# Patient Record
Sex: MALE | Race: BLACK OR AFRICAN AMERICAN | Employment: UNEMPLOYED | URBAN - METROPOLITAN AREA
[De-identification: names, ages, dates, MRNs, and addresses within clinical notes are randomized per-mention and may not be internally consistent; named-entity substitution may affect disease eponyms.]

---

## 2017-01-01 ENCOUNTER — APPOINTMENT (OUTPATIENT)
Dept: ULTRASOUND IMAGING | Facility: CLINIC | Age: 67
DRG: 870 | End: 2017-01-01
Attending: INTERNAL MEDICINE
Payer: MEDICARE

## 2017-01-01 ENCOUNTER — TELEPHONE (OUTPATIENT)
Dept: INTERNAL MEDICINE | Facility: CLINIC | Age: 67
End: 2017-01-01

## 2017-01-01 ENCOUNTER — APPOINTMENT (OUTPATIENT)
Dept: GENERAL RADIOLOGY | Facility: CLINIC | Age: 67
DRG: 870 | End: 2017-01-01
Attending: EMERGENCY MEDICINE
Payer: MEDICARE

## 2017-01-01 ENCOUNTER — HOSPITAL ENCOUNTER (OUTPATIENT)
Dept: LAB | Facility: CLINIC | Age: 67
Discharge: HOME | End: 2017-06-06
Attending: INTERNAL MEDICINE | Admitting: INTERNAL MEDICINE
Payer: MEDICARE

## 2017-01-01 ENCOUNTER — HOSPITAL ENCOUNTER (OUTPATIENT)
Facility: CLINIC | Age: 67
End: 2017-01-01
Admitting: RADIOLOGY
Payer: MEDICARE

## 2017-01-01 ENCOUNTER — APPOINTMENT (OUTPATIENT)
Dept: CT IMAGING | Facility: CLINIC | Age: 67
DRG: 870 | End: 2017-01-01
Attending: EMERGENCY MEDICINE
Payer: MEDICARE

## 2017-01-01 ENCOUNTER — MEDICAL CORRESPONDENCE (OUTPATIENT)
Dept: HEALTH INFORMATION MANAGEMENT | Facility: CLINIC | Age: 67
End: 2017-01-01

## 2017-01-01 ENCOUNTER — APPOINTMENT (OUTPATIENT)
Dept: CT IMAGING | Facility: CLINIC | Age: 67
DRG: 870 | End: 2017-01-01
Attending: INTERNAL MEDICINE
Payer: MEDICARE

## 2017-01-01 ENCOUNTER — APPOINTMENT (OUTPATIENT)
Dept: GENERAL RADIOLOGY | Facility: CLINIC | Age: 67
DRG: 870 | End: 2017-01-01
Attending: INTERNAL MEDICINE
Payer: MEDICARE

## 2017-01-01 ENCOUNTER — APPOINTMENT (OUTPATIENT)
Dept: GENERAL RADIOLOGY | Facility: CLINIC | Age: 67
DRG: 870 | End: 2017-01-01
Attending: SURGERY
Payer: MEDICARE

## 2017-01-01 ENCOUNTER — OFFICE VISIT (OUTPATIENT)
Dept: INTERNAL MEDICINE | Facility: CLINIC | Age: 67
End: 2017-01-01
Payer: MEDICARE

## 2017-01-01 ENCOUNTER — HOSPITAL ENCOUNTER (OUTPATIENT)
Facility: CLINIC | Age: 67
Discharge: HOME | End: 2017-03-28
Attending: RADIOLOGY | Admitting: RADIOLOGY
Payer: MEDICARE

## 2017-01-01 ENCOUNTER — APPOINTMENT (OUTPATIENT)
Dept: GENERAL RADIOLOGY | Facility: CLINIC | Age: 67
End: 2017-01-01
Attending: EMERGENCY MEDICINE
Payer: MEDICARE

## 2017-01-01 ENCOUNTER — APPOINTMENT (OUTPATIENT)
Dept: INTERVENTIONAL RADIOLOGY/VASCULAR | Facility: CLINIC | Age: 67
End: 2017-01-01
Attending: INTERNAL MEDICINE
Payer: MEDICARE

## 2017-01-01 ENCOUNTER — TELEPHONE (OUTPATIENT)
Dept: LAB | Facility: CLINIC | Age: 67
End: 2017-01-01

## 2017-01-01 ENCOUNTER — CARE COORDINATION (OUTPATIENT)
Dept: CARE COORDINATION | Facility: CLINIC | Age: 67
End: 2017-01-01

## 2017-01-01 ENCOUNTER — APPOINTMENT (OUTPATIENT)
Dept: ULTRASOUND IMAGING | Facility: CLINIC | Age: 67
End: 2017-01-01
Attending: RADIOLOGY
Payer: MEDICARE

## 2017-01-01 ENCOUNTER — HOSPITAL ENCOUNTER (OUTPATIENT)
Dept: SPEECH THERAPY | Facility: CLINIC | Age: 67
Setting detail: THERAPIES SERIES
End: 2017-03-09
Attending: INTERNAL MEDICINE
Payer: MEDICARE

## 2017-01-01 ENCOUNTER — HOSPITAL ENCOUNTER (EMERGENCY)
Facility: CLINIC | Age: 67
Discharge: HOME | End: 2017-04-24
Attending: EMERGENCY MEDICINE | Admitting: EMERGENCY MEDICINE
Payer: MEDICARE

## 2017-01-01 ENCOUNTER — HOSPITAL ENCOUNTER (OUTPATIENT)
Dept: GENERAL RADIOLOGY | Facility: CLINIC | Age: 67
Discharge: HOME | End: 2017-03-09
Attending: INTERNAL MEDICINE | Admitting: INTERNAL MEDICINE
Payer: MEDICARE

## 2017-01-01 ENCOUNTER — HOSPITAL ENCOUNTER (INPATIENT)
Facility: CLINIC | Age: 67
LOS: 16 days | Discharge: HOSPICE | DRG: 870 | End: 2017-06-28
Attending: EMERGENCY MEDICINE | Admitting: INTERNAL MEDICINE
Payer: MEDICARE

## 2017-01-01 VITALS
WEIGHT: 130.95 LBS | HEIGHT: 65 IN | BODY MASS INDEX: 21.82 KG/M2 | TEMPERATURE: 98.2 F | SYSTOLIC BLOOD PRESSURE: 135 MMHG | RESPIRATION RATE: 18 BRPM | HEART RATE: 120 BPM | OXYGEN SATURATION: 97 % | DIASTOLIC BLOOD PRESSURE: 45 MMHG

## 2017-01-01 VITALS
RESPIRATION RATE: 18 BRPM | SYSTOLIC BLOOD PRESSURE: 166 MMHG | OXYGEN SATURATION: 97 % | TEMPERATURE: 98.7 F | DIASTOLIC BLOOD PRESSURE: 80 MMHG

## 2017-01-01 VITALS
DIASTOLIC BLOOD PRESSURE: 52 MMHG | OXYGEN SATURATION: 100 % | BODY MASS INDEX: 18.3 KG/M2 | WEIGHT: 110 LBS | SYSTOLIC BLOOD PRESSURE: 102 MMHG | HEART RATE: 80 BPM | TEMPERATURE: 97 F | RESPIRATION RATE: 16 BRPM

## 2017-01-01 VITALS
DIASTOLIC BLOOD PRESSURE: 79 MMHG | RESPIRATION RATE: 15 BRPM | OXYGEN SATURATION: 99 % | HEART RATE: 71 BPM | TEMPERATURE: 97.9 F | SYSTOLIC BLOOD PRESSURE: 137 MMHG

## 2017-01-01 VITALS
OXYGEN SATURATION: 93 % | WEIGHT: 110.23 LBS | HEIGHT: 65 IN | TEMPERATURE: 98 F | DIASTOLIC BLOOD PRESSURE: 70 MMHG | HEART RATE: 102 BPM | BODY MASS INDEX: 18.37 KG/M2 | SYSTOLIC BLOOD PRESSURE: 132 MMHG

## 2017-01-01 VITALS
SYSTOLIC BLOOD PRESSURE: 122 MMHG | HEART RATE: 67 BPM | WEIGHT: 125 LBS | BODY MASS INDEX: 20.83 KG/M2 | DIASTOLIC BLOOD PRESSURE: 54 MMHG | TEMPERATURE: 97.7 F | HEIGHT: 65 IN

## 2017-01-01 DIAGNOSIS — G93.40 ENCEPHALOPATHY: ICD-10-CM

## 2017-01-01 DIAGNOSIS — R11.10 VOMITING, INTRACTABILITY OF VOMITING NOT SPECIFIED, PRESENCE OF NAUSEA NOT SPECIFIED, UNSPECIFIED VOMITING TYPE: ICD-10-CM

## 2017-01-01 DIAGNOSIS — Z93.1 FEEDING BY G-TUBE (H): Primary | ICD-10-CM

## 2017-01-01 DIAGNOSIS — E78.5 HYPERLIPIDEMIA LDL GOAL <100: ICD-10-CM

## 2017-01-01 DIAGNOSIS — N18.5 CKD (CHRONIC KIDNEY DISEASE) STAGE 5, GFR LESS THAN 15 ML/MIN (H): ICD-10-CM

## 2017-01-01 DIAGNOSIS — Z99.2 END STAGE RENAL FAILURE ON DIALYSIS (H): Primary | ICD-10-CM

## 2017-01-01 DIAGNOSIS — Z00.00 ROUTINE GENERAL MEDICAL EXAMINATION AT A HEALTH CARE FACILITY: Primary | ICD-10-CM

## 2017-01-01 DIAGNOSIS — I63.50 CEREBROVASCULAR ACCIDENT (CVA) DUE TO OCCLUSION OF CEREBRAL ARTERY (H): ICD-10-CM

## 2017-01-01 DIAGNOSIS — I10 ESSENTIAL HYPERTENSION: ICD-10-CM

## 2017-01-01 DIAGNOSIS — A41.9 SEVERE SEPSIS (H): ICD-10-CM

## 2017-01-01 DIAGNOSIS — R56.9 SEIZURES (H): ICD-10-CM

## 2017-01-01 DIAGNOSIS — R56.9 CONVULSIONS, UNSPECIFIED CONVULSION TYPE (H): ICD-10-CM

## 2017-01-01 DIAGNOSIS — R56.9 CONVULSIONS, UNSPECIFIED CONVULSION TYPE (H): Primary | ICD-10-CM

## 2017-01-01 DIAGNOSIS — N18.6 ESRF (END STAGE RENAL FAILURE) (H): Primary | ICD-10-CM

## 2017-01-01 DIAGNOSIS — I50.32 CHRONIC DIASTOLIC CONGESTIVE HEART FAILURE (H): ICD-10-CM

## 2017-01-01 DIAGNOSIS — I63.50 CEREBROVASCULAR ACCIDENT (CVA) DUE TO OCCLUSION OF CEREBRAL ARTERY (H): Primary | ICD-10-CM

## 2017-01-01 DIAGNOSIS — R41.89 COGNITIVE IMPAIRMENT: ICD-10-CM

## 2017-01-01 DIAGNOSIS — T17.908S ASPIRATION INTO AIRWAY, SEQUELA: ICD-10-CM

## 2017-01-01 DIAGNOSIS — F03.90 DEMENTIA WITHOUT BEHAVIORAL DISTURBANCE, UNSPECIFIED DEMENTIA TYPE: ICD-10-CM

## 2017-01-01 DIAGNOSIS — Z99.2 HEMODIALYSIS STATUS (H): ICD-10-CM

## 2017-01-01 DIAGNOSIS — N18.6 ESRF (END STAGE RENAL FAILURE) (H): ICD-10-CM

## 2017-01-01 DIAGNOSIS — K59.00 CONSTIPATION, UNSPECIFIED CONSTIPATION TYPE: Primary | ICD-10-CM

## 2017-01-01 DIAGNOSIS — R13.10 DYSPHAGIA, UNSPECIFIED TYPE: ICD-10-CM

## 2017-01-01 DIAGNOSIS — K59.00 CONSTIPATION, UNSPECIFIED CONSTIPATION TYPE: ICD-10-CM

## 2017-01-01 DIAGNOSIS — H61.20 WAX IN EAR: ICD-10-CM

## 2017-01-01 DIAGNOSIS — I50.32 DIASTOLIC CHF, CHRONIC (H): ICD-10-CM

## 2017-01-01 DIAGNOSIS — G81.91 HEMIPLEGIA AFFECTING RIGHT DOMINANT SIDE (H): ICD-10-CM

## 2017-01-01 DIAGNOSIS — R13.10 SWALLOWING IMPAIRED: Primary | ICD-10-CM

## 2017-01-01 DIAGNOSIS — Z93.1 GASTROINTESTINAL TUBE PRESENT (H): ICD-10-CM

## 2017-01-01 DIAGNOSIS — Z99.2 ESRD (END STAGE RENAL DISEASE) ON DIALYSIS (H): ICD-10-CM

## 2017-01-01 DIAGNOSIS — I69.320 APHASIA S/P CVA: ICD-10-CM

## 2017-01-01 DIAGNOSIS — R65.20 SEVERE SEPSIS (H): ICD-10-CM

## 2017-01-01 DIAGNOSIS — I69.359 HEMIPLEGIA AFFECTING DOMINANT SIDE, POST-STROKE (H): ICD-10-CM

## 2017-01-01 DIAGNOSIS — R47.01 EXPRESSIVE APHASIA: ICD-10-CM

## 2017-01-01 DIAGNOSIS — Z93.1 FEEDING BY G-TUBE (H): ICD-10-CM

## 2017-01-01 DIAGNOSIS — R52 PAIN: ICD-10-CM

## 2017-01-01 DIAGNOSIS — R06.03 RESPIRATORY DISTRESS: ICD-10-CM

## 2017-01-01 DIAGNOSIS — N18.6 ESRD (END STAGE RENAL DISEASE) ON DIALYSIS (H): ICD-10-CM

## 2017-01-01 DIAGNOSIS — J69.0 ASPIRATION PNEUMONITIS (H): ICD-10-CM

## 2017-01-01 DIAGNOSIS — N18.6 END STAGE RENAL FAILURE ON DIALYSIS (H): Primary | ICD-10-CM

## 2017-01-01 DIAGNOSIS — R09.89 CHOKING EPISODE: ICD-10-CM

## 2017-01-01 DIAGNOSIS — Z01.818 PREOP GENERAL PHYSICAL EXAM: Primary | ICD-10-CM

## 2017-01-01 LAB
ABO + RH BLD: NORMAL
ABO + RH BLD: NORMAL
ALBUMIN SERPL-MCNC: 1.5 G/DL (ref 3.4–5)
ALBUMIN SERPL-MCNC: 1.6 G/DL (ref 3.4–5)
ALBUMIN SERPL-MCNC: 1.9 G/DL (ref 3.4–5)
ALBUMIN SERPL-MCNC: 1.9 G/DL (ref 3.4–5)
ALBUMIN SERPL-MCNC: 2 G/DL (ref 3.4–5)
ALBUMIN SERPL-MCNC: 2 G/DL (ref 3.4–5)
ALBUMIN SERPL-MCNC: 2.1 G/DL (ref 3.4–5)
ALBUMIN SERPL-MCNC: 2.2 G/DL (ref 3.4–5)
ALBUMIN SERPL-MCNC: 3.2 G/DL (ref 3.4–5)
ALBUMIN SERPL-MCNC: 3.3 G/DL (ref 3.4–5)
ALBUMIN SERPL-MCNC: 3.9 G/DL (ref 3.4–5)
ALP SERPL-CCNC: 113 U/L (ref 40–150)
ALP SERPL-CCNC: 114 U/L (ref 40–150)
ALP SERPL-CCNC: 126 U/L (ref 40–150)
ALP SERPL-CCNC: 135 U/L (ref 40–150)
ALP SERPL-CCNC: 153 U/L (ref 40–150)
ALP SERPL-CCNC: 156 U/L (ref 40–150)
ALP SERPL-CCNC: 219 U/L (ref 40–150)
ALP SERPL-CCNC: 298 U/L (ref 40–150)
ALP SERPL-CCNC: 362 U/L (ref 40–150)
ALT SERPL W P-5'-P-CCNC: 103 U/L (ref 0–70)
ALT SERPL W P-5'-P-CCNC: 133 U/L (ref 0–70)
ALT SERPL W P-5'-P-CCNC: 202 U/L (ref 0–70)
ALT SERPL W P-5'-P-CCNC: 342 U/L (ref 0–70)
ALT SERPL W P-5'-P-CCNC: 40 U/L (ref 0–70)
ALT SERPL W P-5'-P-CCNC: 47 U/L (ref 0–70)
ALT SERPL W P-5'-P-CCNC: 48 U/L (ref 0–70)
ALT SERPL W P-5'-P-CCNC: 70 U/L (ref 0–70)
ALT SERPL W P-5'-P-CCNC: 75 U/L (ref 0–70)
ANION GAP SERPL CALCULATED.3IONS-SCNC: 10 MMOL/L (ref 3–14)
ANION GAP SERPL CALCULATED.3IONS-SCNC: 11 MMOL/L (ref 3–14)
ANION GAP SERPL CALCULATED.3IONS-SCNC: 12 MMOL/L (ref 3–14)
ANION GAP SERPL CALCULATED.3IONS-SCNC: 12 MMOL/L (ref 6–17)
ANION GAP SERPL CALCULATED.3IONS-SCNC: 13 MMOL/L (ref 3–14)
ANION GAP SERPL CALCULATED.3IONS-SCNC: 14 MMOL/L (ref 3–14)
ANION GAP SERPL CALCULATED.3IONS-SCNC: 16 MMOL/L (ref 3–14)
ANION GAP SERPL CALCULATED.3IONS-SCNC: 17 MMOL/L (ref 3–14)
ANION GAP SERPL CALCULATED.3IONS-SCNC: 19 MMOL/L (ref 3–14)
ANION GAP SERPL CALCULATED.3IONS-SCNC: 20 MMOL/L (ref 3–14)
ANION GAP SERPL CALCULATED.3IONS-SCNC: 20 MMOL/L (ref 3–14)
ANION GAP SERPL CALCULATED.3IONS-SCNC: 21 MMOL/L (ref 3–14)
ANION GAP SERPL CALCULATED.3IONS-SCNC: 7 MMOL/L (ref 3–14)
ANION GAP SERPL CALCULATED.3IONS-SCNC: 8 MMOL/L (ref 3–14)
ANION GAP SERPL CALCULATED.3IONS-SCNC: 8 MMOL/L (ref 3–14)
ANION GAP SERPL CALCULATED.3IONS-SCNC: 9 MMOL/L (ref 3–14)
ANION GAP SERPL CALCULATED.3IONS-SCNC: 9 MMOL/L (ref 3–14)
ANISOCYTOSIS BLD QL SMEAR: SLIGHT
APTT PPP: 36 SEC (ref 22–37)
APTT PPP: 36 SEC (ref 22–37)
AST SERPL W P-5'-P-CCNC: 100 U/L (ref 0–45)
AST SERPL W P-5'-P-CCNC: 199 U/L (ref 0–45)
AST SERPL W P-5'-P-CCNC: 23 U/L (ref 0–45)
AST SERPL W P-5'-P-CCNC: 28 U/L (ref 0–45)
AST SERPL W P-5'-P-CCNC: 324 U/L (ref 0–45)
AST SERPL W P-5'-P-CCNC: 44 U/L (ref 0–45)
AST SERPL W P-5'-P-CCNC: 54 U/L (ref 0–45)
AST SERPL W P-5'-P-CCNC: 68 U/L (ref 0–45)
AST SERPL W P-5'-P-CCNC: 72 U/L (ref 0–45)
BACTERIA SPEC CULT: ABNORMAL
BACTERIA SPEC CULT: NO GROWTH
BASE DEFICIT BLDA-SCNC: 13.4 MMOL/L
BASE DEFICIT BLDA-SCNC: 13.9 MMOL/L
BASE DEFICIT BLDA-SCNC: 9.7 MMOL/L
BASE DEFICIT BLDV-SCNC: 11.2 MMOL/L
BASE DEFICIT BLDV-SCNC: 7.7 MMOL/L
BASE EXCESS BLDA CALC-SCNC: 2.1 MMOL/L
BASE EXCESS BLDV CALC-SCNC: 1.5 MMOL/L
BASOPHILS # BLD AUTO: 0 10E9/L (ref 0–0.2)
BASOPHILS # BLD AUTO: 0.1 10E9/L (ref 0–0.2)
BASOPHILS NFR BLD AUTO: 0 %
BASOPHILS NFR BLD AUTO: 0.3 %
BASOPHILS NFR BLD AUTO: 0.4 %
BASOPHILS NFR BLD AUTO: 0.4 %
BASOPHILS NFR BLD AUTO: 1 %
BILIRUB DIRECT SERPL-MCNC: 0.2 MG/DL (ref 0–0.2)
BILIRUB SERPL-MCNC: 0.2 MG/DL (ref 0.2–1.3)
BILIRUB SERPL-MCNC: 0.3 MG/DL (ref 0.2–1.3)
BILIRUB SERPL-MCNC: 0.4 MG/DL (ref 0.2–1.3)
BILIRUB SERPL-MCNC: 0.4 MG/DL (ref 0.2–1.3)
BILIRUB SERPL-MCNC: 0.5 MG/DL (ref 0.2–1.3)
BILIRUB SERPL-MCNC: 0.6 MG/DL (ref 0.2–1.3)
BILIRUB SERPL-MCNC: 0.6 MG/DL (ref 0.2–1.3)
BILIRUB SERPL-MCNC: 0.7 MG/DL (ref 0.2–1.3)
BILIRUB SERPL-MCNC: 1.2 MG/DL (ref 0.2–1.3)
BLD GP AB SCN SERPL QL: NORMAL
BLD PROD TYP BPU: NORMAL
BLD UNIT ID BPU: 0
BLD UNIT ID BPU: 0
BLOOD BANK CMNT PATIENT-IMP: NORMAL
BLOOD PRODUCT CODE: NORMAL
BLOOD PRODUCT CODE: NORMAL
BPU ID: NORMAL
BPU ID: NORMAL
BUN SERPL-MCNC: 124 MG/DL (ref 7–30)
BUN SERPL-MCNC: 134 MG/DL (ref 7–30)
BUN SERPL-MCNC: 143 MG/DL (ref 7–30)
BUN SERPL-MCNC: 147 MG/DL (ref 7–30)
BUN SERPL-MCNC: 150 MG/DL (ref 7–30)
BUN SERPL-MCNC: 150 MG/DL (ref 7–30)
BUN SERPL-MCNC: 161 MG/DL (ref 7–30)
BUN SERPL-MCNC: 19 MG/DL (ref 7–30)
BUN SERPL-MCNC: 30 MG/DL (ref 7–30)
BUN SERPL-MCNC: 34 MG/DL (ref 7–30)
BUN SERPL-MCNC: 38 MG/DL (ref 7–30)
BUN SERPL-MCNC: 46 MG/DL (ref 7–30)
BUN SERPL-MCNC: 47 MG/DL (ref 7–30)
BUN SERPL-MCNC: 53 MG/DL (ref 7–30)
BUN SERPL-MCNC: 55 MG/DL (ref 7–30)
BUN SERPL-MCNC: 62 MG/DL (ref 7–30)
BUN SERPL-MCNC: 67 MG/DL (ref 7–30)
BUN SERPL-MCNC: 67 MG/DL (ref 7–30)
BUN SERPL-MCNC: 68 MG/DL (ref 7–30)
BUN SERPL-MCNC: 85 MG/DL (ref 7–30)
CA-I BLD-MCNC: 4.2 MG/DL (ref 4.4–5.2)
CA-I BLD-SCNC: 3.8 MG/DL (ref 4.4–5.2)
CA-I SERPL ISE-MCNC: 4.6 MG/DL (ref 4.4–5.2)
CALCIUM SERPL-MCNC: 6.6 MG/DL (ref 8.5–10.1)
CALCIUM SERPL-MCNC: 7.1 MG/DL (ref 8.5–10.1)
CALCIUM SERPL-MCNC: 7.2 MG/DL (ref 8.5–10.1)
CALCIUM SERPL-MCNC: 7.3 MG/DL (ref 8.5–10.1)
CALCIUM SERPL-MCNC: 7.4 MG/DL (ref 8.5–10.1)
CALCIUM SERPL-MCNC: 7.4 MG/DL (ref 8.5–10.1)
CALCIUM SERPL-MCNC: 7.5 MG/DL (ref 8.5–10.1)
CALCIUM SERPL-MCNC: 7.6 MG/DL (ref 8.5–10.1)
CALCIUM SERPL-MCNC: 7.8 MG/DL (ref 8.5–10.1)
CALCIUM SERPL-MCNC: 7.8 MG/DL (ref 8.5–10.1)
CALCIUM SERPL-MCNC: 8.2 MG/DL (ref 8.5–10.1)
CALCIUM SERPL-MCNC: 8.3 MG/DL (ref 8.5–10.1)
CALCIUM SERPL-MCNC: 8.3 MG/DL (ref 8.5–10.1)
CALCIUM SERPL-MCNC: 8.9 MG/DL (ref 8.5–10.1)
CALCIUM SERPL-MCNC: 9.3 MG/DL (ref 8.5–10.1)
CALCIUM SERPL-MCNC: 9.5 MG/DL (ref 8.5–10.1)
CALCIUM SERPL-MCNC: 9.7 MG/DL (ref 8.5–10.1)
CHLORIDE BLD-SCNC: 104 MMOL/L (ref 94–109)
CHLORIDE SERPL-SCNC: 100 MMOL/L (ref 94–109)
CHLORIDE SERPL-SCNC: 101 MMOL/L (ref 94–109)
CHLORIDE SERPL-SCNC: 102 MMOL/L (ref 94–109)
CHLORIDE SERPL-SCNC: 102 MMOL/L (ref 94–109)
CHLORIDE SERPL-SCNC: 103 MMOL/L (ref 94–109)
CHLORIDE SERPL-SCNC: 104 MMOL/L (ref 94–109)
CHLORIDE SERPL-SCNC: 104 MMOL/L (ref 94–109)
CHLORIDE SERPL-SCNC: 105 MMOL/L (ref 94–109)
CHLORIDE SERPL-SCNC: 106 MMOL/L (ref 94–109)
CHLORIDE SERPL-SCNC: 107 MMOL/L (ref 94–109)
CHLORIDE SERPL-SCNC: 107 MMOL/L (ref 94–109)
CHLORIDE SERPL-SCNC: 108 MMOL/L (ref 94–109)
CHLORIDE SERPL-SCNC: 110 MMOL/L (ref 94–109)
CHLORIDE SERPL-SCNC: 97 MMOL/L (ref 94–109)
CHLORIDE SERPL-SCNC: 99 MMOL/L (ref 94–109)
CHOLEST SERPL-MCNC: 187 MG/DL
CO2 BLD-SCNC: 22 MMOL/L (ref 20–32)
CO2 BLDCOV-SCNC: 21 MMOL/L (ref 21–28)
CO2 SERPL-SCNC: 14 MMOL/L (ref 20–32)
CO2 SERPL-SCNC: 15 MMOL/L (ref 20–32)
CO2 SERPL-SCNC: 19 MMOL/L (ref 20–32)
CO2 SERPL-SCNC: 21 MMOL/L (ref 20–32)
CO2 SERPL-SCNC: 22 MMOL/L (ref 20–32)
CO2 SERPL-SCNC: 23 MMOL/L (ref 20–32)
CO2 SERPL-SCNC: 26 MMOL/L (ref 20–32)
CO2 SERPL-SCNC: 26 MMOL/L (ref 20–32)
CO2 SERPL-SCNC: 27 MMOL/L (ref 20–32)
CO2 SERPL-SCNC: 28 MMOL/L (ref 20–32)
CO2 SERPL-SCNC: 29 MMOL/L (ref 20–32)
CO2 SERPL-SCNC: 33 MMOL/L (ref 20–32)
CREAT BLD-MCNC: 8.1 MG/DL (ref 0.66–1.25)
CREAT SERPL-MCNC: 2.47 MG/DL (ref 0.66–1.25)
CREAT SERPL-MCNC: 3.02 MG/DL (ref 0.66–1.25)
CREAT SERPL-MCNC: 3.47 MG/DL (ref 0.66–1.25)
CREAT SERPL-MCNC: 3.81 MG/DL (ref 0.66–1.25)
CREAT SERPL-MCNC: 4.08 MG/DL (ref 0.66–1.25)
CREAT SERPL-MCNC: 4.25 MG/DL (ref 0.66–1.25)
CREAT SERPL-MCNC: 4.78 MG/DL (ref 0.66–1.25)
CREAT SERPL-MCNC: 4.84 MG/DL (ref 0.66–1.25)
CREAT SERPL-MCNC: 4.92 MG/DL (ref 0.66–1.25)
CREAT SERPL-MCNC: 5.01 MG/DL (ref 0.66–1.25)
CREAT SERPL-MCNC: 5.58 MG/DL (ref 0.66–1.25)
CREAT SERPL-MCNC: 6.4 MG/DL (ref 0.66–1.25)
CREAT SERPL-MCNC: 6.67 MG/DL (ref 0.66–1.25)
CREAT SERPL-MCNC: 7.77 MG/DL (ref 0.66–1.25)
CREAT SERPL-MCNC: 7.83 MG/DL (ref 0.66–1.25)
CREAT SERPL-MCNC: 7.89 MG/DL (ref 0.66–1.25)
CREAT SERPL-MCNC: 7.92 MG/DL (ref 0.66–1.25)
CREAT SERPL-MCNC: 7.98 MG/DL (ref 0.66–1.25)
CREAT SERPL-MCNC: 9.07 MG/DL (ref 0.66–1.25)
CRP SERPL-MCNC: 366 MG/L (ref 0–8)
DEPRECATED CALCIDIOL+CALCIFEROL SERPL-MC: 85 UG/L (ref 20–75)
DIFFERENTIAL METHOD BLD: ABNORMAL
DOHLE BOD BLD QL SMEAR: PRESENT
EOSINOPHIL # BLD AUTO: 0 10E9/L (ref 0–0.7)
EOSINOPHIL # BLD AUTO: 0 10E9/L (ref 0–0.7)
EOSINOPHIL # BLD AUTO: 0.1 10E9/L (ref 0–0.7)
EOSINOPHIL # BLD AUTO: 0.2 10E9/L (ref 0–0.7)
EOSINOPHIL # BLD AUTO: 0.3 10E9/L (ref 0–0.7)
EOSINOPHIL # BLD AUTO: 0.3 10E9/L (ref 0–0.7)
EOSINOPHIL NFR BLD AUTO: 0 %
EOSINOPHIL NFR BLD AUTO: 0 %
EOSINOPHIL NFR BLD AUTO: 1 %
EOSINOPHIL NFR BLD AUTO: 1.6 %
EOSINOPHIL NFR BLD AUTO: 2 %
EOSINOPHIL NFR BLD AUTO: 4.5 %
ERYTHROCYTE [DISTWIDTH] IN BLOOD BY AUTOMATED COUNT: 13.5 % (ref 10–15)
ERYTHROCYTE [DISTWIDTH] IN BLOOD BY AUTOMATED COUNT: 14.5 % (ref 10–15)
ERYTHROCYTE [DISTWIDTH] IN BLOOD BY AUTOMATED COUNT: 14.8 % (ref 10–15)
ERYTHROCYTE [DISTWIDTH] IN BLOOD BY AUTOMATED COUNT: 14.9 % (ref 10–15)
ERYTHROCYTE [DISTWIDTH] IN BLOOD BY AUTOMATED COUNT: 14.9 % (ref 10–15)
ERYTHROCYTE [DISTWIDTH] IN BLOOD BY AUTOMATED COUNT: 15 % (ref 10–15)
ERYTHROCYTE [DISTWIDTH] IN BLOOD BY AUTOMATED COUNT: 15 % (ref 10–15)
ERYTHROCYTE [DISTWIDTH] IN BLOOD BY AUTOMATED COUNT: 15.1 % (ref 10–15)
ERYTHROCYTE [DISTWIDTH] IN BLOOD BY AUTOMATED COUNT: 15.1 % (ref 10–15)
ERYTHROCYTE [DISTWIDTH] IN BLOOD BY AUTOMATED COUNT: 15.2 % (ref 10–15)
ERYTHROCYTE [DISTWIDTH] IN BLOOD BY AUTOMATED COUNT: 15.3 % (ref 10–15)
ERYTHROCYTE [DISTWIDTH] IN BLOOD BY AUTOMATED COUNT: 15.4 % (ref 10–15)
ERYTHROCYTE [DISTWIDTH] IN BLOOD BY AUTOMATED COUNT: 15.4 % (ref 10–15)
ERYTHROCYTE [DISTWIDTH] IN BLOOD BY AUTOMATED COUNT: 16.5 % (ref 10–15)
GFR SERPL CREATININE-BSD FRML MDRD: 10 ML/MIN/1.7M2
GFR SERPL CREATININE-BSD FRML MDRD: 12 ML/MIN/1.7M2
GFR SERPL CREATININE-BSD FRML MDRD: 14 ML/MIN/1.7M2
GFR SERPL CREATININE-BSD FRML MDRD: 15 ML/MIN/1.7M2
GFR SERPL CREATININE-BSD FRML MDRD: 16 ML/MIN/1.7M2
GFR SERPL CREATININE-BSD FRML MDRD: 18 ML/MIN/1.7M2
GFR SERPL CREATININE-BSD FRML MDRD: 21 ML/MIN/1.7M2
GFR SERPL CREATININE-BSD FRML MDRD: 26 ML/MIN/1.7M2
GFR SERPL CREATININE-BSD FRML MDRD: 6 ML/MIN/1.7M2
GFR SERPL CREATININE-BSD FRML MDRD: 7 ML/MIN/1.7M2
GFR SERPL CREATININE-BSD FRML MDRD: 8 ML/MIN/1.7M2
GFR SERPL CREATININE-BSD FRML MDRD: 9 ML/MIN/1.7M2
GFR SERPL CREATININE-BSD FRML MDRD: ABNORMAL ML/MIN/1.7M2
GLUCOSE BLD-MCNC: 283 MG/DL (ref 70–99)
GLUCOSE BLDC GLUCOMTR-MCNC: 101 MG/DL (ref 70–99)
GLUCOSE BLDC GLUCOMTR-MCNC: 103 MG/DL (ref 70–99)
GLUCOSE BLDC GLUCOMTR-MCNC: 116 MG/DL (ref 70–99)
GLUCOSE BLDC GLUCOMTR-MCNC: 118 MG/DL (ref 70–99)
GLUCOSE BLDC GLUCOMTR-MCNC: 118 MG/DL (ref 70–99)
GLUCOSE BLDC GLUCOMTR-MCNC: 119 MG/DL (ref 70–99)
GLUCOSE BLDC GLUCOMTR-MCNC: 121 MG/DL (ref 70–99)
GLUCOSE BLDC GLUCOMTR-MCNC: 122 MG/DL (ref 70–99)
GLUCOSE BLDC GLUCOMTR-MCNC: 123 MG/DL (ref 70–99)
GLUCOSE BLDC GLUCOMTR-MCNC: 128 MG/DL (ref 70–99)
GLUCOSE BLDC GLUCOMTR-MCNC: 129 MG/DL (ref 70–99)
GLUCOSE BLDC GLUCOMTR-MCNC: 130 MG/DL (ref 70–99)
GLUCOSE BLDC GLUCOMTR-MCNC: 133 MG/DL (ref 70–99)
GLUCOSE BLDC GLUCOMTR-MCNC: 134 MG/DL (ref 70–99)
GLUCOSE BLDC GLUCOMTR-MCNC: 135 MG/DL (ref 70–99)
GLUCOSE BLDC GLUCOMTR-MCNC: 136 MG/DL (ref 70–99)
GLUCOSE BLDC GLUCOMTR-MCNC: 136 MG/DL (ref 70–99)
GLUCOSE BLDC GLUCOMTR-MCNC: 137 MG/DL (ref 70–99)
GLUCOSE BLDC GLUCOMTR-MCNC: 138 MG/DL (ref 70–99)
GLUCOSE BLDC GLUCOMTR-MCNC: 138 MG/DL (ref 70–99)
GLUCOSE BLDC GLUCOMTR-MCNC: 14 MG/DL (ref 70–99)
GLUCOSE BLDC GLUCOMTR-MCNC: 140 MG/DL (ref 70–99)
GLUCOSE BLDC GLUCOMTR-MCNC: 140 MG/DL (ref 70–99)
GLUCOSE BLDC GLUCOMTR-MCNC: 141 MG/DL (ref 70–99)
GLUCOSE BLDC GLUCOMTR-MCNC: 142 MG/DL (ref 70–99)
GLUCOSE BLDC GLUCOMTR-MCNC: 143 MG/DL (ref 70–99)
GLUCOSE BLDC GLUCOMTR-MCNC: 145 MG/DL (ref 70–99)
GLUCOSE BLDC GLUCOMTR-MCNC: 146 MG/DL (ref 70–99)
GLUCOSE BLDC GLUCOMTR-MCNC: 147 MG/DL (ref 70–99)
GLUCOSE BLDC GLUCOMTR-MCNC: 148 MG/DL (ref 70–99)
GLUCOSE BLDC GLUCOMTR-MCNC: 149 MG/DL (ref 70–99)
GLUCOSE BLDC GLUCOMTR-MCNC: 151 MG/DL (ref 70–99)
GLUCOSE BLDC GLUCOMTR-MCNC: 153 MG/DL (ref 70–99)
GLUCOSE BLDC GLUCOMTR-MCNC: 154 MG/DL (ref 70–99)
GLUCOSE BLDC GLUCOMTR-MCNC: 158 MG/DL (ref 70–99)
GLUCOSE BLDC GLUCOMTR-MCNC: 158 MG/DL (ref 70–99)
GLUCOSE BLDC GLUCOMTR-MCNC: 16 MG/DL (ref 70–99)
GLUCOSE BLDC GLUCOMTR-MCNC: 160 MG/DL (ref 70–99)
GLUCOSE BLDC GLUCOMTR-MCNC: 162 MG/DL (ref 70–99)
GLUCOSE BLDC GLUCOMTR-MCNC: 166 MG/DL (ref 70–99)
GLUCOSE BLDC GLUCOMTR-MCNC: 168 MG/DL (ref 70–99)
GLUCOSE BLDC GLUCOMTR-MCNC: 173 MG/DL (ref 70–99)
GLUCOSE BLDC GLUCOMTR-MCNC: 176 MG/DL (ref 70–99)
GLUCOSE BLDC GLUCOMTR-MCNC: 177 MG/DL (ref 70–99)
GLUCOSE BLDC GLUCOMTR-MCNC: 177 MG/DL (ref 70–99)
GLUCOSE BLDC GLUCOMTR-MCNC: 179 MG/DL (ref 70–99)
GLUCOSE BLDC GLUCOMTR-MCNC: 180 MG/DL (ref 70–99)
GLUCOSE BLDC GLUCOMTR-MCNC: 181 MG/DL (ref 70–99)
GLUCOSE BLDC GLUCOMTR-MCNC: 182 MG/DL (ref 70–99)
GLUCOSE BLDC GLUCOMTR-MCNC: 183 MG/DL (ref 70–99)
GLUCOSE BLDC GLUCOMTR-MCNC: 184 MG/DL (ref 70–99)
GLUCOSE BLDC GLUCOMTR-MCNC: 189 MG/DL (ref 70–99)
GLUCOSE BLDC GLUCOMTR-MCNC: 197 MG/DL (ref 70–99)
GLUCOSE BLDC GLUCOMTR-MCNC: 199 MG/DL (ref 70–99)
GLUCOSE BLDC GLUCOMTR-MCNC: 201 MG/DL (ref 70–99)
GLUCOSE BLDC GLUCOMTR-MCNC: 211 MG/DL (ref 70–99)
GLUCOSE BLDC GLUCOMTR-MCNC: 212 MG/DL (ref 70–99)
GLUCOSE BLDC GLUCOMTR-MCNC: 23 MG/DL (ref 70–99)
GLUCOSE BLDC GLUCOMTR-MCNC: 233 MG/DL (ref 70–99)
GLUCOSE BLDC GLUCOMTR-MCNC: 234 MG/DL (ref 70–99)
GLUCOSE BLDC GLUCOMTR-MCNC: 261 MG/DL (ref 70–99)
GLUCOSE BLDC GLUCOMTR-MCNC: 262 MG/DL (ref 70–99)
GLUCOSE BLDC GLUCOMTR-MCNC: 279 MG/DL (ref 70–99)
GLUCOSE BLDC GLUCOMTR-MCNC: 300 MG/DL (ref 70–99)
GLUCOSE BLDC GLUCOMTR-MCNC: 31 MG/DL (ref 70–99)
GLUCOSE BLDC GLUCOMTR-MCNC: 370 MG/DL (ref 70–99)
GLUCOSE BLDC GLUCOMTR-MCNC: 394 MG/DL (ref 70–99)
GLUCOSE BLDC GLUCOMTR-MCNC: 44 MG/DL (ref 70–99)
GLUCOSE BLDC GLUCOMTR-MCNC: 54 MG/DL (ref 70–99)
GLUCOSE BLDC GLUCOMTR-MCNC: 60 MG/DL (ref 70–99)
GLUCOSE BLDC GLUCOMTR-MCNC: 85 MG/DL (ref 70–99)
GLUCOSE BLDC GLUCOMTR-MCNC: 89 MG/DL (ref 70–99)
GLUCOSE BLDC GLUCOMTR-MCNC: 91 MG/DL (ref 70–99)
GLUCOSE BLDC GLUCOMTR-MCNC: 92 MG/DL (ref 70–99)
GLUCOSE BLDC GLUCOMTR-MCNC: 94 MG/DL (ref 70–99)
GLUCOSE BLDC GLUCOMTR-MCNC: 96 MG/DL (ref 70–99)
GLUCOSE SERPL-MCNC: 111 MG/DL (ref 70–99)
GLUCOSE SERPL-MCNC: 122 MG/DL (ref 70–99)
GLUCOSE SERPL-MCNC: 125 MG/DL (ref 70–99)
GLUCOSE SERPL-MCNC: 140 MG/DL (ref 70–99)
GLUCOSE SERPL-MCNC: 142 MG/DL (ref 70–99)
GLUCOSE SERPL-MCNC: 147 MG/DL (ref 70–99)
GLUCOSE SERPL-MCNC: 147 MG/DL (ref 70–99)
GLUCOSE SERPL-MCNC: 151 MG/DL (ref 70–99)
GLUCOSE SERPL-MCNC: 152 MG/DL (ref 70–99)
GLUCOSE SERPL-MCNC: 154 MG/DL (ref 70–99)
GLUCOSE SERPL-MCNC: 158 MG/DL (ref 70–99)
GLUCOSE SERPL-MCNC: 169 MG/DL (ref 70–99)
GLUCOSE SERPL-MCNC: 188 MG/DL (ref 70–99)
GLUCOSE SERPL-MCNC: 197 MG/DL (ref 70–99)
GLUCOSE SERPL-MCNC: 205 MG/DL (ref 70–99)
GLUCOSE SERPL-MCNC: 286 MG/DL (ref 70–99)
GLUCOSE SERPL-MCNC: 294 MG/DL (ref 70–99)
GLUCOSE SERPL-MCNC: 437 MG/DL (ref 70–99)
GLUCOSE SERPL-MCNC: 82 MG/DL (ref 70–99)
GLUCOSE SERPL-MCNC: 88 MG/DL (ref 70–99)
GRAM STN SPEC: ABNORMAL
GRAM STN SPEC: ABNORMAL
HBA1C MFR BLD: 5.1 % (ref 4.3–6)
HCO3 BLD-SCNC: 12 MMOL/L (ref 21–28)
HCO3 BLD-SCNC: 13 MMOL/L (ref 21–28)
HCO3 BLD-SCNC: 16 MMOL/L (ref 21–28)
HCO3 BLD-SCNC: 25 MMOL/L (ref 21–28)
HCO3 BLDV-SCNC: 15 MMOL/L (ref 21–28)
HCO3 BLDV-SCNC: 18 MMOL/L (ref 21–28)
HCO3 BLDV-SCNC: 26 MMOL/L (ref 21–28)
HCT VFR BLD AUTO: 22.5 % (ref 40–53)
HCT VFR BLD AUTO: 22.9 % (ref 40–53)
HCT VFR BLD AUTO: 23.6 % (ref 40–53)
HCT VFR BLD AUTO: 23.9 % (ref 40–53)
HCT VFR BLD AUTO: 24.2 % (ref 40–53)
HCT VFR BLD AUTO: 24.3 % (ref 40–53)
HCT VFR BLD AUTO: 25.6 % (ref 40–53)
HCT VFR BLD AUTO: 25.7 % (ref 40–53)
HCT VFR BLD AUTO: 25.9 % (ref 40–53)
HCT VFR BLD AUTO: 26.4 % (ref 40–53)
HCT VFR BLD AUTO: 27.7 % (ref 40–53)
HCT VFR BLD AUTO: 28 % (ref 40–53)
HCT VFR BLD AUTO: 29.2 % (ref 40–53)
HCT VFR BLD AUTO: 31.1 % (ref 40–53)
HCT VFR BLD AUTO: 35 % (ref 40–53)
HCT VFR BLD AUTO: 38.2 % (ref 40–53)
HCT VFR BLD CALC: 39 %PCV (ref 40–53)
HDLC SERPL-MCNC: 80 MG/DL
HGB BLD CALC-MCNC: 13.3 G/DL (ref 13.3–17.7)
HGB BLD-MCNC: 10 G/DL (ref 13.3–17.7)
HGB BLD-MCNC: 11.7 G/DL (ref 13.3–17.7)
HGB BLD-MCNC: 11.7 G/DL (ref 13.3–17.7)
HGB BLD-MCNC: 7 G/DL (ref 13.3–17.7)
HGB BLD-MCNC: 7.1 G/DL (ref 13.3–17.7)
HGB BLD-MCNC: 7.3 G/DL (ref 13.3–17.7)
HGB BLD-MCNC: 7.5 G/DL (ref 13.3–17.7)
HGB BLD-MCNC: 7.6 G/DL (ref 13.3–17.7)
HGB BLD-MCNC: 7.7 G/DL (ref 13.3–17.7)
HGB BLD-MCNC: 7.7 G/DL (ref 13.3–17.7)
HGB BLD-MCNC: 8 G/DL (ref 13.3–17.7)
HGB BLD-MCNC: 8.2 G/DL (ref 13.3–17.7)
HGB BLD-MCNC: 8.2 G/DL (ref 13.3–17.7)
HGB BLD-MCNC: 8.4 G/DL (ref 13.3–17.7)
HGB BLD-MCNC: 8.8 G/DL (ref 13.3–17.7)
HGB BLD-MCNC: 9.4 G/DL (ref 13.3–17.7)
HGB BLD-MCNC: 9.7 G/DL (ref 13.3–17.7)
HYPOCHROMIA BLD QL: PRESENT
IMM GRANULOCYTES # BLD: 0 10E9/L (ref 0–0.4)
IMM GRANULOCYTES # BLD: 0.2 10E9/L (ref 0–0.4)
IMM GRANULOCYTES # BLD: 0.2 10E9/L (ref 0–0.4)
IMM GRANULOCYTES NFR BLD: 0.1 %
IMM GRANULOCYTES NFR BLD: 1.3 %
IMM GRANULOCYTES NFR BLD: 1.3 %
INR PPP: 1.03 (ref 0.86–1.14)
INR PPP: 1.06 (ref 0.86–1.14)
INR PPP: 1.23 (ref 0.86–1.14)
INR PPP: 1.38 (ref 0.86–1.14)
INR PPP: 1.57 (ref 0.86–1.14)
INR PPP: 1.76 (ref 0.86–1.14)
INTERPRETATION ECG - MUSE: NORMAL
INTERPRETATION ECG - MUSE: NORMAL
LACTATE BLD-SCNC: 0.6 MMOL/L (ref 0.7–2.1)
LACTATE BLD-SCNC: 0.9 MMOL/L (ref 0.7–2.1)
LACTATE BLD-SCNC: 1.1 MMOL/L (ref 0.7–2.1)
LACTATE BLD-SCNC: 1.3 MMOL/L (ref 0.7–2.1)
LACTATE BLD-SCNC: 2.7 MMOL/L (ref 0.7–2.1)
LACTATE BLD-SCNC: 4.1 MMOL/L (ref 0.7–2.1)
LACTATE BLD-SCNC: 4.4 MMOL/L (ref 0.7–2.1)
LACTATE BLD-SCNC: 4.4 MMOL/L (ref 0.7–2.1)
LACTATE BLD-SCNC: 4.6 MMOL/L (ref 0.7–2.1)
LACTATE BLD-SCNC: 4.7 MMOL/L (ref 0.7–2.1)
LACTATE BLD-SCNC: ABNORMAL MMOL/L (ref 0.7–2.1)
LACTATE SERPL-SCNC: 4.7 MMOL/L (ref 0.4–2)
LDLC SERPL CALC-MCNC: 89 MG/DL
LYMPHOCYTES # BLD AUTO: 0.4 10E9/L (ref 0.8–5.3)
LYMPHOCYTES # BLD AUTO: 0.5 10E9/L (ref 0.8–5.3)
LYMPHOCYTES # BLD AUTO: 0.6 10E9/L (ref 0.8–5.3)
LYMPHOCYTES # BLD AUTO: 0.8 10E9/L (ref 0.8–5.3)
LYMPHOCYTES # BLD AUTO: 1 10E9/L (ref 0.8–5.3)
LYMPHOCYTES # BLD AUTO: 1.2 10E9/L (ref 0.8–5.3)
LYMPHOCYTES # BLD AUTO: 1.3 10E9/L (ref 0.8–5.3)
LYMPHOCYTES # BLD AUTO: 1.5 10E9/L (ref 0.8–5.3)
LYMPHOCYTES NFR BLD AUTO: 12 %
LYMPHOCYTES NFR BLD AUTO: 20.8 %
LYMPHOCYTES NFR BLD AUTO: 3 %
LYMPHOCYTES NFR BLD AUTO: 3.5 %
LYMPHOCYTES NFR BLD AUTO: 4 %
LYMPHOCYTES NFR BLD AUTO: 4.5 %
LYMPHOCYTES NFR BLD AUTO: 5 %
LYMPHOCYTES NFR BLD AUTO: 5.2 %
LYMPHOCYTES NFR BLD AUTO: 7 %
LYMPHOCYTES NFR BLD AUTO: 7 %
Lab: ABNORMAL
Lab: NORMAL
MACROCYTES BLD QL SMEAR: PRESENT
MAGNESIUM SERPL-MCNC: 1.4 MG/DL (ref 1.6–2.3)
MAGNESIUM SERPL-MCNC: 1.7 MG/DL (ref 1.6–2.3)
MAGNESIUM SERPL-MCNC: 2 MG/DL (ref 1.6–2.3)
MAGNESIUM SERPL-MCNC: 2.7 MG/DL (ref 1.6–2.3)
MAGNESIUM SERPL-MCNC: 2.9 MG/DL (ref 1.6–2.3)
MAGNESIUM SERPL-MCNC: 3.1 MG/DL (ref 1.6–2.3)
MCH RBC QN AUTO: 28.7 PG (ref 26.5–33)
MCH RBC QN AUTO: 29.1 PG (ref 26.5–33)
MCH RBC QN AUTO: 29.4 PG (ref 26.5–33)
MCH RBC QN AUTO: 29.6 PG (ref 26.5–33)
MCH RBC QN AUTO: 29.7 PG (ref 26.5–33)
MCH RBC QN AUTO: 29.7 PG (ref 26.5–33)
MCH RBC QN AUTO: 29.8 PG (ref 26.5–33)
MCH RBC QN AUTO: 29.9 PG (ref 26.5–33)
MCH RBC QN AUTO: 29.9 PG (ref 26.5–33)
MCH RBC QN AUTO: 30 PG (ref 26.5–33)
MCH RBC QN AUTO: 30.1 PG (ref 26.5–33)
MCH RBC QN AUTO: 30.2 PG (ref 26.5–33)
MCHC RBC AUTO-ENTMCNC: 30.6 G/DL (ref 31.5–36.5)
MCHC RBC AUTO-ENTMCNC: 30.9 G/DL (ref 31.5–36.5)
MCHC RBC AUTO-ENTMCNC: 30.9 G/DL (ref 31.5–36.5)
MCHC RBC AUTO-ENTMCNC: 31.1 G/DL (ref 31.5–36.5)
MCHC RBC AUTO-ENTMCNC: 31.4 G/DL (ref 31.5–36.5)
MCHC RBC AUTO-ENTMCNC: 31.6 G/DL (ref 31.5–36.5)
MCHC RBC AUTO-ENTMCNC: 31.8 G/DL (ref 31.5–36.5)
MCHC RBC AUTO-ENTMCNC: 31.9 G/DL (ref 31.5–36.5)
MCHC RBC AUTO-ENTMCNC: 32 G/DL (ref 31.5–36.5)
MCHC RBC AUTO-ENTMCNC: 32.2 G/DL (ref 31.5–36.5)
MCHC RBC AUTO-ENTMCNC: 32.7 G/DL (ref 31.5–36.5)
MCHC RBC AUTO-ENTMCNC: 33.2 G/DL (ref 31.5–36.5)
MCHC RBC AUTO-ENTMCNC: 33.4 G/DL (ref 31.5–36.5)
MCHC RBC AUTO-ENTMCNC: 33.9 G/DL (ref 31.5–36.5)
MCV RBC AUTO: 87 FL (ref 78–100)
MCV RBC AUTO: 89 FL (ref 78–100)
MCV RBC AUTO: 89 FL (ref 78–100)
MCV RBC AUTO: 90 FL (ref 78–100)
MCV RBC AUTO: 92 FL (ref 78–100)
MCV RBC AUTO: 92 FL (ref 78–100)
MCV RBC AUTO: 93 FL (ref 78–100)
MCV RBC AUTO: 95 FL (ref 78–100)
MCV RBC AUTO: 96 FL (ref 78–100)
MCV RBC AUTO: 97 FL (ref 78–100)
METAMYELOCYTES # BLD: 0.4 10E9/L
METAMYELOCYTES # BLD: 1.7 10E9/L
METAMYELOCYTES NFR BLD MANUAL: 4 %
METAMYELOCYTES NFR BLD MANUAL: 5 %
MICRO REPORT STATUS: ABNORMAL
MICRO REPORT STATUS: NORMAL
MICROORGANISM SPEC CULT: ABNORMAL
MONOCYTES # BLD AUTO: 0.3 10E9/L (ref 0–1.3)
MONOCYTES # BLD AUTO: 0.4 10E9/L (ref 0–1.3)
MONOCYTES # BLD AUTO: 0.5 10E9/L (ref 0–1.3)
MONOCYTES # BLD AUTO: 0.5 10E9/L (ref 0–1.3)
MONOCYTES # BLD AUTO: 0.6 10E9/L (ref 0–1.3)
MONOCYTES # BLD AUTO: 0.7 10E9/L (ref 0–1.3)
MONOCYTES # BLD AUTO: 0.7 10E9/L (ref 0–1.3)
MONOCYTES # BLD AUTO: 0.8 10E9/L (ref 0–1.3)
MONOCYTES # BLD AUTO: 0.9 10E9/L (ref 0–1.3)
MONOCYTES # BLD AUTO: 5.6 10E9/L (ref 0–1.3)
MONOCYTES NFR BLD AUTO: 13 %
MONOCYTES NFR BLD AUTO: 3 %
MONOCYTES NFR BLD AUTO: 4 %
MONOCYTES NFR BLD AUTO: 4 %
MONOCYTES NFR BLD AUTO: 5 %
MONOCYTES NFR BLD AUTO: 5.6 %
MONOCYTES NFR BLD AUTO: 6 %
MONOCYTES NFR BLD AUTO: 6.5 %
MONOCYTES NFR BLD AUTO: 6.7 %
MONOCYTES NFR BLD AUTO: 8 %
MRSA DNA SPEC QL NAA+PROBE: NORMAL
NEUTROPHILS # BLD AUTO: 10.5 10E9/L (ref 1.6–8.3)
NEUTROPHILS # BLD AUTO: 12 10E9/L (ref 1.6–8.3)
NEUTROPHILS # BLD AUTO: 12 10E9/L (ref 1.6–8.3)
NEUTROPHILS # BLD AUTO: 12.6 10E9/L (ref 1.6–8.3)
NEUTROPHILS # BLD AUTO: 34.4 10E9/L (ref 1.6–8.3)
NEUTROPHILS # BLD AUTO: 4.8 10E9/L (ref 1.6–8.3)
NEUTROPHILS # BLD AUTO: 6 10E9/L (ref 1.6–8.3)
NEUTROPHILS # BLD AUTO: 8.1 10E9/L (ref 1.6–8.3)
NEUTROPHILS # BLD AUTO: 9.2 10E9/L (ref 1.6–8.3)
NEUTROPHILS # BLD AUTO: 9.7 10E9/L (ref 1.6–8.3)
NEUTROPHILS NFR BLD AUTO: 67.5 %
NEUTROPHILS NFR BLD AUTO: 80 %
NEUTROPHILS NFR BLD AUTO: 81 %
NEUTROPHILS NFR BLD AUTO: 82 %
NEUTROPHILS NFR BLD AUTO: 84 %
NEUTROPHILS NFR BLD AUTO: 86 %
NEUTROPHILS NFR BLD AUTO: 86.6 %
NEUTROPHILS NFR BLD AUTO: 87.2 %
NEUTROPHILS NFR BLD AUTO: 89 %
NEUTROPHILS NFR BLD AUTO: 91 %
NONHDLC SERPL-MCNC: 107 MG/DL
NRBC # BLD AUTO: 0 10*3/UL
NRBC # BLD AUTO: 0.1 10*3/UL
NRBC BLD AUTO-RTO: 0 /100
NRBC BLD AUTO-RTO: 1 /100
NUM BPU REQUESTED: 2
O2/TOTAL GAS SETTING VFR VENT: ABNORMAL %
O2/TOTAL GAS SETTING VFR VENT: NORMAL %
OXYHGB MFR BLD: 96 % (ref 92–100)
OXYHGB MFR BLD: 97 % (ref 92–100)
OXYHGB MFR BLD: 97 % (ref 92–100)
OXYHGB MFR BLD: 99 % (ref 92–100)
OXYHGB MFR BLDV: 38 %
OXYHGB MFR BLDV: 71 %
PCO2 BLD: 26 MM HG (ref 35–45)
PCO2 BLD: 28 MM HG (ref 35–45)
PCO2 BLD: 31 MM HG (ref 35–45)
PCO2 BLD: 32 MM HG (ref 35–45)
PCO2 BLDV: 32 MM HG (ref 40–50)
PCO2 BLDV: 36 MM HG (ref 40–50)
PCO2 BLDV: 41 MM HG (ref 40–50)
PCO2 BLDV: 44 MM HG (ref 40–50)
PH BLD: 7.26 PH (ref 7.35–7.45)
PH BLD: 7.27 PH (ref 7.35–7.45)
PH BLD: 7.3 PH (ref 7.35–7.45)
PH BLD: 7.52 PH (ref 7.35–7.45)
PH BLDV: 7.27 PH (ref 7.32–7.43)
PH BLDV: 7.28 PH (ref 7.32–7.43)
PH BLDV: 7.31 PH (ref 7.32–7.43)
PH BLDV: 7.41 PH (ref 7.32–7.43)
PHENYTOIN FREE SERPL-MCNC: 3.63 UG/ML
PHENYTOIN SERPL-MCNC: 15.7 MG/L (ref 10–20)
PHENYTOIN SERPL-MCNC: 23.8 MG/L (ref 10–20)
PHOSPHATE SERPL-MCNC: 2.4 MG/DL (ref 2.5–4.5)
PHOSPHATE SERPL-MCNC: 2.8 MG/DL (ref 2.5–4.5)
PHOSPHATE SERPL-MCNC: 3.7 MG/DL (ref 2.5–4.5)
PHOSPHATE SERPL-MCNC: 4.6 MG/DL (ref 2.5–4.5)
PHOSPHATE SERPL-MCNC: 5 MG/DL (ref 2.5–4.5)
PHOSPHATE SERPL-MCNC: 5.6 MG/DL (ref 2.5–4.5)
PHOSPHATE SERPL-MCNC: 6.1 MG/DL (ref 2.5–4.5)
PHOSPHATE SERPL-MCNC: 6.7 MG/DL (ref 2.5–4.5)
PLATELET # BLD AUTO: 166 10E9/L (ref 150–450)
PLATELET # BLD AUTO: 171 10E9/L (ref 150–450)
PLATELET # BLD AUTO: 194 10E9/L (ref 150–450)
PLATELET # BLD AUTO: 197 10E9/L (ref 150–450)
PLATELET # BLD AUTO: 199 10E9/L (ref 150–450)
PLATELET # BLD AUTO: 206 10E9/L (ref 150–450)
PLATELET # BLD AUTO: 222 10E9/L (ref 150–450)
PLATELET # BLD AUTO: 255 10E9/L (ref 150–450)
PLATELET # BLD AUTO: 283 10E9/L (ref 150–450)
PLATELET # BLD AUTO: 301 10E9/L (ref 150–450)
PLATELET # BLD AUTO: 312 10E9/L (ref 150–450)
PLATELET # BLD AUTO: 340 10E9/L (ref 150–450)
PLATELET # BLD AUTO: 347 10E9/L (ref 150–450)
PLATELET # BLD AUTO: 352 10E9/L (ref 150–450)
PLATELET # BLD AUTO: 409 10E9/L (ref 150–450)
PLATELET # BLD AUTO: 424 10E9/L (ref 150–450)
PLATELET # BLD EST: NORMAL 10*3/UL
PO2 BLD: 108 MM HG (ref 80–105)
PO2 BLD: 111 MM HG (ref 80–105)
PO2 BLD: 228 MM HG (ref 80–105)
PO2 BLD: 94 MM HG (ref 80–105)
PO2 BLDV: 17 MM HG (ref 25–47)
PO2 BLDV: 26 MM HG (ref 25–47)
PO2 BLDV: 40 MM HG (ref 25–47)
PO2 BLDV: 43 MM HG (ref 25–47)
POLYCHROMASIA BLD QL SMEAR: SLIGHT
POLYCHROMASIA BLD QL SMEAR: SLIGHT
POTASSIUM BLD-SCNC: 5.8 MMOL/L (ref 3.4–5.3)
POTASSIUM SERPL-SCNC: 3.1 MMOL/L (ref 3.4–5.3)
POTASSIUM SERPL-SCNC: 3.2 MMOL/L (ref 3.4–5.3)
POTASSIUM SERPL-SCNC: 3.4 MMOL/L (ref 3.4–5.3)
POTASSIUM SERPL-SCNC: 3.4 MMOL/L (ref 3.4–5.3)
POTASSIUM SERPL-SCNC: 3.5 MMOL/L (ref 3.4–5.3)
POTASSIUM SERPL-SCNC: 3.5 MMOL/L (ref 3.4–5.3)
POTASSIUM SERPL-SCNC: 3.7 MMOL/L (ref 3.4–5.3)
POTASSIUM SERPL-SCNC: 3.7 MMOL/L (ref 3.4–5.3)
POTASSIUM SERPL-SCNC: 3.8 MMOL/L (ref 3.4–5.3)
POTASSIUM SERPL-SCNC: 4.1 MMOL/L (ref 3.4–5.3)
POTASSIUM SERPL-SCNC: 4.2 MMOL/L (ref 3.4–5.3)
POTASSIUM SERPL-SCNC: 4.3 MMOL/L (ref 3.4–5.3)
POTASSIUM SERPL-SCNC: 4.4 MMOL/L (ref 3.4–5.3)
POTASSIUM SERPL-SCNC: 4.6 MMOL/L (ref 3.4–5.3)
POTASSIUM SERPL-SCNC: 5 MMOL/L (ref 3.4–5.3)
POTASSIUM SERPL-SCNC: 5.5 MMOL/L (ref 3.4–5.3)
POTASSIUM SERPL-SCNC: 5.6 MMOL/L (ref 3.4–5.3)
POTASSIUM SERPL-SCNC: 5.7 MMOL/L (ref 3.4–5.3)
POTASSIUM SERPL-SCNC: 5.8 MMOL/L (ref 3.4–5.3)
POTASSIUM SERPL-SCNC: 8 MMOL/L (ref 3.4–5.3)
PREALB SERPL IA-MCNC: 5 MG/DL (ref 15–45)
PREALB SERPL IA-MCNC: 9 MG/DL (ref 15–45)
PROCALCITONIN SERPL-MCNC: 69.7 NG/ML
PROT SERPL-MCNC: 5.2 G/DL (ref 6.8–8.8)
PROT SERPL-MCNC: 5.7 G/DL (ref 6.8–8.8)
PROT SERPL-MCNC: 5.7 G/DL (ref 6.8–8.8)
PROT SERPL-MCNC: 5.9 G/DL (ref 6.8–8.8)
PROT SERPL-MCNC: 6 G/DL (ref 6.8–8.8)
PROT SERPL-MCNC: 6.4 G/DL (ref 6.8–8.8)
PROT SERPL-MCNC: 7.8 G/DL (ref 6.8–8.8)
PROT SERPL-MCNC: 8.5 G/DL (ref 6.8–8.8)
PROT SERPL-MCNC: 9.4 G/DL (ref 6.8–8.8)
RBC # BLD AUTO: 2.36 10E12/L (ref 4.4–5.9)
RBC # BLD AUTO: 2.42 10E12/L (ref 4.4–5.9)
RBC # BLD AUTO: 2.54 10E12/L (ref 4.4–5.9)
RBC # BLD AUTO: 2.55 10E12/L (ref 4.4–5.9)
RBC # BLD AUTO: 2.59 10E12/L (ref 4.4–5.9)
RBC # BLD AUTO: 2.6 10E12/L (ref 4.4–5.9)
RBC # BLD AUTO: 2.66 10E12/L (ref 4.4–5.9)
RBC # BLD AUTO: 2.72 10E12/L (ref 4.4–5.9)
RBC # BLD AUTO: 2.74 10E12/L (ref 4.4–5.9)
RBC # BLD AUTO: 2.8 10E12/L (ref 4.4–5.9)
RBC # BLD AUTO: 2.91 10E12/L (ref 4.4–5.9)
RBC # BLD AUTO: 3.11 10E12/L (ref 4.4–5.9)
RBC # BLD AUTO: 3.26 10E12/L (ref 4.4–5.9)
RBC # BLD AUTO: 3.49 10E12/L (ref 4.4–5.9)
RBC # BLD AUTO: 3.94 10E12/L (ref 4.4–5.9)
RBC # BLD AUTO: 4.02 10E12/L (ref 4.4–5.9)
RBC MORPH BLD: ABNORMAL
RBC MORPH BLD: ABNORMAL
SAO2 % BLDV FROM PO2: 19 %
SODIUM BLD-SCNC: 138 MMOL/L (ref 133–144)
SODIUM SERPL-SCNC: 133 MMOL/L (ref 133–144)
SODIUM SERPL-SCNC: 135 MMOL/L (ref 133–144)
SODIUM SERPL-SCNC: 137 MMOL/L (ref 133–144)
SODIUM SERPL-SCNC: 137 MMOL/L (ref 133–144)
SODIUM SERPL-SCNC: 138 MMOL/L (ref 133–144)
SODIUM SERPL-SCNC: 138 MMOL/L (ref 133–144)
SODIUM SERPL-SCNC: 139 MMOL/L (ref 133–144)
SODIUM SERPL-SCNC: 140 MMOL/L (ref 133–144)
SODIUM SERPL-SCNC: 140 MMOL/L (ref 133–144)
SODIUM SERPL-SCNC: 141 MMOL/L (ref 133–144)
SODIUM SERPL-SCNC: 142 MMOL/L (ref 133–144)
SODIUM SERPL-SCNC: 143 MMOL/L (ref 133–144)
SODIUM SERPL-SCNC: 143 MMOL/L (ref 133–144)
SODIUM SERPL-SCNC: 144 MMOL/L (ref 133–144)
SODIUM SERPL-SCNC: 146 MMOL/L (ref 133–144)
SODIUM SERPL-SCNC: 146 MMOL/L (ref 133–144)
SPECIMEN EXP DATE BLD: NORMAL
SPECIMEN SOURCE: ABNORMAL
SPECIMEN SOURCE: NORMAL
TARGETS BLD QL SMEAR: SLIGHT
TARGETS BLD QL SMEAR: SLIGHT
TOXIC GRANULES BLD QL SMEAR: PRESENT
TRANSFUSION STATUS PATIENT QL: NORMAL
TRIGL SERPL-MCNC: 140 MG/DL
TRIGL SERPL-MCNC: 89 MG/DL
TROPONIN I BLD-MCNC: 0.18 UG/L (ref 0–0.1)
TROPONIN I SERPL-MCNC: 0.03 UG/L (ref 0–0.04)
TROPONIN I SERPL-MCNC: 0.12 UG/L (ref 0–0.04)
TROPONIN I SERPL-MCNC: 0.13 UG/L (ref 0–0.04)
TSH SERPL DL<=0.005 MIU/L-ACNC: 1.56 MU/L (ref 0.4–4)
WBC # BLD AUTO: 10.3 10E9/L (ref 4–11)
WBC # BLD AUTO: 10.9 10E9/L (ref 4–11)
WBC # BLD AUTO: 11 10E9/L (ref 4–11)
WBC # BLD AUTO: 11.5 10E9/L (ref 4–11)
WBC # BLD AUTO: 11.6 10E9/L (ref 4–11)
WBC # BLD AUTO: 12 10E9/L (ref 4–11)
WBC # BLD AUTO: 12.6 10E9/L (ref 4–11)
WBC # BLD AUTO: 13.2 10E9/L (ref 4–11)
WBC # BLD AUTO: 14 10E9/L (ref 4–11)
WBC # BLD AUTO: 14.5 10E9/L (ref 4–11)
WBC # BLD AUTO: 15.8 10E9/L (ref 4–11)
WBC # BLD AUTO: 43 10E9/L (ref 4–11)
WBC # BLD AUTO: 6.3 10E9/L (ref 4–11)
WBC # BLD AUTO: 7.1 10E9/L (ref 4–11)
WBC # BLD AUTO: 7.4 10E9/L (ref 4–11)
WBC # BLD AUTO: 9.9 10E9/L (ref 4–11)
YEAST SPEC QL CULT: ABNORMAL

## 2017-01-01 PROCEDURE — 25000128 H RX IP 250 OP 636: Performed by: CLINICAL NURSE SPECIALIST

## 2017-01-01 PROCEDURE — 25000125 ZZHC RX 250: Performed by: RADIOLOGY

## 2017-01-01 PROCEDURE — 80185 ASSAY OF PHENYTOIN TOTAL: CPT | Performed by: INTERNAL MEDICINE

## 2017-01-01 PROCEDURE — 40000901 ZZH STATISTIC WOC PT EDUCATION, 0-15 MIN

## 2017-01-01 PROCEDURE — 25000125 ZZHC RX 250: Performed by: CLINICAL NURSE SPECIALIST

## 2017-01-01 PROCEDURE — C1769 GUIDE WIRE: HCPCS

## 2017-01-01 PROCEDURE — 80048 BASIC METABOLIC PNL TOTAL CA: CPT | Performed by: INTERNAL MEDICINE

## 2017-01-01 PROCEDURE — 87070 CULTURE OTHR SPECIMN AEROBIC: CPT | Performed by: INTERNAL MEDICINE

## 2017-01-01 PROCEDURE — G8998 SWALLOW D/C STATUS: HCPCS | Mod: GN,CN

## 2017-01-01 PROCEDURE — 87106 FUNGI IDENTIFICATION YEAST: CPT | Performed by: INTERNAL MEDICINE

## 2017-01-01 PROCEDURE — 25000132 ZZH RX MED GY IP 250 OP 250 PS 637: Mod: GY | Performed by: INTERNAL MEDICINE

## 2017-01-01 PROCEDURE — 99222 1ST HOSP IP/OBS MODERATE 55: CPT | Performed by: SURGERY

## 2017-01-01 PROCEDURE — 25000132 ZZH RX MED GY IP 250 OP 250 PS 637: Mod: GY

## 2017-01-01 PROCEDURE — 40000275 ZZH STATISTIC RCP TIME EA 10 MIN

## 2017-01-01 PROCEDURE — 94640 AIRWAY INHALATION TREATMENT: CPT | Mod: 76

## 2017-01-01 PROCEDURE — 25000125 ZZHC RX 250: Performed by: INTERNAL MEDICINE

## 2017-01-01 PROCEDURE — 83605 ASSAY OF LACTIC ACID: CPT | Performed by: INTERNAL MEDICINE

## 2017-01-01 PROCEDURE — 25000128 H RX IP 250 OP 636: Performed by: INTERNAL MEDICINE

## 2017-01-01 PROCEDURE — 85025 COMPLETE CBC W/AUTO DIFF WBC: CPT | Performed by: EMERGENCY MEDICINE

## 2017-01-01 PROCEDURE — 94640 AIRWAY INHALATION TREATMENT: CPT

## 2017-01-01 PROCEDURE — 80069 RENAL FUNCTION PANEL: CPT | Performed by: INTERNAL MEDICINE

## 2017-01-01 PROCEDURE — 99232 SBSQ HOSP IP/OBS MODERATE 35: CPT | Performed by: CLINICAL NURSE SPECIALIST

## 2017-01-01 PROCEDURE — 87077 CULTURE AEROBIC IDENTIFY: CPT | Performed by: INTERNAL MEDICINE

## 2017-01-01 PROCEDURE — 12000007 ZZH R&B INTERMEDIATE

## 2017-01-01 PROCEDURE — 84478 ASSAY OF TRIGLYCERIDES: CPT | Performed by: INTERNAL MEDICINE

## 2017-01-01 PROCEDURE — 82805 BLOOD GASES W/O2 SATURATION: CPT | Performed by: INTERNAL MEDICINE

## 2017-01-01 PROCEDURE — P9041 ALBUMIN (HUMAN),5%, 50ML: HCPCS | Performed by: INTERNAL MEDICINE

## 2017-01-01 PROCEDURE — 87040 BLOOD CULTURE FOR BACTERIA: CPT | Performed by: INTERNAL MEDICINE

## 2017-01-01 PROCEDURE — 0DP6XUZ REMOVAL OF FEEDING DEVICE FROM STOMACH, EXTERNAL APPROACH: ICD-10-PCS | Performed by: RADIOLOGY

## 2017-01-01 PROCEDURE — 99207 ZZC CDG-MDM COMPONENT: MEETS MODERATE - UP CODED: CPT | Performed by: INTERNAL MEDICINE

## 2017-01-01 PROCEDURE — 99291 CRITICAL CARE FIRST HOUR: CPT | Performed by: SURGERY

## 2017-01-01 PROCEDURE — 85025 COMPLETE CBC W/AUTO DIFF WBC: CPT | Performed by: INTERNAL MEDICINE

## 2017-01-01 PROCEDURE — A9270 NON-COVERED ITEM OR SERVICE: HCPCS | Mod: GY | Performed by: INTERNAL MEDICINE

## 2017-01-01 PROCEDURE — 85027 COMPLETE CBC AUTOMATED: CPT | Performed by: RADIOLOGY

## 2017-01-01 PROCEDURE — 99233 SBSQ HOSP IP/OBS HIGH 50: CPT | Performed by: INTERNAL MEDICINE

## 2017-01-01 PROCEDURE — 36415 COLL VENOUS BLD VENIPUNCTURE: CPT | Performed by: RADIOLOGY

## 2017-01-01 PROCEDURE — 00000146 ZZHCL STATISTIC GLUCOSE BY METER IP

## 2017-01-01 PROCEDURE — 83605 ASSAY OF LACTIC ACID: CPT | Performed by: EMERGENCY MEDICINE

## 2017-01-01 PROCEDURE — 84145 PROCALCITONIN (PCT): CPT | Performed by: INTERNAL MEDICINE

## 2017-01-01 PROCEDURE — 99239 HOSP IP/OBS DSCHRG MGMT >30: CPT | Performed by: INTERNAL MEDICINE

## 2017-01-01 PROCEDURE — 63400005 ZZH RX 634: Performed by: INTERNAL MEDICINE

## 2017-01-01 PROCEDURE — 71010 XR CHEST PORT 1 VW: CPT

## 2017-01-01 PROCEDURE — 71010 XR CHEST 1 VW: CPT

## 2017-01-01 PROCEDURE — 49440 PLACE GASTROSTOMY TUBE PERC: CPT

## 2017-01-01 PROCEDURE — 36415 COLL VENOUS BLD VENIPUNCTURE: CPT | Performed by: INTERNAL MEDICINE

## 2017-01-01 PROCEDURE — 87040 BLOOD CULTURE FOR BACTERIA: CPT | Performed by: EMERGENCY MEDICINE

## 2017-01-01 PROCEDURE — 27210905 ZZH KIT CR7

## 2017-01-01 PROCEDURE — 25000128 H RX IP 250 OP 636

## 2017-01-01 PROCEDURE — 25000125 ZZHC RX 250

## 2017-01-01 PROCEDURE — 94003 VENT MGMT INPAT SUBQ DAY: CPT

## 2017-01-01 PROCEDURE — 99356 ZZC PROLONGED SERV,INPATIENT,1ST HR: CPT | Performed by: CLINICAL NURSE SPECIALIST

## 2017-01-01 PROCEDURE — 87181 SC STD AGAR DILUTION PER AGT: CPT | Performed by: INTERNAL MEDICINE

## 2017-01-01 PROCEDURE — 27210995 ZZH RX 272

## 2017-01-01 PROCEDURE — 20000003 ZZH R&B ICU

## 2017-01-01 PROCEDURE — 70450 CT HEAD/BRAIN W/O DYE: CPT

## 2017-01-01 PROCEDURE — 25000131 ZZH RX MED GY IP 250 OP 636 PS 637: Mod: GY | Performed by: INTERNAL MEDICINE

## 2017-01-01 PROCEDURE — 27210432 ZZH NUTRITION PRODUCT RENAL BASIC LITER

## 2017-01-01 PROCEDURE — 86900 BLOOD TYPING SEROLOGIC ABO: CPT | Performed by: INTERNAL MEDICINE

## 2017-01-01 PROCEDURE — A9270 NON-COVERED ITEM OR SERVICE: HCPCS | Mod: GY

## 2017-01-01 PROCEDURE — A9270 NON-COVERED ITEM OR SERVICE: HCPCS | Mod: GY | Performed by: EMERGENCY MEDICINE

## 2017-01-01 PROCEDURE — 80053 COMPREHEN METABOLIC PANEL: CPT | Performed by: EMERGENCY MEDICINE

## 2017-01-01 PROCEDURE — 85610 PROTHROMBIN TIME: CPT | Performed by: RADIOLOGY

## 2017-01-01 PROCEDURE — 86850 RBC ANTIBODY SCREEN: CPT | Performed by: INTERNAL MEDICINE

## 2017-01-01 PROCEDURE — 83735 ASSAY OF MAGNESIUM: CPT | Performed by: INTERNAL MEDICINE

## 2017-01-01 PROCEDURE — 82947 ASSAY GLUCOSE BLOOD QUANT: CPT | Performed by: INTERNAL MEDICINE

## 2017-01-01 PROCEDURE — 83735 ASSAY OF MAGNESIUM: CPT | Performed by: EMERGENCY MEDICINE

## 2017-01-01 PROCEDURE — 27210995 ZZH RX 272: Performed by: INTERNAL MEDICINE

## 2017-01-01 PROCEDURE — 36620 INSERTION CATHETER ARTERY: CPT | Performed by: INTERNAL MEDICINE

## 2017-01-01 PROCEDURE — 3E0436Z INTRODUCTION OF NUTRITIONAL SUBSTANCE INTO CENTRAL VEIN, PERCUTANEOUS APPROACH: ICD-10-PCS | Performed by: INTERNAL MEDICINE

## 2017-01-01 PROCEDURE — A9270 NON-COVERED ITEM OR SERVICE: HCPCS | Mod: GY | Performed by: CLINICAL NURSE SPECIALIST

## 2017-01-01 PROCEDURE — 87103 BLOOD FUNGUS CULTURE: CPT | Performed by: INTERNAL MEDICINE

## 2017-01-01 PROCEDURE — G0402 INITIAL PREVENTIVE EXAM: HCPCS | Performed by: INTERNAL MEDICINE

## 2017-01-01 PROCEDURE — 87205 SMEAR GRAM STAIN: CPT | Performed by: INTERNAL MEDICINE

## 2017-01-01 PROCEDURE — 99285 EMERGENCY DEPT VISIT HI MDM: CPT | Mod: 25

## 2017-01-01 PROCEDURE — 93971 EXTREMITY STUDY: CPT | Mod: RT

## 2017-01-01 PROCEDURE — 74176 CT ABD & PELVIS W/O CONTRAST: CPT

## 2017-01-01 PROCEDURE — 82330 ASSAY OF CALCIUM: CPT | Performed by: INTERNAL MEDICINE

## 2017-01-01 PROCEDURE — 80053 COMPREHEN METABOLIC PANEL: CPT | Performed by: INTERNAL MEDICINE

## 2017-01-01 PROCEDURE — 85610 PROTHROMBIN TIME: CPT | Performed by: INTERNAL MEDICINE

## 2017-01-01 PROCEDURE — 87800 DETECT AGNT MULT DNA DIREC: CPT | Performed by: INTERNAL MEDICINE

## 2017-01-01 PROCEDURE — 3E043XZ INTRODUCTION OF VASOPRESSOR INTO CENTRAL VEIN, PERCUTANEOUS APPROACH: ICD-10-PCS | Performed by: INTERNAL MEDICINE

## 2017-01-01 PROCEDURE — 40000281 ZZH STATISTIC TRANSPORT TIME EA 15 MIN

## 2017-01-01 PROCEDURE — 25000132 ZZH RX MED GY IP 250 OP 250 PS 637: Mod: GY | Performed by: CLINICAL NURSE SPECIALIST

## 2017-01-01 PROCEDURE — 84484 ASSAY OF TROPONIN QUANT: CPT | Performed by: INTERNAL MEDICINE

## 2017-01-01 PROCEDURE — 84134 ASSAY OF PREALBUMIN: CPT | Performed by: INTERNAL MEDICINE

## 2017-01-01 PROCEDURE — 93005 ELECTROCARDIOGRAM TRACING: CPT

## 2017-01-01 PROCEDURE — 76705 ECHO EXAM OF ABDOMEN: CPT

## 2017-01-01 PROCEDURE — 85610 PROTHROMBIN TIME: CPT | Performed by: EMERGENCY MEDICINE

## 2017-01-01 PROCEDURE — 25800025 ZZH RX 258: Performed by: INTERNAL MEDICINE

## 2017-01-01 PROCEDURE — 85014 HEMATOCRIT: CPT

## 2017-01-01 PROCEDURE — 84443 ASSAY THYROID STIM HORMONE: CPT | Performed by: INTERNAL MEDICINE

## 2017-01-01 PROCEDURE — 0DHA3UZ INSERTION OF FEEDING DEVICE INTO JEJUNUM, PERCUTANEOUS APPROACH: ICD-10-PCS | Performed by: RADIOLOGY

## 2017-01-01 PROCEDURE — 99233 SBSQ HOSP IP/OBS HIGH 50: CPT | Performed by: CLINICAL NURSE SPECIALIST

## 2017-01-01 PROCEDURE — 99291 CRITICAL CARE FIRST HOUR: CPT | Mod: 25 | Performed by: INTERNAL MEDICINE

## 2017-01-01 PROCEDURE — 84100 ASSAY OF PHOSPHORUS: CPT | Performed by: INTERNAL MEDICINE

## 2017-01-01 PROCEDURE — 84484 ASSAY OF TROPONIN QUANT: CPT

## 2017-01-01 PROCEDURE — 85027 COMPLETE CBC AUTOMATED: CPT | Performed by: INTERNAL MEDICINE

## 2017-01-01 PROCEDURE — 99232 SBSQ HOSP IP/OBS MODERATE 35: CPT | Performed by: SURGERY

## 2017-01-01 PROCEDURE — 86923 COMPATIBILITY TEST ELECTRIC: CPT | Performed by: INTERNAL MEDICINE

## 2017-01-01 PROCEDURE — 90937 HEMODIALYSIS REPEATED EVAL: CPT

## 2017-01-01 PROCEDURE — 99212 OFFICE O/P EST SF 10 MIN: CPT

## 2017-01-01 PROCEDURE — 99231 SBSQ HOSP IP/OBS SF/LOW 25: CPT | Performed by: CLINICAL NURSE SPECIALIST

## 2017-01-01 PROCEDURE — 40000211 ZZHC STATISTIC SLP  DEPARTMENT VISIT

## 2017-01-01 PROCEDURE — 99291 CRITICAL CARE FIRST HOUR: CPT | Performed by: INTERNAL MEDICINE

## 2017-01-01 PROCEDURE — 74000 XR ABDOMEN PORT F1 VW: CPT

## 2017-01-01 PROCEDURE — 25000125 ZZHC RX 250: Performed by: NURSE PRACTITIONER

## 2017-01-01 PROCEDURE — 99231 SBSQ HOSP IP/OBS SF/LOW 25: CPT | Performed by: INTERNAL MEDICINE

## 2017-01-01 PROCEDURE — 40000983 ZZH STATISTIC HFNC ADULT NON-CPAP

## 2017-01-01 PROCEDURE — 85027 COMPLETE CBC AUTOMATED: CPT | Performed by: EMERGENCY MEDICINE

## 2017-01-01 PROCEDURE — 49446 CHANGE G-TUBE TO G-J PERC: CPT

## 2017-01-01 PROCEDURE — 82803 BLOOD GASES ANY COMBINATION: CPT | Performed by: INTERNAL MEDICINE

## 2017-01-01 PROCEDURE — 83036 HEMOGLOBIN GLYCOSYLATED A1C: CPT | Performed by: INTERNAL MEDICINE

## 2017-01-01 PROCEDURE — 25000132 ZZH RX MED GY IP 250 OP 250 PS 637: Mod: GY | Performed by: EMERGENCY MEDICINE

## 2017-01-01 PROCEDURE — 84100 ASSAY OF PHOSPHORUS: CPT | Performed by: EMERGENCY MEDICINE

## 2017-01-01 PROCEDURE — 80061 LIPID PANEL: CPT | Performed by: INTERNAL MEDICINE

## 2017-01-01 PROCEDURE — 85018 HEMOGLOBIN: CPT | Performed by: INTERNAL MEDICINE

## 2017-01-01 PROCEDURE — 93971 EXTREMITY STUDY: CPT | Mod: XS,RT

## 2017-01-01 PROCEDURE — 27210300 ZZH CANNULA HIGH FLOW, ADULT

## 2017-01-01 PROCEDURE — 25000128 H RX IP 250 OP 636: Performed by: RADIOLOGY

## 2017-01-01 PROCEDURE — 94002 VENT MGMT INPAT INIT DAY: CPT

## 2017-01-01 PROCEDURE — 40000257 ZZH STATISTIC CONSULT NO CHARGE VASC ACCESS

## 2017-01-01 PROCEDURE — G8996 SWALLOW CURRENT STATUS: HCPCS | Mod: GN,CN

## 2017-01-01 PROCEDURE — 99211 OFF/OP EST MAY X REQ PHY/QHP: CPT

## 2017-01-01 PROCEDURE — 85730 THROMBOPLASTIN TIME PARTIAL: CPT | Performed by: RADIOLOGY

## 2017-01-01 PROCEDURE — 74178 CT ABD&PLV WO CNTR FLWD CNTR: CPT

## 2017-01-01 PROCEDURE — 80047 BASIC METABLC PNL IONIZED CA: CPT

## 2017-01-01 PROCEDURE — 74020 XR ABDOMEN PORT 2 VW: CPT

## 2017-01-01 PROCEDURE — 80076 HEPATIC FUNCTION PANEL: CPT | Performed by: INTERNAL MEDICINE

## 2017-01-01 PROCEDURE — 99223 1ST HOSP IP/OBS HIGH 75: CPT | Performed by: NURSE PRACTITIONER

## 2017-01-01 PROCEDURE — 87641 MR-STAPH DNA AMP PROBE: CPT | Performed by: INTERNAL MEDICINE

## 2017-01-01 PROCEDURE — 82805 BLOOD GASES W/O2 SATURATION: CPT | Performed by: EMERGENCY MEDICINE

## 2017-01-01 PROCEDURE — 86901 BLOOD TYPING SEROLOGIC RH(D): CPT | Performed by: INTERNAL MEDICINE

## 2017-01-01 PROCEDURE — 85730 THROMBOPLASTIN TIME PARTIAL: CPT | Performed by: EMERGENCY MEDICINE

## 2017-01-01 PROCEDURE — 92611 MOTION FLUOROSCOPY/SWALLOW: CPT | Mod: GN

## 2017-01-01 PROCEDURE — 99215 OFFICE O/P EST HI 40 MIN: CPT | Performed by: NURSE PRACTITIONER

## 2017-01-01 PROCEDURE — 99233 SBSQ HOSP IP/OBS HIGH 50: CPT | Performed by: NURSE PRACTITIONER

## 2017-01-01 PROCEDURE — 87640 STAPH A DNA AMP PROBE: CPT | Performed by: INTERNAL MEDICINE

## 2017-01-01 PROCEDURE — 40000854 ZZH STATISTIC SIMPLE TUBE INSERTION/CHARGE, PORT, CATH, FISTULOGRAM

## 2017-01-01 PROCEDURE — 36600 WITHDRAWAL OF ARTERIAL BLOOD: CPT

## 2017-01-01 PROCEDURE — 87186 SC STD MICRODIL/AGAR DIL: CPT | Performed by: INTERNAL MEDICINE

## 2017-01-01 PROCEDURE — 5A1D60Z PERFORMANCE OF URINARY FILTRATION, MULTIPLE: ICD-10-PCS | Performed by: INTERNAL MEDICINE

## 2017-01-01 PROCEDURE — 80186 ASSAY OF PHENYTOIN FREE: CPT | Performed by: INTERNAL MEDICINE

## 2017-01-01 PROCEDURE — 84484 ASSAY OF TROPONIN QUANT: CPT | Performed by: EMERGENCY MEDICINE

## 2017-01-01 PROCEDURE — G8997 SWALLOW GOAL STATUS: HCPCS | Mod: GN,CN

## 2017-01-01 PROCEDURE — 25000128 H RX IP 250 OP 636: Performed by: EMERGENCY MEDICINE

## 2017-01-01 PROCEDURE — 27210915 ZZ H TUBE GASTRO CR4

## 2017-01-01 PROCEDURE — 99223 1ST HOSP IP/OBS HIGH 75: CPT | Mod: AI | Performed by: INTERNAL MEDICINE

## 2017-01-01 PROCEDURE — 99232 SBSQ HOSP IP/OBS MODERATE 35: CPT | Performed by: INTERNAL MEDICINE

## 2017-01-01 PROCEDURE — 25000125 ZZHC RX 250: Performed by: EMERGENCY MEDICINE

## 2017-01-01 PROCEDURE — 84132 ASSAY OF SERUM POTASSIUM: CPT | Performed by: EMERGENCY MEDICINE

## 2017-01-01 PROCEDURE — 99214 OFFICE O/P EST MOD 30 MIN: CPT | Performed by: FAMILY MEDICINE

## 2017-01-01 PROCEDURE — 40000354 US MARKINGS

## 2017-01-01 PROCEDURE — 27211040 ZZH CONTINUOUS NEBULIZER MICRO PUMP

## 2017-01-01 PROCEDURE — 82803 BLOOD GASES ANY COMBINATION: CPT | Performed by: EMERGENCY MEDICINE

## 2017-01-01 PROCEDURE — P9016 RBC LEUKOCYTES REDUCED: HCPCS | Performed by: INTERNAL MEDICINE

## 2017-01-01 PROCEDURE — 5A1955Z RESPIRATORY VENTILATION, GREATER THAN 96 CONSECUTIVE HOURS: ICD-10-PCS | Performed by: INTERNAL MEDICINE

## 2017-01-01 PROCEDURE — 40000809 ZZH STATISTIC NO DOCUMENTATION TO SUPPORT CHARGE

## 2017-01-01 PROCEDURE — 86140 C-REACTIVE PROTEIN: CPT | Performed by: INTERNAL MEDICINE

## 2017-01-01 PROCEDURE — 82306 VITAMIN D 25 HYDROXY: CPT | Performed by: EMERGENCY MEDICINE

## 2017-01-01 PROCEDURE — 40000986 XR CHEST PORT 1 VW

## 2017-01-01 RX ORDER — ALBUMIN (HUMAN) 12.5 G/50ML
50 SOLUTION INTRAVENOUS
Status: DISCONTINUED | OUTPATIENT
Start: 2017-01-01 | End: 2017-01-01

## 2017-01-01 RX ORDER — NALOXONE HYDROCHLORIDE 0.4 MG/ML
.1-.4 INJECTION, SOLUTION INTRAMUSCULAR; INTRAVENOUS; SUBCUTANEOUS
Status: DISCONTINUED | OUTPATIENT
Start: 2017-01-01 | End: 2017-01-01

## 2017-01-01 RX ORDER — SIMVASTATIN 40 MG
40 TABLET ORAL AT BEDTIME
Qty: 90 TABLET | Refills: 1 | Status: CANCELLED | OUTPATIENT
Start: 2017-01-01

## 2017-01-01 RX ORDER — NICOTINE POLACRILEX 4 MG
15-30 LOZENGE BUCCAL
Status: DISCONTINUED | OUTPATIENT
Start: 2017-01-01 | End: 2017-01-01

## 2017-01-01 RX ORDER — LIDOCAINE HYDROCHLORIDE 10 MG/ML
1-30 INJECTION, SOLUTION EPIDURAL; INFILTRATION; INTRACAUDAL; PERINEURAL
Status: COMPLETED | OUTPATIENT
Start: 2017-01-01 | End: 2017-01-01

## 2017-01-01 RX ORDER — HYDROMORPHONE HCL/0.9% NACL/PF 0.2MG/0.2
0.2 SYRINGE (ML) INTRAVENOUS
Status: DISCONTINUED | OUTPATIENT
Start: 2017-01-01 | End: 2017-01-01

## 2017-01-01 RX ORDER — IPRATROPIUM BROMIDE AND ALBUTEROL SULFATE 2.5; .5 MG/3ML; MG/3ML
3 SOLUTION RESPIRATORY (INHALATION) EVERY 4 HOURS PRN
Status: DISCONTINUED | OUTPATIENT
Start: 2017-01-01 | End: 2017-01-01 | Stop reason: HOSPADM

## 2017-01-01 RX ORDER — SIMVASTATIN 40 MG
40 TABLET ORAL EVERY EVENING
Status: DISCONTINUED | OUTPATIENT
Start: 2017-01-01 | End: 2017-01-01

## 2017-01-01 RX ORDER — SIMVASTATIN 40 MG
40 TABLET ORAL AT BEDTIME
Qty: 90 TABLET | Refills: 1 | Status: SHIPPED | OUTPATIENT
Start: 2017-01-01 | End: 2017-01-01

## 2017-01-01 RX ORDER — DONEPEZIL HYDROCHLORIDE 10 MG/1
10 TABLET, ORALLY DISINTEGRATING ORAL DAILY
Qty: 90 TABLET | Refills: 1 | Status: SHIPPED | OUTPATIENT
Start: 2017-01-01 | End: 2017-01-01

## 2017-01-01 RX ORDER — CALCIUM CARBONATE 750 MG/1
750 TABLET, CHEWABLE ORAL 3 TIMES DAILY
Qty: 90 TABLET | COMMUNITY
Start: 2017-01-01 | End: 2017-01-01

## 2017-01-01 RX ORDER — LORAZEPAM 2 MG/ML
0.5 CONCENTRATE ORAL EVERY 4 HOURS PRN
Qty: 30 ML | Refills: 0 | Status: SHIPPED | OUTPATIENT
Start: 2017-01-01 | End: 2017-01-01

## 2017-01-01 RX ORDER — LORAZEPAM 0.5 MG/1
.5-1 TABLET ORAL
Status: DISCONTINUED | OUTPATIENT
Start: 2017-01-01 | End: 2017-01-01 | Stop reason: HOSPADM

## 2017-01-01 RX ORDER — GLYCOPYRROLATE 0.2 MG/ML
0.3 INJECTION, SOLUTION INTRAMUSCULAR; INTRAVENOUS EVERY 4 HOURS
Status: DISCONTINUED | OUTPATIENT
Start: 2017-01-01 | End: 2017-01-01

## 2017-01-01 RX ORDER — DOXERCALCIFEROL 4 UG/2ML
2 INJECTION INTRAVENOUS SEE ADMIN INSTRUCTIONS
Status: DISCONTINUED | OUTPATIENT
Start: 2017-01-01 | End: 2017-01-01

## 2017-01-01 RX ORDER — DOCUSATE SODIUM 50 MG/5ML
10 LIQUID ORAL 2 TIMES DAILY
Qty: 1800 ML | Refills: 1 | Status: SHIPPED | OUTPATIENT
Start: 2017-01-01 | End: 2017-01-01

## 2017-01-01 RX ORDER — ALBUTEROL SULFATE 0.83 MG/ML
2.5 SOLUTION RESPIRATORY (INHALATION)
Status: DISCONTINUED | OUTPATIENT
Start: 2017-01-01 | End: 2017-01-01 | Stop reason: HOSPADM

## 2017-01-01 RX ORDER — ACETAMINOPHEN 10 MG/ML
1000 INJECTION, SOLUTION INTRAVENOUS EVERY 8 HOURS PRN
Status: DISCONTINUED | OUTPATIENT
Start: 2017-01-01 | End: 2017-01-01

## 2017-01-01 RX ORDER — LIDOCAINE 40 MG/G
CREAM TOPICAL
Status: DISCONTINUED | OUTPATIENT
Start: 2017-01-01 | End: 2017-01-01 | Stop reason: HOSPADM

## 2017-01-01 RX ORDER — AMPICILLIN AND SULBACTAM 2; 1 G/1; G/1
3 INJECTION, POWDER, FOR SOLUTION INTRAMUSCULAR; INTRAVENOUS DAILY
Status: DISCONTINUED | OUTPATIENT
Start: 2017-01-01 | End: 2017-01-01 | Stop reason: DRUGHIGH

## 2017-01-01 RX ORDER — ACETAMINOPHEN 650 MG/1
650 SUPPOSITORY RECTAL EVERY 4 HOURS PRN
Status: DISCONTINUED | OUTPATIENT
Start: 2017-01-01 | End: 2017-01-01 | Stop reason: HOSPADM

## 2017-01-01 RX ORDER — MORPHINE SULFATE 10 MG/5ML
5-10 SOLUTION ORAL
Status: DISCONTINUED | OUTPATIENT
Start: 2017-01-01 | End: 2017-01-01

## 2017-01-01 RX ORDER — OXYCODONE HCL 20 MG/ML
5 CONCENTRATE, ORAL ORAL
Status: DISCONTINUED | OUTPATIENT
Start: 2017-01-01 | End: 2017-01-01 | Stop reason: HOSPADM

## 2017-01-01 RX ORDER — BARIUM SULFATE 400 MG/ML
SUSPENSION ORAL ONCE
Status: DISCONTINUED | OUTPATIENT
Start: 2017-01-01 | End: 2017-01-01 | Stop reason: CLARIF

## 2017-01-01 RX ORDER — ALBUMIN, HUMAN INJ 5% 5 %
250 SOLUTION INTRAVENOUS
Status: COMPLETED | OUTPATIENT
Start: 2017-01-01 | End: 2017-01-01

## 2017-01-01 RX ORDER — FENTANYL CITRATE 50 UG/ML
25-50 INJECTION, SOLUTION INTRAMUSCULAR; INTRAVENOUS EVERY 5 MIN PRN
Status: DISCONTINUED | OUTPATIENT
Start: 2017-01-01 | End: 2017-01-01 | Stop reason: HOSPADM

## 2017-01-01 RX ORDER — PROCHLORPERAZINE MALEATE 5 MG
5 TABLET ORAL EVERY 6 HOURS PRN
Status: DISCONTINUED | OUTPATIENT
Start: 2017-01-01 | End: 2017-01-01

## 2017-01-01 RX ORDER — ONDANSETRON 2 MG/ML
4 INJECTION INTRAMUSCULAR; INTRAVENOUS ONCE
Status: DISCONTINUED | OUTPATIENT
Start: 2017-01-01 | End: 2017-01-01 | Stop reason: HOSPADM

## 2017-01-01 RX ORDER — CALCIUM CARBONATE 500 MG/1
1 TABLET, CHEWABLE ORAL DAILY
Qty: 150 TABLET | Refills: 1 | Status: ON HOLD | OUTPATIENT
Start: 2017-01-01 | End: 2017-01-01

## 2017-01-01 RX ORDER — LORAZEPAM 0.5 MG/1
1 TABLET ORAL EVERY 4 HOURS PRN
Qty: 30 TABLET | Refills: 1 | Status: SHIPPED | OUTPATIENT
Start: 2017-01-01

## 2017-01-01 RX ORDER — SALIVA STIMULANT COMB. NO.3
2 SPRAY, NON-AEROSOL (ML) MUCOUS MEMBRANE
Status: DISCONTINUED | OUTPATIENT
Start: 2017-01-01 | End: 2017-01-01 | Stop reason: HOSPADM

## 2017-01-01 RX ORDER — SIMVASTATIN 40 MG
40 TABLET ORAL AT BEDTIME
Qty: 90 TABLET | Refills: 1 | Status: ON HOLD | OUTPATIENT
Start: 2017-01-01 | End: 2017-01-01

## 2017-01-01 RX ORDER — SODIUM CHLORIDE 9 MG/ML
INJECTION, SOLUTION INTRAVENOUS CONTINUOUS
Status: DISCONTINUED | OUTPATIENT
Start: 2017-01-01 | End: 2017-01-01

## 2017-01-01 RX ORDER — SENNOSIDES 8.6 MG
2 TABLET ORAL 2 TIMES DAILY
Qty: 360 TABLET | Refills: 1 | Status: ON HOLD | OUTPATIENT
Start: 2017-01-01 | End: 2017-01-01

## 2017-01-01 RX ORDER — PROCHLORPERAZINE 25 MG
12.5 SUPPOSITORY, RECTAL RECTAL EVERY 12 HOURS PRN
Status: DISCONTINUED | OUTPATIENT
Start: 2017-01-01 | End: 2017-01-01

## 2017-01-01 RX ORDER — OXYCODONE HCL 20 MG/ML
5 CONCENTRATE, ORAL ORAL
Qty: 30 ML | Refills: 0 | Status: SHIPPED | OUTPATIENT
Start: 2017-01-01 | End: 2017-01-01

## 2017-01-01 RX ORDER — ATROPINE SULFATE 10 MG/ML
2 SOLUTION/ DROPS OPHTHALMIC
Qty: 5 ML | Refills: 1 | Status: SHIPPED | OUTPATIENT
Start: 2017-01-01

## 2017-01-01 RX ORDER — LEVOFLOXACIN 5 MG/ML
750 INJECTION, SOLUTION INTRAVENOUS ONCE
Status: COMPLETED | OUTPATIENT
Start: 2017-01-01 | End: 2017-01-01

## 2017-01-01 RX ORDER — NYSTATIN 100000/ML
500000 SUSPENSION, ORAL (FINAL DOSE FORM) ORAL 4 TIMES DAILY
Status: DISCONTINUED | OUTPATIENT
Start: 2017-01-01 | End: 2017-01-01

## 2017-01-01 RX ORDER — FLUCONAZOLE 2 MG/ML
200 INJECTION, SOLUTION INTRAVENOUS
Status: DISCONTINUED | OUTPATIENT
Start: 2017-01-01 | End: 2017-01-01 | Stop reason: ALTCHOICE

## 2017-01-01 RX ORDER — LEVOFLOXACIN 5 MG/ML
750 INJECTION, SOLUTION INTRAVENOUS ONCE
Status: CANCELLED | OUTPATIENT
Start: 2017-01-01

## 2017-01-01 RX ORDER — ALBUMIN, HUMAN INJ 5% 5 %
12.5 SOLUTION INTRAVENOUS ONCE
Status: COMPLETED | OUTPATIENT
Start: 2017-01-01 | End: 2017-01-01

## 2017-01-01 RX ORDER — HYDROMORPHONE HYDROCHLORIDE 1 MG/ML
.3-.5 INJECTION, SOLUTION INTRAMUSCULAR; INTRAVENOUS; SUBCUTANEOUS
Status: DISCONTINUED | OUTPATIENT
Start: 2017-01-01 | End: 2017-01-01

## 2017-01-01 RX ORDER — DOXERCALCIFEROL 4 UG/2ML
4 INJECTION INTRAVENOUS
Status: DISCONTINUED | OUTPATIENT
Start: 2017-01-01 | End: 2017-01-01

## 2017-01-01 RX ORDER — LEVOFLOXACIN 5 MG/ML
500 INJECTION, SOLUTION INTRAVENOUS
Status: COMPLETED | OUTPATIENT
Start: 2017-01-01 | End: 2017-01-01

## 2017-01-01 RX ORDER — ONDANSETRON 4 MG/1
4 TABLET, ORALLY DISINTEGRATING ORAL EVERY 6 HOURS PRN
Status: DISCONTINUED | OUTPATIENT
Start: 2017-01-01 | End: 2017-01-01

## 2017-01-01 RX ORDER — DONEPEZIL HYDROCHLORIDE 10 MG/1
10 TABLET, ORALLY DISINTEGRATING ORAL DAILY
Qty: 90 TABLET | Refills: 1 | Status: ON HOLD | OUTPATIENT
Start: 2017-01-01 | End: 2017-01-01

## 2017-01-01 RX ORDER — LIDOCAINE HYDROCHLORIDE 10 MG/ML
1-30 INJECTION, SOLUTION EPIDURAL; INFILTRATION; INTRACAUDAL; PERINEURAL
Status: DISCONTINUED | OUTPATIENT
Start: 2017-01-01 | End: 2017-01-01 | Stop reason: HOSPADM

## 2017-01-01 RX ORDER — ACETAMINOPHEN 650 MG/1
650 SUPPOSITORY RECTAL EVERY 4 HOURS PRN
Qty: 12 SUPPOSITORY | Refills: 1 | Status: SHIPPED | OUTPATIENT
Start: 2017-01-01

## 2017-01-01 RX ORDER — CARBOXYMETHYLCELLULOSE SODIUM 5 MG/ML
1-2 SOLUTION/ DROPS OPHTHALMIC EVERY 8 HOURS PRN
Status: DISCONTINUED | OUTPATIENT
Start: 2017-01-01 | End: 2017-01-01 | Stop reason: HOSPADM

## 2017-01-01 RX ORDER — B COMPLEX C NO.10/FOLIC ACID 900MCG/5ML
5 LIQUID (ML) ORAL DAILY
Status: DISCONTINUED | OUTPATIENT
Start: 2017-01-01 | End: 2017-01-01

## 2017-01-01 RX ORDER — DEXTROSE MONOHYDRATE 25 G/50ML
25-50 INJECTION, SOLUTION INTRAVENOUS
Status: DISCONTINUED | OUTPATIENT
Start: 2017-01-01 | End: 2017-01-01

## 2017-01-01 RX ORDER — LORAZEPAM 2 MG/ML
.5-1 INJECTION INTRAMUSCULAR
Status: DISCONTINUED | OUTPATIENT
Start: 2017-01-01 | End: 2017-01-01

## 2017-01-01 RX ORDER — POLYETHYLENE GLYCOL 3350 17 G/17G
17 POWDER, FOR SOLUTION ORAL 2 TIMES DAILY PRN
Status: ON HOLD | COMMUNITY
End: 2017-01-01

## 2017-01-01 RX ORDER — HYDROMORPHONE HYDROCHLORIDE 1 MG/ML
.5-1 INJECTION, SOLUTION INTRAMUSCULAR; INTRAVENOUS; SUBCUTANEOUS
Status: DISCONTINUED | OUTPATIENT
Start: 2017-01-01 | End: 2017-01-01

## 2017-01-01 RX ORDER — VANCOMYCIN HYDROCHLORIDE 1 G/200ML
1000 INJECTION, SOLUTION INTRAVENOUS ONCE
Status: COMPLETED | OUTPATIENT
Start: 2017-01-01 | End: 2017-01-01

## 2017-01-01 RX ORDER — MAGNESIUM SULFATE HEPTAHYDRATE 40 MG/ML
4 INJECTION, SOLUTION INTRAVENOUS EVERY 4 HOURS PRN
Status: DISCONTINUED | OUTPATIENT
Start: 2017-01-01 | End: 2017-01-01

## 2017-01-01 RX ORDER — ALBUMIN, HUMAN INJ 5% 5 %
250 SOLUTION INTRAVENOUS
Status: DISCONTINUED | OUTPATIENT
Start: 2017-01-01 | End: 2017-01-01

## 2017-01-01 RX ORDER — ALBUMIN, HUMAN INJ 5% 5 %
12.5 SOLUTION INTRAVENOUS ONCE
Status: DISCONTINUED | OUTPATIENT
Start: 2017-01-01 | End: 2017-01-01

## 2017-01-01 RX ORDER — NALOXONE HYDROCHLORIDE 0.4 MG/ML
.1-.4 INJECTION, SOLUTION INTRAMUSCULAR; INTRAVENOUS; SUBCUTANEOUS
Status: DISCONTINUED | OUTPATIENT
Start: 2017-01-01 | End: 2017-01-01 | Stop reason: HOSPADM

## 2017-01-01 RX ORDER — FLUMAZENIL 0.1 MG/ML
0.2 INJECTION, SOLUTION INTRAVENOUS
Status: DISCONTINUED | OUTPATIENT
Start: 2017-01-01 | End: 2017-01-01 | Stop reason: HOSPADM

## 2017-01-01 RX ORDER — ALBUMIN, HUMAN INJ 5% 5 %
250 SOLUTION INTRAVENOUS
Status: CANCELLED | OUTPATIENT
Start: 2017-01-01

## 2017-01-01 RX ORDER — HYDROMORPHONE HYDROCHLORIDE 2 MG/1
2 TABLET ORAL
Qty: 90 TABLET | Refills: 0 | Status: SHIPPED | OUTPATIENT
Start: 2017-01-01

## 2017-01-01 RX ORDER — DOCUSATE SODIUM 50 MG/5ML
10 LIQUID ORAL 2 TIMES DAILY
Qty: 1800 ML | Refills: 1 | Status: ON HOLD | OUTPATIENT
Start: 2017-01-01 | End: 2017-01-01

## 2017-01-01 RX ORDER — FLUCONAZOLE 2 MG/ML
200 INJECTION, SOLUTION INTRAVENOUS EVERY 24 HOURS
Status: DISCONTINUED | OUTPATIENT
Start: 2017-01-01 | End: 2017-01-01

## 2017-01-01 RX ORDER — IPRATROPIUM BROMIDE AND ALBUTEROL SULFATE 2.5; .5 MG/3ML; MG/3ML
3 SOLUTION RESPIRATORY (INHALATION)
Status: DISCONTINUED | OUTPATIENT
Start: 2017-01-01 | End: 2017-01-01

## 2017-01-01 RX ORDER — LORAZEPAM 0.5 MG/1
.5-1 TABLET ORAL
Status: DISCONTINUED | OUTPATIENT
Start: 2017-01-01 | End: 2017-01-01

## 2017-01-01 RX ORDER — ATROPINE SULFATE 10 MG/ML
1-2 SOLUTION/ DROPS OPHTHALMIC
Status: DISCONTINUED | OUTPATIENT
Start: 2017-01-01 | End: 2017-01-01 | Stop reason: HOSPADM

## 2017-01-01 RX ORDER — CALCIUM CARBONATE 500 MG/1
1 TABLET, CHEWABLE ORAL DAILY
Qty: 150 TABLET | Refills: 1 | Status: SHIPPED | OUTPATIENT
Start: 2017-01-01 | End: 2017-01-01

## 2017-01-01 RX ORDER — LORAZEPAM 2 MG/ML
INJECTION INTRAMUSCULAR
Status: COMPLETED
Start: 2017-01-01 | End: 2017-01-01

## 2017-01-01 RX ORDER — HYDROMORPHONE HYDROCHLORIDE 1 MG/ML
2 SOLUTION ORAL
Qty: 30 ML | Refills: 0 | Status: SHIPPED | OUTPATIENT
Start: 2017-01-01 | End: 2017-01-01

## 2017-01-01 RX ORDER — IPRATROPIUM BROMIDE AND ALBUTEROL SULFATE 2.5; .5 MG/3ML; MG/3ML
6 SOLUTION RESPIRATORY (INHALATION) ONCE
Status: COMPLETED | OUTPATIENT
Start: 2017-01-01 | End: 2017-01-01

## 2017-01-01 RX ORDER — FENTANYL CITRATE 50 UG/ML
INJECTION, SOLUTION INTRAMUSCULAR; INTRAVENOUS
Status: DISCONTINUED
Start: 2017-01-01 | End: 2017-01-01 | Stop reason: HOSPADM

## 2017-01-01 RX ORDER — POLYETHYLENE GLYCOL 3350 17 G/17G
1 POWDER, FOR SOLUTION ORAL PRN
Qty: 7 PACKET | Refills: 11 | Status: ON HOLD | OUTPATIENT
Start: 2017-01-01 | End: 2017-01-01

## 2017-01-01 RX ORDER — LEVOFLOXACIN 5 MG/ML
500 INJECTION, SOLUTION INTRAVENOUS
Status: CANCELLED | OUTPATIENT
Start: 2017-01-01

## 2017-01-01 RX ORDER — HEPARIN SODIUM 1000 [USP'U]/ML
2000 INJECTION, SOLUTION INTRAVENOUS; SUBCUTANEOUS ONCE
Status: DISCONTINUED | OUTPATIENT
Start: 2017-01-01 | End: 2017-01-01 | Stop reason: HOSPADM

## 2017-01-01 RX ORDER — ONDANSETRON 2 MG/ML
4 INJECTION INTRAMUSCULAR; INTRAVENOUS EVERY 6 HOURS PRN
Status: DISCONTINUED | OUTPATIENT
Start: 2017-01-01 | End: 2017-01-01

## 2017-01-01 RX ORDER — IOPAMIDOL 755 MG/ML
500 INJECTION, SOLUTION INTRAVASCULAR ONCE
Status: DISCONTINUED | OUTPATIENT
Start: 2017-01-01 | End: 2017-01-01 | Stop reason: CLARIF

## 2017-01-01 RX ORDER — DONEPEZIL HYDROCHLORIDE 10 MG/1
10 TABLET, ORALLY DISINTEGRATING ORAL DAILY
Qty: 90 TABLET | Refills: 1 | Status: CANCELLED | OUTPATIENT
Start: 2017-01-01

## 2017-01-01 RX ORDER — LORAZEPAM 2 MG/ML
0.5 INJECTION INTRAMUSCULAR
Status: DISCONTINUED | OUTPATIENT
Start: 2017-01-01 | End: 2017-01-01

## 2017-01-01 RX ORDER — GLYCOPYRROLATE 0.2 MG/ML
0.2 INJECTION, SOLUTION INTRAMUSCULAR; INTRAVENOUS EVERY 4 HOURS PRN
Status: DISCONTINUED | OUTPATIENT
Start: 2017-01-01 | End: 2017-01-01

## 2017-01-01 RX ORDER — DOCUSATE SODIUM 50 MG/5ML
10 LIQUID ORAL 2 TIMES DAILY
Qty: 600 ML | Refills: 1 | Status: SHIPPED | OUTPATIENT
Start: 2017-01-01 | End: 2017-01-01

## 2017-01-01 RX ORDER — SODIUM CHLORIDE, SODIUM LACTATE, POTASSIUM CHLORIDE, CALCIUM CHLORIDE 600; 310; 30; 20 MG/100ML; MG/100ML; MG/100ML; MG/100ML
INJECTION, SOLUTION INTRAVENOUS CONTINUOUS
Status: DISCONTINUED | OUTPATIENT
Start: 2017-01-01 | End: 2017-01-01

## 2017-01-01 RX ORDER — SENNOSIDES 8.6 MG
2 TABLET ORAL 2 TIMES DAILY
Qty: 360 TABLET | Refills: 1 | Status: SHIPPED | OUTPATIENT
Start: 2017-01-01 | End: 2017-01-01

## 2017-01-01 RX ORDER — PROPOFOL 10 MG/ML
INJECTION, EMULSION INTRAVENOUS
Status: DISCONTINUED
Start: 2017-01-01 | End: 2017-01-01 | Stop reason: HOSPADM

## 2017-01-01 RX ORDER — MORPHINE SULFATE 20 MG/ML
5-10 SOLUTION ORAL
Status: DISCONTINUED | OUTPATIENT
Start: 2017-01-01 | End: 2017-01-01

## 2017-01-01 RX ORDER — SENNOSIDES 8.6 MG
2 TABLET ORAL 2 TIMES DAILY
Qty: 120 TABLET | Refills: 1 | Status: SHIPPED | OUTPATIENT
Start: 2017-01-01 | End: 2017-01-01

## 2017-01-01 RX ADMIN — IPRATROPIUM BROMIDE AND ALBUTEROL SULFATE 3 ML: .5; 3 SOLUTION RESPIRATORY (INHALATION) at 15:43

## 2017-01-01 RX ADMIN — INSULIN ASPART 1 UNITS: 100 INJECTION, SOLUTION INTRAVENOUS; SUBCUTANEOUS at 20:13

## 2017-01-01 RX ADMIN — NYSTATIN 500000 UNITS: 100000 SUSPENSION ORAL at 00:22

## 2017-01-01 RX ADMIN — MICAFUNGIN SODIUM 100 MG: 20 INJECTION, POWDER, LYOPHILIZED, FOR SOLUTION INTRAVENOUS at 16:20

## 2017-01-01 RX ADMIN — INSULIN ASPART 1 UNITS: 100 INJECTION, SOLUTION INTRAVENOUS; SUBCUTANEOUS at 12:12

## 2017-01-01 RX ADMIN — LORAZEPAM 0.5 MG: 2 INJECTION INTRAMUSCULAR; INTRAVENOUS at 12:04

## 2017-01-01 RX ADMIN — SODIUM CHLORIDE 150 MG PE: 9 INJECTION, SOLUTION INTRAVENOUS at 03:56

## 2017-01-01 RX ADMIN — SODIUM CHLORIDE: 9 INJECTION, SOLUTION INTRAVENOUS at 05:20

## 2017-01-01 RX ADMIN — SODIUM CHLORIDE: 9 INJECTION, SOLUTION INTRAVENOUS at 03:41

## 2017-01-01 RX ADMIN — LORAZEPAM 0.5 MG: 0.5 TABLET ORAL at 06:25

## 2017-01-01 RX ADMIN — HYDROMORPHONE HYDROCHLORIDE 0.5 MG: 1 INJECTION, SOLUTION INTRAMUSCULAR; INTRAVENOUS; SUBCUTANEOUS at 02:35

## 2017-01-01 RX ADMIN — HEPARIN SODIUM 1900 UNITS: 1000 INJECTION, SOLUTION INTRAVENOUS; SUBCUTANEOUS at 18:37

## 2017-01-01 RX ADMIN — MICAFUNGIN SODIUM 100 MG: 20 INJECTION, POWDER, LYOPHILIZED, FOR SOLUTION INTRAVENOUS at 16:47

## 2017-01-01 RX ADMIN — NYSTATIN 500000 UNITS: 100000 SUSPENSION ORAL at 08:10

## 2017-01-01 RX ADMIN — Medication 2 SPRAY: at 08:57

## 2017-01-01 RX ADMIN — DEXMEDETOMIDINE 0.7 MCG/KG/HR: 100 INJECTION, SOLUTION, CONCENTRATE INTRAVENOUS at 18:20

## 2017-01-01 RX ADMIN — DEXMEDETOMIDINE 0.5 MCG/KG/HR: 100 INJECTION, SOLUTION, CONCENTRATE INTRAVENOUS at 07:49

## 2017-01-01 RX ADMIN — AMPICILLIN SODIUM AND SULBACTAM SODIUM 3 G: 2; 1 INJECTION, POWDER, FOR SOLUTION INTRAMUSCULAR; INTRAVENOUS at 15:48

## 2017-01-01 RX ADMIN — IPRATROPIUM BROMIDE AND ALBUTEROL SULFATE 3 ML: .5; 3 SOLUTION RESPIRATORY (INHALATION) at 19:38

## 2017-01-01 RX ADMIN — MICAFUNGIN SODIUM 100 MG: 20 INJECTION, POWDER, LYOPHILIZED, FOR SOLUTION INTRAVENOUS at 16:12

## 2017-01-01 RX ADMIN — PANTOPRAZOLE SODIUM 40 MG: 40 INJECTION, POWDER, FOR SOLUTION INTRAVENOUS at 08:36

## 2017-01-01 RX ADMIN — TAZOBACTAM SODIUM AND PIPERACILLIN SODIUM 2.25 G: 250; 2 INJECTION, SOLUTION INTRAVENOUS at 01:16

## 2017-01-01 RX ADMIN — INSULIN ASPART 1 UNITS: 100 INJECTION, SOLUTION INTRAVENOUS; SUBCUTANEOUS at 12:25

## 2017-01-01 RX ADMIN — DEXMEDETOMIDINE 0.7 MCG/KG/HR: 100 INJECTION, SOLUTION, CONCENTRATE INTRAVENOUS at 17:31

## 2017-01-01 RX ADMIN — NYSTATIN 500000 UNITS: 100000 SUSPENSION ORAL at 21:36

## 2017-01-01 RX ADMIN — PANTOPRAZOLE SODIUM 40 MG: 40 INJECTION, POWDER, FOR SOLUTION INTRAVENOUS at 08:25

## 2017-01-01 RX ADMIN — SODIUM CHLORIDE 250 ML: 9 INJECTION, SOLUTION INTRAVENOUS at 12:52

## 2017-01-01 RX ADMIN — INSULIN ASPART 2 UNITS: 100 INJECTION, SOLUTION INTRAVENOUS; SUBCUTANEOUS at 20:21

## 2017-01-01 RX ADMIN — PANTOPRAZOLE SODIUM 40 MG: 40 INJECTION, POWDER, FOR SOLUTION INTRAVENOUS at 08:27

## 2017-01-01 RX ADMIN — TAZOBACTAM SODIUM AND PIPERACILLIN SODIUM 2.25 G: 250; 2 INJECTION, SOLUTION INTRAVENOUS at 00:00

## 2017-01-01 RX ADMIN — HYDROMORPHONE HYDROCHLORIDE 0.5 MG: 1 INJECTION, SOLUTION INTRAMUSCULAR; INTRAVENOUS; SUBCUTANEOUS at 22:09

## 2017-01-01 RX ADMIN — DEXMEDETOMIDINE 0.7 MCG/KG/HR: 100 INJECTION, SOLUTION, CONCENTRATE INTRAVENOUS at 08:39

## 2017-01-01 RX ADMIN — INSULIN ASPART 2 UNITS: 100 INJECTION, SOLUTION INTRAVENOUS; SUBCUTANEOUS at 08:06

## 2017-01-01 RX ADMIN — SODIUM CHLORIDE 150 MG PE: 9 INJECTION, SOLUTION INTRAVENOUS at 04:05

## 2017-01-01 RX ADMIN — INSULIN ASPART 1 UNITS: 100 INJECTION, SOLUTION INTRAVENOUS; SUBCUTANEOUS at 03:49

## 2017-01-01 RX ADMIN — NYSTATIN 500000 UNITS: 100000 SUSPENSION ORAL at 21:52

## 2017-01-01 RX ADMIN — Medication 5 ML: at 10:42

## 2017-01-01 RX ADMIN — HYDROMORPHONE HYDROCHLORIDE 0.5 MG: 1 INJECTION, SOLUTION INTRAMUSCULAR; INTRAVENOUS; SUBCUTANEOUS at 20:33

## 2017-01-01 RX ADMIN — MIDAZOLAM HYDROCHLORIDE 1 MG: 1 INJECTION, SOLUTION INTRAMUSCULAR; INTRAVENOUS at 12:25

## 2017-01-01 RX ADMIN — POTASSIUM CHLORIDE: 2 INJECTION, SOLUTION, CONCENTRATE INTRAVENOUS at 20:36

## 2017-01-01 RX ADMIN — IPRATROPIUM BROMIDE AND ALBUTEROL SULFATE 3 ML: .5; 3 SOLUTION RESPIRATORY (INHALATION) at 19:20

## 2017-01-01 RX ADMIN — NYSTATIN 500000 UNITS: 100000 SUSPENSION ORAL at 16:41

## 2017-01-01 RX ADMIN — LORAZEPAM 0.5 MG: 2 INJECTION INTRAMUSCULAR; INTRAVENOUS at 14:10

## 2017-01-01 RX ADMIN — NYSTATIN 500000 UNITS: 100000 SUSPENSION ORAL at 14:17

## 2017-01-01 RX ADMIN — IPRATROPIUM BROMIDE AND ALBUTEROL SULFATE 3 ML: .5; 3 SOLUTION RESPIRATORY (INHALATION) at 12:28

## 2017-01-01 RX ADMIN — SODIUM CHLORIDE 150 MG PE: 9 INJECTION, SOLUTION INTRAVENOUS at 15:50

## 2017-01-01 RX ADMIN — DEXMEDETOMIDINE 0.7 MCG/KG/HR: 100 INJECTION, SOLUTION, CONCENTRATE INTRAVENOUS at 17:11

## 2017-01-01 RX ADMIN — DEXMEDETOMIDINE 0.7 MCG/KG/HR: 100 INJECTION, SOLUTION, CONCENTRATE INTRAVENOUS at 08:42

## 2017-01-01 RX ADMIN — DEXMEDETOMIDINE 0.7 MCG/KG/HR: 100 INJECTION, SOLUTION, CONCENTRATE INTRAVENOUS at 15:40

## 2017-01-01 RX ADMIN — DEXMEDETOMIDINE 0.7 MCG/KG/HR: 100 INJECTION, SOLUTION, CONCENTRATE INTRAVENOUS at 20:13

## 2017-01-01 RX ADMIN — SIMVASTATIN 40 MG: 40 TABLET, FILM COATED ORAL at 21:56

## 2017-01-01 RX ADMIN — NOREPINEPHRINE BITARTRATE 0.19 MCG/KG/MIN: 1 INJECTION INTRAVENOUS at 15:41

## 2017-01-01 RX ADMIN — ALBUMIN (HUMAN) 250 ML: 12.5 SOLUTION INTRAVENOUS at 07:58

## 2017-01-01 RX ADMIN — SODIUM CHLORIDE, POTASSIUM CHLORIDE, SODIUM LACTATE AND CALCIUM CHLORIDE 500 ML: 600; 310; 30; 20 INJECTION, SOLUTION INTRAVENOUS at 06:22

## 2017-01-01 RX ADMIN — SIMVASTATIN 40 MG: 40 TABLET, FILM COATED ORAL at 20:28

## 2017-01-01 RX ADMIN — PANTOPRAZOLE SODIUM 40 MG: 40 INJECTION, POWDER, FOR SOLUTION INTRAVENOUS at 20:28

## 2017-01-01 RX ADMIN — IPRATROPIUM BROMIDE AND ALBUTEROL SULFATE 3 ML: .5; 3 SOLUTION RESPIRATORY (INHALATION) at 19:34

## 2017-01-01 RX ADMIN — LIDOCAINE HYDROCHLORIDE 100 MG: 10 INJECTION, SOLUTION INFILTRATION; PERINEURAL at 13:00

## 2017-01-01 RX ADMIN — PANTOPRAZOLE SODIUM 40 MG: 40 INJECTION, POWDER, FOR SOLUTION INTRAVENOUS at 10:47

## 2017-01-01 RX ADMIN — DEXMEDETOMIDINE 0.6 MCG/KG/HR: 100 INJECTION, SOLUTION, CONCENTRATE INTRAVENOUS at 23:03

## 2017-01-01 RX ADMIN — SODIUM CHLORIDE 150 MG PE: 9 INJECTION, SOLUTION INTRAVENOUS at 15:34

## 2017-01-01 RX ADMIN — FENTANYL CITRATE 50 MCG: 50 INJECTION, SOLUTION INTRAMUSCULAR; INTRAVENOUS at 12:25

## 2017-01-01 RX ADMIN — NYSTATIN 500000 UNITS: 100000 SUSPENSION ORAL at 10:47

## 2017-01-01 RX ADMIN — DOXERCALCIFEROL 4 MCG: 2 INJECTION, SOLUTION INTRAVENOUS at 09:18

## 2017-01-01 RX ADMIN — FENTANYL CITRATE 25 MCG: 50 INJECTION, SOLUTION INTRAMUSCULAR; INTRAVENOUS at 12:35

## 2017-01-01 RX ADMIN — MAGNESIUM SULFATE IN WATER 4 G: 40 INJECTION, SOLUTION INTRAVENOUS at 13:55

## 2017-01-01 RX ADMIN — SIMVASTATIN 40 MG: 40 TABLET, FILM COATED ORAL at 21:36

## 2017-01-01 RX ADMIN — NOREPINEPHRINE BITARTRATE 0.4 MCG/KG/MIN: 1 INJECTION INTRAVENOUS at 08:54

## 2017-01-01 RX ADMIN — PANTOPRAZOLE SODIUM 40 MG: 40 INJECTION, POWDER, FOR SOLUTION INTRAVENOUS at 08:23

## 2017-01-01 RX ADMIN — NOREPINEPHRINE BITARTRATE 0.16 MCG/KG/MIN: 1 INJECTION INTRAVENOUS at 03:07

## 2017-01-01 RX ADMIN — IPRATROPIUM BROMIDE AND ALBUTEROL SULFATE 3 ML: .5; 3 SOLUTION RESPIRATORY (INHALATION) at 07:42

## 2017-01-01 RX ADMIN — EPOETIN ALFA 3000 UNITS: 3000 SOLUTION INTRAVENOUS; SUBCUTANEOUS at 09:18

## 2017-01-01 RX ADMIN — Medication: at 11:01

## 2017-01-01 RX ADMIN — SODIUM CHLORIDE 150 MG PE: 9 INJECTION, SOLUTION INTRAVENOUS at 16:05

## 2017-01-01 RX ADMIN — PANTOPRAZOLE SODIUM 40 MG: 40 TABLET, DELAYED RELEASE ORAL at 08:29

## 2017-01-01 RX ADMIN — HYDROMORPHONE HYDROCHLORIDE 0.5 MG: 1 INJECTION, SOLUTION INTRAMUSCULAR; INTRAVENOUS; SUBCUTANEOUS at 20:15

## 2017-01-01 RX ADMIN — IPRATROPIUM BROMIDE AND ALBUTEROL SULFATE 3 ML: .5; 3 SOLUTION RESPIRATORY (INHALATION) at 19:46

## 2017-01-01 RX ADMIN — INSULIN ASPART 3 UNITS: 100 INJECTION, SOLUTION INTRAVENOUS; SUBCUTANEOUS at 05:10

## 2017-01-01 RX ADMIN — INSULIN ASPART 1 UNITS: 100 INJECTION, SOLUTION INTRAVENOUS; SUBCUTANEOUS at 09:29

## 2017-01-01 RX ADMIN — SODIUM CHLORIDE 6 UNITS/HR: 9 INJECTION, SOLUTION INTRAVENOUS at 05:10

## 2017-01-01 RX ADMIN — HEPARIN SODIUM 2000 UNITS: 1000 INJECTION, SOLUTION INTRAVENOUS; SUBCUTANEOUS at 10:42

## 2017-01-01 RX ADMIN — IPRATROPIUM BROMIDE AND ALBUTEROL SULFATE 3 ML: .5; 3 SOLUTION RESPIRATORY (INHALATION) at 07:31

## 2017-01-01 RX ADMIN — IPRATROPIUM BROMIDE AND ALBUTEROL SULFATE 3 ML: .5; 3 SOLUTION RESPIRATORY (INHALATION) at 15:54

## 2017-01-01 RX ADMIN — PANTOPRAZOLE SODIUM 40 MG: 40 INJECTION, POWDER, FOR SOLUTION INTRAVENOUS at 21:07

## 2017-01-01 RX ADMIN — DEXTROSE MONOHYDRATE 50 ML: 25 INJECTION, SOLUTION INTRAVENOUS at 20:34

## 2017-01-01 RX ADMIN — IPRATROPIUM BROMIDE AND ALBUTEROL SULFATE 3 ML: .5; 3 SOLUTION RESPIRATORY (INHALATION) at 19:14

## 2017-01-01 RX ADMIN — SODIUM CHLORIDE 150 MG PE: 9 INJECTION, SOLUTION INTRAVENOUS at 05:05

## 2017-01-01 RX ADMIN — SODIUM CHLORIDE: 9 INJECTION, SOLUTION INTRAVENOUS at 20:20

## 2017-01-01 RX ADMIN — INSULIN ASPART 1 UNITS: 100 INJECTION, SOLUTION INTRAVENOUS; SUBCUTANEOUS at 00:08

## 2017-01-01 RX ADMIN — NYSTATIN 500000 UNITS: 100000 SUSPENSION ORAL at 12:13

## 2017-01-01 RX ADMIN — FLUCONAZOLE 200 MG: 2 INJECTION INTRAVENOUS at 14:41

## 2017-01-01 RX ADMIN — DEXMEDETOMIDINE 0.7 MCG/KG/HR: 100 INJECTION, SOLUTION, CONCENTRATE INTRAVENOUS at 08:35

## 2017-01-01 RX ADMIN — TAZOBACTAM SODIUM AND PIPERACILLIN SODIUM 2.25 G: 250; 2 INJECTION, SOLUTION INTRAVENOUS at 08:25

## 2017-01-01 RX ADMIN — IPRATROPIUM BROMIDE AND ALBUTEROL SULFATE 3 ML: .5; 3 SOLUTION RESPIRATORY (INHALATION) at 20:10

## 2017-01-01 RX ADMIN — PANTOPRAZOLE SODIUM 40 MG: 40 INJECTION, POWDER, FOR SOLUTION INTRAVENOUS at 22:02

## 2017-01-01 RX ADMIN — HYDROMORPHONE HYDROCHLORIDE 0.5 MG: 1 INJECTION, SOLUTION INTRAMUSCULAR; INTRAVENOUS; SUBCUTANEOUS at 17:09

## 2017-01-01 RX ADMIN — SODIUM BICARBONATE: 84 INJECTION, SOLUTION INTRAVENOUS at 22:15

## 2017-01-01 RX ADMIN — IPRATROPIUM BROMIDE AND ALBUTEROL SULFATE 3 ML: .5; 3 SOLUTION RESPIRATORY (INHALATION) at 15:18

## 2017-01-01 RX ADMIN — PANTOPRAZOLE SODIUM 40 MG: 40 TABLET, DELAYED RELEASE ORAL at 12:14

## 2017-01-01 RX ADMIN — IPRATROPIUM BROMIDE AND ALBUTEROL SULFATE 3 ML: .5; 3 SOLUTION RESPIRATORY (INHALATION) at 11:40

## 2017-01-01 RX ADMIN — IPRATROPIUM BROMIDE AND ALBUTEROL SULFATE 3 ML: .5; 3 SOLUTION RESPIRATORY (INHALATION) at 14:52

## 2017-01-01 RX ADMIN — NYSTATIN 500000 UNITS: 100000 SUSPENSION ORAL at 08:29

## 2017-01-01 RX ADMIN — LEVOFLOXACIN 500 MG: 5 INJECTION, SOLUTION INTRAVENOUS at 02:17

## 2017-01-01 RX ADMIN — PANTOPRAZOLE SODIUM 40 MG: 40 INJECTION, POWDER, FOR SOLUTION INTRAVENOUS at 21:36

## 2017-01-01 RX ADMIN — NOREPINEPHRINE BITARTRATE 0.16 MCG/KG/MIN: 1 INJECTION INTRAVENOUS at 20:21

## 2017-01-01 RX ADMIN — VASOPRESSIN 2.4 UNITS/HR: 20 INJECTION INTRAVENOUS at 04:34

## 2017-01-01 RX ADMIN — INSULIN ASPART 1 UNITS: 100 INJECTION, SOLUTION INTRAVENOUS; SUBCUTANEOUS at 20:09

## 2017-01-01 RX ADMIN — SODIUM CHLORIDE: 9 INJECTION, SOLUTION INTRAVENOUS at 21:20

## 2017-01-01 RX ADMIN — Medication 0.2 MG: at 21:27

## 2017-01-01 RX ADMIN — PANTOPRAZOLE SODIUM 40 MG: 40 TABLET, DELAYED RELEASE ORAL at 09:14

## 2017-01-01 RX ADMIN — DEXMEDETOMIDINE 0.7 MCG/KG/HR: 100 INJECTION, SOLUTION, CONCENTRATE INTRAVENOUS at 12:53

## 2017-01-01 RX ADMIN — IPRATROPIUM BROMIDE AND ALBUTEROL SULFATE 3 ML: .5; 3 SOLUTION RESPIRATORY (INHALATION) at 16:00

## 2017-01-01 RX ADMIN — HYDROMORPHONE HYDROCHLORIDE 0.5 MG: 1 INJECTION, SOLUTION INTRAMUSCULAR; INTRAVENOUS; SUBCUTANEOUS at 11:21

## 2017-01-01 RX ADMIN — ALBUMIN (HUMAN) 250 ML: 12.5 SOLUTION INTRAVENOUS at 09:05

## 2017-01-01 RX ADMIN — DEXTROSE MONOHYDRATE 50 ML: 25 INJECTION, SOLUTION INTRAVENOUS at 22:30

## 2017-01-01 RX ADMIN — IPRATROPIUM BROMIDE AND ALBUTEROL SULFATE 3 ML: .5; 3 SOLUTION RESPIRATORY (INHALATION) at 11:00

## 2017-01-01 RX ADMIN — FLUCONAZOLE 200 MG: 2 INJECTION INTRAVENOUS at 14:06

## 2017-01-01 RX ADMIN — SODIUM CHLORIDE 150 MG PE: 9 INJECTION, SOLUTION INTRAVENOUS at 04:20

## 2017-01-01 RX ADMIN — IPRATROPIUM BROMIDE AND ALBUTEROL SULFATE 3 ML: .5; 3 SOLUTION RESPIRATORY (INHALATION) at 19:53

## 2017-01-01 RX ADMIN — PROCHLORPERAZINE EDISYLATE 5 MG: 5 INJECTION INTRAMUSCULAR; INTRAVENOUS at 14:06

## 2017-01-01 RX ADMIN — CARBOXYMETHYLCELLULOSE SODIUM 1 DROP: 5 SOLUTION/ DROPS OPHTHALMIC at 16:51

## 2017-01-01 RX ADMIN — Medication 0.2 MG: at 17:46

## 2017-01-01 RX ADMIN — TAZOBACTAM SODIUM AND PIPERACILLIN SODIUM 2.25 G: 250; 2 INJECTION, SOLUTION INTRAVENOUS at 17:45

## 2017-01-01 RX ADMIN — Medication 0.2 MG: at 15:08

## 2017-01-01 RX ADMIN — SODIUM CHLORIDE: 9 INJECTION, SOLUTION INTRAVENOUS at 15:00

## 2017-01-01 RX ADMIN — PANTOPRAZOLE SODIUM 40 MG: 40 INJECTION, POWDER, FOR SOLUTION INTRAVENOUS at 08:07

## 2017-01-01 RX ADMIN — HEPARIN SODIUM 2000 UNITS: 1000 INJECTION, SOLUTION INTRAVENOUS; SUBCUTANEOUS at 10:41

## 2017-01-01 RX ADMIN — PANTOPRAZOLE SODIUM 40 MG: 40 TABLET, DELAYED RELEASE ORAL at 21:03

## 2017-01-01 RX ADMIN — HYDROMORPHONE HYDROCHLORIDE 1 MG: 1 INJECTION, SOLUTION INTRAMUSCULAR; INTRAVENOUS; SUBCUTANEOUS at 11:30

## 2017-01-01 RX ADMIN — NOREPINEPHRINE BITARTRATE 0.03 MCG/KG/MIN: 1 INJECTION INTRAVENOUS at 15:40

## 2017-01-01 RX ADMIN — INSULIN ASPART 1 UNITS: 100 INJECTION, SOLUTION INTRAVENOUS; SUBCUTANEOUS at 00:50

## 2017-01-01 RX ADMIN — SODIUM CHLORIDE 150 MG PE: 9 INJECTION, SOLUTION INTRAVENOUS at 16:40

## 2017-01-01 RX ADMIN — IPRATROPIUM BROMIDE AND ALBUTEROL SULFATE 3 ML: .5; 3 SOLUTION RESPIRATORY (INHALATION) at 07:11

## 2017-01-01 RX ADMIN — NOREPINEPHRINE BITARTRATE 0.24 MCG/KG/MIN: 1 INJECTION INTRAVENOUS at 22:49

## 2017-01-01 RX ADMIN — ACETAMINOPHEN 975 MG: 650 SUPPOSITORY RECTAL at 00:06

## 2017-01-01 RX ADMIN — INSULIN ASPART 1 UNITS: 100 INJECTION, SOLUTION INTRAVENOUS; SUBCUTANEOUS at 00:26

## 2017-01-01 RX ADMIN — PANTOPRAZOLE SODIUM 40 MG: 40 INJECTION, POWDER, FOR SOLUTION INTRAVENOUS at 07:42

## 2017-01-01 RX ADMIN — HYDROMORPHONE HYDROCHLORIDE 0.5 MG: 1 INJECTION, SOLUTION INTRAMUSCULAR; INTRAVENOUS; SUBCUTANEOUS at 00:22

## 2017-01-01 RX ADMIN — DOXERCALCIFEROL 2 MCG: 2 INJECTION, SOLUTION INTRAVENOUS at 15:53

## 2017-01-01 RX ADMIN — NOREPINEPHRINE BITARTRATE 0.05 MCG/KG/MIN: 1 INJECTION INTRAVENOUS at 12:00

## 2017-01-01 RX ADMIN — IPRATROPIUM BROMIDE AND ALBUTEROL SULFATE 6 ML: .5; 3 SOLUTION RESPIRATORY (INHALATION) at 00:05

## 2017-01-01 RX ADMIN — MIDAZOLAM HYDROCHLORIDE 0.5 MG: 1 INJECTION, SOLUTION INTRAMUSCULAR; INTRAVENOUS at 12:35

## 2017-01-01 RX ADMIN — SODIUM CHLORIDE 150 MG PE: 9 INJECTION, SOLUTION INTRAVENOUS at 04:24

## 2017-01-01 RX ADMIN — NYSTATIN 500000 UNITS: 100000 SUSPENSION ORAL at 14:40

## 2017-01-01 RX ADMIN — PANTOPRAZOLE SODIUM 40 MG: 40 TABLET, DELAYED RELEASE ORAL at 21:38

## 2017-01-01 RX ADMIN — DEXMEDETOMIDINE 0.6 MCG/KG/HR: 100 INJECTION, SOLUTION, CONCENTRATE INTRAVENOUS at 04:47

## 2017-01-01 RX ADMIN — SODIUM CHLORIDE 150 MG PE: 9 INJECTION, SOLUTION INTRAVENOUS at 05:35

## 2017-01-01 RX ADMIN — NYSTATIN 500000 UNITS: 100000 SUSPENSION ORAL at 22:02

## 2017-01-01 RX ADMIN — IPRATROPIUM BROMIDE AND ALBUTEROL SULFATE 3 ML: .5; 3 SOLUTION RESPIRATORY (INHALATION) at 08:20

## 2017-01-01 RX ADMIN — PANTOPRAZOLE SODIUM 40 MG: 40 INJECTION, POWDER, FOR SOLUTION INTRAVENOUS at 20:57

## 2017-01-01 RX ADMIN — VASOPRESSIN 2.4 UNITS/HR: 20 INJECTION INTRAVENOUS at 01:47

## 2017-01-01 RX ADMIN — IPRATROPIUM BROMIDE AND ALBUTEROL SULFATE 3 ML: .5; 3 SOLUTION RESPIRATORY (INHALATION) at 15:36

## 2017-01-01 RX ADMIN — NYSTATIN 500000 UNITS: 100000 SUSPENSION ORAL at 17:10

## 2017-01-01 RX ADMIN — IPRATROPIUM BROMIDE AND ALBUTEROL SULFATE 3 ML: .5; 3 SOLUTION RESPIRATORY (INHALATION) at 19:17

## 2017-01-01 RX ADMIN — SODIUM BICARBONATE: 84 INJECTION, SOLUTION INTRAVENOUS at 02:10

## 2017-01-01 RX ADMIN — PANTOPRAZOLE SODIUM 40 MG: 40 INJECTION, POWDER, FOR SOLUTION INTRAVENOUS at 21:27

## 2017-01-01 RX ADMIN — IPRATROPIUM BROMIDE AND ALBUTEROL SULFATE 3 ML: .5; 3 SOLUTION RESPIRATORY (INHALATION) at 11:50

## 2017-01-01 RX ADMIN — DEXMEDETOMIDINE 0.7 MCG/KG/HR: 100 INJECTION, SOLUTION, CONCENTRATE INTRAVENOUS at 15:28

## 2017-01-01 RX ADMIN — EPOETIN ALFA 3000 UNITS: 3000 SOLUTION INTRAVENOUS; SUBCUTANEOUS at 08:33

## 2017-01-01 RX ADMIN — IPRATROPIUM BROMIDE AND ALBUTEROL SULFATE 3 ML: .5; 3 SOLUTION RESPIRATORY (INHALATION) at 11:20

## 2017-01-01 RX ADMIN — SODIUM CHLORIDE: 9 INJECTION, SOLUTION INTRAVENOUS at 04:35

## 2017-01-01 RX ADMIN — SODIUM CHLORIDE 500 ML: 9 INJECTION, SOLUTION INTRAVENOUS at 00:19

## 2017-01-01 RX ADMIN — VASOPRESSIN 2.4 UNITS/HR: 20 INJECTION INTRAVENOUS at 15:06

## 2017-01-01 RX ADMIN — SODIUM CHLORIDE 150 MG PE: 9 INJECTION, SOLUTION INTRAVENOUS at 15:38

## 2017-01-01 RX ADMIN — ALBUMIN (HUMAN) 12.5 G: 12.5 SOLUTION INTRAVENOUS at 14:45

## 2017-01-01 RX ADMIN — IPRATROPIUM BROMIDE AND ALBUTEROL SULFATE 3 ML: .5; 3 SOLUTION RESPIRATORY (INHALATION) at 07:35

## 2017-01-01 RX ADMIN — ASCORBIC ACID TAB 250 MG 250 MG: 250 TAB at 08:29

## 2017-01-01 RX ADMIN — DEXTROSE MONOHYDRATE 50 ML: 25 INJECTION, SOLUTION INTRAVENOUS at 08:23

## 2017-01-01 RX ADMIN — SODIUM CHLORIDE 150 MG PE: 9 INJECTION, SOLUTION INTRAVENOUS at 16:24

## 2017-01-01 RX ADMIN — NYSTATIN 500000 UNITS: 100000 SUSPENSION ORAL at 21:27

## 2017-01-01 RX ADMIN — INSULIN ASPART 2 UNITS: 100 INJECTION, SOLUTION INTRAVENOUS; SUBCUTANEOUS at 00:12

## 2017-01-01 RX ADMIN — NYSTATIN 500000 UNITS: 100000 SUSPENSION ORAL at 09:18

## 2017-01-01 RX ADMIN — DOXERCALCIFEROL 4 MCG: 2 INJECTION, SOLUTION INTRAVENOUS at 08:33

## 2017-01-01 RX ADMIN — SODIUM CHLORIDE 150 MG PE: 9 INJECTION, SOLUTION INTRAVENOUS at 04:02

## 2017-01-01 RX ADMIN — LORAZEPAM 0.5 MG: 2 INJECTION INTRAMUSCULAR; INTRAVENOUS at 17:11

## 2017-01-01 RX ADMIN — IPRATROPIUM BROMIDE AND ALBUTEROL SULFATE 3 ML: .5; 3 SOLUTION RESPIRATORY (INHALATION) at 15:58

## 2017-01-01 RX ADMIN — MIDAZOLAM HYDROCHLORIDE 2 MG: 1 INJECTION, SOLUTION INTRAMUSCULAR; INTRAVENOUS at 17:48

## 2017-01-01 RX ADMIN — MIDAZOLAM HYDROCHLORIDE 2 MG: 1 INJECTION, SOLUTION INTRAMUSCULAR; INTRAVENOUS at 11:10

## 2017-01-01 RX ADMIN — IPRATROPIUM BROMIDE AND ALBUTEROL SULFATE 3 ML: .5; 3 SOLUTION RESPIRATORY (INHALATION) at 07:18

## 2017-01-01 RX ADMIN — HYDROMORPHONE HYDROCHLORIDE 1 MG: 1 INJECTION, SOLUTION INTRAMUSCULAR; INTRAVENOUS; SUBCUTANEOUS at 07:41

## 2017-01-01 RX ADMIN — OXYCODONE HYDROCHLORIDE 5 MG: 100 SOLUTION ORAL at 02:44

## 2017-01-01 RX ADMIN — ACETAMINOPHEN 650 MG: 650 SUPPOSITORY RECTAL at 16:20

## 2017-01-01 RX ADMIN — NYSTATIN 500000 UNITS: 100000 SUSPENSION ORAL at 21:56

## 2017-01-01 RX ADMIN — DEXTROSE MONOHYDRATE 1000 ML: 50 INJECTION, SOLUTION INTRAVENOUS at 14:27

## 2017-01-01 RX ADMIN — NYSTATIN 500000 UNITS: 100000 SUSPENSION ORAL at 17:36

## 2017-01-01 RX ADMIN — ALBUMIN (HUMAN) 250 ML: 12.5 SOLUTION INTRAVENOUS at 09:41

## 2017-01-01 RX ADMIN — PANTOPRAZOLE SODIUM 40 MG: 40 INJECTION, POWDER, FOR SOLUTION INTRAVENOUS at 07:46

## 2017-01-01 RX ADMIN — NYSTATIN 500000 UNITS: 100000 SUSPENSION ORAL at 22:54

## 2017-01-01 RX ADMIN — PANTOPRAZOLE SODIUM 40 MG: 40 TABLET, DELAYED RELEASE ORAL at 22:09

## 2017-01-01 RX ADMIN — SODIUM CHLORIDE 150 MG PE: 9 INJECTION, SOLUTION INTRAVENOUS at 17:11

## 2017-01-01 RX ADMIN — ALBUMIN (HUMAN) 250 ML: 12.5 SOLUTION INTRAVENOUS at 14:27

## 2017-01-01 RX ADMIN — NOREPINEPHRINE BITARTRATE 0.28 MCG/KG/MIN: 1 INJECTION INTRAVENOUS at 01:27

## 2017-01-01 RX ADMIN — PANTOPRAZOLE SODIUM 40 MG: 40 INJECTION, POWDER, FOR SOLUTION INTRAVENOUS at 20:36

## 2017-01-01 RX ADMIN — LORAZEPAM 0.5 MG: 0.5 TABLET ORAL at 13:12

## 2017-01-01 RX ADMIN — AMPICILLIN SODIUM AND SULBACTAM SODIUM 3 G: 2; 1 INJECTION, POWDER, FOR SOLUTION INTRAMUSCULAR; INTRAVENOUS at 15:54

## 2017-01-01 RX ADMIN — PANTOPRAZOLE SODIUM 40 MG: 40 INJECTION, POWDER, FOR SOLUTION INTRAVENOUS at 21:52

## 2017-01-01 RX ADMIN — HYDROMORPHONE HYDROCHLORIDE 1 MG: 1 INJECTION, SOLUTION INTRAMUSCULAR; INTRAVENOUS; SUBCUTANEOUS at 09:58

## 2017-01-01 RX ADMIN — IPRATROPIUM BROMIDE AND ALBUTEROL SULFATE 3 ML: .5; 3 SOLUTION RESPIRATORY (INHALATION) at 11:23

## 2017-01-01 RX ADMIN — IPRATROPIUM BROMIDE AND ALBUTEROL SULFATE 3 ML: .5; 3 SOLUTION RESPIRATORY (INHALATION) at 11:30

## 2017-01-01 RX ADMIN — NYSTATIN 500000 UNITS: 100000 SUSPENSION ORAL at 21:07

## 2017-01-01 RX ADMIN — LEVOFLOXACIN 750 MG: 5 INJECTION, SOLUTION INTRAVENOUS at 03:29

## 2017-01-01 RX ADMIN — INSULIN ASPART 2 UNITS: 100 INJECTION, SOLUTION INTRAVENOUS; SUBCUTANEOUS at 04:16

## 2017-01-01 RX ADMIN — VASOPRESSIN 2.4 UNITS/HR: 20 INJECTION INTRAVENOUS at 08:55

## 2017-01-01 RX ADMIN — MICAFUNGIN SODIUM 100 MG: 20 INJECTION, POWDER, LYOPHILIZED, FOR SOLUTION INTRAVENOUS at 16:59

## 2017-01-01 RX ADMIN — NYSTATIN 500000 UNITS: 100000 SUSPENSION ORAL at 17:30

## 2017-01-01 RX ADMIN — IPRATROPIUM BROMIDE AND ALBUTEROL SULFATE 3 ML: .5; 3 SOLUTION RESPIRATORY (INHALATION) at 11:11

## 2017-01-01 RX ADMIN — SODIUM CHLORIDE 500 ML: 9 INJECTION, SOLUTION INTRAVENOUS at 04:17

## 2017-01-01 RX ADMIN — DEXMEDETOMIDINE 0.7 MCG/KG/HR: 100 INJECTION, SOLUTION, CONCENTRATE INTRAVENOUS at 04:38

## 2017-01-01 RX ADMIN — IPRATROPIUM BROMIDE AND ALBUTEROL SULFATE 3 ML: .5; 3 SOLUTION RESPIRATORY (INHALATION) at 07:13

## 2017-01-01 RX ADMIN — NYSTATIN 500000 UNITS: 100000 SUSPENSION ORAL at 18:04

## 2017-01-01 RX ADMIN — SODIUM CHLORIDE, POTASSIUM CHLORIDE, SODIUM LACTATE AND CALCIUM CHLORIDE 1497 ML: 600; 310; 30; 20 INJECTION, SOLUTION INTRAVENOUS at 01:52

## 2017-01-01 RX ADMIN — AMPICILLIN SODIUM AND SULBACTAM SODIUM 3 G: 2; 1 INJECTION, POWDER, FOR SOLUTION INTRAMUSCULAR; INTRAVENOUS at 16:15

## 2017-01-01 RX ADMIN — OXYCODONE HYDROCHLORIDE 5 MG: 100 SOLUTION ORAL at 10:53

## 2017-01-01 RX ADMIN — SODIUM CHLORIDE 5.5 UNITS/HR: 9 INJECTION, SOLUTION INTRAVENOUS at 01:08

## 2017-01-01 RX ADMIN — IPRATROPIUM BROMIDE AND ALBUTEROL SULFATE 3 ML: .5; 3 SOLUTION RESPIRATORY (INHALATION) at 15:26

## 2017-01-01 RX ADMIN — HYDROMORPHONE HYDROCHLORIDE 1 MG: 1 INJECTION, SOLUTION INTRAMUSCULAR; INTRAVENOUS; SUBCUTANEOUS at 07:46

## 2017-01-01 RX ADMIN — Medication 5 ML: at 12:14

## 2017-01-01 RX ADMIN — TAZOBACTAM SODIUM AND PIPERACILLIN SODIUM 2.25 G: 250; 2 INJECTION, SOLUTION INTRAVENOUS at 16:43

## 2017-01-01 RX ADMIN — ONDANSETRON 4 MG: 2 INJECTION INTRAMUSCULAR; INTRAVENOUS at 11:50

## 2017-01-01 RX ADMIN — IPRATROPIUM BROMIDE AND ALBUTEROL SULFATE 3 ML: .5; 3 SOLUTION RESPIRATORY (INHALATION) at 11:09

## 2017-01-01 RX ADMIN — DEXMEDETOMIDINE 0.7 MCG/KG/HR: 100 INJECTION, SOLUTION, CONCENTRATE INTRAVENOUS at 02:33

## 2017-01-01 RX ADMIN — ACETAMINOPHEN 1000 MG: 10 INJECTION, SOLUTION INTRAVENOUS at 08:29

## 2017-01-01 RX ADMIN — DOXERCALCIFEROL 4 MCG: 2 INJECTION, SOLUTION INTRAVENOUS at 13:44

## 2017-01-01 RX ADMIN — Medication 0.2 MG: at 10:33

## 2017-01-01 RX ADMIN — IPRATROPIUM BROMIDE AND ALBUTEROL SULFATE 3 ML: .5; 3 SOLUTION RESPIRATORY (INHALATION) at 19:33

## 2017-01-01 RX ADMIN — VASOPRESSIN 2.4 UNITS/HR: 20 INJECTION INTRAVENOUS at 09:01

## 2017-01-01 RX ADMIN — IPRATROPIUM BROMIDE AND ALBUTEROL SULFATE 3 ML: .5; 3 SOLUTION RESPIRATORY (INHALATION) at 08:58

## 2017-01-01 RX ADMIN — SODIUM CHLORIDE 150 MG PE: 9 INJECTION, SOLUTION INTRAVENOUS at 04:10

## 2017-01-01 RX ADMIN — Medication 0.2 MG: at 12:55

## 2017-01-01 RX ADMIN — DIATRIZOATE MEGLUMINE AND DIATRIZOATE SODIUM 30 ML: 660; 100 SOLUTION ORAL; RECTAL at 09:55

## 2017-01-01 RX ADMIN — INSULIN ASPART 1 UNITS: 100 INJECTION, SOLUTION INTRAVENOUS; SUBCUTANEOUS at 04:15

## 2017-01-01 RX ADMIN — HYDROMORPHONE HYDROCHLORIDE 0.5 MG: 1 INJECTION, SOLUTION INTRAMUSCULAR; INTRAVENOUS; SUBCUTANEOUS at 02:50

## 2017-01-01 RX ADMIN — SODIUM CHLORIDE 500 ML: 9 INJECTION, SOLUTION INTRAVENOUS at 09:23

## 2017-01-01 RX ADMIN — SODIUM CHLORIDE 150 MG PE: 9 INJECTION, SOLUTION INTRAVENOUS at 17:23

## 2017-01-01 RX ADMIN — IPRATROPIUM BROMIDE AND ALBUTEROL SULFATE 3 ML: .5; 3 SOLUTION RESPIRATORY (INHALATION) at 16:25

## 2017-01-01 RX ADMIN — TAZOBACTAM SODIUM AND PIPERACILLIN SODIUM 2.25 G: 250; 2 INJECTION, SOLUTION INTRAVENOUS at 00:02

## 2017-01-01 RX ADMIN — DEXMEDETOMIDINE 0.5 MCG/KG/HR: 100 INJECTION, SOLUTION, CONCENTRATE INTRAVENOUS at 01:01

## 2017-01-01 RX ADMIN — DEXMEDETOMIDINE 0.6 MCG/KG/HR: 100 INJECTION, SOLUTION, CONCENTRATE INTRAVENOUS at 16:42

## 2017-01-01 RX ADMIN — SODIUM CHLORIDE 150 MG PE: 9 INJECTION, SOLUTION INTRAVENOUS at 15:41

## 2017-01-01 RX ADMIN — INSULIN ASPART 1 UNITS: 100 INJECTION, SOLUTION INTRAVENOUS; SUBCUTANEOUS at 00:17

## 2017-01-01 RX ADMIN — NYSTATIN 500000 UNITS: 100000 SUSPENSION ORAL at 07:46

## 2017-01-01 RX ADMIN — INSULIN ASPART 1 UNITS: 100 INJECTION, SOLUTION INTRAVENOUS; SUBCUTANEOUS at 08:08

## 2017-01-01 RX ADMIN — SODIUM CHLORIDE 150 MG PE: 9 INJECTION, SOLUTION INTRAVENOUS at 04:49

## 2017-01-01 RX ADMIN — NYSTATIN 500000 UNITS: 100000 SUSPENSION ORAL at 14:14

## 2017-01-01 RX ADMIN — SODIUM CHLORIDE 150 MG PE: 9 INJECTION, SOLUTION INTRAVENOUS at 16:59

## 2017-01-01 RX ADMIN — IPRATROPIUM BROMIDE AND ALBUTEROL SULFATE 3 ML: .5; 3 SOLUTION RESPIRATORY (INHALATION) at 19:56

## 2017-01-01 RX ADMIN — HYDROMORPHONE HYDROCHLORIDE 1 MG: 1 INJECTION, SOLUTION INTRAMUSCULAR; INTRAVENOUS; SUBCUTANEOUS at 00:45

## 2017-01-01 RX ADMIN — IPRATROPIUM BROMIDE AND ALBUTEROL SULFATE 3 ML: .5; 3 SOLUTION RESPIRATORY (INHALATION) at 11:47

## 2017-01-01 RX ADMIN — NYSTATIN 500000 UNITS: 100000 SUSPENSION ORAL at 08:19

## 2017-01-01 RX ADMIN — AMPICILLIN SODIUM AND SULBACTAM SODIUM 3 G: 2; 1 INJECTION, POWDER, FOR SOLUTION INTRAMUSCULAR; INTRAVENOUS at 15:50

## 2017-01-01 RX ADMIN — IPRATROPIUM BROMIDE AND ALBUTEROL SULFATE 3 ML: .5; 3 SOLUTION RESPIRATORY (INHALATION) at 07:48

## 2017-01-01 RX ADMIN — PANTOPRAZOLE SODIUM 40 MG: 40 TABLET, DELAYED RELEASE ORAL at 21:56

## 2017-01-01 RX ADMIN — IPRATROPIUM BROMIDE AND ALBUTEROL SULFATE 3 ML: .5; 3 SOLUTION RESPIRATORY (INHALATION) at 12:29

## 2017-01-01 RX ADMIN — IPRATROPIUM BROMIDE AND ALBUTEROL SULFATE 3 ML: .5; 3 SOLUTION RESPIRATORY (INHALATION) at 15:33

## 2017-01-01 RX ADMIN — NOREPINEPHRINE BITARTRATE 0.15 MCG/KG/MIN: 1 INJECTION INTRAVENOUS at 08:33

## 2017-01-01 RX ADMIN — NYSTATIN 500000 UNITS: 100000 SUSPENSION ORAL at 14:10

## 2017-01-01 RX ADMIN — DEXMEDETOMIDINE 0.7 MCG/KG/HR: 100 INJECTION, SOLUTION, CONCENTRATE INTRAVENOUS at 02:17

## 2017-01-01 RX ADMIN — IPRATROPIUM BROMIDE AND ALBUTEROL SULFATE 3 ML: .5; 3 SOLUTION RESPIRATORY (INHALATION) at 07:55

## 2017-01-01 RX ADMIN — VASOPRESSIN 2.4 UNITS/HR: 20 INJECTION INTRAVENOUS at 23:51

## 2017-01-01 RX ADMIN — NYSTATIN 500000 UNITS: 100000 SUSPENSION ORAL at 17:52

## 2017-01-01 RX ADMIN — IPRATROPIUM BROMIDE AND ALBUTEROL SULFATE 3 ML: .5; 3 SOLUTION RESPIRATORY (INHALATION) at 19:28

## 2017-01-01 RX ADMIN — PANTOPRAZOLE SODIUM 40 MG: 40 TABLET, DELAYED RELEASE ORAL at 21:36

## 2017-01-01 RX ADMIN — INSULIN ASPART 1 UNITS: 100 INJECTION, SOLUTION INTRAVENOUS; SUBCUTANEOUS at 17:07

## 2017-01-01 RX ADMIN — NYSTATIN 500000 UNITS: 100000 SUSPENSION ORAL at 07:42

## 2017-01-01 RX ADMIN — HYDROMORPHONE HYDROCHLORIDE 1 MG: 1 INJECTION, SOLUTION INTRAMUSCULAR; INTRAVENOUS; SUBCUTANEOUS at 11:22

## 2017-01-01 RX ADMIN — AMPICILLIN SODIUM AND SULBACTAM SODIUM 3 G: 2; 1 INJECTION, POWDER, FOR SOLUTION INTRAMUSCULAR; INTRAVENOUS at 15:38

## 2017-01-01 RX ADMIN — IPRATROPIUM BROMIDE AND ALBUTEROL SULFATE 3 ML: .5; 3 SOLUTION RESPIRATORY (INHALATION) at 19:44

## 2017-01-01 RX ADMIN — PROCHLORPERAZINE EDISYLATE 5 MG: 5 INJECTION INTRAMUSCULAR; INTRAVENOUS at 12:38

## 2017-01-01 RX ADMIN — LEVOFLOXACIN 500 MG: 5 INJECTION, SOLUTION INTRAVENOUS at 01:25

## 2017-01-01 RX ADMIN — DEXMEDETOMIDINE 0.7 MCG/KG/HR: 100 INJECTION, SOLUTION, CONCENTRATE INTRAVENOUS at 04:26

## 2017-01-01 RX ADMIN — PANTOPRAZOLE SODIUM 40 MG: 40 INJECTION, POWDER, FOR SOLUTION INTRAVENOUS at 09:18

## 2017-01-01 RX ADMIN — DEXMEDETOMIDINE 0.7 MCG/KG/HR: 100 INJECTION, SOLUTION, CONCENTRATE INTRAVENOUS at 21:36

## 2017-01-01 RX ADMIN — SIMVASTATIN 40 MG: 40 TABLET, FILM COATED ORAL at 21:03

## 2017-01-01 RX ADMIN — NYSTATIN 500000 UNITS: 100000 SUSPENSION ORAL at 12:48

## 2017-01-01 RX ADMIN — NYSTATIN 500000 UNITS: 100000 SUSPENSION ORAL at 21:03

## 2017-01-01 RX ADMIN — DEXTROSE MONOHYDRATE 50 ML: 25 INJECTION, SOLUTION INTRAVENOUS at 21:02

## 2017-01-01 RX ADMIN — INSULIN ASPART 1 UNITS: 100 INJECTION, SOLUTION INTRAVENOUS; SUBCUTANEOUS at 16:19

## 2017-01-01 RX ADMIN — IPRATROPIUM BROMIDE AND ALBUTEROL SULFATE 3 ML: .5; 3 SOLUTION RESPIRATORY (INHALATION) at 07:44

## 2017-01-01 RX ADMIN — GLYCOPYRROLATE 0.3 MG: 0.2 INJECTION, SOLUTION INTRAMUSCULAR; INTRAVENOUS at 08:23

## 2017-01-01 RX ADMIN — IPRATROPIUM BROMIDE AND ALBUTEROL SULFATE 3 ML: .5; 3 SOLUTION RESPIRATORY (INHALATION) at 19:48

## 2017-01-01 RX ADMIN — HYDROMORPHONE HYDROCHLORIDE 0.5 MG: 1 INJECTION, SOLUTION INTRAMUSCULAR; INTRAVENOUS; SUBCUTANEOUS at 08:05

## 2017-01-01 RX ADMIN — VASOPRESSIN 2.4 UNITS/HR: 20 INJECTION INTRAVENOUS at 21:06

## 2017-01-01 RX ADMIN — SODIUM CHLORIDE 250 ML: 9 INJECTION, SOLUTION INTRAVENOUS at 07:49

## 2017-01-01 RX ADMIN — IPRATROPIUM BROMIDE AND ALBUTEROL SULFATE 3 ML: .5; 3 SOLUTION RESPIRATORY (INHALATION) at 11:25

## 2017-01-01 RX ADMIN — GLYCOPYRROLATE 0.3 MG: 0.2 INJECTION, SOLUTION INTRAMUSCULAR; INTRAVENOUS at 13:29

## 2017-01-01 RX ADMIN — SIMVASTATIN 40 MG: 40 TABLET, FILM COATED ORAL at 22:00

## 2017-01-01 RX ADMIN — EPOETIN ALFA 5000 UNITS: 3000 SOLUTION INTRAVENOUS; SUBCUTANEOUS at 13:45

## 2017-01-01 RX ADMIN — INSULIN ASPART 1 UNITS: 100 INJECTION, SOLUTION INTRAVENOUS; SUBCUTANEOUS at 17:36

## 2017-01-01 RX ADMIN — IPRATROPIUM BROMIDE AND ALBUTEROL SULFATE 3 ML: .5; 3 SOLUTION RESPIRATORY (INHALATION) at 15:17

## 2017-01-01 RX ADMIN — HEPARIN SODIUM 1900 UNITS: 1000 INJECTION, SOLUTION INTRAVENOUS; SUBCUTANEOUS at 18:35

## 2017-01-01 RX ADMIN — SODIUM CHLORIDE 250 ML: 9 INJECTION, SOLUTION INTRAVENOUS at 09:18

## 2017-01-01 RX ADMIN — IPRATROPIUM BROMIDE AND ALBUTEROL SULFATE 3 ML: .5; 3 SOLUTION RESPIRATORY (INHALATION) at 07:53

## 2017-01-01 RX ADMIN — IPRATROPIUM BROMIDE AND ALBUTEROL SULFATE 3 ML: .5; 3 SOLUTION RESPIRATORY (INHALATION) at 15:15

## 2017-01-01 RX ADMIN — NYSTATIN 500000 UNITS: 100000 SUSPENSION ORAL at 10:42

## 2017-01-01 RX ADMIN — TAZOBACTAM SODIUM AND PIPERACILLIN SODIUM 2.25 G: 250; 2 INJECTION, SOLUTION INTRAVENOUS at 10:55

## 2017-01-01 RX ADMIN — ASCORBIC ACID TAB 250 MG 250 MG: 250 TAB at 12:14

## 2017-01-01 RX ADMIN — HYDROMORPHONE HYDROCHLORIDE 1 MG: 1 INJECTION, SOLUTION INTRAMUSCULAR; INTRAVENOUS; SUBCUTANEOUS at 04:56

## 2017-01-01 RX ADMIN — INSULIN ASPART 1 UNITS: 100 INJECTION, SOLUTION INTRAVENOUS; SUBCUTANEOUS at 12:23

## 2017-01-01 RX ADMIN — NYSTATIN 500000 UNITS: 100000 SUSPENSION ORAL at 13:03

## 2017-01-01 RX ADMIN — PROCHLORPERAZINE EDISYLATE 5 MG: 5 INJECTION INTRAMUSCULAR; INTRAVENOUS at 18:05

## 2017-01-01 RX ADMIN — PROCHLORPERAZINE EDISYLATE 5 MG: 5 INJECTION INTRAMUSCULAR; INTRAVENOUS at 07:28

## 2017-01-01 RX ADMIN — SODIUM CHLORIDE: 9 INJECTION, SOLUTION INTRAVENOUS at 21:21

## 2017-01-01 RX ADMIN — IPRATROPIUM BROMIDE AND ALBUTEROL SULFATE 3 ML: .5; 3 SOLUTION RESPIRATORY (INHALATION) at 11:38

## 2017-01-01 RX ADMIN — NOREPINEPHRINE BITARTRATE 0.03 MCG/KG/MIN: 1 INJECTION INTRAVENOUS at 14:50

## 2017-01-01 RX ADMIN — NOREPINEPHRINE BITARTRATE 0.32 MCG/KG/MIN: 1 INJECTION INTRAVENOUS at 17:12

## 2017-01-01 RX ADMIN — SODIUM CHLORIDE 250 ML: 9 INJECTION, SOLUTION INTRAVENOUS at 14:25

## 2017-01-01 RX ADMIN — IPRATROPIUM BROMIDE AND ALBUTEROL SULFATE 3 ML: .5; 3 SOLUTION RESPIRATORY (INHALATION) at 15:19

## 2017-01-01 RX ADMIN — VANCOMYCIN HYDROCHLORIDE 1000 MG: 1 INJECTION, SOLUTION INTRAVENOUS at 01:53

## 2017-01-01 RX ADMIN — DEXMEDETOMIDINE 0.6 MCG/KG/HR: 100 INJECTION, SOLUTION, CONCENTRATE INTRAVENOUS at 14:20

## 2017-01-01 RX ADMIN — SODIUM CHLORIDE 150 MG PE: 9 INJECTION, SOLUTION INTRAVENOUS at 04:44

## 2017-01-01 RX ADMIN — SODIUM CHLORIDE, POTASSIUM CHLORIDE, SODIUM LACTATE AND CALCIUM CHLORIDE 500 ML: 600; 310; 30; 20 INJECTION, SOLUTION INTRAVENOUS at 20:03

## 2017-01-01 RX ADMIN — NYSTATIN 500000 UNITS: 100000 SUSPENSION ORAL at 17:54

## 2017-01-01 RX ADMIN — EPOETIN ALFA 5000 UNITS: 3000 SOLUTION INTRAVENOUS; SUBCUTANEOUS at 15:49

## 2017-01-01 RX ADMIN — SODIUM CHLORIDE 150 MG PE: 9 INJECTION, SOLUTION INTRAVENOUS at 16:20

## 2017-01-01 RX ADMIN — DEXMEDETOMIDINE 0.2 MCG/KG/HR: 100 INJECTION, SOLUTION, CONCENTRATE INTRAVENOUS at 14:50

## 2017-01-01 RX ADMIN — Medication: at 09:18

## 2017-01-01 RX ADMIN — SODIUM CHLORIDE 150 MG PE: 9 INJECTION, SOLUTION INTRAVENOUS at 04:33

## 2017-01-01 RX ADMIN — LORAZEPAM 0.5 MG: 2 INJECTION INTRAMUSCULAR; INTRAVENOUS at 20:48

## 2017-01-01 RX ADMIN — IPRATROPIUM BROMIDE AND ALBUTEROL SULFATE 3 ML: .5; 3 SOLUTION RESPIRATORY (INHALATION) at 07:50

## 2017-01-01 RX ADMIN — IPRATROPIUM BROMIDE AND ALBUTEROL SULFATE 3 ML: .5; 3 SOLUTION RESPIRATORY (INHALATION) at 07:40

## 2017-01-01 RX ADMIN — DEXMEDETOMIDINE 0.6 MCG/KG/HR: 100 INJECTION, SOLUTION, CONCENTRATE INTRAVENOUS at 08:29

## 2017-01-01 RX ADMIN — NYSTATIN 500000 UNITS: 100000 SUSPENSION ORAL at 17:38

## 2017-01-01 RX ADMIN — POTASSIUM CHLORIDE: 2 INJECTION, SOLUTION, CONCENTRATE INTRAVENOUS at 20:28

## 2017-01-01 RX ADMIN — LEVOFLOXACIN 500 MG: 5 INJECTION, SOLUTION INTRAVENOUS at 02:51

## 2017-01-01 RX ADMIN — Medication 5 ML: at 08:29

## 2017-01-01 RX ADMIN — PANTOPRAZOLE SODIUM 40 MG: 40 INJECTION, POWDER, FOR SOLUTION INTRAVENOUS at 22:27

## 2017-01-01 RX ADMIN — OXYCODONE HYDROCHLORIDE 5 MG: 100 SOLUTION ORAL at 14:40

## 2017-01-01 RX ADMIN — AMPICILLIN SODIUM AND SULBACTAM SODIUM 3 G: 2; 1 INJECTION, POWDER, FOR SOLUTION INTRAMUSCULAR; INTRAVENOUS at 17:23

## 2017-01-01 RX ADMIN — NOREPINEPHRINE BITARTRATE 0.08 MCG/KG/MIN: 1 INJECTION INTRAVENOUS at 10:55

## 2017-01-01 RX ADMIN — TAZOBACTAM SODIUM AND PIPERACILLIN SODIUM 2.25 G: 250; 2 INJECTION, SOLUTION INTRAVENOUS at 09:08

## 2017-01-01 RX ADMIN — SODIUM CHLORIDE 150 MG PE: 9 INJECTION, SOLUTION INTRAVENOUS at 17:29

## 2017-01-01 RX ADMIN — DEXMEDETOMIDINE 0.7 MCG/KG/HR: 100 INJECTION, SOLUTION, CONCENTRATE INTRAVENOUS at 06:59

## 2017-01-01 ASSESSMENT — ENCOUNTER SYMPTOMS
SHORTNESS OF BREATH: 1
VOMITING: 1

## 2017-01-12 NOTE — TELEPHONE ENCOUNTER
[facility]  Medical history and standing orders  G's yellow folder  Fax back

## 2017-02-01 NOTE — TELEPHONE ENCOUNTER
Please fax written Rx.   Repeat swallow study not needed unless staff or family feel as though he has shown signs of clinical improvement suggesting that a repeat study might show an improved outcome from previous.

## 2017-02-01 NOTE — TELEPHONE ENCOUNTER
"He may require a greater degree of thickening. If daughter/guardian agrees, we could update his swallow study. Will place order. Please advise Marianela.   Phone number for them to call\" [phone]  "

## 2017-02-01 NOTE — TELEPHONE ENCOUNTER
Informed staff Yudi that order and ST swallow [facility]. Asked if there has been a change in the residents condition. Staff yudi states that he has been choking more on food, a couple times per week. He is on a pureed diet.    PCP please advise.

## 2017-02-01 NOTE — TELEPHONE ENCOUNTER
Marianela calling in regarding orders for thick-it. Niece of pt stating he had a order in the past.   Pt had video swallow study done 6/5/13 records from evaluation printed and Orders for nectar thick and pureed foods printed from 06/11/13 [facility]; both  on [facility] nurse triage desk.   Marianela requesting call back at [phone] with clarification. Ok to leave detailed message.    Order pended for Thick-It pended for PCP to review. Also please advise if pt should have updated swallow study completed.    [facility] along with records on RN desk.

## 2017-03-09 NOTE — TELEPHONE ENCOUNTER
Spoke with Ellyn. He had severe aspiration with first attempt with teaspoon of nectar thickened liquids.   Her recommendation would be strict NPO.     Spoke with Maritza at [facility].    Left message for niece, Hemant Ramos, advising her of findings on swallow study and recommendations for NPO and G-tube feeding.   Advised her in message that thickening of liquids is not likely adding much protection from aspiration.     Niece (Hemant) has in the past expressed no interest in patient beginning tube feedings.     Left message for Hemant to call back to acknowledge that she has received this message.

## 2017-03-09 NOTE — PROGRESS NOTES
03/09/17 1300       Present No   Language Other  (Pt is nonverbal)   General Information   Type Of Visit Initial   Start Of Care Date 03/09/17   Referring Physician Dr. 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 Pt is nonverbal at baseline and hx obtained mostly through chart review as pt attended VFSS with group home caregiver. Pt with many VFSS completed in the past and most recent VFSS in 3/2014 recommending nectar thick liquids and pureed textures. Pts caregivers reports pt is currently on pureed textures and nectar thick liquids at home but reports consistent coughing with all PO.    Respiratory Status Room air   Prior Level Of Function Swallowing   Prior Level Of Function Comment Pt currently eats pureed textures and nectar thick liquids at home and per caregiver pt coughs with all PO   Patient Role/employment History Disabled   Living Environment Group Home   Patient/family Goals Pt is nonverbal and did not state   Pain Assessment   Pain Reported No   FALL RISK SCREEN   Comments Please see radiology report   Clinical Swallow Evaluation   Oral Musculature unable to assess due to poor participation/comprehension   Dentition present and adequate   VFSS Evaluation   VFSS Additional Documentation Yes   VFSS Eval: Radiology   Radiologist Dr. Velez   Views Taken left lateral   Physical Location of Procedure [facility]   VFSS Eval: Nectar Thick Liquid Texture Trial   Mode of Presentation, Nectar spoon;fed by clinician   Order of Presentation 1   Preparatory Phase Poor bolus control   Oral Phase,  Nectar Premature pharyngeal entry   Pharyngeal Phase, Nectar Delayed swallow reflex;Residue in valleculae;Residue in pyriform sinus   Rosenbek's Penetration Aspiration Scale: Nectar-Thick Liquid Trial Results 7 - contrast passes glottis, visible subglottic residue remains despite patient's response (aspiration)   Diagnostic Statement 1 teaspoon of nectar thick liquids resulted in premature spillage to valleculae and pyriform sinuses, pt then with gavi aspiration and unproductive cough response.   Response to Aspiration, Nectar unproductive reflexive involuntary cough/throat clear   Swallow Compensations   Swallow Compensations No compensations were used   Results No compensations were used   Educational Assessment   Barriers to Learning Language;Cognitive;Physical   Swallow Eval: Clinical Impressions   Skilled Criteria for Therapy Intervention Skilled criteria met.  Treatment indicated.   Dysphagia Outcome Severity Scale (ISAMAR) Level 1 - ISAMAR   Treatment Diagnosis Severe oropharyngeal dysphagia   Diet texture recommendations NPO   Rehab Potential fair, will monitor progress closely   Demonstrates Need for Referral to Another Service dietitian   Predicted Duration of Therapy Intervention (days/wks) eval only in this setting   Anticipated Discharge Disposition home w/ home health   Risks and Benefits of Treatment have been explained. Yes   Patient, family and/or staff in agreement with Plan of Care Yes   Clinical Impression Comments SLP: Video swallow study completed per MD orders. Pt presents with severe oropharyngeal dysphagia. Pt lethargic but did open eyes to voicing; pt nonverbal and unable to follow directions to complete oral mech examination. Pts caregiver reports pts baseline diet as pureed textures and nectar thick liquids however pt noted to cough with all PO. Initial trial of nectar thick liquids via spoon resulted in premature spillage to level of valleculae and pyriforms with gavi aspiration; pt with  unproductive cough response. No further trials appropriate given amount of barium pt aspirated on initial trial. VFSS limited by pts poor ability to participate and high risk for aspiration given gavi aspiration on teaspoon trial of nectar thick liquids. At this time, the safest possible recommendation is for NPO and consideration of alternative feeding options. However, unclear of pts wishes given pts nonverbal status. Would recommend MD discuss TF options vs. PO eating despite high risk for aspiration with pt and pts family. Pending pt and pts family decision regarding nutritional options, pt may benefit from follow up home care ST visit. Per discussion with MD, recommend pt and pts caregivers/family meet with MD to discuss nutirtional options and high risk for aspiration given todays VFSS results.    Total Session Time   Total Session Time 20   Total Evaluation Time 20

## 2017-03-09 NOTE — TELEPHONE ENCOUNTER
Ellyn outpatient speech therapist for swallow study calling with update of study: Failed nectar thick liquids, aspirated 1 tsp. Stopped test immediately. Asking what recommendations are for nutrition? Possible nutrition consult, at the least to meet with the pt. Pt is probably aspirating at meals everyday. Safest possible recommendation is NPO.    Provider please review and advise.    [phone] pager, please page Ellyn with recommendations and then Ellyn will call group home with update and orders. Ellyn there until 3:30pm today.

## 2017-03-10 NOTE — TELEPHONE ENCOUNTER
Allison calls and asks if pt can take his medications.  He says yes he may eat and drink what he wants and take his medications.  Asked Dr Sherman to fax order to group home.

## 2017-03-10 NOTE — TELEPHONE ENCOUNTER
Maritza from [facility] calls, states pt is NPO at this time d/t below. Asking what should be done over the weekend for fluids and medication (takes anti-seizure meds).    Called Hemant to check if she got PCP's message, states she has. Discuss pt is NPO right now but concerns about nutrition, fluids, and meds. Discuss aspiration pneumonia and high risk of this as evident by swallow study results.Hemant asks about what a G-tube would entail. Discuss it's a tube placed t/ open surgically placed in abd that goes to stomach to allow fluids, nutrition, and med administration. Hemant has continued questions. Dr. Sherman reports he can call her back.     Hemant's number: [phone].

## 2017-03-10 NOTE — TELEPHONE ENCOUNTER
Spoke with Hemant on phone. She would like me to call back at a time when her brother can also listen on the phone.   Advised her that I would call her back after 5 pm tonight.

## 2017-03-10 NOTE — TELEPHONE ENCOUNTER
Spoke again on phone, with Hemant and her brother Garo.    Discussed aspiration noted on recent swallow eval.   They now express interest in G-tube placement.     We agreed for the next few days to resume previous diet (blended solids, thickened liquids), until a G-tube placement could be arranged.     Contacted staff member at [facility] and provided verbal order for above.

## 2017-03-14 NOTE — TELEPHONE ENCOUNTER
Please check with [facility] scheduling, to see whether a G-tube placement has yet been scheduled.

## 2017-03-14 NOTE — TELEPHONE ENCOUNTER
Please contact patient's niece Hemant (number noted below), advise her that a preop H&P needs to be scheduled with me or other provider before we can proceed with G-tube placement at [facility].

## 2017-03-15 NOTE — TELEPHONE ENCOUNTER
Hemant informed pt will need a pre-op H&P prior to procedure being scheduled. She states the group home schedules appts for pt and to reach out to them.    Contacted [facility], pre-op made for 2/23/17 with Dennise. Spoke to Jesi GRAY, they state to call back after H&P is completed. Note placed on pre-op appt.

## 2017-03-17 NOTE — TELEPHONE ENCOUNTER
Form received from: [facility]     Form requesting following info/need: Thick-it supply orders    DANNA needed?: No    Location of form: Dr. Sherman's yellow folder    When completed the route for return: Fax

## 2017-03-23 NOTE — PROGRESS NOTES
[place] [address]  [facility] [address]  [phone]  Dept: [phone]    PRE-OP EVALUATION:  Today's date: 3/23/2017    Hesham Ramos (: 1950) presents for pre-operative evaluation assessment as requested by  In radiology interventional   He requires evaluation and anesthesia risk assessment prior to undergoing surgery/procedure for treatment of  Aspiration seen in video swallow and needs G tube placement .  Proposed procedure: G tube placement     Date of Surgery/ Procedure: TBD  Time of Surgery/ Procedure: TBD  Hospital/Surgical Facility: [facility] interventional radiology     Primary Physician: Fabien Sherman  Type of Anesthesia Anticipated: to be determined    Patient has a Health Care Directive or Living Will:  NO    1. NO - Do you have a history of heart attack, stroke, stent, bypass or surgery on an artery in the head, neck, heart or legs?   and  right side hemiplegia   Dialysis every 3 days -  and Friday ; chest access for dialysis  2. NO - Do you ever have any pain or discomfort in your chest?  3. YES - Do you have a history of  Heart Failure?  4. NO - Are you troubled by shortness of breath when: walking on the level, up a slight hill or at night?  5. NO - Do you currently have a cold, bronchitis or other respiratory infection?  6. NO - Do you have a cough, shortness of breath or wheezing?  7. NO - Do you sometimes get pains in the calves of your legs when you walk?  8. NO - Do you or anyone in your family have previous history of blood clots?  9. NO - Do you or does anyone in your family have a serious bleeding problem such as prolonged bleeding following surgeries or cuts?  10. NO - Have you ever had problems with anemia or been told to take iron pills?  11. NO - Have you had any abnormal blood loss such as black, tarry or bloody stools, or abnormal vaginal bleeding?  12. NO - Have you ever had a blood  transfusion?  13. NO - Have you or any of your relatives ever had problems with anesthesia?  14. NO - Do you have sleep apnea, excessive snoring or daytime drowsiness?  15. NO - Do you have any prosthetic heart valves?  16. NO - Do you have prosthetic joints?  17. NO - Is there any chance that you may be pregnant?      HPI:                                                      Brief HPI related to upcoming procedure:   IMPRESSION: There was marked tracheal aspiration after the patient  swallowed nectar consistency of barium.   Needs gastrostomy tube placement as he is aspirating food     Per Group home staff, family is aware and supportive of this treatment     3/27/2017  On review of POLST form it does say no feeding tubes   We are asking group home to clarify with family if they want feeding tube  If this can not be addressed, procedure will have to be cancelled        See problem list for active medical problems.  Problems all longstanding and stable, except as noted/documented.  See ROS for pertinent symptoms related to these conditions.                                                                                                  .    MEDICAL HISTORY:                                                      Patient Active Problem List    Diagnosis Date Noted     Hemodialysis catheter dysfunction, initial encounter (H) 10/25/2016     Priority: Medium     Hyperkalemia 10/24/2016     Priority: Medium     Diastolic CHF, chronic (H)      Priority: Medium     Septic shock (H) 11/12/2015     Priority: Medium     Health Care Home 08/25/2014     Priority: Medium     Pt not active in care coordination.  Status:  Declined  Care Coordinator:  KATY DOE    See Letters for HC Care Plan  Date:  August 25, 2014           Seizures (HCC) 08/17/2014     Priority: Medium     Hematemesis 06/03/2014     Priority: Medium     Renal cancer (H) 04/25/2014     Priority: Medium     Hypoxia 03/13/2014     Priority: Medium     Syncope  08/02/2013     Priority: Medium     Anemia 08/02/2013     Priority: Medium     EKG abnormality 08/02/2013     Priority: Medium     Encephalopathy 08/02/2013     Priority: Medium     CKD (chronic kidney disease) stage 5, GFR less than 15 ml/min (H) 07/23/2013     Priority: Medium     Cognitive impairment 06/21/2013     Priority: Medium     ESRF (end stage renal failure) (H) 05/16/2013     Priority: Medium     Advance Care Planning 11/01/2011     Priority: Medium     Advance Care Planning 12/2/2015: Receipt of ACP document:  Received: POLST which was signed and dated by provider on 11/18/15.  Document previously scanned on 11/20/15.  Order reviewed and found to be valid.  Code Status reflects choices in most recent ACP document.  Confirmed/documented designated decision maker(s).  Added by PABLO CHAN  Advance Care Planning: Initial facilitation introduction:   Hesham CHRISTEN Ramos presented for initial session regarding ACP at a group session. He was accompanied by no one. [facility] Cho[employer] information provided and resources reviewed. He currently wishes to review choices without completion of an ACP document.  He currently has the following questions or concerns about Advance Care Planning: none known.  Confirmed/documented designated decision maker(s). See permanent comments section of demographics in clinical tab. Added by Kaylin Rincon on 10/20/2014  Advance Care Planning: Receipt of ACP document:  Received: POLST which was signed and dated by provider on 8/27/13.  Document previously scanned on 10/3/13.  Order reviewed and found to be valid.  Code Status reflects choices in most recent ACP document.  Confirmed/documented designated decision maker(s). See permanent comments section of demographics in clinical tab. View document(s) and details by clicking on code status. Added by Kaylin Rincon on 10/20/2014.  Pt have received packets.  Juliette Briggs MA         HYPERLIPIDEMIA LDL GOAL <100 10/31/2010      Priority: Medium     Cerebrovascular accident (CVA) due to occlusion of cerebral artery (H)      Priority: Medium     Hemiplegia, aphasia       Chronic diastolic congestive heart failure (H)      Priority: Medium     Essential hypertension      Priority: Medium     Problem list name updated by automated process. Provider to review        Past Medical History:   Diagnosis Date     CKD (chronic kidney disease)     On dialysis      Diastolic CHF, chronic (H)      Renal cancer (H)     s/p cryoablation     Seizures (H) Onset 8/2014    On dilantin     Stroke (H) 2000, 2007    Hemiplegia, aphasia     Unspecified essential hypertension     Off all BP meds as of late 2015 due to hypotension     Past Surgical History:   Procedure Laterality Date     C NONSPECIFIC PROCEDURE      Cryoablation of kidney cancer     COSMETIC SURGERY      right arm skin graft     ESOPHAGOSCOPY, GASTROSCOPY, DUODENOSCOPY (EGD), COMBINED  8/5/2014    Procedure: COMBINED ESOPHAGOSCOPY, GASTROSCOPY, DUODENOSCOPY (EGD), BIOPSY SINGLE OR MULTIPLE;  Surgeon: Raheel Mixon MD;  Location:  GI     HEMODIALYSIS VIA AV FISTULA       Current Outpatient Prescriptions   Medication Sig Dispense Refill     STARCH-MALTO DEXTRIN (DIAFOODS THICK-IT) POWD Thicken liquids to nectar-thick consistency. 1020 g 3     polyethylene glycol (MIRALAX/GLYCOLAX) packet Take by mouth as needed for constipation       donepezil (ARICEPT) 10 MG tablet Take 1 tablet (10 mg) by mouth At Bedtime 90 tablet 1     simvastatin (ZOCOR) 40 MG tablet Take 1 tablet (40 mg) by mouth At Bedtime 90 tablet 1     phenytoin (DILANTIN) 125 MG/5ML suspension Take 300 mg by mouth At Bedtime Dose = 12 ml per group home (125mg/5ml).       triamcinolone (KENALOG) 0.1 % cream Apply sparingly to affected area twice daily as needed for itching. 30 g 1     cholecalciferol (VITAMIN  -D) 1000 UNITS capsule Take 1 capsule (1,000 Units) by mouth daily 90 capsule 3     senna-docusate (SENOKOT-S;PERICOLACE)  8.6-50 MG per tablet Take 2 tablets by mouth 2 times daily 120 tablet 11     acetaminophen (TYLENOL) 325 MG tablet Take 1-2 tablets (325-650 mg) by mouth every 4 hours as needed for mild pain 100 tablet      ASPIRIN PO Take 81 mg by mouth daily       psyllium (METAMUCIL/KONSYL) packet Take 1 packet by mouth daily as needed for constipation       calcium carbonate (CALCIUM ANTACID EXTRA STRENGTH) 750 MG CHEW Take 750 mg by mouth 3 times daily       order for DME Equipment being ordered: Half Rails for hospital bed 2 Device 0     Multiple Vitamins-Minerals (MULTIVITAMIN ADULT) TABS Take 1 tablet by mouth daily 90 tablet 3     ACE NOT PRESCRIBED, INTENTIONAL, 1 each daily ACE Inhibitor not prescribed due to Symptomatic hypotension not due to excessive diuresis 30 each 0     BETA BLOCKER NOT PRESCRIBED, INTENTIONAL, 1 each daily Beta Blocker not prescribed intentionally due to Hypotension (SBP < 90)  0     MEDICATION INSTRUCTION FOR DIALYSIS PATIENTS 1 each See Admin Instructions Do not give any medication that may affect blood pressure or volume before dialysis (i.e. Lisinopril, Metoprolol)  The following medications may be removed by dialysis and should be given after dialysis: ASA 1 each 0     calcium carbonate (TUMS) 500 MG chewable tablet Take 1 chew tab by mouth daily       ORDER FOR DME Raised toilet seat with safety/hand rails. 1 Units 1     OTC products: None, except as noted above  Takes baby aspirin     No Known Allergies   Latex Allergy: NO    Social History   Substance Use Topics     Smoking status: Never Smoker     Smokeless tobacco: Never Used     Alcohol use No     History   Drug Use No       REVIEW OF SYSTEMS:                                                    C: NEGATIVE for fever, chills, change in weight  He aspirated on video swallow   I: NEGATIVE for worrisome rashes, moles or lesions  E: NEGATIVE for vision changes or irritation  E/M: NEGATIVE for ear, mouth and throat problems  R: NEGATIVE for  "significant cough or SOB  CV: NEGATIVE for chest pain, palpitations or peripheral edema  GI: NEGATIVE for nausea, abdominal pain,  change in bowel habits  He has swallowing difficulty and needs GT   : does not void - hemaodialysis   M: right sided paralysis  N: right side paralysis , non verbal, transfers with assist of 1-2 people with transfer belt     E: NEGATIVE for temperature intolerance, skin/hair changes  H: NEGATIVE for bleeding problems  P: NEGATIVE for changes in mood or affect    EXAM:                                                    /54 (BP Location: Right arm, Patient Position: Chair, Cuff Size: Adult Regular)  Pulse 67  Temp 97.7  F (36.5  C) (Oral)  Ht 5' 5\" (1.651 m)  Wt 125 lb (56.7 kg)  BMI 20.8 kg/m2    GENERAL APPEARANCE: alert and no distress; non verbal      HENT: ear canals and TM's normal and nose and mouth without ulcers or lesions     RESP: lungs clear to auscultation - no rales, rhonchi or wheezes     CV: regular rates and rhythm     ABDOMEN:  soft, nontender, and bowel sounds normal     MS: right side paralysis ; left side is able to move - will follow directions with left side   Right chest dialysis device     NEURO: non verbal - right sided paralysis      PSYCH: non verbal and flat affect     DIAGNOSTICS:                                                    EKG: appears normal, NSR, comparison with previous similar done 10/2016    Recent Labs   Lab Test  12/29/16   1340  10/26/16   0800   10/24/16   1840  10/11/16   1720   03/12/14   2205   HGB  10.6*  7.5*   --   9.4*  9.9*   < >  9.3*   PLT  237   --    --   144*  290   < >  271   INR  1.07   --    --    --   1.04   < >   --    NA  141  141   < >  147*  134   < >  137   POTASSIUM  4.2  3.8   < >  5.6*  4.5   < >  3.8   CR  5.25*  9.64*   < >  18.40*  7.62*   < >  5.49*   A1C   --   5.4   --    --    --    --   6.1*    < > = values in this interval not displayed.        IMPRESSION:                                           "          Reason for surgery/procedure: aspiration   Diagnosis/reason for consult: pre op  Exam limited as patient is non verbal and group home staf providing information     The proposed surgical procedure is considered LOW risk.    REVISED CARDIAC RISK INDEX  The patient has the following serious cardiovascular risks for perioperative complications such as (MI, PE, VFib and 3  AV Block):    Hemodialysis patient   Previous stroke x 2 with right side hemiparalysis      INTERPRETATION: 2 risks: Class III (moderate risk - 6.6% complication rate)    The patient has the following additional risks for perioperative complications:  No identified additional risks  Hemodialysis patient - CKD      ICD-10-CM    1. Preop general physical exam Z01.818    2. Aspiration into airway, sequela T17.908S    3. Hemodialysis status (H) Z99.2    4. ESRF (end stage renal failure) (H) N18.6    5. CKD (chronic kidney disease) stage 5, GFR less than 15 ml/min (H) N18.5    6. Essential hypertension I10    7. Encephalopathy G93.40    8. Convulsions, unspecified convulsion type (H) R56.9    9. Cognitive impairment R41.89          RECOMMENDATIONS:                                                      --Consult hospital rounder / IM to assist post-op medical management  If patient is to remain in hospital     Dietician to recommend his diet via GT         APPROVAL GIVEN to proceed with proposed procedure, without further diagnostic evaluation as long as family in agreement with the procedure - the group home staff said they were, but our only POLST form in chart says no feeding tubes        Signed Electronically by: PHILIPP Cantor CNP    Copy of this evaluation report is provided to requesting physician.    Alex Preop Guidelines

## 2017-03-23 NOTE — TELEPHONE ENCOUNTER
Left message for Maritza SANTANA in regards to  preop was done by Dennise Cuellar, now need to call [facility] to set up his surgery for his Gtube placement at [phone] [place]. Thanks,.AILYN KYLE LPN

## 2017-03-23 NOTE — NURSING NOTE
"Chief Complaint   Patient presents with     Pre-Op Exam       Initial /54 (BP Location: Right arm, Patient Position: Chair, Cuff Size: Adult Regular)  Pulse 67  Temp 97.7  F (36.5  C) (Oral)  Ht 5' 5\" (1.651 m)  Wt 125 lb (56.7 kg)  BMI 20.8 kg/m2 Estimated body mass index is 20.8 kg/(m^2) as calculated from the following:    Height as of this encounter: 5' 5\" (1.651 m).    Weight as of this encounter: 125 lb (56.7 kg).  Medication Reconciliation: complete    "

## 2017-03-23 NOTE — TELEPHONE ENCOUNTER
Did Dr Sherman place order for GT?  Our pre op is for any procedure / surgery     The group home staff did not know where or when it would be   Changed destination to [facility] on preop    Conscious sedation would be fine - group home was not sure of what was planned

## 2017-03-23 NOTE — TELEPHONE ENCOUNTER
Maritza, staff for pt called, states [facility] cannot use the order already placed for GT as the order from 3/9 is for [facility].  [facility] IR states they need to know if pt is going to need sedation in IR or actual anesthesia for a surgery.  It sounds like the wording in preop concerned them as it talks about surgery and anesthesia.  If GT placement to be done under conscious sedation they will do it otherwise will have to contact a surgeon.    Please place order for [facility] if IR.  TEJAL Forbes R.N.

## 2017-03-27 NOTE — TELEPHONE ENCOUNTER
Call and ask group home if they can get updated POLST form   When in for pre op they said the family was in agreement with tube feeding  If they cannot do this we may have to postpone the procedure

## 2017-03-27 NOTE — TELEPHONE ENCOUNTER
Pharm calls, has a couple medication requests.    1) States in past TUMs has clogged up tube. Pharm reports there are liquid alternatives that would be appropriate. No alternative med recommendations gotten from pharm. When asked states there are multiple options.    2) Reports senokot syrup not covered by insurance, but docusate + senna liquid is covered. Indicates pt has been on docusate + senna in past.    Please advise, thanks.

## 2017-03-27 NOTE — TELEPHONE ENCOUNTER
Spoke with Maritza from pt's  (cell # [phone]) and advised of 's message below.    She states  has made many calls to pt's guardian (Hemant) and has not hear back from her yet.    Maritza not currently @ work but will follow up when she goes in tomorrow.  She did not know if  has updated POLST form.

## 2017-03-27 NOTE — TELEPHONE ENCOUNTER
[facility]  Orders and instructions needed for G-Tube placement  Dennise's office  Fax back

## 2017-03-27 NOTE — TELEPHONE ENCOUNTER
Received call from [facility] nutrition asking about the consult needed on this patient.  He is scheduled tomorrow at [facility] for G tube placement so [facility] will contact [facility] to ask for the consult.      Of note, 2015 POLST states no tube feeding via nasogastric or surgically placed tube.

## 2017-03-27 NOTE — TELEPHONE ENCOUNTER
D/c Tums. He may check with his Nephrologist as to whether he requires alternative Calcium supplement that can be given per G-Tube.     I am not able to locate docusate + senna liquid on Epic. Okay to give verbal order for this to pharmacy, ask them for help on dosing.

## 2017-03-28 NOTE — IP AVS SNAPSHOT
MRN:[id number]                      After Visit Summary   3/28/2017    Hesham Ramos    MRN: [id number]           Visit Information        Department      3/28/2017  8:42 AM [facility]          Review of your medicines      UNREVIEWED medicines. Ask your doctor about these medicines        Dose / Directions    ACE NOT PRESCRIBED (INTENTIONAL)   Used for:  Cerebrovascular accident (CVA) due to thrombosis of cerebral artery (H)        Dose:  1 each   1 each daily ACE Inhibitor not prescribed due to Symptomatic hypotension not due to excessive diuresis   Quantity:  30 each   Refills:  0       * acetaminophen 325 MG tablet   Commonly known as:  TYLENOL   Used for:  URI (upper respiratory infection)        Dose:  325-650 mg   Take 1-2 tablets (325-650 mg) by mouth every 4 hours as needed for mild pain   Quantity:  100 tablet   Refills:  0       * acetaminophen 160 MG/5ML solution   Commonly known as:  TYLENOL   Used for:  Pain        Dose:  325-650 mg   10.15-20.3 mLs (325-650 mg) by Per G Tube route every 4 hours as needed for fever or mild pain   Quantity:  300 mL   Refills:  11       ASPIRIN PO        Dose:  81 mg   Take 81 mg by mouth daily   Refills:  0       BETA BLOCKER NOT PRESCRIBED (INTENTIONAL)   Used for:  Hypotension, unspecified hypotension type        Dose:  1 each   1 each daily Beta Blocker not prescribed intentionally due to Hypotension (SBP < 90)   Refills:  0       * CALCIUM ANTACID EXTRA STRENGTH 750 MG Chew   Used for:  ESRF (end stage renal failure) (H), CKD (chronic kidney disease) stage 5, GFR less than 15 ml/min (H)   Generic drug:  calcium carbonate        Dose:  750 mg   1 tablet (750 mg) by Per G Tube route 3 times daily Crush and dissolve in water prior to administration   Quantity:  90 tablet   Refills:  0       * calcium carbonate 500 MG chewable tablet   Commonly known as:  TUMS   Used for:  ESRF (end stage renal failure) (H), CKD (chronic  kidney disease) stage 5, GFR less than 15 ml/min (H)        Dose:  1 chew tab   1 tablet (500 mg) by Per G Tube route daily   Quantity:  150 tablet   Refills:  1       * cholecalciferol 1000 UNITS capsule   Commonly known as:  vitamin  -D   Used for:  ESRF (end stage renal failure) (H)        Dose:  1 capsule   Take 1 capsule (1,000 Units) by mouth daily   Quantity:  90 capsule   Refills:  3       * Vitamin D3 1200 UNIT/15ML Liqd   Used for:  CKD (chronic kidney disease) stage 5, GFR less than 15 ml/min (H), ESRF (end stage renal failure) (H)        Dose:  15 mL   15 mLs by Gastric Tube route daily   Quantity:  1400 mL   Refills:  1       DONEPEZIL HYDROCHLORIDE 10 MG Tbdp   Used for:  Cognitive impairment, Encephalopathy, Dementia without behavioral disturbance, unspecified dementia type        Dose:  10 mg   Take 10 mg by mouth daily ODT- patient has swallowing issues   Quantity:  90 tablet   Refills:  1       * MEDICATION INSTRUCTION FOR DIALYSIS PATIENTS   Used for:  CKD (chronic kidney disease) stage 5, GFR less than 15 ml/min (H)   Ask about: Which instructions should I use?        Dose:  1 each   1 each See Admin Instructions Do not give any medication that may affect blood pressure or volume before dialysis (i.e. Lisinopril, Metoprolol)  The following medications may be removed by dialysis and should be given after dialysis: ASA   Quantity:  1 each   Refills:  0       * MULTIVITAMIN ADULT Tabs   Used for:  Routine general medical examination at a health care facility        Dose:  1 tablet   Take 1 tablet by mouth daily   Quantity:  90 tablet   Refills:  3       * multivitamins with minerals Liqd liquid   Used for:  ESRF (end stage renal failure) (H), CKD (chronic kidney disease) stage 5, GFR less than 15 ml/min (H)        Dose:  15 mL   15 mLs by Per G Tube route daily   Quantity:  600 mL   Refills:  11       * order for DME   Used for:  Stroke (H)   Ask about: Which instructions should I use?        Raised  toilet seat with safety/hand rails.   Quantity:  1 Units   Refills:  1       phenytoin 125 MG/5ML suspension   Commonly known as:  DILANTIN        Dose:  300 mg   Take 300 mg by mouth At Bedtime Dose = 12 ml per group home (125mg/5ml).   Refills:  0       polyethylene glycol Packet   Commonly known as:  MIRALAX/GLYCOLAX   Used for:  Constipation, unspecified constipation type        Dose:  1 packet   17 g by Per G Tube route as needed for constipation (dissolve and give via GT)   Quantity:  7 packet   Refills:  11       psyllium Packet   Commonly known as:  METAMUCIL/KONSYL   Used for:  Constipation, unspecified constipation type        Dose:  1 packet   1 packet by Per G Tube route daily as needed for constipation   Refills:  0       senna-docusate 8.6-50 MG per tablet   Commonly known as:  SENOKOT-S;PERICOLACE   Used for:  Constipation        Dose:  2 tablet   Take 2 tablets by mouth 2 times daily   Quantity:  120 tablet   Refills:  11       simvastatin 40 MG tablet   Commonly known as:  ZOCOR   Used for:  Hyperlipidemia LDL goal <100        Dose:  40 mg   Take 1 tablet (40 mg) by mouth At Bedtime   Quantity:  90 tablet   Refills:  1       STARCH-MALTO DEXTRIN Powd   Commonly known as:  DIAFOODS THICK-IT   Used for:  Cerebrovascular accident (CVA) due to occlusion of cerebral artery (H), Dysphagia, unspecified type        Thicken liquids to nectar-thick consistency.   Quantity:  1020 g   Refills:  3       triamcinolone 0.1 % cream   Commonly known as:  KENALOG   Used for:  Rash        Apply sparingly to affected area twice daily as needed for itching.   Quantity:  30 g   Refills:  1       * Notice:  This list has 10 medication(s) that are the same as other medications prescribed for you. Read the directions carefully, and ask your doctor or other care provider to review them with you.      CONTINUE these medicines which have NOT CHANGED        Dose / Directions    * order for DME   Used for:  Cerebrovascular  accident (CVA) due to occlusion of cerebral artery (H), Cognitive impairment, Encephalopathy        Equipment being ordered: Half Rails for hospital bed   Quantity:  2 Device   Refills:  0       * order for DME   Used for:  Swallowing impaired, Constipation, unspecified constipation type, Essential hypertension, Chronic diastolic congestive heart failure (H), Hyperlipidemia LDL goal <100, ESRF (end stage renal failure) (H), Cognitive impairment, CKD (chronic kidney disease) stage 5, GFR less than 15 ml/min (H), Encephalopathy, Convulsions, unspecified convulsion type (H), Dementia without behavioral disturbance, unspecified dementia type, Pain        Equipment being ordered: feeding pump, bags and supplies for gastric feeding and medication administration Syringes for administration and flushing if GT   Quantity:  3 Month   Refills:  3       * Notice:  This list has 2 medication(s) that are the same as other medications prescribed for you. Read the directions carefully, and ask your doctor or other care provider to review them with you.             Protect others around you: Learn how to safely use, store and throw away your medicines at www.disposemymeds.org.         Follow-ups after your visit        Your next 10 appointments already scheduled     May 02, 2017  9:20 AM CDT   PHYSICAL with Fabien Sherman MD   [facility]    [facility] [id number] [phone]               Care Instructions        After Care Instructions     Discharge Instructions       If questions or problems arise regarding tube function (e.g. leaking, dislodges, etc.) Contact Interventional Radiology department 24 hours a day.     For procedures that were done at [facility]:  8 AM - 4 PM Monday through Friday Contact   [phone]  For afterhours and weekends: [phone]   Ask for the Interventional Radiologist on call.     If DIRECTED by the RADIOLOGIST,  related to specific problems with the tube functioning,  go to the Emergency Department.                  Further instructions from your care team       G-Tube Insertion (New) Discharge Instructions     After you go home:      You should not eat anything or use the tube for 6 hours    You may resume your normal diet after 6 hours    Have an adult stay with you for 6 hours if you received sedation       For 24 hours - due to the sedation you received:    Relax and take it easy    Do NOT make any important or legal decisions    Do NOT drive or operate machines at home or at work    Do NOT drink alcohol    Care of Insertion Site:      For the first 48 hrs, check your puncture site every couple hours while you are awake     You may remove/change the dressing tomorrow    You may shower tomorrow    No tub baths, whirlpools or swimming until your puncture site has fully healed     Activity       You may go back to normal activity in 24 hours     Wait 48 hours before lifting, straining, exercise or other strenuous activity    Medicines:      You may resume all medications    Resume your Warfarin/Coumadin at your regular dose today. Follow up with your provider to have your INR rechecked    Resume your Platelet Inhibitors and Aspirin tomorrow at your regular dose    For minor pain, you may take Acetaminophen (Tylenol) or Ibuprofen (Advil)                 Call the provider who ordered this procedure if:      Blood or fluid is draining from the site    The site is red, swollen, hot, tender or there is foul-smelling drainage    Chills or a fever greater than 101 F (38 C)    Increased pain at the site    Any questions or concerns    Call  911 or go to the Emergency Room if:      Severe pain or trouble breathing    Bleeding that you cannot control        If you have questions call:          [facility] Radiology Dept @ [phone]        The provider who performed your procedure was _________________.        Caring for  your G-tube    T-stay Removal:      Remove the T-stays 10 days after placement    During the placement of this tube - 1 or 2  T-stays  (small plastic buttons) were used to secure the stomach wall to the abdominal wall. These stays must be removed 10 days after tube placement to prevent skin irritation.    Please remove these stays by gently pulling up on the buttons and cutting the suture (thread) under the button. Cut between the button and skin with clean scissors. Be careful not to cut the tube.    Please call us at [phone] if you need assistance or further clarification.    Tube Maintenance:    Possible problems with your tube may include:      Clogged with medications or feedings - most obstructions can be cleared with a small (3cc) syringe and warm water. This may be repeated until the tube is unclogged. This can be prevented by frequently flushing the tube with water (60cc) during the day and always after medications & feedings.      Tube pulls out or falls out -cover the opening with gauze & tape. Call [phone] for further instructions      Skin breakdown and/or yeast infections at the insertion site - use of skin barrier ointments and anti-fungal powders can treat most site irritations.  Ask your pharmacist or provider for assistance (a prescription is not necessary).    In general, tube problems (including pulled tubes) are NOT emergency situations. Unless the pulling out of a tube is accompanied by uncontrollable bleeding, please DO NOT GO TO THE EMERGENCY ROOM!  Call [phone] with problems.    Tube Care:      Change the gauze dressing every 24 hours and if soiled (dirty).  Stabilize all tubes securely by using gauze and tape.  Clean tube site with soap & water using a cotton applicator (Q-tip) as needed to prevent irritation.    Flush feeding tube with 60cc of warm or room temperature water before and after meds.  To prevent the tube from clogging, ask your provider or pharmacist if liquid  "forms of your medications are available. If not, crush the pills well & be sure to flush the tube before & after all medications.    Flush feeding tube a minimum of every 4 hours and when feeding is completed with 60cc of water to keep the tube clear and avoid clogging.    Pt may use an abdominal (waist) binder to protect the G-tube.    If there is continued oozing or bleeding, redness, yellow/green/foul smelling drainage    STOP the feedings & use of the tube immediately if there is:      Continued oozing or bleeding at the site    Redness    Yellow/green or foul smelling drainage at the site    Uncontrolled stomach pain    Many of the supplies mentioned above can be purchased at your local pharmacy      For issues with your tube, please call:    [facility]ventional Radiology Dept at [phone]           Additional Information About Your Visit        [facility] Information     [employer] lets you send messages to your doctor, view your test results, renew your prescriptions, schedule appointments and more. To sign up, go to [email or web address] . Click on \"Log in\" on the left side of the screen, which will take you to the Welcome page. Then click on \"Sign up Now\" on the right side of the page.     You will be asked to enter the access code listed below, as well as some personal information. Please follow the directions to create your username and password.     Your access code is: [email or web address]  Expires: 3/29/2017  6:07 PM     Your access code will  in 90 days. If you need help or a new code, please call your [facility] clinic or [phone].        Care EveryWhere ID     This is your Care EveryWhere ID. This could be used by other organizations to access your [facility] medical records  [id number]        Your Vitals Were     Blood Pressure Pulse Temperature Respirations Pulse Oximetry       138/81 71 97.9  F (36.6  C) (Oral) 15 99%        Primary Care Provider Office Phone # Fax #    Fabien Sherman MD " [phone]      Thank you!     Thank you for choosing [facility] for your care. Our goal is always to provide you with excellent care. Hearing back from our patients is one way we can continue to improve our services. Please take a few minutes to complete the written survey that you may receive in the mail after you visit with us. Thank you!             Medication List: This is a list of all your medications and when to take them. Check marks below indicate your daily home schedule. Keep this list as a reference.      Medications           Morning Afternoon Evening Bedtime As Needed    ACE NOT PRESCRIBED (INTENTIONAL)   1 each daily ACE Inhibitor not prescribed due to Symptomatic hypotension not due to excessive diuresis                                * acetaminophen 325 MG tablet   Commonly known as:  TYLENOL   Take 1-2 tablets (325-650 mg) by mouth every 4 hours as needed for mild pain                                * acetaminophen 160 MG/5ML solution   Commonly known as:  TYLENOL   10.15-20.3 mLs (325-650 mg) by Per G Tube route every 4 hours as needed for fever or mild pain                                ASPIRIN PO   Take 81 mg by mouth daily                                BETA BLOCKER NOT PRESCRIBED (INTENTIONAL)   1 each daily Beta Blocker not prescribed intentionally due to Hypotension (SBP < 90)                                * CALCIUM ANTACID EXTRA STRENGTH 750 MG Chew   1 tablet (750 mg) by Per G Tube route 3 times daily Crush and dissolve in water prior to administration   Generic drug:  calcium carbonate                                * calcium carbonate 500 MG chewable tablet   Commonly known as:  TUMS   1 tablet (500 mg) by Per G Tube route daily                                * cholecalciferol 1000 UNITS capsule   Commonly known as:  vitamin  -D   Take 1 capsule (1,000 Units) by mouth daily                                * Vitamin D3 1200 UNIT/15ML Liqd   15 mLs by Gastric Tube route  daily                                DONEPEZIL HYDROCHLORIDE 10 MG Tbdp   Take 10 mg by mouth daily ODT- patient has swallowing issues                                * MULTIVITAMIN ADULT Tabs   Take 1 tablet by mouth daily                                * multivitamins with minerals Liqd liquid   15 mLs by Per G Tube route daily                                * order for DME   Equipment being ordered: Half Rails for hospital bed                                * order for DME   Equipment being ordered: feeding pump, bags and supplies for gastric feeding and medication administration Syringes for administration and flushing if GT                                phenytoin 125 MG/5ML suspension   Commonly known as:  DILANTIN   Take 300 mg by mouth At Bedtime Dose = 12 ml per group home (125mg/5ml).                                polyethylene glycol Packet   Commonly known as:  MIRALAX/GLYCOLAX   17 g by Per G Tube route as needed for constipation (dissolve and give via GT)                                psyllium Packet   Commonly known as:  METAMUCIL/KONSYL   1 packet by Per G Tube route daily as needed for constipation                                senna-docusate 8.6-50 MG per tablet   Commonly known as:  SENOKOT-S;PERICOLACE   Take 2 tablets by mouth 2 times daily                                simvastatin 40 MG tablet   Commonly known as:  ZOCOR   Take 1 tablet (40 mg) by mouth At Bedtime                                STARCH-MALTO DEXTRIN Powd   Commonly known as:  DIAFOODS THICK-IT   Thicken liquids to nectar-thick consistency.                                triamcinolone 0.1 % cream   Commonly known as:  KENALOG   Apply sparingly to affected area twice daily as needed for itching.                                * Notice:  This list has 10 medication(s) that are the same as other medications prescribed for you. Read the directions carefully, and ask your doctor or other care provider to review them with you.       ASK your doctor about these medications           Morning Afternoon Evening Bedtime As Needed    * MEDICATION INSTRUCTION FOR DIALYSIS PATIENTS   1 each See Admin Instructions Do not give any medication that may affect blood pressure or volume before dialysis (i.e. Lisinopril, Metoprolol)  The following medications may be removed by dialysis and should be given after dialysis: ASA   Ask about: Which instructions should I use?                                * order for DME   Raised toilet seat with safety/hand rails.   Ask about: Which instructions should I use?                                * Notice:  This list has 2 medication(s) that are the same as other medications prescribed for you. Read the directions carefully, and ask your doctor or other care provider to review them with you.

## 2017-03-28 NOTE — DISCHARGE INSTRUCTIONS
G-Tube Insertion (New) Discharge Instructions     After you go home:      You should not eat anything or use the tube for 6 hours    You may resume your normal diet after 6 hours    Have an adult stay with you for 6 hours if you received sedation       For 24 hours - due to the sedation you received:    Relax and take it easy    Do NOT make any important or legal decisions    Do NOT drive or operate machines at home or at work    Do NOT drink alcohol    Care of Insertion Site:      For the first 48 hrs, check your puncture site every couple hours while you are awake     You may remove/change the dressing tomorrow    You may shower tomorrow    No tub baths, whirlpools or swimming until your puncture site has fully healed     Activity       You may go back to normal activity in 24 hours     Wait 48 hours before lifting, straining, exercise or other strenuous activity    Medicines:      You may resume all medications    Resume your Warfarin/Coumadin at your regular dose today. Follow up with your provider to have your INR rechecked    Resume your Platelet Inhibitors and Aspirin tomorrow at your regular dose    For minor pain, you may take Acetaminophen (Tylenol) or Ibuprofen (Advil)                 Call the provider who ordered this procedure if:      Blood or fluid is draining from the site    The site is red, swollen, hot, tender or there is foul-smelling drainage    Chills or a fever greater than 101 F (38 C)    Increased pain at the site    Any questions or concerns    Call  911 or go to the Emergency Room if:      Severe pain or trouble breathing    Bleeding that you cannot control        If you have questions call:          [facility] Radiology Dept @ [phone]        The provider who performed your procedure was _________________.        Caring for your G-tube    T-stay Removal:      Remove the T-stays 10 days after placement    During the placement of this tube - 1 or 2  T-stays  (small plastic  buttons) were used to secure the stomach wall to the abdominal wall. These stays must be removed 10 days after tube placement to prevent skin irritation.    Please remove these stays by gently pulling up on the buttons and cutting the suture (thread) under the button. Cut between the button and skin with clean scissors. Be careful not to cut the tube.    Please call us at [phone] if you need assistance or further clarification.    Tube Maintenance:    Possible problems with your tube may include:      Clogged with medications or feedings - most obstructions can be cleared with a small (3cc) syringe and warm water. This may be repeated until the tube is unclogged. This can be prevented by frequently flushing the tube with water (60cc) during the day and always after medications & feedings.      Tube pulls out or falls out -cover the opening with gauze & tape. Call [phone] for further instructions      Skin breakdown and/or yeast infections at the insertion site - use of skin barrier ointments and anti-fungal powders can treat most site irritations.  Ask your pharmacist or provider for assistance (a prescription is not necessary).    In general, tube problems (including pulled tubes) are NOT emergency situations. Unless the pulling out of a tube is accompanied by uncontrollable bleeding, please DO NOT GO TO THE EMERGENCY ROOM!  Call [phone] with problems.    Tube Care:      Change the gauze dressing every 24 hours and if soiled (dirty).  Stabilize all tubes securely by using gauze and tape.  Clean tube site with soap & water using a cotton applicator (Q-tip) as needed to prevent irritation.    Flush feeding tube with 60cc of warm or room temperature water before and after meds.  To prevent the tube from clogging, ask your provider or pharmacist if liquid forms of your medications are available. If not, crush the pills well & be sure to flush the tube before & after all medications.    Flush feeding tube  a minimum of every 4 hours and when feeding is completed with 60cc of water to keep the tube clear and avoid clogging.    Pt may use an abdominal (waist) binder to protect the G-tube.    If there is continued oozing or bleeding, redness, yellow/green/foul smelling drainage    STOP the feedings & use of the tube immediately if there is:      Continued oozing or bleeding at the site    Redness    Yellow/green or foul smelling drainage at the site    Uncontrolled stomach pain    Many of the supplies mentioned above can be purchased at your local pharmacy      For issues with your tube, please call:    [place] [facility] Dept at [phone]

## 2017-03-28 NOTE — IP AVS SNAPSHOT
[place] [address]    [place] [address]    Phone:  [phone]                                       After Visit Summary   3/28/2017    Hesham Ramos    MRN: [id number]           After Visit Summary Signature Page     I have received my discharge instructions, and my questions have been answered. I have discussed any challenges I see with this plan with the nurse or doctor.    ..........................................................................................................................................  Patient/Patient Representative Signature      ..........................................................................................................................................  Patient Representative Print Name and Relationship to Patient    ..................................................               ................................................  Date                                            Time    ..........................................................................................................................................  Reviewed by Signature/Title    ...................................................              ..............................................  Date                                                            Time

## 2017-03-28 NOTE — TELEPHONE ENCOUNTER
Discussed with Rain, she is not comfortable witting orders involving G-tube.     Discussed request with Dr. Guzmán. She agrees to write order since pharmacy stated it was okay to mix the meds together. Written order faxed to the group home. Maritza informed order was faxed.     Copy of order in Dr. Sherman's faxed basket.

## 2017-03-28 NOTE — PROGRESS NOTES
Interventional Radiology Pre-Procedure Sedation Assessment   Time of Assessment: 11:15 AM    Expected Level: Moderate Sedation    Indication: Sedation is required for the following type of Procedure: Gastrostomy tube placement    Sedation and procedural consent: Risks, benefits and alternatives were discussed with Patient and Relative Dr Jessika Coates Intake: Appropriately NPO for procedure    ASA Class: Class 2 - MILD SYSTEMIC DISEASE, NO ACUTE PROBLEMS, NO FUNCTIONAL LIMITATIONS.    Lungs: Lungs Clear with good breath sounds bilaterally    Heart: Normal heart sounds with tachycardia    History and physical reviewed and no updates needed. I have reviewed the lab findings, diagnostic data, medications, and the plan for sedation. I have determined this patient to be an appropriate candidate for the planned sedation and procedure and have reassessed the patient IMMEDIATELY PRIOR to sedation and procedure.    Mallorie Medina, PHILIPP CNP

## 2017-03-28 NOTE — TELEPHONE ENCOUNTER
Maritza left voice message returning phone call.   Contacted Maritza and informed her that per pharmacy okay to dissolve pill in water. She is asking for the Donepezil order directions to be updated and sent to the pharmacy. Order pended and sent to covering provider.     She also states that they need refill for Simvastatin and asking for liquid if possible. Liquid not available.    Fax copy of both orders to nursing office.   Fax: [phone]    Simvastatin 40mg     Last Written Prescription Date: 1/12/16  Last Fill Quantity: 90, # refills: 1  Last Office Visit with [facility], [facility] or [facility] prescribing provider: 3/23/17  Next 5 appointments (look out 90 days)     May 02, 2017  9:20 AM CDT   PHYSICAL with Fabien Sherman MD   [facility]    [address] [facility] [address]   [phone]                   Lab Results   Component Value Date    CHOL 161 09/03/2015     Lab Results   Component Value Date    HDL 73 09/03/2015     Lab Results   Component Value Date    LDL 74 09/03/2015     Lab Results   Component Value Date    TRIG 72 09/03/2015     Lab Results   Component Value Date    CHOLHDLRATIO 2.2 09/03/2015      Routing refill request to provider for review/approval because:  Labs not current:  Lipid panel last done 2015

## 2017-03-28 NOTE — TELEPHONE ENCOUNTER
Maritza with group home calls. Pt's medications have been changed to liquids, but one was still sent as a tablet. Maritza asking if okay to crush this.    Contacted Maritza, she states medication is Donepezil.   Contacted [facility], this is ordered as a dissolvable tablet and okay to dissolve this in water and then administer through G-tube.    Attempted to contact Maritza. Left voice message to call back.

## 2017-03-28 NOTE — PROGRESS NOTES
RADIOLOGY PROCEDURE NOTE  Patient name: Hesham Ramos  MRN: [id number]  : 1950    Pre-procedure diagnosis: Failure to thrive  Post-procedure diagnosis: Same    Procedure Date/Time: 2017  12:55 PM  Procedure: Percutaneous gastrostomy  Estimated blood loss: None  Specimen(s) collected with description: None  The patient tolerated the procedure well with no immediate complications.  Significant findings:14 fr JUAN CARLOS gastrostomy placed.    See imaging dictation for procedural details.    Provider name: Mamadou Rosen  Assistant(s):None

## 2017-03-28 NOTE — IR NOTE
Patient aphasic. Unable to participate in timeout. Monitors applied and baseline VS obtained. Resp regular and non labored on 2L per NC.

## 2017-03-28 NOTE — PROGRESS NOTES
Patient dressed and assisted into wheelchair, ready to discharge to group home with  with him. Discharge instructions given to attendant who verbalizes understanding. To home with all belongings sent

## 2017-03-28 NOTE — TELEPHONE ENCOUNTER
Maritza from pt's group home calling.  Had G tube placement today but did not see the dietician prior to leaving [facility] and consequently have no orders for G tube except to flush with 30 ml of water.    Waiting for dietician to call back.  In the mean time, req written order stating ok to mix the meds the pt is on together.  (Maritza did check with [facility] pharmacist and he stated its ok to mix pt's meds together).  Maritza states staff will give pt his meds via G tube with water.    Please fax written order to [phone].

## 2017-03-29 NOTE — PROGRESS NOTES
Clinic Care Coordination Contact  Care Team Conversations    Received a request from staff to assist with getting nutrition orders for pt who had a g-tube placed at [facility] on 3/28.  Pt was discharged back to his Group Home without diet orders for his tube feeding.  Spoke with IVR at [facility] and initially spoke with Omero who directed me to Ciara the NP who worked on this case.  Discussed case and need for nutrition orders and at this time was told she did not do those kinds of orders and that was the clinics responsibility to have made a nutrition referral. She was unwilling to assist with getting the pt what was needed at discharge.  I then spoke with Nahum in IVR and he was helpful in connecting me with the Nutrition department and initially spoke with Virgen who was going to work with a Dietician to provide orders. Received orders and needed to speak with Virgen for corrected orders that will include formula for tube feeding.  Updated PCP with this information.  Also spoke with Allison at pt's Group Home and updated her with info for feeding.  Will continue to assist as needed.    Ed Cason RN/CC  Care Coordinator [facility]  [phone]      Clinic Care Coordination Contact  Care Team Conversations    Received request for assistance with orders for home care.  [facility] is the pharmacy that will work with [facility] to provide enteral feedings for pt.  Spoke with Misti and provided her with demographics med list and insurance info faxed to Misti. Orders for enteral feedings to follow.  Update PCP RN  Will continue to assist as needed.    Ed Cason RN/CC  Care Coordinator [facility]  [phone]

## 2017-03-29 NOTE — TELEPHONE ENCOUNTER
He had tube placed?   Did dietician see him to recommend diet? and homecare in place?   Group home was informed that dietician needed to se prior to DC from hospital to assess and recommend feeding  Can we call and see if dietician at hospital will do any recommendations for feeding?

## 2017-03-29 NOTE — PROGRESS NOTES
I received a call from the Evening staff at the pt's group home.  She has yet to see any orders or get any tube feed sent to her.  She states that she called [facility] and they didn't know anything about this.  She's calling to me to figure out what she should be doing for her pt tonight.  I read her the notes attached below and she stated that she will call [facility] for enteral feedings (TF).    Tried to call staff back to see if [facility] had the TF orders and was only able to leave a message.    Jose Rafael VELÁSQUEZ RN

## 2017-03-29 NOTE — LETTER
[facility]  [address] [place] [address]  [phone]    4/4/2017    Regarding:  Hesham CHRISTEN Ramos  [address] [facility] [address]           To whom it may concern:     Please initiate the following orders for Hesham Richard.     1) Begin Miralax, 17 gm (one capful) in 8 oz of liquid once daily down G-tube.   2) Okay to override fluid restriction on 4/4 and 4/5/2017 in order to give this.   3) Ask nephrology at next dialysis session if okay to continue fluid restriction override after 4/6/2017 in order to continue Miralax. Also, ask nephrology if okay to use Fleets enema with his renal function.     If you have any further questions or problems, please contact our office.    Sincerely,        Fabien Sherman MD

## 2017-03-29 NOTE — TELEPHONE ENCOUNTER
Spoke with pharmacist.     1.   Advised to DC Tums.  States there is a liquid Calcium carbonate available and would probably be covered by insur.  Please fax rx.    2.  Pharmacist also recommended: Senna tabs 8.6mg 2 tabs BID and states can be crushed and mixed with water and be administered via G tube.  Rx faxed to pharm.    3.  Also recommended Docusate liquid 150mg/15ml give 10ml BID.  Rx faxed to pharm.    Please advise re: Calcium rx, thanks.

## 2017-03-30 NOTE — TELEPHONE ENCOUNTER
Rx/order refaxed to Anel after provider signed it.  Someone is picking up the Nepro in [place] and home care will go out with the pump, pole, bags, syringes, supplies and instruct staff.  Anel agreed to call Allison from [facility] on her cell number to update her.  TEJAL Forbes R.N.

## 2017-03-30 NOTE — TELEPHONE ENCOUNTER
If the formula does not arrive he can be offered food by mouth as before with thickening to liquids   He needs to have some nutrition

## 2017-03-30 NOTE — TELEPHONE ENCOUNTER
Called Allison-relayed below. Allison stated she just got a call they got the food. So they will get everything today, and staff will be trained today. [place]

## 2017-03-30 NOTE — TELEPHONE ENCOUNTER
Faxed orders, records, insurance information, demographic page to [facility] at [phone] attn: Marlon Forbes R.N.

## 2017-03-30 NOTE — TELEPHONE ENCOUNTER
Misti from [facility] ([phone]) calling.  Received info re: formula.  States Nephro is not a Medicare approved formula and states dialysis isn't a good enough diagnosis to approve Nephro.  Medicare approve formulas are:  Isosource, Jevity, and Osmolite.    Misti is needing info as to why pt cannot be on a Medicare approved formula.    Misti was provided with the phone number to Tiff--dietician @ [facility] ([phone]).  Dietician was the one who recommended the Nepro formula.

## 2017-03-30 NOTE — PROGRESS NOTES
"Staff member called back, she never reached anyone at [facility] (it Based out [place]), she left a message and has not heard back from anyone.    She stated that Dr. Sherman had written that they could feed pt.  She stated \"we had been doing that very carefully and he would have times he would cough.  The pt was feed food for breakfast today    She states the orders today from the \"Day Program\" today, made him NPO again.  I asked about what Day Program and she was unable to tell me.  Informed I can't override a MD's order.    "

## 2017-03-30 NOTE — TELEPHONE ENCOUNTER
Call placed to Tiff, [facility] dietician asking for guidance since Nepro is not covered and Tiff said that they made recommendations and now need to step away due to legal/liability issues.  TEJAL Forbes R.N.

## 2017-03-30 NOTE — LETTER
[place] [address]  [facility] [address]  [phone]          March 30, 2017    RE:  Hesham Ramos                                                                                                                                                       [address] [facility] [address]    Misti in Intake at [facility] at [phone]  To whom it may concern:      Hesham Ramos has feeding disorder assessed by speech per note below with recommendations for enteral feeding and NPO status for risk of aspiration, even with thickened foods.  Family was agreeable to tube feeding, so gastrostomy was placed for same.   As he is a dialysis patient he requires specialty formula, and will require this for his lifetime.          03/09/17 1300       Present No   Language Other  (Pt is nonverbal)   General Information   Type Of Visit Initial   Start Of Care Date 03/09/17   Referring Physician Dr. 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 Pt is nonverbal at baseline and hx obtained mostly through chart review as pt attended VFSS with group home caregiver. Pt with many VFSS completed in the past and most recent VFSS in 3/2014 recommending nectar thick liquids and pureed textures. Pts caregivers reports pt is currently on pureed textures and nectar thick liquids at home but reports consistent coughing with all PO.    Respiratory Status Room air   Prior Level Of Function Swallowing   Prior Level Of Function Comment Pt currently eats pureed textures and nectar  thick liquids at home and per caregiver pt coughs with all PO   Patient Role/employment History Disabled   Living Environment Group Home   Patient/family Goals Pt is nonverbal and did not state   Pain Assessment   Pain Reported No   FALL RISK SCREEN   Comments Please see radiology report   Clinical Swallow Evaluation   Oral Musculature unable to assess due to poor participation/comprehension   Dentition present and adequate   VFSS Evaluation   VFSS Additional Documentation Yes   VFSS Eval: Radiology   Radiologist Dr. Velez   Views Taken left lateral   Physical Location of Procedure [facility]   VFSS Eval: Nectar Thick Liquid Texture Trial   Mode of Presentation, Nectar spoon;fed by clinician   Order of Presentation 1   Preparatory Phase Poor bolus control   Oral Phase, Nectar Premature pharyngeal entry   Pharyngeal Phase, Nectar Delayed swallow reflex;Residue in valleculae;Residue in pyriform sinus   Rosenbek's Penetration Aspiration Scale: Nectar-Thick Liquid Trial Results 7 - contrast passes glottis, visible subglottic residue remains despite patient's response (aspiration)   Diagnostic Statement 1 teaspoon of nectar thick liquids resulted in premature spillage to valleculae and pyriform sinuses, pt then with gavi aspiration and unproductive cough response.   Response to Aspiration, Nectar unproductive reflexive involuntary cough/throat clear   Swallow Compensations   Swallow Compensations No compensations were used   Results No compensations were used   Educational Assessment   Barriers to Learning Language;Cognitive;Physical   Swallow Eval: Clinical Impressions   Skilled Criteria for Therapy Intervention Skilled criteria met. Treatment indicated.   Dysphagia Outcome Severity Scale (ISAMAR) Level 1 - ISAMAR   Treatment Diagnosis Severe oropharyngeal dysphagia   Diet texture recommendations NPO   Rehab Potential fair, will monitor progress closely   Demonstrates Need for Referral to Another Service dietitian    Predicted Duration of Therapy Intervention (days/wks) eval only in this setting   Anticipated Discharge Disposition home w/ home health   Risks and Benefits of Treatment have been explained. Yes   Patient, family and/or staff in agreement with Plan of Care Yes   Clinical Impression Comments SLP: Video swallow study completed per MD orders. Pt presents with severe oropharyngeal dysphagia. Pt lethargic but did open eyes to voicing; pt nonverbal and unable to follow directions to complete oral mech examination. Pts caregiver reports pts baseline diet as pureed textures and nectar thick liquids however pt noted to cough with all PO. Initial trial of nectar thick liquids via spoon resulted in premature spillage to level of valleculae and pyriforms with gavi aspiration; pt with unproductive cough response. No further trials appropriate given amount of barium pt aspirated on initial trial. VFSS limited by pts poor ability to participate and high risk for aspiration given gavi aspiration on teaspoon trial of nectar thick liquids. At this time, the safest possible recommendation is for NPO and consideration of alternative feeding options. However, unclear of pts wishes given pts nonverbal status. Would recommend MD discuss TF options vs. PO eating despite high risk for aspiration with pt and pts family. Pending pt and pts family decision regarding nutritional options, pt may benefit from follow up home care ST visit. Per discussion with MD, recommend pt and pts caregivers/family meet with MD to discuss nutirtional options and high risk for aspiration given todays VFSS results.    Total Session Time   Total Session Time 20   Total Evaluation Time 20             Sincerely,        Fabien Cuellar CNP

## 2017-03-30 NOTE — TELEPHONE ENCOUNTER
Allison (from group home [phone]) called-stated the pump will be there today, but just incase the Nepro from [facility] does not arrive today, what is the back up plan?

## 2017-03-30 NOTE — TELEPHONE ENCOUNTER
Spoke with Anel at [facility],([phone]) she requests order for feeding pump, IV pole, feeding sets and Nepro.  No need to put quantity, just length of need LIFETIME.  TEJAL Forbes R.N.

## 2017-03-30 NOTE — TELEPHONE ENCOUNTER
Allison from [facility] ([phone]) advised that records have been faxed to [facility].  She wants to know once they get the feeding do they start right away with the full 1080 cc feeding?  Would they need to adjust feeding quantity for tonight then?  FYI: Allison states she does not want the staff at the [facility] checking for gastric residuals.  TEJAL Forbes R.N.

## 2017-03-30 NOTE — TELEPHONE ENCOUNTER
Received call from Allison at [facility], they have not recevied feeding or supplies and have not heard from [facility].  Call placed to [facility] and because pt is Medicare pt they need a letter from provider and chart notes supporting need for specialty formula//tube feedings    1.  Documentation of dysphagia  2.  Length of need   3.  Reason for use of specialty formula before trying other more conventional formulas.    Fax to Misti in Intake at [facility] at [phone] then call Allison at [phone] and let her know when this has been completed.  They should be able to deliver to patient within a couple hours of receiving necessary documentation.  TEJAL Forbes R.N.

## 2017-03-30 NOTE — TELEPHONE ENCOUNTER
Allison from group home calling (cell [phone]) asking for status on Formula.  Advised her still working on it and speaking with dietician.    Allison states if they do not receive formula and supplies today, need plan of care for tonight.  What to give thru G tube, how much, how often, and if to con't flushes.

## 2017-03-31 PROBLEM — T17.908S ASPIRATION INTO AIRWAY, SEQUELA: Status: ACTIVE | Noted: 2017-01-01

## 2017-03-31 PROBLEM — Z93.1 FEEDING BY G-TUBE (H): Status: ACTIVE | Noted: 2017-01-01

## 2017-03-31 NOTE — TELEPHONE ENCOUNTER
Allison from [facility] called-Stated pt's day program needs a signed order saying to continue pt's flush q 4hrs with 30mls d/c it. Allison thought pt did not need it any longer, but needs clarification from Dennise, and the order stating what to do.      Also-Allison is requesting an order be faxed to [facility] for toothettes, for pt's mouth care. Order placed if ok, need to print, sign and fax

## 2017-03-31 NOTE — TELEPHONE ENCOUNTER
Maritza from patient's group home ([phone]) calling with the following questions/concerns:  1.  Need feeding order faxed to group home  2.  Need suggestions on how to get feedings in if they have less than 12 hours between pt coming back to group home and leaving again in mornings.  Spoke with Nettie Taylor, dietician at [facility] ([phone] or pager [phone]) who said that after a few days of tube feedings at 90 ml/hr he could have rate increased and shorten the duration of the feeding.    108 ml/hour (could round up to 110 ml) x10 hours  135 ml/hour running for 8 hours.    If tolerating at 135 ml an hour could then go to gravity drip if MD wishes.  She states there only needs to be a day or 2 between increases to establish tolerance.  Orders for increase need to come from provider.  TEJAL Forbes R.N.

## 2017-03-31 NOTE — TELEPHONE ENCOUNTER
Late note  This has been a very challenging patient to get the feeding via GT going     He was seen for preop, and then requests for medication in liquid form was requested and done   There was not any process in place for his feeding, so home care was ordered for set up, teaching of group home staff and initiation of feedings   When order was placed [place] homecare was not allowed, due to Medicare   Therefore [employer] was contacted. They referred nursing to send feeding orders to [facility], and said they have no dietician for feeding support   We therefore called the nutrition department at hospital and asked to have a nutrition consult at time of procedure for feeding orders  They agreed to do this     However, at time of feeding tube placement:    IR staff did not call for dietician to come, although nursing informed them of consult day prior   Dietician did not go to IR as they were not called and when they did go, the patient had left  Group home also aware of need for this left without consult being done        So, clinic was called to figure out his feedings. Discussion with dietary by care coordinator was done and initial orders for feeding were made and faxed to [facility]  called and said formula was not on medicare list and would have to fail 3 alternative formulas prior to being allowed the preferred formula, even though this was requested because of him being on hemodialysis.    Nursing called to ask for assistance from dietician, Tiff, at [facility]  who made original referral order and she told nursing they were no longer going to provide input     Provider called dietician at [facility], Stacey, [phone], who gave some direction in case we had to try alternative formula, but was surprised that [facility] said they had no dietician and gave me number for them, [phone], and I spoke to Tanna,  who was a dietician. She asked who home care was and when told  [facility], she was surprised. She said [facility] had their own dietician and would be better able to process this than [facility] was doing. She gave me number for intake, [phone], and I spoke to [facility] who asked orders be faxed to them and they would take care of this, and call when orders received. (Originally [facility] told our staff to send orders to [facility]- we did not choose to send to them without direction and also they said they had no dietician at [facility] and [facility])     [facility] called shortly after and asked for DME order- which had been done previously, but for [facility]   They were to get the formula that was requested and teach and start feeding tonight on patient at group home  Group home verified that formula was able to be gotten, and homecare to proceed with feeding tonight and teaching        Also spoke to dietician at [facility] and she will help watch patient labs etc once he is on the tube feeding   Yoly- dietician at [facility] [phone]- she was at [place] location today. She gave her personal cell phone so we could reach her, but I will not place number in this chart     Hopefully this is all in place and will provide the adequate nutrition for this man who was aspirating his oral foods   Dennise Cuellar CNP

## 2017-03-31 NOTE — TELEPHONE ENCOUNTER
Faxed tubefeeding order, toothette order and split gauze order to group home, also faxed toothette order to [facility].and gauze order to [phone].  TEJAL Forbes R.N.

## 2017-03-31 NOTE — TELEPHONE ENCOUNTER
DME order pended for split 2x2 gauze and tape for pt.     Fax order to [phone].   Dennise has been working with pt and G-tube, sent to her to review.

## 2017-03-31 NOTE — TELEPHONE ENCOUNTER
Forgot to enter fax# for orders for flushing-Needs to go to pt's day program-[facility] [phone]

## 2017-03-31 NOTE — TELEPHONE ENCOUNTER
Reason for Call: Request for an order or referral:    Order or referral being requested: orders for gauze and tape for g tube please fax to [phone]   Date needed: as soon as possible    Has the patient been seen by the PCP for this problem? YES    Additional comments: [facility]    Phone number Patient can be reached at: [phone]   Best Time:  Any     Can we leave a detailed message on this number?  YES    Call taken on 3/31/2017 at 12:30 PM by Giuliana Bianchi

## 2017-04-03 NOTE — TELEPHONE ENCOUNTER
Ramona Rascon, with [facility] calls. She is the contracted [facility]  for pt. They are looking into moving pt to a new group home in a few weeks, but the hospital bed he is using at his current home is no this. She is requesting an script from Dr. Sherman for a hospital bed with side rails. Script can be faxed to Ramona and she will forward to [facility].    Fax number: [phone], Attn: Ramona YEAGER  Phone: [phone]    Order pended and sent to Dr. Sherman to review.

## 2017-04-03 NOTE — TELEPHONE ENCOUNTER
[facility]  Clarification regarding G-tube  G's keyboard in office  Fax back

## 2017-04-04 NOTE — TELEPHONE ENCOUNTER
Maritza with pt's group home calls. Reports pt has not had a stool in 5 days. They are not able to give him his Miralax because he is on a fluid restriction. Pt has also vomited today. They are looking for recommendations from our office. Pt currently receiving Docusate 10mL BID and Senna 2 tabs BID.     Pt is getting G-tube feedings and vomited x1 today, emesis consisted of partially digested formula. Advised constipation could be contributing to this, recommended monitoring vomiting and calling back if occurring more frequently. Will check with Dr. Sherman for recommendations for constipation.     [facility].   Group Home fax number [phone].

## 2017-04-07 NOTE — TELEPHONE ENCOUNTER
Maritza with [facility] called 4/6/17, reports she did not receive letter from our office. Letter re-faxed to Maritza.

## 2017-04-10 NOTE — TELEPHONE ENCOUNTER
Maritza, from [facility] received form from [facility] for sterile gauze 4x4. Maritza will fax form over to be signed by PCP.  Please fax back to [phone] Attn: Maritza. Please call once signed and faxed back.

## 2017-04-14 NOTE — ADDENDUM NOTE
Encounter addended by: Ellyn Kaplan, SLP on: 4/14/2017  9:04 AM<BR>     Actions taken: Sign clinical note, Flowsheet accepted, Charge Capture section accepted, Document created

## 2017-04-14 NOTE — PROGRESS NOTES
03/09/17 1300       Present No   Language Other  (Pt is nonverbal)   General Information   Type Of Visit Initial   Start Of Care Date 03/09/17   Referring Physician Dr. 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 Pt is nonverbal at baseline and hx obtained mostly through chart review as pt attended VFSS with group home caregiver. Pt with many VFSS completed in the past and most recent VFSS in 3/2014 recommending nectar thick liquids and pureed textures. Pts caregivers reports pt is currently on pureed textures and nectar thick liquids at home but reports consistent coughing with all PO.    Respiratory Status Room air   Prior Level Of Function Swallowing   Prior Level Of Function Comment Pt currently eats pureed textures and nectar thick liquids at home and per caregiver pt coughs with all PO   Patient Role/employment History Disabled   Living Environment Group Home   Patient/family Goals Pt is nonverbal and did not state   Pain Assessment   Pain Reported No   FALL RISK SCREEN   Comments Please see radiology report   Clinical Swallow Evaluation   Oral Musculature unable to assess due to poor participation/comprehension   Dentition present and adequate   VFSS Evaluation   VFSS Additional Documentation Yes   VFSS Eval: Radiology   Radiologist Dr. Velez   Views Taken left lateral   Physical Location of Procedure [facility]   VFSS Eval: Nectar Thick Liquid Texture Trial   Mode of Presentation, Nectar spoon;fed by clinician   Order of Presentation 1   Preparatory Phase Poor bolus control   Oral Phase,  Nectar Premature pharyngeal entry   Pharyngeal Phase, Nectar Delayed swallow reflex;Residue in valleculae;Residue in pyriform sinus   Rosenbek's Penetration Aspiration Scale: Nectar-Thick Liquid Trial Results 7 - contrast passes glottis, visible subglottic residue remains despite patient's response (aspiration)   Diagnostic Statement 1 teaspoon of nectar thick liquids resulted in premature spillage to valleculae and pyriform sinuses, pt then with gavi aspiration and unproductive cough response.   Response to Aspiration, Nectar unproductive reflexive involuntary cough/throat clear   Swallow Compensations   Swallow Compensations No compensations were used   Results No compensations were used   Educational Assessment   Barriers to Learning Language;Cognitive;Physical   Swallow Eval: Clinical Impressions   Skilled Criteria for Therapy Intervention Skilled criteria met.  Treatment indicated.   Dysphagia Outcome Severity Scale (ISAMAR) Level 1 - ISAMAR   Treatment Diagnosis Severe oropharyngeal dysphagia   Diet texture recommendations NPO   Rehab Potential fair, will monitor progress closely   Demonstrates Need for Referral to Another Service dietitian   Predicted Duration of Therapy Intervention (days/wks) eval only in this setting   Anticipated Discharge Disposition home w/ home health   Risks and Benefits of Treatment have been explained. Yes   Patient, family and/or staff in agreement with Plan of Care Yes   Clinical Impression Comments SLP: Video swallow study completed per MD orders. Pt presents with severe oropharyngeal dysphagia. Pt lethargic but did open eyes to voicing; pt nonverbal and unable to follow directions to complete oral mech examination. Pts caregiver reports pts baseline diet as pureed textures and nectar thick liquids however pt noted to cough with all PO. Initial trial of nectar thick liquids via spoon resulted in premature spillage to level of valleculae and pyriforms with gavi aspiration; pt with  unproductive cough response. No further trials appropriate given amount of barium pt aspirated on initial trial. VFSS limited by pts poor ability to participate and high risk for aspiration given gavi aspiration on teaspoon trial of nectar thick liquids. At this time, the safest possible recommendation is for NPO and consideration of alternative feeding options. However, unclear of pts wishes given pts nonverbal status. Would recommend MD discuss TF options vs. PO eating despite high risk for aspiration with pt and pts family. Pending pt and pts family decision regarding nutritional options, pt may benefit from follow up home care ST visit. Per discussion with MD, recommend pt and pts caregivers/family meet with MD to discuss nutirtional options and high risk for aspiration given todays VFSS results.    Total Session Time   Total Session Time 20   Total Evaluation Time 20   SLP Medicare Only G-code   G-code Swallowing   Swallowing   Swallowing:  Current Status , Goal , Discharge -Jytd Only-Modifier the same for all G-codes CN: 100% impairment   Swallowing: Current  & Discharge Modifier Rationale-Eval Only Based on VFSS results and clinical judgement

## 2017-04-18 NOTE — NURSING NOTE
"Chief Complaint   Patient presents with     RECHECK     Pt is here for a face to face evaluation for medical bed and shower chair       Initial /70 (BP Location: Right arm, Patient Position: Chair, Cuff Size: Adult Large)  Pulse 102  Temp 98  F (36.7  C) (Oral)  Ht 5' 5\" (1.651 m)  Wt 110 lb 3.7 oz (50 kg)  SpO2 93%  BMI 18.34 kg/m2 Estimated body mass index is 18.34 kg/(m^2) as calculated from the following:    Height as of this encounter: 5' 5\" (1.651 m).    Weight as of this encounter: 110 lb 3.7 oz (50 kg).  Medication Reconciliation: complete   Bee Fernandez MA    "

## 2017-04-18 NOTE — NURSING NOTE
"Chief Complaint   Patient presents with     Consult     Pt is here for a face to face evaluation for medical bed and shower chair       Initial /70 (BP Location: Right arm, Patient Position: Chair, Cuff Size: Adult Large)  Pulse 102  Temp 98  F (36.7  C) (Oral)  Ht 5' 5\" (1.651 m)  Wt 110 lb 3.7 oz (50 kg)  SpO2 93%  BMI 18.34 kg/m2 Estimated body mass index is 18.34 kg/(m^2) as calculated from the following:    Height as of this encounter: 5' 5\" (1.651 m).    Weight as of this encounter: 110 lb 3.7 oz (50 kg).  Medication Reconciliation: complete   Bee Fernandez MA    "

## 2017-04-18 NOTE — MR AVS SNAPSHOT
"              After Visit Summary   4/18/2017    Hesham Ramos    MRN: [id number]           Patient Information     Date Of Birth          1950        Visit Information        Provider Department      4/18/2017 5:00 PM Zach Otoole MD [facility]        Today's Diagnoses     Cerebrovascular accident (CVA) due to occlusion of cerebral artery (H)    -  1    ESRF (end stage renal failure) (H)        Diastolic CHF, chronic (H)        Convulsions, unspecified convulsion type (H)           Follow-ups after your visit        Your next 10 appointments already scheduled     May 02, 2017  9:20 AM CDT   PHYSICAL with Fabien Sherman MD   [facility] ([facility]    [address]  [facility] [id number] [phone]              Who to contact     If you have questions or need follow up information about today's clinic visit or your schedule please contact [facility] directly at [phone].  Normal or non-critical lab and imaging results will be communicated to you by [employer], letter or phone within 4 business days after the clinic has received the results. If you do not hear from us within 7 days, please contact the clinic through [email or web address] or phone. If you have a critical or abnormal lab result, we will notify you by phone as soon as possible.  Submit refill requests through Privy or call your pharmacy and they will forward the refill request to us. Please allow 3 business days for your refill to be completed.          Additional Information About Your Visit        Ch[employer]ur PriveharDeltasight Information     Privy lets you send messages to your doctor, view your test results, renew your prescriptions, schedule appointments and more. To sign up, go to [email or web address] . Click on \"Log in\" on the left side of the screen, which will take you to the Welcome page. Then click on \"Sign up Now\" on the right side of the page.     You will be asked " "to enter the access code listed below, as well as some personal information. Please follow the directions to create your username and password.     Your access code is: [email or web address]  Expires: 2017  5:29 PM     Your access code will  in 90 days. If you need help or a new code, please call your [facility] clinic or [phone].        Care EveryWhere ID     This is your Care EveryWhere ID. This could be used by other organizations to access your [facility] medical records  [id number]        Your Vitals Were     Pulse Temperature Height Pulse Oximetry BMI (Body Mass Index)       102 98  F (36.7  C) (Oral) 5' 5\" (1.651 m) 93% 18.34 kg/m2        Blood Pressure from Last 3 Encounters:   17 132/70   17 137/79   17 122/54    Weight from Last 3 Encounters:   17 110 lb 3.7 oz (50 kg)   17 125 lb (56.7 kg)   16 125 lb (56.7 kg)              Today, you had the following     No orders found for display         Today's Medication Changes          These changes are accurate as of: 17  5:29 PM.  If you have any questions, ask your nurse or doctor.               These medicines have changed or have updated prescriptions.        Dose/Directions    * order for DME   This may have changed:  Another medication with the same name was added. Make sure you understand how and when to take each.   Used for:  Cerebrovascular accident (CVA) due to occlusion of cerebral artery (H), Cognitive impairment, Encephalopathy   Changed by:  Fabien Sherman MD        Equipment being ordered: Half Rails for hospital bed   Quantity:  2 Device   Refills:  0       * order for DME   This may have changed:  Another medication with the same name was added. Make sure you understand how and when to take each.   Used for:  Swallowing impaired, Constipation, unspecified constipation type, Essential hypertension, Chronic diastolic congestive heart failure (H), Hyperlipidemia LDL goal <100, ESRF (end stage renal " failure) (H), Cognitive impairment, CKD (chronic kidney disease) stage 5, GFR less than 15 ml/min (H), Encephalopathy, Convulsions, unspecified convulsion type (H), Dementia without behavioral disturbance, unspecified dementia type, Pain   Changed by:  Dennise Cuellar APRN CNP        Equipment being ordered: feeding pump, bags and supplies for gastric feeding and medication administration Syringes for administration and flushing if GT   Quantity:  3 Month   Refills:  3       * order for DME   This may have changed:  Another medication with the same name was added. Make sure you understand how and when to take each.   Used for:  End stage renal failure on dialysis (H), Cerebrovascular accident (CVA) due to occlusion of cerebral artery (H), Chronic diastolic congestive heart failure (H), Essential hypertension, Cognitive impairment, Encephalopathy   Changed by:  Fabien Sherman MD        Equipment being ordered:  Feeding pump IV pole Feeding sets Nepro 90 ml/hour x12 hours nightly 1944 amado  Length of need: LIFETIME   Quantity:  1 each   Refills:  0       * order for DME   This may have changed:  Another medication with the same name was added. Make sure you understand how and when to take each.   Used for:  Feeding by G-tube (H)   Changed by:  Fabien Sherman MD        Equipment being ordered: split 2x2 gauze, tape   Quantity:  100 Device   Refills:  11       * order for DME   This may have changed:  Another medication with the same name was added. Make sure you understand how and when to take each.   Used for:  Cerebrovascular accident (CVA) due to occlusion of cerebral artery (H), ESRF (end stage renal failure) (H), Encephalopathy, Gastrointestinal tube present (H), Aspiration into airway, sequela, Feeding by G-tube (H)   Changed by:  Fabien Sherman MD        [facility] for oral care [facility] fax# [phone]   Quantity:  100 each   Refills:  1       * order for DME   This may have changed:  Another  medication with the same name was added. Make sure you understand how and when to take each.   Used for:  ESRF (end stage renal failure) (H), Cerebrovascular accident (CVA) due to occlusion of cerebral artery (H), Hemiplegia affecting dominant side, post-stroke (H), Aphasia due to stroke (H)   Changed by:  Fabien Sherman MD        Equipment being ordered: Hospital Bed with side rails   Quantity:  1 each   Refills:  0       * order for DME   This may have changed:  You were already taking a medication with the same name, and this prescription was added. Make sure you understand how and when to take each.   Used for:  Cerebrovascular accident (CVA) due to occlusion of cerebral artery (H), ESRF (end stage renal failure) (H), Diastolic CHF, chronic (H)   Changed by:  Zach Otoole MD        Equipment being ordered: Hospital Bed with side rails.   Quantity:  1 Units   Refills:  0       * order for DME   This may have changed:  You were already taking a medication with the same name, and this prescription was added. Make sure you understand how and when to take each.   Used for:  Cerebrovascular accident (CVA) due to occlusion of cerebral artery (H), ESRF (end stage renal failure) (H), Diastolic CHF, chronic (H)   Changed by:  Zach Otoole MD        Equipment being ordered: Shower Chair with head rest, foot rest, belt.   Quantity:  1 Units   Refills:  0       * Notice:  This list has 8 medication(s) that are the same as other medications prescribed for you. Read the directions carefully, and ask your doctor or other care provider to review them with you.         Where to get your medicines      Some of these will need a paper prescription and others can be bought over the counter.  Ask your nurse if you have questions.     Bring a paper prescription for each of these medications     order for DME    order for DME                Primary Care Provider Office Phone # Fax #    Fabien Sherman [phone]  [phone]       [facility] [address]  [facility] [address]        Thank you!     Thank you for choosing [facility]  for your care. Our goal is always to provide you with excellent care. Hearing back from our patients is one way we can continue to improve our services. Please take a few minutes to complete the written survey that you may receive in the mail after your visit with us. Thank you!             Your Updated Medication List - Protect others around you: Learn how to safely use, store and throw away your medicines at www.disposemymeds.org.          This list is accurate as of: 4/18/17  5:29 PM.  Always use your most recent med list.                   Brand Name Dispense Instructions for use    ACE NOT PRESCRIBED (INTENTIONAL)     30 each    1 each daily ACE Inhibitor not prescribed due to Symptomatic hypotension not due to excessive diuresis       * acetaminophen 325 MG tablet    TYLENOL    100 tablet    Take 1-2 tablets (325-650 mg) by mouth every 4 hours as needed for mild pain       * acetaminophen 32 mg/mL solution    TYLENOL    300 mL    10.15-20.3 mLs (325-650 mg) by Per G Tube route every 4 hours as needed for fever or mild pain       ASPIRIN PO      Take 81 mg by mouth daily       BETA BLOCKER NOT PRESCRIBED (INTENTIONAL)      1 each daily Beta Blocker not prescribed intentionally due to Hypotension (SBP < 90)       * CALCIUM ANTACID EXTRA STRENGTH 750 MG Chew   Generic drug:  calcium carbonate     90 tablet    1 tablet (750 mg) by Per G Tube route 3 times daily Crush and dissolve in water prior to administration       * calcium carbonate 500 MG chewable tablet    TUMS    150 tablet    1 tablet (500 mg) by Per G Tube route daily       * cholecalciferol 1000 UNITS capsule    vitamin  -D    90 capsule    Take 1 capsule (1,000 Units) by mouth daily       * Vitamin D3 1200 UNIT/15ML Liqd     1400 mL    15 mLs by Gastric Tube route daily       Docusate Sodium  150 MG/15ML Liqd     600 mL    Take 10 mLs by mouth 2 times daily       DONEPEZIL HYDROCHLORIDE 10 MG Tbdp     90 tablet    Take 10 mg by mouth daily ODT- patient has swallowing issues       * MEDICATION INSTRUCTION FOR DIALYSIS PATIENTS     1 each    1 each See Admin Instructions Do not give any medication that may affect blood pressure or volume before dialysis (i.e. Lisinopril, Metoprolol)  The following medications may be removed by dialysis and should be given after dialysis: ASA       * MULTIVITAMIN ADULT Tabs     90 tablet    Take 1 tablet by mouth daily       * multivitamins with minerals Liqd liquid     600 mL    15 mLs by Per G Tube route daily       * order for DME     1 Units    Raised toilet seat with safety/hand rails.       * order for DME     2 Device    Equipment being ordered: Half Rails for hospital bed       * order for DME     3 Month    Equipment being ordered: feeding pump, bags and supplies for gastric feeding and medication administration Syringes for administration and flushing if GT       * order for DME     1 each    Equipment being ordered:  Feeding pump IV pole Feeding sets Nepro 90 ml/hour x12 hours nightly 1944 amado  Length of need: LIFETIME       * order for DME     100 Device    Equipment being ordered: split 2x2 gauze, tape       * order for DME     100 each    [facility] for oral care [facility] fax# [phone]       * order for DME     1 each    Equipment being ordered: Hospital Bed with side rails       * order for DME     1 Units    Equipment being ordered: Hospital Bed with side rails.       * order for DME     1 Units    Equipment being ordered: Shower Chair with head rest, foot rest, belt.       phenytoin 125 MG/5ML suspension    DILANTIN     Take 300 mg by mouth At Bedtime Dose = 12 ml per group home (125mg/5ml).       polyethylene glycol Packet    MIRALAX/GLYCOLAX    7 packet    17 g by Per G Tube route as needed for constipation (dissolve and give via GT)       psyllium  Packet    METAMUCIL/KONSYL     1 packet by Per G Tube route daily as needed for constipation       sennosides 8.6 MG tablet    SENOKOT    120 tablet    Take 2 tablets by mouth 2 times daily       simvastatin 40 MG tablet    ZOCOR    90 tablet    Take 1 tablet (40 mg) by mouth At Bedtime       STARCH-MALTO DEXTRIN Powd    DIAFOODS THICK-IT    1020 g    Thicken liquids to nectar-thick consistency.       triamcinolone 0.1 % cream    KENALOG    30 g    Apply sparingly to affected area twice daily as needed for itching.       * Notice:  This list has 18 medication(s) that are the same as other medications prescribed for you. Read the directions carefully, and ask your doctor or other care provider to review them with you.

## 2017-04-18 NOTE — PROGRESS NOTES
CHIEF COMPLAINT    Needs DME RX's  Needs wound care supply form.      HISTORY    Hesham is a 68 y/o man debilitated with CVA's, renal failure, CHF. He is on dialysis. He has a feeding tube. He is basically a total care patient.    His niece has brought him in today.    He is moving from one group home to another.    Patient Active Problem List   Diagnosis     Cerebrovascular accident (CVA) due to occlusion of cerebral artery (H)     Chronic diastolic congestive heart failure (H)     Essential hypertension     HYPERLIPIDEMIA LDL GOAL <100     Advance Care Planning     ESRF (end stage renal failure) (H)     Cognitive impairment     CKD (chronic kidney disease) stage 5, GFR less than 15 ml/min (H)     Syncope     Anemia     EKG abnormality     Encephalopathy     Hypoxia     Renal cancer (H)     Hematemesis     Seizures (HCC)     Health Care Home     Septic shock (H)     Diastolic CHF, chronic (H)     Hyperkalemia     Hemodialysis catheter dysfunction, initial encounter (H)     Feeding by G-tube (H)     Aspiration into airway, sequela     Current Outpatient Prescriptions   Medication Sig Dispense Refill     order for DME Equipment being ordered: Hospital Bed with side rails. 1 Units 0     order for DME Equipment being ordered: Shower Chair with head rest, foot rest, belt. 1 Units 0     order for DME Equipment being ordered: Hospital Bed with side rails 1 each 0     order for DME Equipment being ordered: split 2x2 gauze, tape 100 Device 11     order for DME Toothettes for oral care  [facility] fax# [phone] 100 each 1     order for DME Equipment being ordered:   Feeding pump  IV pole  Feeding sets  Nepro 90 ml/hour x12 hours nightly 1944 amado    Length of need: LIFETIME 1 each 0     Docusate Sodium 150 MG/15ML LIQD Take 10 mLs by mouth 2 times daily 600 mL 1     sennosides (SENOKOT) 8.6 MG tablet Take 2 tablets by mouth 2 times daily 120 tablet 1     polyethylene glycol (MIRALAX/GLYCOLAX) Packet 17 g by Per G Tube route  as needed for constipation (dissolve and give via GT) 7 packet 11     psyllium (METAMUCIL/KONSYL) Packet 1 packet by Per G Tube route daily as needed for constipation       calcium carbonate (CALCIUM ANTACID EXTRA STRENGTH) 750 MG CHEW 1 tablet (750 mg) by Per G Tube route 3 times daily Crush and dissolve in water prior to administration 90 tablet      multivitamins with minerals (CERTAVITE/CEROVITE) LIQD liquid 15 mLs by Per G Tube route daily 600 mL 11     DONEPEZIL HYDROCHLORIDE 10 MG TBDP Take 10 mg by mouth daily ODT- patient has swallowing issues 90 tablet 1     Cholecalciferol (VITAMIN D3) 1200 UNIT/15ML LIQD 15 mLs by Gastric Tube route daily 1400 mL 1     acetaminophen (TYLENOL) 160 MG/5ML solution 10.15-20.3 mLs (325-650 mg) by Per G Tube route every 4 hours as needed for fever or mild pain 300 mL 11     calcium carbonate (TUMS) 500 MG chewable tablet 1 tablet (500 mg) by Per G Tube route daily 150 tablet 1     order for DME Equipment being ordered: feeding pump, bags and supplies for gastric feeding and medication administration  Syringes for administration and flushing if GT 3 Month 3     STARCH-MALTO DEXTRIN (DIAFOODS THICK-IT) POWD Thicken liquids to nectar-thick consistency. 1020 g 3     ASPIRIN PO Take 81 mg by mouth daily       order for DME Equipment being ordered: Half Rails for hospital bed 2 Device 0     Multiple Vitamins-Minerals (MULTIVITAMIN ADULT) TABS Take 1 tablet by mouth daily 90 tablet 3     simvastatin (ZOCOR) 40 MG tablet Take 1 tablet (40 mg) by mouth At Bedtime 90 tablet 1     ACE NOT PRESCRIBED, INTENTIONAL, 1 each daily ACE Inhibitor not prescribed due to Symptomatic hypotension not due to excessive diuresis 30 each 0     BETA BLOCKER NOT PRESCRIBED, INTENTIONAL, 1 each daily Beta Blocker not prescribed intentionally due to Hypotension (SBP < 90)  0     phenytoin (DILANTIN) 125 MG/5ML suspension Take 300 mg by mouth At Bedtime Dose = 12 ml per group home (125mg/5ml).        "triamcinolone (KENALOG) 0.1 % cream Apply sparingly to affected area twice daily as needed for itching. 30 g 1     cholecalciferol (VITAMIN  -D) 1000 UNITS capsule Take 1 capsule (1,000 Units) by mouth daily 90 capsule 3     acetaminophen (TYLENOL) 325 MG tablet Take 1-2 tablets (325-650 mg) by mouth every 4 hours as needed for mild pain 100 tablet      MEDICATION INSTRUCTION FOR DIALYSIS PATIENTS 1 each See Admin Instructions Do not give any medication that may affect blood pressure or volume before dialysis (i.e. Lisinopril, Metoprolol)  The following medications may be removed by dialysis and should be given after dialysis: ASA 1 each 0     ORDER FOR DME Raised toilet seat with safety/hand rails. 1 Units 1       REVIEW OF SYSTEMS    Unremarkable except as above.      Past Medical History:   Diagnosis Date     CKD (chronic kidney disease)     On dialysis      Diastolic CHF, chronic (H)      Renal cancer (H)     s/p cryoablation     Seizures (H) Onset 8/2014    On dilantin     Stroke (H) 2000, 2007    Hemiplegia, aphasia     Unspecified essential hypertension     Off all BP meds as of late 2015 due to hypotension       EXAM  /70 (BP Location: Right arm, Patient Position: Chair, Cuff Size: Adult Large)  Pulse 102  Temp 98  F (36.7  C) (Oral)  Ht 5' 5\" (1.651 m)  Wt 110 lb 3.7 oz (50 kg)  SpO2 93%  BMI 18.34 kg/m2    Quiet man reclining in WC.  He is non verbal.  Opens eyes, PERRL  No resp distress  Dialysis catheter R upper chest with dressing taped in place  CV RSR  Abd: non distended, feeding tube in L upper abd with dressing  Extremities: no edema      (I63.50) Cerebrovascular accident (CVA) due to occlusion of cerebral artery (H)  (primary encounter diagnosis)  Comment:   Plan: order for DME, order for DME            (N18.6) ESRF (end stage renal failure) (H)  Comment:   Plan: order for DME, order for DME            (I50.32) Diastolic CHF, chronic (H)  Comment:   Plan: order for DME, order for DME    "         (R56.9) Convulsions, unspecified convulsion type (H)  Comment:   Plan:     Rx's completed for DME: hosp bed, shower chair  Form done for dressings, tape.    This was a 25 min visit with 10 min on exam and 15 min counseling re equipment, cares.      Addendum on 4/21/2017:  Hesham Ramos requires a bed that allows for elevation of his head. The patient is unable to move without assistance, and a hospital bed is required for necessary and medically appropriate repositioning of the patient.     Fabien Sherman MD   April 21, 2017   1600        Addendum on 4/28/2017:  Hesham Ramos also requires 1 wet and 1 dry sling with head support for lift system, due to pt being non mobile.    Fabien Sherman MD   April 28, 2017   0815

## 2017-04-18 NOTE — NURSING NOTE
"Chief Complaint   Patient presents with     Consult     Pt is here for a face to face evaluation for medical bed and shoeer chair       Initial /70 (BP Location: Right arm, Patient Position: Chair, Cuff Size: Adult Large)  Pulse 102  Temp 98  F (36.7  C) (Oral)  Ht 5' 5\" (1.651 m)  Wt 110 lb 3.7 oz (50 kg)  SpO2 93%  BMI 18.34 kg/m2 Estimated body mass index is 18.34 kg/(m^2) as calculated from the following:    Height as of this encounter: 5' 5\" (1.651 m).    Weight as of this encounter: 110 lb 3.7 oz (50 kg).  Medication Reconciliation: complete   Bee Fernandez MA    "

## 2017-04-20 NOTE — TELEPHONE ENCOUNTER
PEPPER Greene  for pt calls. He saw Dr. Otoole for appt on 4/18 for face-to-face for DME. She needs a copy of visit notes to submit with prescription. Requesting to have note faxed to [phone], Attn: Ramona RIVERA     Visit note faxed.

## 2017-04-21 NOTE — TELEPHONE ENCOUNTER
Addendum placed on the office note.   Please fax addended copy of office note to [phone] (attention Ramona Rascon).

## 2017-04-21 NOTE — TELEPHONE ENCOUNTER
Ramona, pt's  from [facility], reports she received the face-to-face OV note from 04/18/17 when Dr. Otoole saw pt. States attempting to get pt a hospital bed for new group home situation as pt is moving from current group home that owns the hosp bed he currently uses. Hoping to move pt by 05/01, but waiting on getting a hosp bed lined up before able to proceed with move.    Reports Jennifer is saying the OV needs to states that pt needs to have a bed that can have head elevated (a position not feasible in another type of bed) and why he needs this. Indicates pt does not move self so hosp bed instrumental in re-positioning pt appropriately.     Requesting an addendum to 04/18 OV note and updated OV note be faxed to: [phone] Attn: Ramona Rascon.    Routed to Dr. Sherman to see if he's able to address as Dr. Otoole is out of the office until Monday.

## 2017-04-23 NOTE — ED AVS SNAPSHOT
[facility] Emergency Department    [address]    [facility] [address]    Phone:  [phone]    Fax:  [phone]                                       Hesham Ramos   MRN: [id number]    Department:  [facility] Emergency Department   Date of Visit:  4/23/2017           After Visit Summary Signature Page     I have received my discharge instructions, and my questions have been answered. I have discussed any challenges I see with this plan with the nurse or doctor.    ..........................................................................................................................................  Patient/Patient Representative Signature      ..........................................................................................................................................  Patient Representative Print Name and Relationship to Patient    ..................................................               ................................................  Date                                            Time    ..........................................................................................................................................  Reviewed by Signature/Title    ...................................................              ..............................................  Date                                                            Time

## 2017-04-23 NOTE — ED AVS SNAPSHOT
[facility]    [address]    [facility] [address]    Phone:  [phone]    Fax:  [phone]                                       Hesham Ramos   MRN: [id number]    Department:  [facility]   Date of Visit:  4/23/2017           Patient Information     Date Of Birth          1950        Your diagnoses for this visit were:     Choking episode     Vomiting, intractability of vomiting not specified, presence of nausea not specified, unspecified vomiting type     ESRD (end stage renal disease) on dialysis (H)     Aphasia S/P CVA        You were seen by Jesus Trivedi MD.      Follow-up Information     Follow up with PCP and Dialysis tomorrow. .      Discharge References/Attachments     CHOKING SPELL (ADULT) (ENGLISH)      Future Appointments        Provider Department Dept Phone Center    5/2/2017 9:20 AM Fabien Sherman MD, MD [facility] [phone]      24 Hour Appointment Hotline       To make an appointment at any [facility], call [phone] ([phone]). If you don't have a family doctor or clinic, we will help you find one. [facility] clinics are conveniently located to serve the needs of you and your family.             Review of your medicines      Our records show that you are taking the medicines listed below. If these are incorrect, please call your family doctor or clinic.        Dose / Directions Last dose taken    ASPIRIN PO   Dose:  81 mg        Take 81 mg by mouth daily   Refills:  0        * CALCIUM ANTACID EXTRA STRENGTH 750 MG Chew   Dose:  750 mg   Quantity:  90 tablet   Generic drug:  calcium carbonate        1 tablet (750 mg) by Per G Tube route 3 times daily Crush and dissolve in water prior to administration   Refills:  0        * calcium carbonate 500 MG chewable tablet   Commonly known as:  TUMS   Dose:  1 chew tab   Quantity:  150 tablet        1 tablet (500 mg) by  Per G Tube route daily   Refills:  1        * cholecalciferol 1000 UNITS capsule   Commonly known as:  vitamin  -D   Dose:  1 capsule   Quantity:  90 capsule        Take 1 capsule (1,000 Units) by mouth daily   Refills:  3        * Vitamin D3 1200 UNIT/15ML Liqd   Dose:  15 mL   Quantity:  1400 mL        15 mLs by Gastric Tube route daily   Refills:  1        Docusate Sodium 150 MG/15ML Liqd   Dose:  10 mL   Quantity:  600 mL        Take 10 mLs by mouth 2 times daily   Refills:  1        DONEPEZIL HYDROCHLORIDE 10 MG Tbdp   Dose:  10 mg   Quantity:  90 tablet        Take 10 mg by mouth daily ODT- patient has swallowing issues   Refills:  1        * MULTIVITAMIN ADULT Tabs   Dose:  1 tablet   Quantity:  90 tablet        Take 1 tablet by mouth daily   Refills:  3        * multivitamins with minerals Liqd liquid   Dose:  15 mL   Quantity:  600 mL        15 mLs by Per G Tube route daily   Refills:  11        phenytoin 125 MG/5ML suspension   Commonly known as:  DILANTIN   Dose:  300 mg        Take 300 mg by mouth At Bedtime Dose = 12 ml per group home (125mg/5ml).   Refills:  0        polyethylene glycol Packet   Commonly known as:  MIRALAX/GLYCOLAX   Dose:  1 packet   Quantity:  7 packet        17 g by Per G Tube route as needed for constipation (dissolve and give via GT)   Refills:  11        psyllium Packet   Commonly known as:  METAMUCIL/KONSYL   Dose:  1 packet        1 packet by Per G Tube route daily as needed for constipation   Refills:  0        sennosides 8.6 MG tablet   Commonly known as:  SENOKOT   Dose:  2 tablet   Quantity:  120 tablet        Take 2 tablets by mouth 2 times daily   Refills:  1        simvastatin 40 MG tablet   Commonly known as:  ZOCOR   Dose:  40 mg   Quantity:  90 tablet        Take 1 tablet (40 mg) by mouth At Bedtime   Refills:  1        triamcinolone 0.1 % cream   Commonly known as:  KENALOG   Quantity:  30 g        Apply sparingly to affected area twice daily as needed for itching.    Refills:  1        * Notice:  This list has 6 medication(s) that are the same as other medications prescribed for you. Read the directions carefully, and ask your doctor or other care provider to review them with you.            Procedures and tests performed during your visit     CBC with platelets differential    Comprehensive metabolic panel    EKG 12 lead    INR    Lactic acid whole blood    Partial thromboplastin time    Troponin I    XR Chest 1 View      Orders Needing Specimen Collection     None      Pending Results     Date and Time Order Name Status Description    4/24/2017 0045 EKG 12 lead Preliminary     4/23/2017 2322 XR Chest 1 View Preliminary             Pending Culture Results     No orders found for last 3 day(s).            Test Results From Your Hospital Stay        4/23/2017 11:50 PM      Component Results     Component Value Ref Range & Units Status    WBC 7.1 4.0 - 11.0 10e9/L Final    RBC Count 3.49 (L) 4.4 - 5.9 10e12/L Final    Hemoglobin 10.0 (L) 13.3 - 17.7 g/dL Final    Hematocrit 31.1 (L) 40.0 - 53.0 % Final    MCV 89 78 - 100 fl Final    MCH 28.7 26.5 - 33.0 pg Final    MCHC 32.2 31.5 - 36.5 g/dL Final    RDW 15.4 (H) 10.0 - 15.0 % Final    Platelet Count 301 150 - 450 10e9/L Final    Diff Method Automated Method  Final    % Neutrophils 67.5 % Final    % Lymphocytes 20.8 % Final    % Monocytes 6.7 % Final    % Eosinophils 4.5 % Final    % Basophils 0.4 % Final    % Immature Granulocytes 0.1 % Final    Nucleated RBCs 0 0 /100 Final    Absolute Neutrophil 4.8 1.6 - 8.3 10e9/L Final    Absolute Lymphocytes 1.5 0.8 - 5.3 10e9/L Final    Absolute Monocytes 0.5 0.0 - 1.3 10e9/L Final    Absolute Eosinophils 0.3 0.0 - 0.7 10e9/L Final    Absolute Basophils 0.0 0.0 - 0.2 10e9/L Final    Abs Immature Granulocytes 0.0 0 - 0.4 10e9/L Final    Absolute Nucleated RBC 0.0  Final         4/24/2017 12:10 AM      Component Results     Component Value Ref Range & Units Status    INR 1.03 0.86 - 1.14  Final         4/24/2017 12:10 AM      Component Results     Component Value Ref Range & Units Status    PTT 36 22 - 37 sec Final         4/24/2017 12:12 AM      Component Results     Component Value Ref Range & Units Status    Sodium 133 133 - 144 mmol/L Final    Potassium 3.5 3.4 - 5.3 mmol/L Final    Chloride 99 94 - 109 mmol/L Final    Carbon Dioxide 22 20 - 32 mmol/L Final    Anion Gap 12 3 - 14 mmol/L Final    Glucose 111 (H) 70 - 99 mg/dL Final    Urea Nitrogen 85 (H) 7 - 30 mg/dL Final    Creatinine 6.67 (H) 0.66 - 1.25 mg/dL Final    GFR Estimate 8 (L) >60 mL/min/1.7m2 Final    Non  GFR Calc    GFR Estimate If Black 10 (L) >60 mL/min/1.7m2 Final    African American GFR Calc    Calcium 8.9 8.5 - 10.1 mg/dL Final    Bilirubin Total 0.3 0.2 - 1.3 mg/dL Final    Albumin 3.3 (L) 3.4 - 5.0 g/dL Final    Protein Total 7.8 6.8 - 8.8 g/dL Final    Alkaline Phosphatase 298 (H) 40 - 150 U/L Final    ALT 40 0 - 70 U/L Final    AST 23 0 - 45 U/L Final         4/24/2017 12:12 AM      Component Results     Component Value Ref Range & Units Status    Lactic Acid 0.6 (L) 0.7 - 2.1 mmol/L Final         4/24/2017 12:12 AM      Component Results     Component Value Ref Range & Units Status    Troponin I ES 0.033 0.000 - 0.045 ug/L Final    The 99th percentile for upper reference range is 0.045 ug/L.  Troponin values   in   the range of 0.045 - 0.120 ug/L may be associated with risks of adverse   clinical events.           4/24/2017 12:59 AM      Narrative     XR CHEST 1 VW  4/24/2017 12:46 AM      HISTORY: Possible altered mental state.     COMPARISON: 12/29/2016.    FINDINGS: Right IJ infusion catheter in place. No pneumothorax. The  heart size is normal. The lungs are clear. Stents in the right arm.        Impression     IMPRESSION: No acute abnormality.                Clinical Quality Measure: Blood Pressure Screening     Your blood pressure was checked while you were in the emergency department today. The  "last reading we obtained was  BP: 168/68 . Please read the guidelines below about what these numbers mean and what you should do about them.  If your systolic blood pressure (the top number) is less than 120 and your diastolic blood pressure (the bottom number) is less than 80, then your blood pressure is normal. There is nothing more that you need to do about it.  If your systolic blood pressure (the top number) is 120-139 or your diastolic blood pressure (the bottom number) is 80-89, your blood pressure may be higher than it should be. You should have your blood pressure rechecked within a year by a primary care provider.  If your systolic blood pressure (the top number) is 140 or greater or your diastolic blood pressure (the bottom number) is 90 or greater, you may have high blood pressure. High blood pressure is treatable, but if left untreated over time it can put you at risk for heart attack, stroke, or kidney failure. You should have your blood pressure rechecked by a primary care provider within the next 4 weeks.  If your provider in the emergency department today gave you specific instructions to follow-up with your doctor or provider even sooner than that, you should follow that instruction and not wait for up to 4 weeks for your follow-up visit.        Thank you for choosing [facility]       Thank you for choosing [facility] for your care. Our goal is always to provide you with excellent care. Hearing back from our patients is one way we can continue to improve our services. Please take a few minutes to complete the written survey that you may receive in the mail after you visit with us. Thank you!        [employer] Information     [employer] lets you send messages to your doctor, view your test results, renew your prescriptions, schedule appointments and more. To sign up, go to [email or web address] . Click on \"Log in\" on the left side of the screen, which will take you to the Welcome page. Then click on \"Sign " "up Now\" on the right side of the page.     You will be asked to enter the access code listed below, as well as some personal information. Please follow the directions to create your username and password.     Your access code is: [email or web address]  Expires: 2017  5:29 PM     Your access code will  in 90 days. If you need help or a new code, please call your [facility] clinic or [phone].        Care EveryWhere ID     This is your Care EveryWhere ID. This could be used by other organizations to access your [facility] medical records  [id number]        After Visit Summary       This is your record. Keep this with you and show to your community pharmacist(s) and doctor(s) at your next visit.                  "

## 2017-04-24 NOTE — ED NOTES
"Call placed to group home at [phone] and spoke with GEORGE Gutierrez. Per Brenda she just came on as night staff but was told patient was \"choking on phlegm\" and \"was having difficulty breathing\". Patient had been laying down on side and was also vomiting. Staff sat patient up after this. Pt receives dialysis every day except for weekend. Last dialysis 4/21/17. Patient does not make urine per staff. Pt also receives feedings per g-tube.  "

## 2017-04-24 NOTE — ED NOTES
Call placed to group home at [phone] and spoke with staff Ewelina. Hand off care provided. Advised staff is available there 24/7.

## 2017-04-24 NOTE — ED NOTES
Pt brought in by EMS from group home. Concerns expressed by staff limited but states patient has vomited today. Pt hx of stroke and is non-verbal. ABCs intact.

## 2017-04-24 NOTE — ED PROVIDER NOTES
CHIEF COMPLAINT:  Choking episode, vomiting.      HISTORY OF PRESENT ILLNESS:  Hesham Ramos is a 67-year-old gentleman who presents from a group home.  He has end-stage renal disease, on hemodialysis.  He dialyzes every day except for the weekends, so his last dialysis was on Friday without complication, per report from the [facility].  He is nonverbal at baseline with right-sided hemiplegia due to his previous CVA.  He is PEG tube-dependent.  It sounds like he had a witnessed choking event while lying flat and did vomit.  This prompted a call to EMS.  No known fevers or diarrhea.  At this time, he is resting comfortably and does not appear to be in acute distress.  History is limited due to the patient's nonverbal state.      PAST MEDICAL HISTORY:   1.  Stroke, with resulting right-sided hemiplegia and verbal aphasia.   2.  Seizures.   3.  Renal cancer.   4.  Essential hypertension.   5.  End-stage renal disease, on hemodialysis.   6.  Diastolic congestive heart failure.   7.  Cognitive impairment.      PAST SURGICAL HISTORY:   1.  Right arm skin graft.   2.  PEG tube.   3.  Hemodialysis, AV fistula in the right upper chest.   4.  EGD.   5.  Cryoablation for kidney cancer.      ALLERGIES:  None.      SOCIAL HISTORY:  The patient does not smoke.  Lives in a group home.  He is nonverbal and dependent on all cares.      MEDICATIONS:   1.  Tums.   2.  Vitamin D.   3.  Vitamin D3.   4.  Docusate.   5.  Donepezil.   6.  Multivitamin.   7.  MiraLax.   8.  Senokot.   9.  Simvastatin.   10.  Kenalog cream.   11.  Aspirin.   12.  Calcium carbonate.   13.  Dilantin.   14.  Psyllium.      REVIEW OF SYSTEMS:  Unable to be obtained due to the patient's nonverbal state, as discussed above.      PHYSICAL EXAMINATION:   GENERAL:  The patient is resting in bed, leaning to the right side.   VITAL SIGNS:  Blood pressure 153/67, heart rate 82, respiratory rate 18, oxygen 99% on room air, temperature 98.7 degrees.   HEENT:   Slightly dry mucous membranes.  Right eye shows chronic changes with a nonreactive pupil.  Otherwise, the left eye is normal.   CARDIOVASCULAR:  Regular rhythm.  Normal rate.  No murmur.   CHEST:  There is a dialysis port in his right upper chest.   RESPIRATORY:  Limited due to the patient's effort, but there is no wheezing or rales identified.  Normal respiratory rate.   ABDOMEN:  Soft, nondistended.  Normal bowel sounds.  There is a PEG tube noted without evidence of infection.   MUSCULOSKELETAL:  He has atrophy and contractures of the right upper and lower extremities.  He does not move these extremities.  No edema in the legs.   NEUROLOGIC:  The patient is nonverbal.  He has right-sided hemiplegia.  He is noted to be moving his left upper and lower extremities.  He does not respond to simple commands, which is his baseline per the staff.   SKIN:  There are no pertinent skin findings.   PSYCHIATRIC:  Unable to assess.      LABORATORY EVALUATION AND DIAGNOSTIC STUDIES:  A 12-lead electrocardiogram shows a normal sinus rhythm with a ventricular rate of 84 beats a minute.  There are nonspecific lateral ST segment changes, but no acute ischemic changes.  It does look like there is voltage criteria consistent with hypertension which is longstanding.      AP chest x-ray shows no acute abnormality.      Labs:  Lactic acid is 0.6.  CBC shows a hemoglobin of 10.0 (which is baseline), white blood cell count 7.1, platelets 301,000.  INR is 1.03, PTT 36.  Comprehensive metabolic panel significant for creatinine of 6.67, otherwise unremarkable other than a slightly elevated alkaline phosphatase of 298.  Troponin is 0.033.  No urine was obtained as the patient does not make urine.      EMERGENCY DEPARTMENT COURSE AND MEDICAL DECISION MAKING:  A 67-year-old gentleman, status post CVA, with longstanding cognitive impairment, verbal aphasia, and right-sided hemiplegia, who presents after a vomiting and choking episode.  He is  oxygenating well with no increased work of breathing.  Chest x-ray is clear.  He has been observed in the emergency department for over 2.5 hours with no decompensation.  Labs and workup are baseline for him and without concern.      At this time, he is safe for discharge.  Signs and symptoms to look for in case he may have aspirated were sent home along with the patient to be reviewed by the group home staff.  He will follow up with his regular physician and dialysis tomorrow.      DIAGNOSES:   1.  Choking episode.   2.  Vomiting secondary to above.   3.  End-stage renal disease, on dialysis.   4.  Baseline verbal aphasia and right-sided hemiplegia, status post CVA.      PLAN OF CARE:  As above.         LUCIA AUSTIN MD             D: 2017 01:28   T: 2017 03:49   MT: [id number]      Name:     HUGH JENSEN   MRN:      [id number]        Account:      [id number]   :      1950           Visit Date:   2017      Document: [id number]       cc: Fabien Sherman MD

## 2017-04-24 NOTE — ED NOTES
Call placed to Carlos, family member at [phone]. Update provided and advised patient returning to group home.

## 2017-04-24 NOTE — ED NOTES
Family members present in room. Niece, Ingrid and Beyan, nephew. Family members state patient appears within normal for baseline. Family members can be reached at [phone] and [phone].

## 2017-04-26 NOTE — TELEPHONE ENCOUNTER
Ramona called again (ph# [phone], fax# [phone])-stated she needs one more thing for pt. Pt needs a dry and wet sling for lift system. For insurances purposes this will need to be part of a dictation. Ramona is hoping Dr Sherman or Dr Otoole can again addend the 4/18/17 visit with this information. Ramona will also need an order/rx faxed.          Order-  1 wet and 1 dry sling with head support for lift system, due to pt being non mobile      DME order pending if ok. Dictation from 4/18/17 also needs to be addended

## 2017-04-28 NOTE — TELEPHONE ENCOUNTER
Pa from [facility] at  has questions for Dr. Otoole about wound forms and supplies.  Please return her call.

## 2017-04-29 NOTE — MR AVS SNAPSHOT
After Visit Summary   3/23/2017    Hesham Ramos    MRN: [id number]           Patient Information     Date Of Birth          1950        Visit Information        Provider Department      3/23/2017 10:40 AM Dennise Cuellar APRN CNP [facility]        Today's Diagnoses     Preop general physical exam    -  1    Aspiration into airway, sequela        Hemodialysis status (H)        ESRF (end stage renal failure) (H)        CKD (chronic kidney disease) stage 5, GFR less than 15 ml/min (H)        Essential hypertension        Encephalopathy        Convulsions, unspecified convulsion type (H)        Cognitive impairment          Care Instructions      Before Your Surgery      Call your surgeon if there is any change in your health. This includes signs of a cold or flu (such as a sore throat, runny nose, cough, rash or fever).    Do not smoke, drink alcohol or take over the counter medicine (unless your surgeon or primary care doctor tells you to) for the 24 hours before and after surgery.    If you take prescribed drugs: Follow your doctor s orders about which medicines to take and which to stop until after surgery.    Eating and drinking prior to surgery: follow the instructions from your surgeon    Take a shower or bath the night before surgery. Use the soap your surgeon gave you to gently clean your skin. If you do not have soap from your surgeon, use your regular soap. Do not shave or scrub the surgery site.  Wear clean pajamas and have clean sheets on your bed.         Follow-ups after your visit        Your next 10 appointments already scheduled     May 02, 2017  9:20 AM CDT   PHYSICAL with Fabien Sherman MD   [facility] ([facility])    [address]  [facility] [id number] [phone]              Who to contact     If you have questions or need follow up information about today's clinic visit or your schedule please  "contact [facility] directly at [phone].  Normal or non-critical lab and imaging results will be communicated to you by MyChart, letter or phone within 4 business days after the clinic has received the results. If you do not hear from us within 7 days, please contact the clinic through MyChart or phone. If you have a critical or abnormal lab result, we will notify you by phone as soon as possible.  Submit refill requests through Cr[employer]le or call your pharmacy and they will forward the refill request to us. Please allow 3 business days for your refill to be completed.          Additional Information About Your Visit        [employer] Information     Crackle lets you send messages to your doctor, view your test results, renew your prescriptions, schedule appointments and more. To sign up, go to [email or web address] . Click on \"Log in\" on the left side of the screen, which will take you to the Welcome page. Then click on \"Sign up Now\" on the right side of the page.     You will be asked to enter the access code listed below, as well as some personal information. Please follow the directions to create your username and password.     Your access code is: [email or web address]  Expires: 3/29/2017  6:07 PM     Your access code will  in 90 days. If you need help or a new code, please call your [place] clinic or [phone].        Care EveryWhere ID     This is your Care EveryWhere ID. This could be used by other organizations to access your [place] medical records  [id number]        Your Vitals Were     Pulse Temperature Height BMI (Body Mass Index)          67 97.7  F (36.5  C) (Oral) 5' 5\" (1.651 m) 20.8 kg/m2         Blood Pressure from Last 3 Encounters:   17 122/54   16 178/84   10/26/16 141/67    Weight from Last 3 Encounters:   17 125 lb (56.7 kg)   16 125 lb (56.7 kg)   10/26/16 97 lb 12.8 oz (44.4 kg)              Today, you had the following     No orders found for " display       Primary Care Provider Office Phone # Fax #    Fabien Sherman [phone]       [facility] [address] [facility] [address]  [facility] [address]        Thank you!     Thank you for choosing [facility]  for your care. Our goal is always to provide you with excellent care. Hearing back from our patients is one way we can continue to improve our services. Please take a few minutes to complete the written survey that you may receive in the mail after your visit with us. Thank you!             Your Updated Medication List - Protect others around you: Learn how to safely use, store and throw away your medicines at www.disposemymeds.org.          This list is accurate as of: 3/23/17  7:33 PM.  Always use your most recent med list.                   Brand Name Dispense Instructions for use    ACE NOT PRESCRIBED (INTENTIONAL)     30 each    1 each daily ACE Inhibitor not prescribed due to Symptomatic hypotension not due to excessive diuresis       acetaminophen 325 MG tablet    TYLENOL    100 tablet    Take 1-2 tablets (325-650 mg) by mouth every 4 hours as needed for mild pain       ASPIRIN PO      Take 81 mg by mouth daily       BETA BLOCKER NOT PRESCRIBED (INTENTIONAL)      1 each daily Beta Blocker not prescribed intentionally due to Hypotension (SBP < 90)       * calcium carbonate 500 MG chewable tablet    TUMS     Take 1 chew tab by mouth daily       * CALCIUM ANTACID EXTRA STRENGTH 750 MG Chew   Generic drug:  calcium carbonate      Take 750 mg by mouth 3 times daily       cholecalciferol 1000 UNITS capsule    vitamin  -D    90 capsule    Take 1 capsule (1,000 Units) by mouth daily       donepezil 10 MG tablet    ARICEPT    90 tablet    Take 1 tablet (10 mg) by mouth At Bedtime       * MEDICATION INSTRUCTION FOR DIALYSIS PATIENTS     1 each    1 each See Admin Instructions Do not give any medication that may affect blood pressure or volume before dialysis (i.e.  Lisinopril, Metoprolol)  The following medications may be removed by dialysis and should be given after dialysis: ASA       MULTIVITAMIN ADULT Tabs     90 tablet    Take 1 tablet by mouth daily       * order for DME     1 Units    Raised toilet seat with safety/hand rails.       order for DME     2 Device    Equipment being ordered: Half Rails for hospital bed       phenytoin 125 MG/5ML suspension    DILANTIN     Take 300 mg by mouth At Bedtime Dose = 12 ml per group home (125mg/5ml).       polyethylene glycol Packet    MIRALAX/GLYCOLAX     Take by mouth as needed for constipation       psyllium Packet    METAMUCIL/KONSYL     Take 1 packet by mouth daily as needed for constipation       senna-docusate 8.6-50 MG per tablet    SENOKOT-S;PERICOLACE    120 tablet    Take 2 tablets by mouth 2 times daily       simvastatin 40 MG tablet    ZOCOR    90 tablet    Take 1 tablet (40 mg) by mouth At Bedtime       STARCH-MALTO DEXTRIN Powd    DIAFOODS THICK-IT    1020 g    Thicken liquids to nectar-thick consistency.       triamcinolone 0.1 % cream    KENALOG    30 g    Apply sparingly to affected area twice daily as needed for itching.       * Notice:  This list has 4 medication(s) that are the same as other medications prescribed for you. Read the directions carefully, and ask your doctor or other care provider to review them with you.       67.1

## 2017-05-02 NOTE — MR AVS SNAPSHOT
After Visit Summary   5/2/2017    Hesham Ramos    MRN: [id number]           Patient Information     Date Of Birth          1950        Visit Information        Provider Department      5/2/2017 9:20 AM Fabien Sherman MD [facility]        Today's Diagnoses     Routine general medical examination at a health care facility    -  1    Cerebrovascular accident (CVA) due to occlusion of cerebral artery (H)        Expressive aphasia        Hemiplegia affecting dominant side, post-stroke (H)        Chronic diastolic congestive heart failure (H)        Hyperlipidemia LDL goal <100        Essential hypertension        ESRF (end stage renal failure) (H)        Convulsions, unspecified convulsion type (H)        Feeding by G-tube (H)        Wax in ear          Care Instructions      Preventive Health Recommendations:   Male Ages 65 and over    Yearly exam:             See your health care provider every year in order to  o   Review health changes.   o   Discuss preventive care.    o   Review your medicines if your doctor has prescribed any.    Talk with your health care provider about whether you should have a test to screen for prostate cancer (PSA).    Every 3 years, have a diabetes test (fasting glucose). If you are at risk for diabetes, you should have this test more often.    Every 5 years, have a cholesterol test. Have this test more often if you are at risk for high cholesterol or heart disease.     Every 10 years, have a colonoscopy. Or, have a yearly FIT test (stool test). These exams will check for colon cancer.    Talk to with your health care provider about screening for Abdominal Aortic Aneurysm if you have a family history of AAA or have a history of smoking.    Shots:     Get a flu shot each year.     Get a tetanus shot every 10 years.     Talk to your doctor about your pneumonia vaccines. There are now two you should receive - Pneumovax (PPSV 23) and Prevnar (PCV  13).     Talk to your doctor about a shingles vaccine.     Talk to your doctor about the hepatitis B vaccine.  Nutrition:     Eat at least 5 servings of fruits and vegetables each day.     Eat whole-grain bread, whole-wheat pasta and brown rice instead of white grains and rice.     Talk to your provider about Calcium and Vitamin D.   Lifestyle    Exercise for at least 150 minutes a week (30 minutes a day, 5 days a week). This will help you control your weight and prevent disease.     Limit alcohol to one drink per day.     No smoking.     Wear sunscreen to prevent skin cancer.     See your dentist every six months for an exam and cleaning.     See your eye doctor every 1 to 2 years to screen for conditions such as glaucoma, macular degeneration, cataracts, etc       Contact information provided for ophthalmology and for Ear, Nose and Throat specialists.   Have pharmacy contact Dr Sherman when meds require refill.   Contact Dr Sherman's office for any concerns.   See Dr Sherman in the office in about three months for follow up.         Follow-ups after your visit        Additional Services     OPHTHALMOLOGY ADULT REFERRAL       Your provider has referred you to: [facility] [phone]   [email or web address]    Please be aware that coverage of these services is subject to the terms and limitations of your health insurance plan.  Call member services at your health plan with any benefit or coverage questions.      Please bring the following with you to your appointment:    (1) Any X-Rays, CTs or MRIs which have been performed.  Contact the facility where they were done to arrange for  prior to your scheduled appointment.    (2) List of current medications  (3) This referral request   (4) Any documents/labs given to you for this referral            OTOLARYNGOLOGY REFERRAL       Your provider has referred you to: [facility] -  "[place] [phone]   [email or web address]    Please be aware that coverage of these services is subject to the terms and limitations of your health insurance plan.  Call member services at your health plan with any benefit or coverage questions.      Please bring the following with you to your appointment:    (1) Any X-Rays, CTs or MRIs which have been performed.  Contact the facility where they were done to arrange for  prior to your scheduled appointment.   (2) List of current medications  (3) This referral request   (4) Any documents/labs given to you for this referral                  Who to contact     If you have questions or need follow up information about today's clinic visit or your schedule please contact [facility] directly at [phone].  Normal or non-critical lab and imaging results will be communicated to you by MyChart, letter or phone within 4 business days after the clinic has received the results. If you do not hear from us within 7 days, please contact the clinic through [employer] or phone. If you have a critical or abnormal lab result, we will notify you by phone as soon as possible.  Submit refill requests through [employer]o or call your pharmacy and they will forward the refill request to us. Please allow 3 business days for your refill to be completed.          Additional Information About Your Visit        [employer]acks Information     Hi[employer]o lets you send messages to your doctor, view your test results, renew your prescriptions, schedule appointments and more. To sign up, go to [email or web address] . Click on \"Log in\" on the left side of the screen, which will take you to the Welcome page. Then click on \"Sign up Now\" on the right side of the page.     You will be asked to enter the access code listed below, as well as some personal information. Please follow the directions to create your username and password.     Your access " code is: [email or web address]  Expires: 2017  5:29 PM     Your access code will  in 90 days. If you need help or a new code, please call your [place] clinic or [phone].        Care EveryWhere ID     This is your Care EveryWhere ID. This could be used by other organizations to access your [place] medical records  [id number]        Your Vitals Were     Pulse Temperature Respirations Pulse Oximetry BMI (Body Mass Index)       80 97  F (36.1  C) (Axillary) 16 100% 18.3 kg/m2        Blood Pressure from Last 3 Encounters:   17 102/52   17 166/80   17 132/70    Weight from Last 3 Encounters:   17 110 lb (49.9 kg)   17 110 lb 3.7 oz (50 kg)   17 125 lb (56.7 kg)              We Performed the Following     Comprehensive metabolic panel     Lipid panel reflex to direct LDL     OPHTHALMOLOGY ADULT REFERRAL     OTOLARYNGOLOGY REFERRAL     TSH with free T4 reflex          Today's Medication Changes          These changes are accurate as of: 17 10:23 AM.  If you have any questions, ask your nurse or doctor.               Start taking these medicines.        Dose/Directions    ACE/ARB NOT PRESCRIBED (INTENTIONAL)   Used for:  Chronic diastolic congestive heart failure (H)   Started by:  Fabien Sherman MD        Please choose reason not prescribed, below   Refills:  0       BETA BLOCKER NOT PRESCRIBED (INTENTIONAL)   Used for:  Chronic diastolic congestive heart failure (H)   Started by:  Fabien Sherman MD        Beta Blocker not prescribed intentionally due to Hypotension (SBP < 90)   Refills:  0            Where to get your medicines      Some of these will need a paper prescription and others can be bought over the counter.  Ask your nurse if you have questions.     You don't need a prescription for these medications     ACE/ARB NOT PRESCRIBED (INTENTIONAL)    BETA BLOCKER NOT PRESCRIBED (INTENTIONAL)                Primary Care Provider Office Phone # Fax #    Fabien VALLECILLO  [address] [phone]       [facility] [address] [facility] [address]  [facility] [address]        Thank you!     Thank you for choosing [facility]  for your care. Our goal is always to provide you with excellent care. Hearing back from our patients is one way we can continue to improve our services. Please take a few minutes to complete the written survey that you may receive in the mail after your visit with us. Thank you!             Your Updated Medication List - Protect others around you: Learn how to safely use, store and throw away your medicines at www.disposemymeds.org.          This list is accurate as of: 5/2/17 10:23 AM.  Always use your most recent med list.                   Brand Name Dispense Instructions for use    ACE/ARB NOT PRESCRIBED (INTENTIONAL)      Please choose reason not prescribed, below       ASPIRIN PO      Take 81 mg by mouth daily       BETA BLOCKER NOT PRESCRIBED (INTENTIONAL)      Beta Blocker not prescribed intentionally due to Hypotension (SBP < 90)       calcium carbonate 500 MG chewable tablet    TUMS    150 tablet    1 tablet (500 mg) by Per G Tube route daily       * cholecalciferol 1000 UNITS capsule    vitamin  -D    90 capsule    Take 1 capsule (1,000 Units) by mouth daily       * Vitamin D3 1200 UNIT/15ML Liqd     1400 mL    15 mLs by Gastric Tube route daily       Docusate Sodium 150 MG/15ML Liqd     600 mL    Take 10 mLs by mouth 2 times daily       DONEPEZIL HYDROCHLORIDE 10 MG Tbdp     90 tablet    Take 10 mg by mouth daily ODT- patient has swallowing issues       * MULTIVITAMIN ADULT Tabs     90 tablet    Take 1 tablet by mouth daily       * multivitamins with minerals Liqd liquid     600 mL    15 mLs by Per G Tube route daily       order for DME     2 each    1 wet sling with head support 1 dry sling with head support       phenytoin 125 MG/5ML suspension    DILANTIN     Take 300 mg by mouth At Bedtime Dose = 12 ml per group home  (125mg/5ml).       polyethylene glycol Packet    MIRALAX/GLYCOLAX    7 packet    17 g by Per G Tube route as needed for constipation (dissolve and give via GT)       psyllium Packet    METAMUCIL/KONSYL     1 packet by Per G Tube route daily as needed for constipation       sennosides 8.6 MG tablet    SENOKOT    120 tablet    Take 2 tablets by mouth 2 times daily       simvastatin 40 MG tablet    ZOCOR    90 tablet    Take 1 tablet (40 mg) by mouth At Bedtime       triamcinolone 0.1 % cream    KENALOG    30 g    Apply sparingly to affected area twice daily as needed for itching.       * Notice:  This list has 4 medication(s) that are the same as other medications prescribed for you. Read the directions carefully, and ask your doctor or other care provider to review them with you.

## 2017-05-02 NOTE — PATIENT INSTRUCTIONS
Preventive Health Recommendations:   Male Ages 65 and over    Yearly exam:             See your health care provider every year in order to  o   Review health changes.   o   Discuss preventive care.    o   Review your medicines if your doctor has prescribed any.    Talk with your health care provider about whether you should have a test to screen for prostate cancer (PSA).    Every 3 years, have a diabetes test (fasting glucose). If you are at risk for diabetes, you should have this test more often.    Every 5 years, have a cholesterol test. Have this test more often if you are at risk for high cholesterol or heart disease.     Every 10 years, have a colonoscopy. Or, have a yearly FIT test (stool test). These exams will check for colon cancer.    Talk to with your health care provider about screening for Abdominal Aortic Aneurysm if you have a family history of AAA or have a history of smoking.    Shots:     Get a flu shot each year.     Get a tetanus shot every 10 years.     Talk to your doctor about your pneumonia vaccines. There are now two you should receive - Pneumovax (PPSV 23) and Prevnar (PCV 13).     Talk to your doctor about a shingles vaccine.     Talk to your doctor about the hepatitis B vaccine.  Nutrition:     Eat at least 5 servings of fruits and vegetables each day.     Eat whole-grain bread, whole-wheat pasta and brown rice instead of white grains and rice.     Talk to your provider about Calcium and Vitamin D.   Lifestyle    Exercise for at least 150 minutes a week (30 minutes a day, 5 days a week). This will help you control your weight and prevent disease.     Limit alcohol to one drink per day.     No smoking.     Wear sunscreen to prevent skin cancer.     See your dentist every six months for an exam and cleaning.     See your eye doctor every 1 to 2 years to screen for conditions such as glaucoma, macular degeneration, cataracts, etc       Contact information provided for ophthalmology and  for Ear, Nose and Throat specialists.   Have pharmacy contact Dr Sherman when meds require refill.   Contact Dr Sherman's office for any concerns.   See Dr Sherman in the office in about three months for follow up.

## 2017-05-02 NOTE — PROGRESS NOTES
SUBJECTIVE:     CC: Hesham Ramos is an 67 year old male who presents for preventative health visit.     Healthy Habits:    Do you get at least three servings of calcium containing foods daily (dairy, green leafy vegetables, etc.)? yes    Amount of exercise or daily activities, outside of work: 0 day(s) per week    Problems taking medications regularly No    Medication side effects: Unsure    Have you had an eye exam in the past two years? no    Do you see a dentist twice per year? No    Do you have sleep apnea, excessive snoring or daytime drowsiness?      Limited history was provided by patient's attendant as she does not usually work with patient. Patient unable to provide history due to aphasia.     Attendant is unsure whether patient has any new skin sores.     He is tube fed during the night.     Dialysis on Mondays, Wednesdays and Fridays.    Today's PHQ-2 Score:   PHQ-2 ( 1999 [employer]) 12/3/2015 11/25/2015   Q1: Little interest or pleasure in doing things 0 0   Q2: Feeling down, depressed or hopeless 0 0   PHQ-2 Score 0 0       Do you feel safe in your environment - NOT APPLICABLE    Social History   Substance Use Topics     Smoking status: Never Smoker     Smokeless tobacco: Never Used     Alcohol use No     The patient does not drink >3 drinks per day nor >7 drinks per week.    Last PSA: No results found for: PSA    Recent Labs   Lab Test  09/03/15   1815  06/04/14   0515  07/23/13   1405   CHOL  161   --   160   HDL  73   --   55   LDL  74   --   88   TRIG  72  78  85   CHOLHDLRATIO  2.2   --   2.9       Reviewed orders with patient. Reviewed health maintenance and updated orders accordingly - Yes    Reviewed and updated as needed this visit by clinical staff  Tobacco  Allergies  Meds         Reviewed and updated as needed this visit by Provider        Past Medical History:   Diagnosis Date     Cognitive impairment      Diastolic CHF, chronic (H)      End-stage renal disease on hemodialysis (H)       Essential hypertension     Off all BP meds as of late 2015 due to hypotension     Renal cancer (H)     s/p cryoablation     Seizures (H) Onset 8/2014    On dilantin     Stroke (H) 2000, 2007    Hemiplegia, aphasia      Past Surgical History:   Procedure Laterality Date     Cryoablation of kidney cancer       ESOPHAGOSCOPY, GASTROSCOPY, DUODENOSCOPY (EGD), COMBINED  8/5/2014    Procedure: COMBINED ESOPHAGOSCOPY, GASTROSCOPY, DUODENOSCOPY (EGD), BIOPSY SINGLE OR MULTIPLE;  Surgeon: Raheel Mixon MD;  Location:  GI     HEMODIALYSIS VIA AV FISTULA       PEG tube NOS       right arm skin graft         ROS:  Review of systems: Unobtainable, patient unable to provide due to cognitive disabilities. Attendant provides no concerns on his behalf except as noted above.     This document serves as a record of the services and decisions personally performed and made by Fabien Sherman MD. It was created on his behalf by Juanita Mancuso, a trained medical scribe. The creation of this document is based on the provider's statements to the medical scribe.  Juanita Mancuso May 2, 2017 11:09 AM       Problem list, Medication list, Allergies, and Medical/Social/Surgical histories reviewed in [facility] and updated as appropriate.  OBJECTIVE:     /52 (BP Location: Left arm, Patient Position: Chair, Cuff Size: Adult Regular)  Pulse 80  Temp 97  F (36.1  C) (Axillary)  Resp 16  Wt 49.9 kg (110 lb)  SpO2 100%  BMI 18.3 kg/m2  EXAM:  GENERAL: healthy, alert and no distress  EYES: Eyes grossly normal to inspection, PERRL and conjunctivae and sclerae normal  HENT: ear canals and TM's normal, nose. Cerumen impaction on left ear. Unable to see oral pharynx    NECK: no adenopathy, no asymmetry, masses, or scars and thyroid normal to palpation  RESP: lungs clear to auscultation - no rales, rhonchi or wheezes  CV: regular rate and rhythm, normal S1 S2, no S3 or S4, no murmur, click or rub, no peripheral edema and peripheral  pulses strong  ABDOMEN: soft, nontender, no hepatosplenomegaly, no masses and bowel sounds normal  /Rectal: not performed  MS: no gross musculoskeletal defects noted, no edema  SKIN: no suspicious lesions or rashes  NEURO: Expressive aphasia, right hemiparesis. Minimal speech  PSYCH: mentation appears normal, affect normal/bright    ASSESSMENT/PLAN:     (Z00.00) Routine general medical examination at a health care facility  (primary encounter diagnosis)  Comment: Stable health. See epic orders.   Plan: OPHTHALMOLOGY ADULT REFERRAL, TSH with free T4         reflex          (I63.50) Cerebrovascular accident (CVA) due to occlusion of cerebral artery (H)  (R47.01) Expressive aphasia  (I69.359) Hemiplegia affecting dominant side, post-stroke (H)  (R13.10) Dysphagia, unspecified type  Comment: Stable. See epic orders.     (I50.32) Chronic diastolic congestive heart failure (H)  Comment: Low-normal BP's.   Plan: ACE/ARB NOT PRESCRIBED, INTENTIONAL,, BETA         BLOCKER NOT PRESCRIBED, INTENTIONAL,          (E78.5) Hyperlipidemia LDL goal <100  Comment: Update lipids.   Plan: Lipid panel reflex to direct LDL, Comprehensive        metabolic panel          (I10) Essential hypertension  Comment: /52.   Plan: Continue current meds.     (N18.6) ESRF (end stage renal failure) (H)  Comment: Continue dialysis Monday, Wednesday, Friday    (R56.9) Convulsions, unspecified convulsion type (H)  Comment: No recent seizures.     (Z93.1) Feeding by G-tube (H)  Comment: Continue tube feedings with Nepro, 1080 ml (1944 kcal)/12 hours each night.     (H61.20) Wax in ear  Comment: Left cerumen impaction   Plan: OTOLARYNGOLOGY REFERRAL          Patient Instructions     Contact information provided for ophthalmology and for Ear, Nose and Throat specialists.   Have pharmacy contact Dr Sherman when meds require refill.   Contact Dr Sherman's office for any concerns.   See Dr Sherman in the office in about three months for follow up.  "    COUNSELING:  Reviewed preventive health counseling, as reflected in patient instructions       Vision screening       Hearing screening       reports that he has never smoked. He has never used smokeless tobacco.  Estimated body mass index is 18.3 kg/(m^2) as calculated from the following:    Height as of 4/18/17: 1.651 m (5' 5\").    Weight as of this encounter: 49.9 kg (110 lb).     Counseling Resources:  ATP IV Guidelines  Pooled Cohorts Equation Calculator  FRAX Risk Assessment  ICSI Preventive Guidelines  Dietary Guidelines for Americans, 2010  USDA's MyPlate  ASA Prophylaxis  Lung CA Screening    The information in this document, created by the medical scribe for me, accurately reflects the services I personally performed and the decisions made by me. I have reviewed and approved this document for accuracy prior to leaving the patient care area.  May 2, 2017 10:22 AM    Fabien Sherman MD,   [facility]  "

## 2017-05-02 NOTE — TELEPHONE ENCOUNTER
Call to [facility] and left message. DR Otoole saw the pt on 4/18/17. Dr Sherman is the primary provider.

## 2017-05-04 NOTE — TELEPHONE ENCOUNTER
[facility]  Wound Care questions for gauze sponges  Last seen 5/2/17  PAULINA's yellow folder  Fax back

## 2017-05-18 NOTE — TELEPHONE ENCOUNTER
Nurse called from pt's group home. Stated pt is moving to a new on 5/31 and pt needs some of his rx's to go to his new pharm, which is [facility] in [place].     Pt's other meds will be transferred over, since they have refills. Pt is not established at [facility] yet (and does not need refills yet), so nurse will be calling to give pt's information. Relayed I will put message on rx to not fill rx's until pt's group home calls for refills.

## 2017-06-01 NOTE — TELEPHONE ENCOUNTER
Nelly, RN supervisor at pt's new group home - [facility], calls with questions.     1. Asking about order for TUMS - previous group home was not giving these and they want to know if this is an order from Dr. Sherman. Advised [facility] Dr. Sherman been ordering this for pt.  2. Requesting to have any PO med orders be resent to pharmacy to state they should be given per G-tube. They do not want any staff to give meds orally and have pt aspirate. Docusate, Donepezil, Sennosides and Simvastatin all listed to be given orally, orders changed to administer via G-Tube and resent to [facility].   3. Pt's lexy Marcos asking how often Dilantin levels should be checked and who would be responsible for ordering this. Sent to provider to review.

## 2017-06-03 NOTE — TELEPHONE ENCOUNTER
Okay to d/c Tums.     If he is seeing a neurologist, they should follow Dilantin levels. If not, I can follow them.   Future orders placed, may be drawn at any time.

## 2017-06-05 NOTE — TELEPHONE ENCOUNTER
Contacted Group Home and spoke to GEORGE Haines. They will discontinue TUMS. Pt is not seeing Neurology, advised Dr. Sherman will follow labs and orders placed for levels. Zaki states they will arrange a lab appt with [facility] in the future.

## 2017-06-06 NOTE — TELEPHONE ENCOUNTER
Nelly RN with group home left voice message requesting an order for a shower chair. She also states that his wheelchair does not appear to be the right size for pt.     Attempted to contact [facility]:  1. Where do they want order for shower chair sent  2. Is wheelchair too large or too small and do they want pt evaluated for better fitting chair.  Left voice message to call back.

## 2017-06-11 NOTE — IP AVS SNAPSHOT
"    [place] [address] MEDICAL SURGICAL: [phone]                                              INTERAGENCY TRANSFER FORM - PHYSICIAN ORDERS   2017                    Hospital Admission Date: 2017  HUGH JENSEN   : 1950  Sex: Male        Attending Provider: (none)    Allergies:  No Known Allergies    Infection:  None   Service:  HOSPITALIST    Ht:  1.651 m (5' 5\")   Wt:  59.4 kg (130 lb 15.3 oz)   Admission Wt:  48.5 kg (106 lb 14.8 oz)    BMI:  21.79 kg/m 2   BSA:  1.65 m 2            Patient PCP Information     Provider PCP Type    Fabien Sherman MD, MD General      ED Clinical Impression     Diagnosis Description Comment Added By Time Added    Respiratory distress [R06.00] Respiratory distress [R06.00]  Billy Morel MD 2017 12:18 AM    Aspiration pneumonitis (H) [J69.0] Aspiration pneumonitis (H) [J69.0]  Billy Morel MD 2017 12:18 AM    Severe sepsis (H) [A41.9, R65.20] Severe sepsis (H) [A41.9, R65.20]  Billy Morel MD 2017 12:19 AM      Hospital Problems as of 2017              Priority Class Noted POA    Respiratory distress Medium  2017 Yes      Non-Hospital Problems as of 2017              Priority Class Noted    Cerebrovascular accident (CVA) due to occlusion of cerebral artery (H)   Unknown    Chronic diastolic congestive heart failure (H)   Unknown    Essential hypertension   Unknown    HYPERLIPIDEMIA LDL GOAL <100   10/31/2010    Advance Care Planning   2011    ESRF (end stage renal failure) (H)   2013    Cognitive impairment   2013    CKD (chronic kidney disease) stage 5, GFR less than 15 ml/min (H)   2013    Syncope   2013    Anemia   2013    EKG abnormality   2013    Encephalopathy   2013    Hypoxia   3/13/2014    Renal cancer (H)   2014    Hematemesis   6/3/2014    Seizures (HCC)   2014    Health Care Home   2014    Septic shock (H) Medium  2015    Diastolic CHF, chronic (H) " Medium  Unknown    Hyperkalemia Medium  10/24/2016    Hemodialysis catheter dysfunction, initial encounter (H) Medium  10/25/2016    Feeding by G-tube (H) Medium  3/31/2017    Aspiration into airway, sequela Medium  3/31/2017      Code Status History     Date Active Date Inactive Code Status Order ID Comments User Context    6/28/2017  1:56 PM  DNR/DNI [id number]  Hesham Todd MD Outpatient    6/26/2017 11:52 AM 6/28/2017  1:56 PM DNR/DNI [id number] Code status discussion is appropriately documented in the chart. Nomi Kumar MD Inpatient    6/23/2017  9:45 AM 6/26/2017 11:52 AM DNR/DNI [id number] Code status discussion is appropriately documented in the chart. Elaina Moeller APRN CNS Inpatient    6/14/2017 11:37 AM 6/23/2017  9:45 AM DNR [id number]  Elaina Moeller APRN CNS Inpatient    6/12/2017  4:14 AM 6/14/2017 11:37 AM Full Code [id number]  Dinesh Mclaughlin, DO Inpatient    6/12/2017  3:42 AM 6/12/2017  4:14 AM DNR/DNI [id number]  Dinesh Mclaughlin, DO Inpatient    10/26/2016  1:18 PM 6/12/2017  3:42 AM DNR/DNI [id number]  Schuyler Arellano MD Outpatient    10/24/2016 11:44 PM 10/26/2016  1:18 PM DNR/DNI [id number]  Brian Conn MD Inpatient    11/19/2015  2:13 PM 10/24/2016 11:44 PM DNR/DNI [id number]  Schuyler Arellano MD Outpatient    11/19/2015 10:02 AM 11/19/2015  2:13 PM DNR/DNI [id number] See POLST created 11/18/2015  See record of discussion in progress note on same date. Giuliana Vega APRN CNS Inpatient    11/12/2015  4:52 PM 11/19/2015 10:02 AM Full Code [id number]  Cristian Ellison MD Inpatient    8/17/2014 11:27 PM 8/21/2014  9:37 PM Full Code [id number]  Sofia Talavera MD Inpatient    6/3/2014 10:24 PM 6/6/2014  9:41 PM Full Code [id number]  Taqueria Batres MD Inpatient    3/15/2014  1:36 PM 6/3/2014 10:24 PM Full Code [id number]  Juany Braun MD Outpatient    3/13/2014  1:33 AM 3/15/2014  1:36 PM Full Code [id number]  Alana Moore,  RN Inpatient    8/2/2013  9:33 AM 8/6/2013  8:35 PM Full Code [id number]  Jesus Harrison,  Inpatient         Medication Review      START taking        Dose / Directions Comments    acetaminophen 650 MG Suppository   Commonly known as:  TYLENOL   Used for:  Aspiration pneumonitis (H)   Replaces:  acetaminophen 32 mg/mL solution        Dose:  650 mg   Place 1 suppository (650 mg) rectally every 4 hours as needed for fever or mild pain (Do not exceed 4000 mg total acetaminophen per day.) Unwrap prior to insertion.   Quantity:  12 suppository   Refills:  1        atropine 1 % ophthalmic solution   Used for:  Aspiration pneumonitis (H)        Dose:  2 drop   Take 2 drops by mouth, place under tongue or place inside cheek every 2 hours as needed for other (terminal respiratory secretions) Not for ophthalmic use.   Quantity:  5 mL   Refills:  1        HYDROmorphone 2 MG tablet   Commonly known as:  DILAUDID        Dose:  2 mg   Take 1 tablet (2 mg) by mouth or place under tongue every 2 hours as needed for moderate to severe pain (and/or shortness of breath or respiratory rate greater than 20.) Need to be bubble packed for long term care   Quantity:  90 tablet   Refills:  0        LORazepam 0.5 MG tablet   Commonly known as:  ATIVAN        Dose:  1 mg   Take 2 tablets (1 mg) by mouth, place under tongue or insert rectally every 4 hours as needed for anxiety or seizures (restlessness)   Quantity:  30 tablet   Refills:  1        MEDICATION INSTRUCTION   Used for:  Aspiration pneumonitis (H)        If care facility cannot accept or use ranges, facility is instructed to use lower end of dosing range   Refills:  0        MEDICATION INSTRUCTION   Used for:  Aspiration pneumonitis (H)        If care facility cannot accept or use ranges, facility is instructed to use lower end of dosing range   Refills:  0        MEDICATION INSTRUCTION   Used for:  Aspiration pneumonitis (H)        If care facility cannot accept or use  ranges, facility is instructed to use lower end of dosing range   Refills:  0          CONTINUE these medications which have NOT CHANGED        Dose / Directions Comments    order for DME   Used for:  Cerebrovascular accident (CVA) due to occlusion of cerebral artery (H), ESRF (end stage renal failure) (H), Encephalopathy, Hemiplegia affecting right dominant side (H)        1 wet sling with head support 1 dry sling with head support   Quantity:  2 each   Refills:  0          STOP taking     ACE/ARB NOT PRESCRIBED (INTENTIONAL)           acetaminophen 32 mg/mL solution   Commonly known as:  TYLENOL   Replaced by:  acetaminophen 650 MG Suppository           ASPIRIN PO           BETA BLOCKER NOT PRESCRIBED (INTENTIONAL)           cholecalciferol 400 UNIT/ML Liqd liquid   Commonly known as:  vitamin D/D-VI-SOL           Docusate Sodium 150 MG/15ML Liqd           DONEPEZIL HYDROCHLORIDE 10 MG Tbdp           multivitamins with minerals Liqd liquid           phenytoin 125 MG/5ML suspension   Commonly known as:  DILANTIN           polyethylene glycol Packet   Commonly known as:  MIRALAX/GLYCOLAX           sennosides 8.6 MG tablet   Commonly known as:  SENOKOT           simvastatin 40 MG tablet   Commonly known as:  ZOCOR           triamcinolone 0.1 % cream   Commonly known as:  KENALOG                     Further instructions from your care team       CM: [facility] [phone]    After Care     Advance Diet as Tolerated       Follow this diet upon discharge: may eat for comfort       Discharge Instructions       Comfort cares, hospice to follow.       General info for SNF       Length of Stay Estimate: Long Term Care  Condition at Discharge: Declining  Level of care:skilled   Rehabilitation Potential: Poor  Admission H&P remains valid and up-to-date: Yes  Recent Chemotherapy: N/A  Use Nursing Home Standing Orders: Yes       Mantoux instructions       Give two-step Mantoux (PPD) Per Facility Policy Yes              Statement of Approval     Ordered          06/28/17 1356  I have reviewed and agree with all the recommendations and orders detailed in this document.  EFFECTIVE NOW     Approved and electronically signed by:  Hesham Todd MD

## 2017-06-11 NOTE — IP AVS SNAPSHOT
"          [place] [address] [facility]: [phone]                                              INTERAGENCY TRANSFER FORM - LAB / IMAGING / EKG / EMG RESULTS   2017                    Hospital Admission Date: 2017  HESHAM JENSEN   : 1950  Sex: Male        Attending Provider: (none)    Allergies:  No Known Allergies    Infection:  None   Service:  HOSPITALIST    Ht:  1.651 m (5' 5\")   Wt:  59.4 kg (130 lb 15.3 oz)   Admission Wt:  48.5 kg (106 lb 14.8 oz)    BMI:  21.79 kg/m 2   BSA:  1.65 m 2            Patient PCP Information     Provider PCP Type    Fabien Sherman MD, MD General         Lab Results - 3 Days      Comprehensive metabolic panel [[id number]] (Abnormal)  Resulted: 17 1335, Result status: Final result    Ordering provider: Hesham Todd MD  17 1151 Resulting lab: [facility]    Specimen Information    Type Source Collected On   Blood  17 1215          Components       Value Reference Range Flag Lab   Sodium 140 133 - 144 mmol/L  FrRdHs   Potassium 8.0 3.4 - 5.3 mmol/L HH FrRdHs   Comment:         Specimen slightly hemolyzed, potassium may be falsely elevated   Results confirmed by repeat test   Critical Value called to and read back by  Kari Morgan (MS5 Charge Nurse) at 1321 06, TD  This is a redraw sample after previous questionable result discussed with Berna Rincon (MS5 RN) at 1148.     Chloride 104 94 - 109 mmol/L  FrRdHs   Carbon Dioxide 15 20 - 32 mmol/L L FrRdHs   Anion Gap 21 3 - 14 mmol/L H FrRdHs   Glucose 82 70 - 99 mg/dL  FrRdHs   Urea Nitrogen 124 7 - 30 mg/dL H FrRdHs   Creatinine 9.07 0.66 - 1.25 mg/dL H FrRdHs   GFR Estimate 6 >60 mL/min/1.7m2 L FrRdHs   Comment:  Non  GFR Calc   GFR Estimate If Black 7 >60 mL/min/1.7m2 L FrRdHs   Comment:  African American GFR Calc   Calcium 6.6 8.5 - 10.1 mg/dL L FrRdHs   Bilirubin Total 1.2 0.2 - 1.3 mg/dL  FrRdHs   Albumin 1.5 3.4 - 5.0 g/dL L FrRdHs "   Protein Total 5.7 6.8 - 8.8 g/dL L FrRdHs   Alkaline Phosphatase 153 40 - 150 U/L H FrRdHs   ALT 47 0 - 70 U/L  FrRdHs   AST 44 0 - 45 U/L  FrRdHs            CBC with platelets differential [[id number]] (Abnormal)  Resulted: 06/28/17 1154, Result status: Final result    Ordering provider: Hesham Todd MD  06/28/17 1014 Resulting lab: [facility]    Specimen Information    Type Source Collected On   Blood  06/28/17 1035          Components       Value Reference Range Flag Lab   WBC 43.0 4.0 - 11.0 10e9/L H FrRdHs   RBC Count 2.36 4.4 - 5.9 10e12/L L FrRdHs   Hemoglobin 7.1 13.3 - 17.7 g/dL L FrRdHs   Hematocrit 22.5 40.0 - 53.0 % L FrRdHs   MCV 95 78 - 100 fl  FrRdHs   MCH 30.1 26.5 - 33.0 pg  FrRdHs   MCHC 31.6 31.5 - 36.5 g/dL  FrRdHs   RDW 16.5 10.0 - 15.0 % H FrRdHs   Platelet Count 409 150 - 450 10e9/L  FrRdHs   Diff Method Manual Differential   FrRdHs   % Neutrophils 80.0 %  FrRdHs   % Lymphocytes 3.0 %  FrRdHs   % Monocytes 13.0 %  FrRdHs   % Eosinophils 0.0 %  FrRdHs   % Basophils 0.0 %  FrRdHs   % Metamyelocytes 4.0 %  FrRdHs   Absolute Neutrophil 34.4 1.6 - 8.3 10e9/L H FrRdHs   Absolute Lymphocytes 1.3 0.8 - 5.3 10e9/L  FrRdHs   Absolute Monocytes 5.6 0.0 - 1.3 10e9/L H FrRdHs   Absolute Eosinophils 0.0 0.0 - 0.7 10e9/L  FrRdHs   Absolute Basophils 0.0 0.0 - 0.2 10e9/L  FrRdHs   Absolute Metamyelocytes 1.7 0 10e9/L H FrRdHs   Anisocytosis Slight   FrRdHs   Macrocytes Present   FrRdHs   Hypochromasia Present   FrRdHs   Dohle Bodies Present   FrRdHs   Toxic Granulation Present   FrRdHs   Platelet Estimate Normal   FrRdHs            Comprehensive metabolic panel [[id number]]  Resulted: 06/28/17 1058, Result status: In process    Ordering provider: Hesham Todd MD  06/28/17 1014 Resulting lab: [facility]    Specimen Information    Type Source Collected On   Blood  06/28/17 1035            Blood culture yeast [[id number]]  Resulted: 06/28/17 0819, Result status: Preliminary result     Ordering provider: Desiree Guevara MD  06/17/17 0820 Resulting lab: [facility]    Specimen Information    Type Source Collected On     06/17/17 0820          Components       Value Reference Range Flag Lab   Specimen Description Blood Arterial line   75   Special Requests Aerobic and anaerobic bottles received   75   Culture Micro No growth after11 days   225   Micro Report Status Pending   225            Blood culture [[id number]]  Resulted: 06/28/17 0817, Result status: Preliminary result    Ordering provider: Shree Owusu MD  06/23/17 0000 Resulting lab: [facility]    Specimen Information    Type Source Collected On   Blood Central Line 06/23/17 0540          Components       Value Reference Range Flag Lab   Specimen Description Blood Central line   75   Special Requests Aerobic and anaerobic bottles received   FrRdHs   Culture Micro No growth after 5 days   225   Micro Report Status Pending   225            Blood culture [[id number]]  Resulted: 06/28/17 0817, Result status: Final result    Ordering provider: Shree Owusu MD  06/22/17 0001 Resulting lab: [facility]    Specimen Information    Type Source Collected On   Blood Line, venous 06/22/17 0500          Components       Value Reference Range Flag Lab   Specimen Description Blood IV site   FrRdHs   Special Requests Aerobic and anaerobic bottles received   FrRdHs   Culture Micro No growth   225   Micro Report Status FINAL 06/28/2017   225            Blood culture [[id number]]  Resulted: 06/27/17 0748, Result status: Final result    Ordering provider: Dinesh Mclaughlin DO  06/21/17 1620 Resulting lab: [facility]    Specimen Information    Type Source Collected On     06/21/17 1620          Components       Value Reference Range Flag Lab   Specimen Description Blood Left Arm   FrRdHs   Special Requests Aerobic and anaerobic bottles  received   75   Culture Micro No growth   75   Micro Report Status FINAL 06/27/2017   75            Blood culture [[id number]] (Abnormal)  Resulted: 06/25/17 0934, Result status: Edited Result - FINAL    Ordering provider: Kimber Mansfield MD  06/17/17 0920 Resulting lab: [facility]    Specimen Information    Type Source Collected On   Blood Central Line 06/17/17 0931          Components       Value Reference Range Flag Lab   Specimen Description Blood Central venous catheter   226   Special Requests Aerobic and anaerobic bottles received   75   Culture Micro --  A 225   Result:         Cultured on the 5th day of incubation: Candida glabrata  Critical Value/Significant Value called to and read back by Silva August RN   [facility] at 1410 on 6/21/17 by SAVAGE.     Micro Report Status FINAL 06/24/2017   225   Result:     Organism: Cultured on the 5th day of incubation: Candida glabrata   225            Blood culture [[id number]] (Abnormal)  Resulted: 06/25/17 0749, Result status: Edited Result - FINAL    Ordering provider: Shree Owusu MD  06/21/17 1538 Resulting lab: [facility]    Specimen Information    Type Source Collected On   Blood Central Line 06/21/17 1606          Components       Value Reference Range Flag Lab   Specimen Description Blood DIALYSIS LINE   226   Special Requests Aerobic and anaerobic bottles received   75   Culture Micro --  A 225   Result:         Cultured on the 2nd day of incubation: Staphylococcus epidermidis  Critical Value/Significant Value, preliminary result only, called to and read   back by Sally Davidson RN @ 2221 6/23/17 CS  (Note)  POSITIVE for STAPHYLOCOCCUS EPIDERMIDIS and NEGATIVE for the mecA  gene (not resistant to methicillin) by Verigene nucleic acid test.  The mecA gene was not detected. Final identification and  antimicrobial susceptibility testing will be verified by standard  methods.    Specimen tested with  Verigene multiplex, gram-positive blood culture  nucleic acid test for the following targets: Staph aureus, Staph  epidermidis, Staph lugdunensis, other Staph species, Enterococcus  faecalis, Enterococcus faecium, Streptococcus species, S. agalactiae,  S. anginosus grp., S. pneumoniae, S. pyogenes, Listeria sp., mecA  (methicillin resistance) and Tristen/B (vancomycin resistance).    Critical Value/Significant Value called to and read back by Sally Davidson RN, @ 0105 06.24.2017      Micro Report Status FINAL 06/25/2017   225   Result:     Organism: Cultured on the 2nd day of incubation: Staphylococcus epidermidis   225            Testing Performed By     Lab - Abbreviation Name Director Address Valid Date Range    12 - [facility] Unknown [address] [facility] [address] 05/08/15 1057 - Present    45 - [facility] Unknown Unknown 01/28/02 0000 - Present    75 - Unknown [facility] Unknown [address] [facility] [address] 01/15/15 1019 - Present    225 - Unknown [facility] Unknown [address] [facility] [address] 12/19/14 0954 - Present    226 - Unknown [facility] Unknown [address] [facility] [address] 12/19/14 0955 - Present            Unresulted Labs     None      Encounter-Level Documents:     There are no encounter-level documents.      Order-Level Documents:     There are no order-level documents.

## 2017-06-11 NOTE — IP AVS SNAPSHOT
MRN:[id number]                      After Visit Summary   6/11/2017    Hesham Ramos    MRN: [id number]           Thank you!     Thank you for choosing [facility] for your care. Our goal is always to provide you with excellent care. Hearing back from our patients is one way we can continue to improve our services. Please take a few minutes to complete the written survey that you may receive in the mail after you visit. If you would like to speak to someone directly about your visit please contact Patient Relations at [phone]. Thank you!          Patient Information     Date Of Birth          1950        Designated Caregiver       Most Recent Value    Caregiver    Will someone help with your care after discharge? yes    Name of designated caregiver Hemant Greene    Phone number of caregiver [phone]    Caregiver address [place], [place]      About your hospital stay     You were admitted on:  June 12, 2017 You last received care in the:  [place] [address] [facility]    You were discharged on:  June 28, 2017       Who to Call     For medical emergencies, please call 911.  For non-urgent questions about your medical care, please call your primary care provider or clinic, [phone]          Attending Provider     Provider Specialty    Billy Morel MD Emergency Medicine    [facility],  Internal Medicine       Primary Care Provider Office Phone # Fax #    Fabien Sherman [phone]      After Care Instructions     Advance Diet as Tolerated       Follow this diet upon discharge: may eat for comfort            Discharge Instructions       Comfort cares, hospice to follow.            General info for SNF       Length of Stay Estimate: Long Term Care  Condition at Discharge: Declining  Level of care:skilled   Rehabilitation Potential: Poor  Admission H&P remains valid and up-to-date: Yes  Recent Chemotherapy: N/A  Use Nursing Home  "Standing Orders: Yes            Mantoux instructions       Give two-step Mantoux (PPD) Per Facility Policy Yes                  Further instructions from your care team       CM: [facility] [phone]    Pending Results     Date and Time Order Name Status Description    2017 0000 Blood culture Preliminary     2017 0820 Blood culture yeast Preliminary             Statement of Approval     Ordered          17 6986  I have reviewed and agree with all the recommendations and orders detailed in this document.  EFFECTIVE NOW     Approved and electronically signed by:  Hesham Todd MD             Admission Information     Date & Time Department Dept. Phone    2017 [place] [address] Medical Surgical [phone]      Your Vitals Were     Blood Pressure Pulse Temperature Respirations Height Weight    135/45 120 98.2  F (36.8  C) (Axillary) 18 1.651 m (5' 5\") 59.4 kg (130 lb 15.3 oz)    Pulse Oximetry BMI (Body Mass Index)                97% 21.79 kg/m2          MyChart Information     panpan lets you send messages to your doctor, view your test results, renew your prescriptions, schedule appointments and more. To sign up, go to [email or web address] . Click on \"Log in\" on the left side of the screen, which will take you to the Welcome page. Then click on \"Sign up Now\" on the right side of the page.     You will be asked to enter the access code listed below, as well as some personal information. Please follow the directions to create your username and password.     Your access code is: [email or web address]  Expires: 2017  5:29 PM     Your access code will  in 90 days. If you need help or a new code, please call your [facility] clinic or [phone].        Care EveryWhere ID     This is your Care EveryWhere ID. This could be used by other organizations to access your [facility] medical records  [id number]        Equal Access to Services     KAYKAY WU AH: Fantasma Parson, " waaxda luqadaha, qaybta kaalmada sydnee, shirley malik'aan ah. So [facility] [phone].    ATENCIÓN: Si kianna bermudez, tiene a gallego disposición servicios gratuitos de asistencia lingüística. Llame al [phone].    We comply with applicable federal civil rights laws and [place] laws. We do not discriminate on the basis of race, color, national origin, age, disability sex, sexual orientation or gender identity.               Review of your medicines      START taking        Dose / Directions    acetaminophen 650 MG Suppository   Commonly known as:  TYLENOL   Used for:  Aspiration pneumonitis (H)   Replaces:  acetaminophen 32 mg/mL solution        Dose:  650 mg   Place 1 suppository (650 mg) rectally every 4 hours as needed for fever or mild pain (Do not exceed 4000 mg total acetaminophen per day.) Unwrap prior to insertion.   Quantity:  12 suppository   Refills:  1       atropine 1 % ophthalmic solution   Used for:  Aspiration pneumonitis (H)        Dose:  2 drop   Take 2 drops by mouth, place under tongue or place inside cheek every 2 hours as needed for other (terminal respiratory secretions) Not for ophthalmic use.   Quantity:  5 mL   Refills:  1       HYDROmorphone 2 MG tablet   Commonly known as:  DILAUDID        Dose:  2 mg   Take 1 tablet (2 mg) by mouth or place under tongue every 2 hours as needed for moderate to severe pain (and/or shortness of breath or respiratory rate greater than 20.) Need to be bubble packed for long term care   Quantity:  90 tablet   Refills:  0       LORazepam 0.5 MG tablet   Commonly known as:  ATIVAN        Dose:  1 mg   Take 2 tablets (1 mg) by mouth, place under tongue or insert rectally every 4 hours as needed for anxiety or seizures (restlessness)   Quantity:  30 tablet   Refills:  1       MEDICATION INSTRUCTION   Used for:  Aspiration pneumonitis (H)        If care facility cannot accept or use ranges, facility is instructed to use lower end of dosing  range   Refills:  0       MEDICATION INSTRUCTION   Used for:  Aspiration pneumonitis (H)        If care facility cannot accept or use ranges, facility is instructed to use lower end of dosing range   Refills:  0       MEDICATION INSTRUCTION   Used for:  Aspiration pneumonitis (H)        If care facility cannot accept or use ranges, facility is instructed to use lower end of dosing range   Refills:  0         CONTINUE these medicines which have NOT CHANGED        Dose / Directions    order for DME   Used for:  Cerebrovascular accident (CVA) due to occlusion of cerebral artery (H), ESRF (end stage renal failure) (H), Encephalopathy, Hemiplegia affecting right dominant side (H)        1 wet sling with head support 1 dry sling with head support   Quantity:  2 each   Refills:  0         STOP taking     ACE/ARB NOT PRESCRIBED (INTENTIONAL)           acetaminophen 32 mg/mL solution   Commonly known as:  TYLENOL   Replaced by:  acetaminophen 650 MG Suppository           ASPIRIN PO           BETA BLOCKER NOT PRESCRIBED (INTENTIONAL)           cholecalciferol 400 UNIT/ML Liqd liquid   Commonly known as:  vitamin D/D-VI-SOL           Docusate Sodium 150 MG/15ML Liqd           DONEPEZIL HYDROCHLORIDE 10 MG Tbdp           multivitamins with minerals Liqd liquid           phenytoin 125 MG/5ML suspension   Commonly known as:  DILANTIN           polyethylene glycol Packet   Commonly known as:  MIRALAX/GLYCOLAX           sennosides 8.6 MG tablet   Commonly known as:  SENOKOT           simvastatin 40 MG tablet   Commonly known as:  ZOCOR           triamcinolone 0.1 % cream   Commonly known as:  KENALOG                Where to get your medicines      Some of these will need a paper prescription and others can be bought over the counter. Ask your nurse if you have questions.     Bring a paper prescription for each of these medications     acetaminophen 650 MG Suppository    atropine 1 % ophthalmic solution    HYDROmorphone 2 MG tablet     LORazepam 0.5 MG tablet       You don't need a prescription for these medications     MEDICATION INSTRUCTION    MEDICATION INSTRUCTION    MEDICATION INSTRUCTION                Protect others around you: Learn how to safely use, store and throw away your medicines at www.disposemymeds.org.             Medication List: This is a list of all your medications and when to take them. Check marks below indicate your daily home schedule. Keep this list as a reference.      Medications           Morning Afternoon Evening Bedtime As Needed    acetaminophen 650 MG Suppository   Commonly known as:  TYLENOL   Place 1 suppository (650 mg) rectally every 4 hours as needed for fever or mild pain (Do not exceed 4000 mg total acetaminophen per day.) Unwrap prior to insertion.   Last time this was given:  650 mg on 6/16/2017  4:20 PM                                atropine 1 % ophthalmic solution   Take 2 drops by mouth, place under tongue or place inside cheek every 2 hours as needed for other (terminal respiratory secretions) Not for ophthalmic use.                                HYDROmorphone 2 MG tablet   Commonly known as:  DILAUDID   Take 1 tablet (2 mg) by mouth or place under tongue every 2 hours as needed for moderate to severe pain (and/or shortness of breath or respiratory rate greater than 20.) Need to be bubble packed for long term care                                LORazepam 0.5 MG tablet   Commonly known as:  ATIVAN   Take 2 tablets (1 mg) by mouth, place under tongue or insert rectally every 4 hours as needed for anxiety or seizures (restlessness)   Last time this was given:  0.5 mg on 6/28/2017  6:25 AM                                MEDICATION INSTRUCTION   If care facility cannot accept or use ranges, facility is instructed to use lower end of dosing range   Last time this was given:  6/16/2017 11:01 AM                                MEDICATION INSTRUCTION   If care facility cannot accept or use ranges, facility  is instructed to use lower end of dosing range   Last time this was given:  6/16/2017 11:01 AM                                MEDICATION INSTRUCTION   If care facility cannot accept or use ranges, facility is instructed to use lower end of dosing range   Last time this was given:  6/16/2017 11:01 AM                                order for DME   1 wet sling with head support 1 dry sling with head support

## 2017-06-11 NOTE — IP AVS SNAPSHOT
` ` Patient Information     Patient Name Sex     Hesham Ramos ([id number]) Male 1950       Room Bed    [address] [place] [address]      Patient Demographics     Address Phone    [address] [facility] [address] [phone] (Home)  none (Work)  [phone] (Mobile) *Preferred*      Patient Ethnicity & Race     Ethnic Group Patient Race    Mozambican       Emergency Contact(s)     Name Relation Home Work Mobile    Hemant Ramos Relative [phone] none [phone]    Mane Ramos Relative none none [phone]    Patience  RN from     [phone]       Documents on File        Status Date Received Description       Documents for the Patient    Insurance Card  06/29/10     External Medication Information Consent Accepted () 06/29/10     Face Sheet Received () 06/29/10     Privacy Notice - [place] Received 06/29/10     Patient ID Received 13     Consent for Services - Hospital/Clinic Received () 06/29/10     Consent for Services - Hospital/Clinic Received () 11     External Medication Information Consent Accepted () 11     Insurance Card Received 11     Insurance Card Received 12     Consent for Services - Hospital/Clinic Received () 12     External Medication Information Consent Accepted () 12     HIM DANNA Authorization - File Only   Daniel 11    HIM DANNA Authorization  12 Daniel    HIM DANNA Authorization  12 Daniel    Consent for EHR Access  13 Copied from existing Consent for services - C/HOD collected on 2012    [facility] Specified Other       Consent for Services - Hospital/Clinic Received () 13     HIM DANNA Authorization  13     HIM DANNA Authorization  08/15/13     HIM DANNA Authorization   Daniel- 2013    Advance Directives and Living Will Received  POLST 13    External Medication Information Consent Accepted 10/29/13     HIM DANNA  Authorization  13 [employer]    Insurance Card       HIM DANNA Authorization  14     Consent for Services - Hospital/Clinic Received () 14     HIM DANNA Authorization  14     HIM DANNA Authorization  14     HIM DANNA Authorization  14     Patient ID Received 17 LIFETIME    Insurance Card Received 10/24/16 ACCESSABILITY CAP    Legal Documentation   [place]    Consent for Services - Hospital/Clinic Received () 09/03/15     Advance Directives and Living Will Received 11/20/15 LEGAL GUARDIAN FOR HEALTHCARE 3/28/2013    Advance Directives and Living Will Received 11/20/15 POLST 2015    HIM DANNA Authorization  11/25/15 SREE    HIM DANNA Authorization  12/08/15 [facility]/Lynn    HIM DANNA Authorization  12/08/15 [facility]/Lynn    Business/Insurance/Care Coordination/Health Form - Patient  16 MEDICAL QUESTIONNAIRE, TOUCHING LIVES- 2016    Business/Insurance/Care Coordination/Health Form - Patient  16 REHAB SERVICES ORDER, [facility]I MEDICAL SUPPLY- 2016    Business/Insurance/Care Coordination/Health Form - Patient  16 CAMILO MARIA, STANDING ORDER MEDICATION LIST, 3/1/16    Consent for Services/Privacy Notice - Hospital/Clinic       Consent for Services/Privacy Notice - Hospital/Clinic Received () 16     Consent to Communicate Received 16     Business/Insurance/Care Coordination/Health Form - Patient  16 ANNUAL PHYSICAL EXAM FORM- 3/31/2016    Business/Insurance/Care Coordination/Health Form - Patient  16 [employer] PRESCRIBER ORDERS, 3/16/16    Business/Insurance/Care Coordination/Health Form - Patient  16 ICD-10 CODE REQUEST, CHO/DIAGNOTIC ASSESSMENT, [facility]- 3/30/2016    Business/Insurance/Care Coordination/Health Form - Patient  16 PRESCRITION/CERTIFICATE OF MEDICAL NECESSITY- 4/15/2016     Business/Insurance/Care Coordination/Health Form - Patient  07/29/16 [facility], PHYSICIAN PRESCRIPTION FORM, 6/30/16    Business/Insurance/Care Coordination/Health Form - Patient  08/01/16 WHEELCHAIR ASSESSMENT CLINIC AND LETTER OF MEDICAL NECESSITY, [facility]- 6/3/2016    Business/Insurance/Care Coordination/Health Form - Patient  08/18/16 [facility] REQUEST FOR MEDICAL OPINION, 5/18/15    HIM DANNA Authorization - File Only  10/20/16 CHO    HIM DANNA Authorization  10/28/16 [employer]    HIM DANNA Authorization  04/03/17 [facility]    HIM DANNA Authorization  04/12/17 [id number]    Speech Therapy Certification Received 04/19/17 Eval only (3/9/17)    Consent for Services/Privacy Notice - Hospital/Clinic Received 04/18/17     Business/Insurance/Care Coordination/Health Form - Patient  04/20/17 [facility] OXYGEN AND MEDICAL EQUIPMENT PHYS STATEMENT OF MEDICAL NECESSITY  4/6/17    HIM DANNA Authorization  05/22/17 TAMMIE/ ZACARIASG/ WILBERT    Business/Insurance/Care Coordination/Health Form - Patient  06/08/17 [facility] - DOCUMENT REQUEST WOUND PROGRESS REPORT - 4/9/17    Advance Directives and Living Will Received 06/14/17 POLST 06/14/2017       Documents for the Encounter    CMS IM for Patient Signature Received 06/15/17 reviewed with Hemant, (relative)     Monitoring Device Output  06/22/17 MONITORING STRIPS DOCUMENTATION    CMS IM for Patient Signature Received 06/28/17 reviewed with Hemant (relative)       Admission Information     Attending Provider Admitting Provider Admission Type Admission Date/Time     Dinesh Mclaughlin, DO Emergency 06/11/17  2247    Discharge Date Hospital Service Auth/Cert Status Service Area     Hospitalist Incomplete [facility]    Unit Room/Bed Admission Status        5 MEDICAL SURGICAL 0523/0523-01 Admission (Confirmed)       Admission     Complaint    Respiratory distress      Hospital Account     Name Acct ID Class Status Primary  Coverage    Hesham Ramos [id number] Inpatient Open MEDICARE - MEDICARE            Guarantor Account (for Hospital Account #[id number])     Name Relation to Pt Service Area Active? Acct Type    Hesham Ramos  FCS Yes Personal/Family    Address Phone          [address] [place] [address] [phone](H)  none(O)              Coverage Information (for Hospital Account #[id number])     1. MEDICARE/MEDICARE     F/O Payor/Plan Precert #    MEDICARE/MEDICARE     Subscriber Subscriber #    Hesham Ramos [id number]    Address Phone    ATTN CLAIMS  [address]  [facility] [place] [address] [phone]          2. MEDICA/MEDICA ACCESS ABILITY [place]     F/O Payor/Plan Precert #    MEDICA/MEDICA ACCESS ABILITY [place]     Subscriber Subscriber #    Hesham Ramos [id number]    Address Phone    [address] [address]  [place] [address] [phone]

## 2017-06-11 NOTE — IP AVS SNAPSHOT
` `           [place] [address] MEDICAL SURGICAL: [phone]                 INTERAGENCY TRANSFER FORM - NOTES (H&P, Discharge Summary, Consults, Procedures, Therapies)   2017                    Hospital Admission Date: 2017  HUGH JENSEN   : 1950  Sex: Male        Patient PCP Information     Provider PCP Type    Fabien Sherman MD, MD General      History & Physicals     No notes of this type exist for this encounter.      Discharge Summaries     No notes of this type exist for this encounter.         Consult Notes      Consults by Nikia Gonzalez MSW at 2017 11:56 AM     Author:  Nikia Gonzalez MSW Service:  Hospice Author Type:      Filed:  2017 11:56 AM Date of Service:  2017 11:56 AM Creation Time:  2017 11:24 AM    Status:  Signed :  Nikia Gonzalez MSW ()         [facility] Consult    Writer and RN Sandra met with pts family in a private conference room for a hospice consult this morning.  Reviewed info about our philosophy of care, medicare benefit, and available services.  Pts family is still working on discharge plans as the group home has decided that they are unable to accept him back.  Coordinated care with lulu Leo who has made referrals to several local SNFs.  Family plans to tour after our meeting and pt will likely discharge to their facility of choice before the end of the day today.  NP Elaina will complete updated POLST with pts family before discharge takes place.  Niece/guardian has signed consents for hospice election upon hospital discharge and we are planning to meet the pt at the SNF he goes to tomorrow morning to complete the admission process.  We can also order O2 to be delivered to the facility upon his arrival if that is necessary.    Thank you for this referral and please call us with updates or changes to this plan.    MANDY Ibrahim, [facility]  [phone][MB1.1]     Revision  History        User Key Date/Time User Provider Type Action    > MB1.1 6/28/2017 11:56 AM Nikia Gonzalez MSW  Sign            Consults by Sandra Eaton RN at 6/28/2017 11:51 AM     Author:  Sandra Eaton RN Service:  Hospice Author Type:  Registered Nurse    Filed:  6/28/2017 11:51 AM Date of Service:  6/28/2017 11:51 AM Creation Time:  6/28/2017 11:29 AM    Status:  Signed :  Sandra Eaton RN (Registered Nurse)         Writer, Nikia ORTIZ with [facility] and Elaina Moeller NP met with patients family in a private conference room to discuss hospice philosophy, hospice program and benefit coverage.  Patient did not attend due to decline in condition.  Patient hospitalized on 6/11/17 with aspiration pneumonia, septic shock 2/2 asp pneumonia and hypoxic respiratory failure 2/2 asp pneumonia.  Patient was intubated x 2 weeks.  He was extubated on 6/26/17.  Patient has ESRD with his last dialysis being one week ago with no further ones to follow.  Patient is no longer receiving TF due to a small bowel obstruction.  He has a G-J tube in place which are currently attached to straight drainage.  Plan is to discharge later today after family have toured facilities that have openings for patient.  Reviewed outcome of meeting with Felipa ORTIZ who will notify hospice when family have made decision of where patient will move to upon discharge from the hospital.  Elaina Moeller NP will order hospice comfort medications to be fillled and sent home with patient upon discharge.  Informed family that hospice admission team will meet patient tomorrow morning at 0930 to complete admission wherever he discharges to.  Please call Queenie Arevalo RN Care Navigator at [phone] with any questions or should discharge plans change.[MF1.1]     Revision History        User Key Date/Time User Provider Type Action    > MF1.1 6/28/2017 11:51 AM Sandra Eaton RN Registered Nurse Sign             Consults by White, Corinne C, LSW at 6/27/2017 11:29 AM     Author:  White, Corinne C, LSW Service:  (none) Author Type:      Filed:  6/27/2017  2:22 PM Date of Service:  6/27/2017 11:29 AM Creation Time:  6/27/2017 11:24 AM    Status:  Addendum :  White, Corinne C, LSW ()     Consult Orders:    1. Social Work IP Consult [[id number]] ordered by Hesham Todd MD at 06/27/17 1100                Care Transition Initial Assessment -   Reason For Consult: discharge planning, end of life/hospice  Met with. Left VM message for both Hemant and Mane. Spoke to RN[CW1.1] Patience[CW1.2] at [facility] [phone]  Active Problems:    Respiratory distress         DATA  Lives With: facility resident  Living Arrangements: group home  Description of Support System: Supportive, Involved  Who is your support system?: Facility resident(s)/Staff  Support Assessment: Adequate family and caregiver support, Adequate social supports. Spoke with RN at [facility]. They were provided full support for pt prior to admission.   Identified issues/concerns regarding health management: PT is now comfort care.  staff would like to attend meeting for dc planning. They are aware that Hospice is the recommendation for pt at this time.        Resources List: Hospice     Quality Of Family Relationships: supportive  Transportation Available:  (stretcher)      ASSESSMENT  Pt has 2 co decision makers for HCD. Left Vm message for both pre conversation with [facility] team.  PT is able to return to his  setting once Hospice has signed onto care.  Jessica VAZQUEZ from [facility] would also like to attend to coordinate needs for for pt.   Called [facility] and set up meeting for tomorrow at 9:30[CW1.1]    [facility] setting has RN on site m-f from 8am-12:00 than from 4pm-8am  Weekends, evening and 12-4 pt is care for by PCA support when RN staff not there[CW1.3]  PLAN  Financial costs for the patient includes Pt has medicare.  For Hospice support.  .  Patient given options and choices for discharge Family would like pt to return to his current [facility] setting  .  Discharge Planner   Discharge Plans in progress: Hospice meeting set up for Wed @ 9:30  Barriers to discharge plan: medical stability for dc  Follow up plan: Waiting for call back from both Decision makers about time        Entered by: Corinne C. White 06/27/2017 11:24 AM[CW1.1]       CM: Ramona Rascon PEPPER [phone] from [facility] called about an update on pt and his needs.[CW1.4]      Revision History        User Key Date/Time User Provider Type Action    > CW1.4 6/27/2017  2:22 PM White, Corinne C, WILLIAM  Addend     CW1.2 6/27/2017 11:52 AM White, Corinne C, LSW  Addend     CW1.3 6/27/2017 11:33 AM White, Corinne C, WILLIAM  Addend     CW1.1 6/27/2017 11:29 AM White, Corinne C, LSW  Sign            Consults signed by Shree Owusu MD at 6/22/2017  7:06 AM      Author:  Shree Owusu MD Service:  Infectious Disease Author Type:  Physician    Filed:  6/22/2017  7:06 AM Date of Service:  6/21/2017  4:41 PM Creation Time:  6/21/2017  4:53 PM    Status:  Signed :  Shree Owusu MD (Physician)         IMPRESSION:   1.  A 67-year-old male with complicated medical history including general poor functional status with G-tube in place for feeding and lives in a group home.   2.  Admitted 10 days ago with apparent sepsis and probable aspiration pneumonia, initially cultures negative, got antibiotics, some improvement, although still intubated.   3.  Acute worsening recently, now with candidemia, probably line-related, has a dialysis access catheter and a left groin line.   4.  End-stage renal disease on dialysis.   5.  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   6.  Small-bowel obstruction, current active problem.   7.  Respiratory failure on the vent, initially cultures were negative in the sputum, now with Enterobacter and pseudomonas  question ongoing pneumonia.      RECOMMENDATIONS:   1.  Micafungin for now, it will probably be Candida albicans fluconazole is acceptable, but jose a for a short course.   2.  Follow up blood cultures today and serial until clear.   3.  Pull at least the groin line if new access needed put a new central line in, can alternate as course and further cultures direct.  Dialysis access catheter certainly a potential problem as well.  Ideally removal if able.     4.  Some discussion about level of care, certainly if goes to comfort care reconsider options at that point.   5.  From a conventional antibiotic standpoint Levaquin for now, although overall not that much for major active pneumonia, may just be colonization of sputum with gram-negative rods.      HISTORY:  This 67-year-old male is seen in consultation due to candidemia.  The patient is currently intubated, sedated in the ICU.  Cannot provide history.  He has a history of longstanding medical problems including end-stage renal disease, poor functional status, has a G-tube in place, history of recurrent aspiration and other sepsis problems.  He was admitted 10 days ago with apparent acute sepsis at that time, felt to have pneumonia.  Has been intubated, possible aspiration occurred, on broad-spectrum antibiotics, initially Zosyn and then Unasyn and he then subsequently had some further worsening, now has blood cultures from 06/17 growing Candida.  He has a left groin access device and a central catheter for dialysis, he has bilateral failed grafts.  He also has now developed some degree of small-bowel obstruction, ongoing ventilator dependent.      PAST MEDICAL HISTORY:  Recurrent episodes of aspiration, a history of general decompensation overall health history of G-tube in place.      ALLERGIES:  No antimicrobial allergies.      MEDICATIONS:  Currently getting fluconazole and Levaquin.      SOCIAL AND FAMILY HISTORY:  Extensive health care contact, not known to  have major resistant pathogens.      REVIEW OF SYSTEMS:  Unobtainable.      PHYSICAL EXAMINATION:   GENERAL:  The patient is in bed, vent in place, not communicative.   VITAL SIGNS:  Currently include being afebrile, pulse 110.   HEENT:  No visible lesions.  Tube in place.   NECK:  Supple and nontender.   HEART:  Tachycardic at about 100.   ABDOMEN:  Slightly distended, not really tender.   EXTREMITIES:  Groin line unremarkable looking, has his right chest dialysis access device, has bilateral failed grafts.      LABORATORY DATA:  Candidemia current blood culture, repeat is pending.  Sputum culture also had Candida, most recent sputum now growing Enterobacter and pseudomonas that are quinolone sensitive, he is on Levaquin.         SHREE MCLEAN MD             D: 2017 16:41   T: 2017 16:53   MT: #150      Name:     HUGH JENSEN   MRN:      [id number]        Account:       [id number]   :      1950           Consult Date:  2017      Document: [id number]       cc: Fabien Sherman MD   [SD1.1]   Revision History        User Key Date/Time User Provider Type Action    > SD1.1 2017  7:06 AM Shree Mclean MD Physician Sign            Consults by Gómez Turk MD at 2017  4:35 PM     Author:  Gómez Turk MD Service:  Nephrology Author Type:  Physician    Filed:  2017  4:35 PM Date of Service:  2017  4:35 PM Creation Time:  2017  4:34 PM    Status:  Signed :  Gómez Turk MD (Physician)     Consult Orders:    1. Nephrology IP Consult: Patient to be seen: Routine - within 24 hours; ESKD; Consultant may enter orders: Yes [[id number]] ordered by Dinesh Mclaughlin DO at 17 0342                Done by Dr. Carter 17.[DB1.1]     Revision History        User Key Date/Time User Provider Type Action    > DB1.1 2017  4:35 PM Gómez Turk MD Physician Sign            Consults by Shree Mclean MD at 2017  3:39 PM     Author:   Shree Owusu MD Service:  Infectious Disease Author Type:  Physician    Filed:  6/21/2017  3:40 PM Date of Service:  6/21/2017  3:39 PM Creation Time:  6/21/2017  3:38 PM    Status:  Signed :  Shree Owusu MD (Physician)         ID consult dictaed IMP 1 66 yo complex, now candidemia,  REc likely lines, groin line out, jose a and serial BC[SD1.1]     Revision History        User Key Date/Time User Provider Type Action    > SD1.1 6/21/2017  3:40 PM Shree Owusu MD Physician Sign            Consults by Madonna Redd RD, LD at 6/21/2017 11:16 AM     Author:  Madonna Redd RD, LD Service:  Nutrition Author Type:  Registered Dietitian    Filed:  6/21/2017 11:30 AM Date of Service:  6/21/2017 11:16 AM Creation Time:  6/21/2017 11:00 AM    Status:  Addendum :  Madonna Redd RD, LD (Registered Dietitian)         CLINICAL NUTRITION SERVICES - REASSESSMENT NOTE[RP1.1]  + CONSULT TO START TPN (verbal per rounds, formal consult pending)[RP1.2]      RECOMMENDATIONS FOR MD/PROVIDER TO ORDER:     TPN consult   Recommendations Ordered by Registered Dietitian (MARYELLEN):   D12 AA8 at 40 mL/hr, no lipids  Mg, P04 and Trig check   Future/Additional Recommendations:   Pending POC, additional protein/CHO provisions, zinc sulfate + Vitamin C + lipids     EVALUATION OF PROGRESS TOWARD GOALS/NEW FINDINGS   Patient with G to G/J conversion 6/19. EN resumed 6/19 and stopped later same day due to SBO, with large emesis. Previous goal rate: Nepro at 45 mL/hr x 24 hours + Prosource TF packet + Renal MVI + Ascorbic acid 250 mg daily per pressure injury protocol. 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  Tissue remains slightly  macerated on distal edge of coccyx wound, plan to continue to assess wound evolution. No s/s infection. Continue powder and Triad paste.     Left buttock superficial scattered erosion/denudement suspect combination of moisture and pressure/ friction; present on admission    Pt tolerating turns better since intubation.    Heels blanchable erythema, scattered dry crusted peeling epidermis       ASSESSED NUTRITION NEEDS (PER APPROVED PRACTICE GUIDELINES; DW: 54.6 kg):  Estimated Energy Needs: 9602-1817 kcals (30-35 Kcal/Kg-)  Justification: repletion, vented  Estimated Protein Needs:  grams protein (1.5-2 g pro/Kg)  Justification: wound healing and dialysis    Previous Goals:   Pt to tolerate TF advancement to goal in the next 1-2 days.   Evaluation: Not met    Previous Nutrition Diagnosis:   Inadequate enteral nutrition infusion related to vomiting and previous EN intolerance as evidenced by inability to advance to goal rate w/ G-tube, pressure injury requiring increased protein needs with HD  Evaluation: Declining    CURRENT NUTRITION DIAGNOSIS  Inadequate enteral nutrition infusion related to intolerance 2/2 SBO as evidenced by nutrition meeting <25% of estimated needs x 5 days, pressure injury requiring increased protein needs with HD    INTERVENTIONS  Recommendations / Nutrition Prescription  Recommend TPN as follows:    D12 AA8 at 40 mL/hr to provide 115 g CHO, 77 g protein (1.4 g per kg), 699 kcal daily.   Pending POC, additional protein/CHO provisions, zinc sulfate + Vitamin C + lipids    Implementation  PN Composition and PN Schedule: Entered orders to reflect regimen outlined above  Biochemical data: P04, Mg and Trig add on  Multivitamin/Minerals: d/c Vitamin C, renal MVI  Collaboration and Referral of care: Discussed patient during interdisciplinary care rounds this morning  including need for TPN consult, total IVF    Goals  TPN to meet % of estimated nutrition needs within next 48 hours    MONITORING AND EVALUATION:  Parenteral intake - Monitor for adequacy, tolerance, stooling, labs  Progress towards goals will be monitored and evaluated per protocol and Practice Guidelines      THANIA Blank  3rd floor/ICU: [phone]  All other floors: [phone]  Weekend/holiday: [phone]  Office: [phone][RP1.1]     Revision History        User Key Date/Time User Provider Type Action    > [N/A] 6/21/2017 11:30 AM Madonna Redd RD, ESTELLA Registered Dietitian Addend     RP1.2 6/21/2017 11:29 AM Madonna Redd RD, ESTELLA Registered Dietitian Sign     RP1.3 6/21/2017 11:16 AM Madonna Redd RD, ESTELLA Registered Dietitian      RP1.1 6/21/2017 11:00 AM Madonna Redd RD, ESTELLA Registered Dietitian             Consults by Gómez Dale MD at 6/20/2017  4:21 PM     Author:  Gómez Dale MD Service:  Surgery Author Type:  Physician    Filed:  6/20/2017  4:53 PM Date of Service:  6/20/2017  4:21 PM Creation Time:  6/20/2017  4:21 PM    Status:  Signed :  Gómez Dale MD (Physician)     Consult Orders:    1. Surgery General IP Consult: Patient to be seen: Routine - within 24 hours; bowel obstruction with transition point. criticaLLY ILL; Consultant may enter orders: Yes [[id number]] ordered by Christina Duckworth MD at 06/20/17 1549                [facility] Surgery Consultation    Hesham Ramos MRN# [id number]   Age: 67 year old YOB: 1950     Date of Admission:  6/11/2017    Reason for consult: SBO       Requesting physician: Donita       Level of consult: Consult, follow and place orders           Assessment and Plan:   Assessment:   SBO[DM1.1]  Patient Active Problem List    Diagnosis Date Noted     Respiratory distress 06/12/2017     Priority: Medium     Feeding by G-tube (H) 03/31/2017     Priority: Medium     Aspiration into  airway, sequela 03/31/2017     Priority: Medium     Hemodialysis catheter dysfunction, initial encounter (H) 10/25/2016     Priority: Medium     Hyperkalemia 10/24/2016     Priority: Medium     Diastolic CHF, chronic (H)      Priority: Medium     Septic shock (H) 11/12/2015     Priority: Medium     Health Care Home 08/25/2014     Pt not active in care coordination.  Status:  Declined  Care Coordinator:  KATY DEO    See Letters for [facility] Care Plan  Date:  August 25, 2014           Seizures (HCC) 08/17/2014     Hematemesis 06/03/2014     Renal cancer (H) 04/25/2014     Hypoxia 03/13/2014     Syncope 08/02/2013     Anemia 08/02/2013     EKG abnormality 08/02/2013     Encephalopathy 08/02/2013     CKD (chronic kidney disease) stage 5, GFR less than 15 ml/min (H) 07/23/2013     Cognitive impairment 06/21/2013     ESRF (end stage renal failure) (H) 05/16/2013     Advance Care Planning 11/01/2011     Advance Care Planning 12/2/2015: Receipt of ACP document:  Received: POLST which was signed and dated by provider on 11/18/15.  Document previously scanned on 11/20/15.  Order reviewed and found to be valid.  Code Status reflects choices in most recent ACP document.  Confirmed/documented designated decision maker(s).  Added by PABLO CHAN  Advance Care Planning: Initial facilitation introduction:   Hesham Ramos presented for initial session regarding ACP at a group session. He was accompanied by no one. Honoring Choices information provided and resources reviewed. He currently wishes to review choices without completion of an ACP document.  He currently has the following questions or concerns about Advance Care Planning: none known.  Confirmed/documented designated decision maker(s). See permanent comments section of demographics in clinical tab. Added by Kaylin Rincon on 10/20/2014  Advance Care Planning: Receipt of ACP document:  Received: POLST which was signed and dated by provider on 8/27/13.   Document previously scanned on 10/3/13.  Order reviewed and found to be valid.  Code Status reflects choices in most recent ACP document.  Confirmed/documented designated decision maker(s). See permanent comments section of demographics in clinical tab. View document(s) and details by clicking on code status. Added by Kaylin Rincon on 10/20/2014.  Pt have received packets.  Maytyrese Mary Loumoriah Briggs MA         HYPERLIPIDEMIA LDL GOAL <100 10/31/2010     Cerebrovascular accident (CVA) due to occlusion of cerebral artery (H)      Hemiplegia, aphasia       Chronic diastolic congestive heart failure (H)      Essential hypertension      Problem list name updated by automated process. Provider to review[DM1.2]           Plan:   Abdominal film - to see if CT contrast progresses to colon (partial obstruction)  Laparoscopy/laparotomy with lysis of adhesions or SB resection would be an option after he gets over his aspiration pneumonia and sepsis. Since his only abdominal surgery was laparoscopic cryotherapy of a right renal tumor in 2007, we would hope that adhesions would be minimal and surgery could be done laparoscopically. Since the obstruction is presumably chronic, GT suction should suffice for now.   Currently he has a prohibitive surgical risk.   Discussed with hospitalist team - they are in agreement.  Continue on LIS, no TF            Chief Complaint:   aspiration     History is obtained from the electronic health record and hospitalist         History of Present Illness:   This patient is a 67 year old  male with a significant past medical history of chronic kidney disease and stroke, unresponsive at baseline who was found in acute respiratory distress and with tube feedings coming out of his nose and mouth. He was admitted with aspiration pneumonia and sepsis. He has a history of aspiration so a G-tube was placed on 3/28/17 (this was converted to G-tube with feeding jejunostomy 6/11/17 to allow TF and minimize risk of  aspiration.  TF were gradually advanced until he had a large emesis 6/16. TF were held , then restarted slowly and he had another emesis 6/19. CT today reveals a picture of SBO. There is a question of some narrowing in the mid ileum but transition is not definitely seen. He is now on LIS.           Past Medical History:[DM1.1]     Past Medical History:   Diagnosis Date     Cognitive impairment      Diastolic CHF, chronic (H)      End-stage renal disease on hemodialysis (H)      Essential hypertension     Off all BP meds as of late 2015 due to hypotension     Renal cancer (H)     s/p cryoablation     Seizures (H) Onset 8/2014    On dilantin     Stroke (H) 2000, 2007    Hemiplegia, aphasia[DM1.2]             Past Surgical History:[DM1.1]     Past Surgical History:   Procedure Laterality Date     Cryoablation of kidney cancer       ESOPHAGOSCOPY, GASTROSCOPY, DUODENOSCOPY (EGD), COMBINED  8/5/2014    Procedure: COMBINED ESOPHAGOSCOPY, GASTROSCOPY, DUODENOSCOPY (EGD), BIOPSY SINGLE OR MULTIPLE;  Surgeon: Raheel Mixon MD;  Location: [facility]     HEMODIALYSIS VIA AV FISTULA       PEG tube NOS       right arm skin graft[DM1.2]               Social History:[DM1.1]     Social History   Substance Use Topics     Smoking status: Never Smoker     Smokeless tobacco: Never Used     Alcohol use No[DM1.2]             Family History:[DM1.1]     Family History   Problem Relation Age of Onset     Family History Negative Mother      Unknown/Adopted No family hx of[DM1.2]              Immunizations:     VACCINE/DOSE   Diptheria   DPT   DTAP   HBIG   Hepatitis A   Hepatitis B   HIB   Influenza   Measles   Meningococcal   MMR   Mumps   Pneumococcal   Polio   Rubella   Small Pox   TDAP   Varicella   Zoster             Allergies:[DM1.1]   No Known Allergies[DM1.2]          Medications:[DM1.1]     Current Facility-Administered Medications   Medication     [START ON 6/22/2017] levofloxacin (LEVAQUIN) infusion 500 mg     epoetin arnulfo  (EPOGEN/PROCRIT) 5,000 Units injection     doxercalciferol (HECTOROL) injection 2 mcg     norepinephrine (LEVOPHED) 16 mg in D5W 250 mL infusion     pantoprazole (PROTONIX) 40 mg IV push injection     fluconazole (DIFLUCAN) intermittent infusion 200 mg in NaCl     D5W 1,000 mL infusion     0.9% sodium chloride infusion     0.9% sodium chloride infusion     simvastatin (ZOCOR) tablet 40 mg     insulin aspart (NovoLOG) inj (RAPID ACTING)     ascorbic acid tablet 250 mg     acetaminophen (OFIRMEV) infusion 1,000 mg     acetaminophen (TYLENOL) Suppository 650 mg     nystatin (MYCOSTATIN) suspension 500,000 Units     doxercalciferol (HECTOROL) injection 4 mcg     epoetin arnulfo (EPOGEN/PROCRIT) injection 3,000 Units     dextrose 10 % 1,000 mL infusion     B and C vitamin Complex with folic acid (NEPHRONEX) liquid 5 mL     Prosource TF protein modulator     fosphenytoin (CEREBYX) 150 mg PE in NaCl 0.9 % 100 mL intermittent infusion     dextrose 10 % 1,000 mL infusion     glucose 40 % gel 15-30 g    Or     dextrose 50 % injection 25-50 mL    Or     glucagon injection 1 mg     ipratropium - albuterol 0.5 mg/2.5 mg/3 mL (DUONEB) neb solution 3 mL     albuterol neb solution 2.5 mg     ondansetron (ZOFRAN-ODT) ODT tab 4 mg    Or     ondansetron (ZOFRAN) injection 4 mg     prochlorperazine (COMPAZINE) injection 5 mg    Or     prochlorperazine (COMPAZINE) tablet 5 mg    Or     prochlorperazine (COMPAZINE) Suppository 12.5 mg     0.9% sodium chloride BOLUS     dexmedetomidine (PRECEDEX) 400 mcg in NaCl 0.9 % 100 mL infusion     LORazepam (ATIVAN) injection 0.5 mg     - MEDICATION INSTRUCTIONS for Dialysis Patients -     HYDROmorphone (DILAUDID) injection 0.2 mg     naloxone (NARCAN) injection 0.1-0.4 mg[DM1.2]             Review of Systems:   Not obtainable from patient - reports of gradual weight loss in chart          Physical Exam:   All vitals have been reviewed[DM1.1]  Patient Vitals for the past 24 hrs:   BP Temp Temp src  Heart Rate Resp SpO2 Weight   06/20/17 1600 - - - - - 100 % -   06/20/17 1530 106/48 - - 94 20 100 % -   06/20/17 1515 99/48 - - 96 20 100 % -   06/20/17 1500 103/50 - - 99 (!) 77 100 % -   06/20/17 1445 117/49 - - 101 19 100 % -   06/20/17 1430 99/58 - - 99 19 100 % -   06/20/17 1330 121/59 - - 102 20 99 % -   06/20/17 1315 118/71 - - 106 24 99 % -   06/20/17 1300 111/55 100  F (37.8  C) Axillary 103 19 100 % -   06/20/17 1245 124/52 - - 101 20 97 % -   06/20/17 1230 119/60 - - 103 16 98 % -   06/20/17 1215 131/53 - - 102 19 97 % -   06/20/17 1200 93/50 - - 100 19 99 % -   06/20/17 1150 - - - - - 98 % -   06/20/17 1145 136/76 - - 96 19 100 % -   06/20/17 1130 108/49 - - 95 19 95 % -   06/20/17 1115 100/45 - - 98 20 100 % -   06/20/17 1100 129/53 - - 99 20 100 % -   06/20/17 1045 137/64 - - 103 19 99 % -   06/20/17 1030 101/45 - - 95 20 99 % -   06/20/17 1015 97/47 - - 96 20 100 % -   06/20/17 1000 95/45 - - 101 20 100 % -   06/20/17 0945 95/49 - - 98 20 99 % -   06/20/17 0930 91/44 - - 102 19 99 % -   06/20/17 0915 99/51 - - 98 20 99 % -   06/20/17 0900 95/43 - - 102 22 93 % -   06/20/17 0845 106/55 - - 104 19 (!) 69 % -   06/20/17 0830 (!) 87/40 - - 102 20 100 % -   06/20/17 0815 (!) 82/39 - - 102 20 (!) 89 % -   06/20/17 0800 92/47 - - 98 19 100 % -   06/20/17 0755 - - - - - 100 % -   06/20/17 0743 109/48 99.9  F (37.7  C) Axillary 99 19 100 % -   06/20/17 0730 105/49 - - 100 21 100 % -   06/20/17 0700 106/47 - - 102 19 100 % -   06/20/17 0645 120/52 - - 104 22 99 % -   06/20/17 0615 101/46 - - 98 19 100 % -   06/20/17 0600 90/64 - - 96 19 90 % -   06/20/17 0545 116/65 - - 102 21 100 % -   06/20/17 0530 (!) 108/36 - - 98 19 100 % -   06/20/17 0515 93/52 - - 101 20 100 % -   06/20/17 0500 99/66 - - 101 21 100 % -   06/20/17 0445 (!) 88/55 - - 100 19 100 % -   06/20/17 0430 102/51 - - 101 18 100 % -   06/20/17 0415 (!) 86/35 - - 103 23 100 % -   06/20/17 0403 - - - - - 100 % -   06/20/17 0400 100/55 100  F (37.8   C) Axillary 101 20 100 % -   06/20/17 0345 102/41 - - 100 19 100 % -   06/20/17 0330 (!) 85/38 - - 102 22 100 % -   06/20/17 0315 (!) 110/28 - - 101 21 100 % -   06/20/17 0300 107/59 - - 103 27 100 % -   06/20/17 0245 111/60 - - 102 20 100 % -   06/20/17 0230 111/50 - - 103 21 98 % -   06/20/17 0215 (!) 122/26 - - 102 20 100 % -   06/20/17 0200 (!) 85/40 - - 101 19 100 % -   06/20/17 0145 98/55 - - 101 20 100 % -   06/20/17 0130 96/53 - - 101 19 100 % -   06/20/17 0115 96/51 - - 104 21 100 % -   06/20/17 0100 (!) 103/35 - - 104 19 100 % -   06/20/17 0045 92/49 - - 104 21 100 % -   06/20/17 0030 109/47 - - 104 21 100 % -   06/20/17 0015 (!) 106/37 - - 104 19 100 % -   06/20/17 0000 (!) 123/31 98.7  F (37.1  C) - 108 25 100 % 57.4 kg (126 lb 8.7 oz)   06/19/17 2345 (!) 133/36 - - 107 21 100 % -   06/19/17 2343 - - - - - 98 % -   06/19/17 2300 105/40 - - 106 23 100 % -   06/19/17 2245 (!) 76/42 - - 105 23 99 % -   06/19/17 2230 108/41 - - 109 20 94 % -   06/19/17 2215 107/42 - - 109 15 (!) 85 % -   06/19/17 2200 116/41 - - 109 20 97 % -   06/19/17 2145 (!) 114/38 - - 110 19 94 % -   06/19/17 2130 (!) 104/30 - - 112 16 95 % -   06/19/17 2115 (!) 127/38 - - 114 21 94 % -   06/19/17 2100 116/54 - - 113 13 98 % -   06/19/17 2045 114/44 - - 113 15 98 % -   06/19/17 2030 (!) 116/31 - - 112 8 96 % -   06/19/17 2015 118/66 - - 108 20 100 % -   06/19/17 2000 118/63 - - 106 22 100 % -   06/19/17 1945 (!) 100/37 100.1  F (37.8  C) Axillary 105 20 99 % -   06/19/17 1930 (!) 112/26 - - 104 20 100 % -   06/19/17 1915 100/51 - - 107 22 98 % -   06/19/17 1900 (!) 98/39 - - 107 19 98 % -   06/19/17 1850 - - - 107 21 98 % -   06/19/17 1840 108/40 - - 108 24 97 % -   06/19/17 1830 (!) 96/29 - - 108 21 98 % -   06/19/17 1820 - - - 111 28 98 % -   06/19/17 1810 93/42 - - 110 23 98 % -   06/19/17 1800 (!) 97/32 - - 109 29 100 % -   06/19/17 1740 (!) 86/52 99.9  F (37.7  C) Axillary 113 21 99 % -   06/19/17 1730 134/59 - - 114 11 99 % -    06/19/17 1720 - - - 116 12 97 % -   06/19/17 1715 113/44 - - 116 15 99 % -   06/19/17 1710 113/44 - - 116 14 98 % -   06/19/17 1700 141/53 - - 117 26 (!) 76 % -   06/19/17 1650 - - - 117 23 (!) 45 % -   06/19/17 1640 116/81 - - 120 9 - -   06/19/17 1630 124/78 - - 120 19 - -[DM1.2]       Intake/Output Summary (Last 24 hours) at 06/20/17 1621  Last data filed at 06/20/17 1400   Gross per 24 hour   Intake          2006.96 ml   Output              350 ml   Net          1656.96 ml     Neck:   intubated and skin normal     Chest / Breast:   Ventilated mechanically     Abdomen:   firm, distended, non-tender (sedated), involuntary guarding absent, no masses palpated and hernia absent             Data:   All laboratory data reviewed[DM1.1]  Results for orders placed or performed during the hospital encounter of 06/11/17   Chest  XR, 1 view PORTABLE    Narrative    XR CHEST PORT 1 VW  6/11/2017 11:04 PM      HISTORY: Shortness of breath.     COMPARISON: 4/24/2017.    FINDINGS: Right IJ infusion catheter in place. Vascular stents in the  right arm. The heart size is normal. The thoracic aorta is calcified  and tortuous. There is a band of scar or atelectasis at the left lung  base. The lungs are otherwise clear. No pneumothorax or pleural  effusion.      Impression    IMPRESSION: Mild left basilar atelectasis.    AMBIKA PERLA MD   Head CT w/o contrast    Narrative    CT HEAD W/O CONTRAST 6/11/2017 11:54 PM      HISTORY: Unresponsive.    TECHNIQUE: Routine noncontrast head CT. Radiation dose for this scan  was reduced using automated exposure control, adjustment of the mA  and/or kV according to patient size, or iterative reconstruction  technique.    COMPARISON: 12/29/2016.    FINDINGS: There is generalized brain atrophy. No mass, mass effect or  intracranial hemorrhage. No evidence of acute infarct. Old cerebellar  infarcts. Periventricular low attenuation is consistent with chronic  small vessel ischemic disease. The  visualized paranasal sinuses are  clear.      Impression    IMPRESSION:  No acute abnormality.    AMBIKA PERLA MD   XR Abdomen Port 1 View    Narrative    XR ABDOMEN PORT F1 VW  6/12/2017 4:37 AM      HISTORY: Aspiration.     COMPARISON: None.    FINDINGS: G-tube in the midline. Right inguinal catheter in place.  Catheter projects over the lower pelvis. The bowel gas pattern is  within normal limits. There is air within the liver; this could be  biliary or portal venous. Right hemidiaphragm is elevated.      Impression    IMPRESSION: Air within the liver which may be biliary or portal  venous. If there is clinical concern for portal venous gas, CT is  recommended.    AMBKIA PERLA MD   XR Chest Port 1 View    Narrative    CHEST ONE VIEW PORTABLE June 12, 2017 12:45 PM     HISTORY: Endotracheal tube placement.    COMPARISON: 6/11/2017.      Impression    IMPRESSION: Endotracheal tube in good position. Elevated right  diaphragm. Interstitial densities in both lungs may be partly due to  shallow inspiration. Normal heart size and pulmonary vascularity.  Central venous catheter is unchanged. Nasogastric tube is coiled in  the esophagus and must be repositioned. Apparent stent grafts over the  right arm medially.    THEODORE SWAN MD   CT Abdomen Pelvis w/o Contrast    Narrative    CT ABDOMEN AND PELVIS WITHOUT CONTRAST   6/12/2017 4:31 PM     HISTORY: Sepsis thought to be due to aspiration of tube feeds but with  air in liver on x-ray. Evaluate for biliary obstruction, infection,  etc.    TECHNIQUE: CT of the abdomen and pelvis was performed without  intravenous contrast. Radiation dose for this scan was reduced using  automated exposure control, adjustment of the mA and/or kV according  to patient size, or iterative reconstruction technique.    COMPARISON: CT of the abdomen/pelvis dated 2/25/2009.    FINDINGS:    This is a very limited exam due to motion artifact and lack of  intravenous contrast.    Again  identified are cysts within the liver. No definite air within  the biliary tree is seen. No definite intrahepatic biliary duct  dilatation although without contrast, sensitivity for detection is  decreased. No definite extrahepatic biliary duct dilatation.    Since the previous exam, a G-tube has been placed.    Evaluation of the remaining solid organs is limited due to a lack of  intravenous contrast. The kidneys are atrophic with multiple  calcifications.    There are distended loops of small bowel throughout the abdomen.    A La catheter is in place.    There is no free air and no definite free fluid in the abdomen or  pelvis.    There are COPD changes at the lung bases. Bibasilar atelectasis and/or  infiltrates.      Impression    IMPRESSION:   1. Limited exam. No definite air within the biliary tree or biliary  duct dilatation identified on this study.  2. Dilated loops of small bowel throughout the abdomen. This is  nonspecific. It could represent a diffuse ileus.  3. Bibasilar atelectasis and/or infiltrates.    SHANTI ANGEL MD   US Abdomen Limited Portable    Narrative    ULTRASOUND ABDOMEN LIMITED PORTABLE  6/14/2017 8:16 AM     HISTORY: Elevated liver enzymes. Elevated INR.    COMPARISON: CT of the abdomen and pelvis performed 6/12/2017.    FINDINGS: Scattered liver cysts are noted, with the largest on the  right measuring 3.2 cm. No evidence for fatty infiltration of the  liver. The gallbladder wall appears thickened, measuring up to 0.6 cm.  No shadowing gallstones are identified. Evaluation of the gallbladder  is limited related to patient immobility. No pericholecystic fluid is  seen. No intra- or extrahepatic bile duct dilatation. Common hepatic  duct is normal in diameter. The entire common duct could not be  visualized on this scan. The right kidney could not be visualized.  Pancreas is partially obscured by overlying bowel gas, but appears  normal where seen. The abdominal aorta and IVC are  of normal caliber  where visualized. A small amount of ascites is noted in the right  upper quadrant.      Impression    IMPRESSION:   1. There is mild gallbladder wall thickening. No other convincing  sonographic evidence for cholecystitis.  2. Small amount of ascites is noted in the right upper quadrant.  3. Multiple hepatic cysts are identified  4. Nonvisualization of the right kidney.    MAYRA MERCADO MD   US Lower Extremity Venous Duplex Right    Narrative    VENOUS ULTRASOUND RIGHT LEG  6/15/2017 9:11 AM     HISTORY: Swelling in the right leg    COMPARISON: None.    FINDINGS:  Examination of the deep veins with graded compression and  color flow Doppler with spectral wave form analysis was performed.   The right common femoral vein and saphenofemoral junction were not  visualized due to overlying bandage. Visualized deep veins are patent  without thrombus.      Impression    IMPRESSION: No evidence of deep venous thrombosis.    SHANTI ANGEL MD   US Extremity Upper Venous rt    Narrative    ULTRASOUND VENOUS RIGHT UPPER EXTREMITY  6/15/2017 11:38 AM     HISTORY: Evaluate for DVT.    COMPARISON: None.    TECHNIQUE: Ultrasound gray scale, Color Doppler flow, and spectral  Doppler waveform analysis performed.    FINDINGS: There are no intraluminal filling defects. The right  internal jugular, axillary, cephalic, basilic, brachial, radial, and  ulnar veins demonstrate normal compressibility. The right internal  jugular, innominate, subclavian, and axillary veins have normal venous  waveforms. Technologist noted the presence of a PICC on the right.      Impression    IMPRESSION: No evidence for DVT.    NATALIYA LIU MD   XR Chest Port 1 View    Narrative    CHEST ONE VIEW UPRIGHT 6/16/2017 11:26 AM     HISTORY: Failing breathing trial.    COMPARISON: 6/12/2017.      Impression    IMPRESSION: Endotracheal tube tip mid to distal trachea. Large bore  right IJ line unchanged. Scattered trace atelectasis and/or  fibrosis.  Previously seen orogastric tube has been removed.    LUCIA MCGUIRE MD   XR Chest Port 1 View    Narrative    CHEST PORTABLE ONE VIEW June 18, 2017 9:19 AM     HISTORY: Aspiration.    COMPARISON: 6/16/2017.      Impression    IMPRESSION: Endotracheal tube and dialysis catheter unchanged. Patchy  airspace opacity present in the left lower lobe, atelectasis versus  pneumonia. Right lung is clear. No pneumothorax or pleural effusion.  Vascular stents noted in the right arm.    NATALIYA LIU MD   XR Gastro Tube to Gastrojejuno Convert    Narrative    GASTRO TUBE TO GASTROJEJUNO CONVERT  6/19/2017 9:55 AM     HISTORY:  Patient has a G-tube in place. He has developed significant  nausea and vomiting suggesting possible gastric outlet obstruction.    COMPARISON: 3/28/2017.    FINDINGS: After obtaining informed consent, the patient was placed in  a supine position on the fluoroscopy table. The existing G-tube was  removed. Skin entry site was prepped and draped in the usual sterile  manner. A JB1 catheter was used to pass a wire into the proximal  jejunum through the existing tract. Over the wire, an 18 [place] JUAN CARLOS GJ  tube was placed. The tip was positioned in the proximal jejunum just  past the ligament of Treitz. Contrast was injected and showed adequate  positioning. A fluoroscopic image was saved.    The patient tolerated the procedure well. There were no immediate  postprocedure complications. The patient's vital signs were monitored  by radiology nursing staff under my supervision and remained stable  throughout the study.    MEDICATIONS: None.    Fluoroscopy time: 7 minutes 31 seconds.    Contrast: 30 mL Gastroview.    Radiation dose: 76 mGy.      Impression    IMPRESSION: GJ tube replaced as above.    SHANTI ANGEL MD   XR Chest Port 1 View    Narrative    CHEST PORTABLE ONE VIEW  6/19/2017 4:53 PM      HISTORY: Worsening breathing on ventilator.     COMPARISON: 6/18/2017.    FINDINGS: Semiupright  portable chest. ET tube in the mid trachea.  Right IJ infusion catheter is in place. There is no pneumothorax. The  heart size is normal. Thoracic aorta is tortuous. Lung volumes are  low. Mild right basilar atelectasis.      Impression    IMPRESSION: Mild right basilar atelectasis. No pneumothorax.    AMBIKA PERLA MD   XR Abdomen Port 1 View    Narrative    ABDOMEN PORTABLE FRONT ONE VIEW  6/19/2017 4:52 PM      HISTORY: Distended after FT evaluate for small bowel obstruction or  ileus.     COMPARISON: None.    FINDINGS: Supine portable abdomen. There are multiple dilated small  bowel loops suggesting small bowel obstruction. G-tube projects over  the mid abdomen. Right inguinal catheter in place.    AMBIKA PERLA MD   CT Abdomen Pelvis w/o & w Contrast    Narrative    CT ABDOMEN/PELVIS WITH AND WITHOUT CONTRAST June 20, 2017 2:04 PM     HISTORY: Bowel obstruction, question transition point.     TECHNIQUE: Axial images are obtained from the lung bases to the  symphysis without IV contrast. Patient had poor vascular access. Oral  contrast was administered. Coronal reformatted images are also  generated. Radiation dose for this scan was reduced using automated  exposure control, adjustment of the mA and/or kV according to patient  size, or iterative reconstruction technique.    FINDINGS: Bibasilar atelectasis is present with consolidation right  lower lobe concerning for pneumonia. Chronic fibroatelectatic changes  could look similar.    Abdomen: Gastrostomy tube with jejunal extension appears in good  position. No free intraperitoneal air. Oral contrast opacifies the  stomach and a significant portion of the small bowel. A discrete  transition point is not definitely identified but there is focal  narrowing in the region of the terminal ileum just proximal to the  ileocecal valve on series 2, image 58 and series 3, images 54-59.  There is also relative narrowing in the mid ileum in the lower abdomen  on series  3, image 41 where the more proximal small bowel is more  distended with fecalized material. The remainder of the more proximal  small bowel is more significantly dilated. Follow-up abdominal x-ray  in several hours following CT imaging could be performed to see if  contrast finally empties into the colon. Colon is decompressed.    Upper abdominal organs demonstrate bilateral renal atrophy with small  nonobstructing stones. No hydronephrosis. Cysts are noted in the liver  and the gallbladder and spleen are unremarkable. Pancreas is within  normal limits. Adrenal glands are unremarkable.    Pelvis: Bladder is decompressed. Prostate gland and rectum are  unremarkable. Small amount free pelvic fluid is present likely related  to the suspected bowel obstruction versus renal failure. No enlarged  lymph nodes. Right femoral venous line is present. Tip terminates in  the inferior IVC. Bone window examination demonstrates ectopic  ossification posterior to the proximal right femur in the adjacent  musculature. Mild degenerative spine changes are noted.      Impression    IMPRESSION:  1. Probable transition point in the lower anterior abdomen in the  region of the mid ileum. Additional area near the terminal ileum also  is narrowed. Colon is decompressed. Oral contrast has not completely  made it through the small bowel but is likely into the distal jejunum  or proximal ileum. Proximal small bowel loops all significantly  distended. Gastrojejunostomy tube appears in good position.  2. Nonobstructing renal collecting system stones and cysts with  bilateral renal atrophy consistent with end-stage renal disease.    KYLIE HERNANDEZ MD   CBC (platelets, no diff)   Result Value Ref Range    WBC 15.8 (H) 4.0 - 11.0 10e9/L    RBC Count 3.94 (L) 4.4 - 5.9 10e12/L    Hemoglobin 11.7 (L) 13.3 - 17.7 g/dL    Hematocrit 38.2 (L) 40.0 - 53.0 %    MCV 97 78 - 100 fl    MCH 29.7 26.5 - 33.0 pg    MCHC 30.6 (L) 31.5 - 36.5 g/dL    RDW 15.1 (H)  10.0 - 15.0 %    Platelet Count 424 150 - 450 10e9/L   Comprehensive metabolic panel   Result Value Ref Range    Sodium 135 133 - 144 mmol/L    Potassium 5.5 (H) 3.4 - 5.3 mmol/L    Chloride 97 94 - 109 mmol/L    Carbon Dioxide 19 (L) 20 - 32 mmol/L    Anion Gap 19 (H) 3 - 14 mmol/L    Glucose 286 (H) 70 - 99 mg/dL    Urea Nitrogen 147 (H) 7 - 30 mg/dL    Creatinine 7.89 (H) 0.66 - 1.25 mg/dL    GFR Estimate 7 (L) >60 mL/min/1.7m2    GFR Estimate If Black 8 (L) >60 mL/min/1.7m2    Calcium 9.7 8.5 - 10.1 mg/dL    Bilirubin Total 0.4 0.2 - 1.3 mg/dL    Albumin 3.2 (L) 3.4 - 5.0 g/dL    Protein Total 9.4 (H) 6.8 - 8.8 g/dL    Alkaline Phosphatase 219 (H) 40 - 150 U/L    ALT 70 0 - 70 U/L    AST 72 (H) 0 - 45 U/L   Lactic acid whole blood   Result Value Ref Range    Lactic Acid 4.4 (HH) 0.7 - 2.1 mmol/L   Glucose by meter   Result Value Ref Range    Glucose 173 (H) 70 - 99 mg/dL   INR   Result Value Ref Range    INR 1.23 (H) 0.86 - 1.14   Blood gas arterial and oxyhgb   Result Value Ref Range    pH Arterial 7.30 (L) 7.35 - 7.45 pH    pCO2 Arterial 32 (L) 35 - 45 mm Hg    pO2 Arterial 111 (H) 80 - 105 mm Hg    Bicarbonate Arterial 16 (L) 21 - 28 mmol/L    FIO2 100%  HIGH FLOW NC       Oxyhemoglobin Arterial 96 92 - 100 %    Base Deficit Art 9.7 mmol/L   Lactic acid whole blood   Result Value Ref Range    Lactic Acid 4.7 (HH) 0.7 - 2.1 mmol/L   Potassium   Result Value Ref Range    Potassium 5.0 3.4 - 5.3 mmol/L   Lactic acid whole blood   Result Value Ref Range    Lactic Acid 4.1 (HH) 0.7 - 2.1 mmol/L   Troponin I   Result Value Ref Range    Troponin I ES 0.118 (H) 0.000 - 0.045 ug/L   Troponin I   Result Value Ref Range    Troponin I ES 0.132 (HH) 0.000 - 0.045 ug/L   Glucose   Result Value Ref Range    Glucose 294 (H) 70 - 99 mg/dL   Hemoglobin A1c   Result Value Ref Range    Hemoglobin A1C 5.1 4.3 - 6.0 %   Glucose by meter   Result Value Ref Range    Glucose 184 (H) 70 - 99 mg/dL   Glucose by meter   Result Value Ref  Range    Glucose 262 (H) 70 - 99 mg/dL   Glucose by meter   Result Value Ref Range    Glucose 197 (H) 70 - 99 mg/dL   Magnesium   Result Value Ref Range    Magnesium 3.1 (H) 1.6 - 2.3 mg/dL   Phosphorus   Result Value Ref Range    Phosphorus 6.7 (H) 2.5 - 4.5 mg/dL   Vitamin D Deficiency   Result Value Ref Range    Vitamin D Deficiency screening 85 (H) 20 - 75 ug/L   Lactic acid whole blood   Result Value Ref Range    Lactic Acid 4.6 (HH) 0.7 - 2.1 mmol/L   Glucose by meter   Result Value Ref Range    Glucose 121 (H) 70 - 99 mg/dL   Blood gas arterial and oxyhgb   Result Value Ref Range    pH Arterial 7.26 (L) 7.35 - 7.45 pH    pCO2 Arterial 28 (L) 35 - 45 mm Hg    pO2 Arterial 228 (H) 80 - 105 mm Hg    Bicarbonate Arterial 13 (L) 21 - 28 mmol/L    FIO2 80%  Ventilator       Oxyhemoglobin Arterial 99 92 - 100 %    Base Deficit Art 13.4 mmol/L   Basic metabolic panel   Result Value Ref Range    Sodium 142 133 - 144 mmol/L    Potassium 5.6 (H) 3.4 - 5.3 mmol/L    Chloride 107 94 - 109 mmol/L    Carbon Dioxide 15 (L) 20 - 32 mmol/L    Anion Gap 20 (H) 3 - 14 mmol/L    Glucose 140 (H) 70 - 99 mg/dL    Urea Nitrogen 143 (H) 7 - 30 mg/dL    Creatinine 7.77 (H) 0.66 - 1.25 mg/dL    GFR Estimate 7 (L) >60 mL/min/1.7m2    GFR Estimate If Black 8 (L) >60 mL/min/1.7m2    Calcium 7.5 (L) 8.5 - 10.1 mg/dL   Lactic acid   Result Value Ref Range    Lactic Acid 4.7 (HH) 0.4 - 2.0 mmol/L   Renal panel   Result Value Ref Range    Sodium 141 133 - 144 mmol/L    Potassium 5.7 (H) 3.4 - 5.3 mmol/L    Chloride 107 94 - 109 mmol/L    Carbon Dioxide 14 (L) 20 - 32 mmol/L    Anion Gap 20 (H) 3 - 14 mmol/L    Glucose 152 (H) 70 - 99 mg/dL    Urea Nitrogen 150 (H) 7 - 30 mg/dL    Creatinine 7.83 (H) 0.66 - 1.25 mg/dL    GFR Estimate 7 (L) >60 mL/min/1.7m2    GFR Estimate If Black 8 (L) >60 mL/min/1.7m2    Calcium 7.4 (L) 8.5 - 10.1 mg/dL    Phosphorus 5.6 (H) 2.5 - 4.5 mg/dL    Albumin 2.2 (L) 3.4 - 5.0 g/dL   Glucose by meter   Result Value  Ref Range    Glucose 94 70 - 99 mg/dL   Blood gas venous   Result Value Ref Range    Ph Venous 7.27 (L) 7.32 - 7.43 pH    PCO2 Venous 32 (L) 40 - 50 mm Hg    PO2 Venous 40 25 - 47 mm Hg    Bicarbonate Venous 15 (L) 21 - 28 mmol/L    Base Deficit Venous 11.2 mmol/L    FIO2 50%VENT    Calcium, ionized whole blood (1200)   Result Value Ref Range    Calcium Ionized Whole Blood 4.2 (L) 4.4 - 5.2 mg/dL   Glucose by meter   Result Value Ref Range    Glucose 16 (LL) 70 - 99 mg/dL   Glucose by meter   Result Value Ref Range    Glucose 23 (LL) 70 - 99 mg/dL   Blood gas arterial with oxyhemoglobin   Result Value Ref Range    pH Arterial 7.27 (L) 7.35 - 7.45 pH    pCO2 Arterial 26 (L) 35 - 45 mm Hg    pO2 Arterial 108 (H) 80 - 105 mm Hg    Bicarbonate Arterial 12 (L) 21 - 28 mmol/L    FIO2 50%  Ventilator       Oxyhemoglobin Arterial 97 92 - 100 %    Base Deficit Art 13.9 mmol/L   Renal panel   Result Value Ref Range    Sodium 138 133 - 144 mmol/L    Potassium 5.8 (H) 3.4 - 5.3 mmol/L    Chloride 106 94 - 109 mmol/L    Carbon Dioxide 15 (L) 20 - 32 mmol/L    Anion Gap 17 (H) 3 - 14 mmol/L    Glucose 437 (H) 70 - 99 mg/dL    Urea Nitrogen 161 (H) 7 - 30 mg/dL    Creatinine 7.92 (H) 0.66 - 1.25 mg/dL    GFR Estimate 7 (L) >60 mL/min/1.7m2    GFR Estimate If Black 8 (L) >60 mL/min/1.7m2    Calcium 7.4 (L) 8.5 - 10.1 mg/dL    Phosphorus 6.1 (H) 2.5 - 4.5 mg/dL    Albumin 2.0 (L) 3.4 - 5.0 g/dL   Lactic acid whole blood   Result Value Ref Range    Lactic Acid 4.4 (HH) 0.7 - 2.1 mmol/L   Glucose by meter   Result Value Ref Range    Glucose 44 (LL) 70 - 99 mg/dL   Glucose by meter   Result Value Ref Range    Glucose 14 (LL) 70 - 99 mg/dL   Glucose by meter   Result Value Ref Range    Glucose 31 (LL) 70 - 99 mg/dL   Glucose by meter   Result Value Ref Range    Glucose 300 (H) 70 - 99 mg/dL   Basic metabolic panel (AM Draw)   Result Value Ref Range    Sodium 142 133 - 144 mmol/L    Potassium 4.6 3.4 - 5.3 mmol/L    Chloride 105 94 - 109  mmol/L    Carbon Dioxide 21 20 - 32 mmol/L    Anion Gap 16 (H) 3 - 14 mmol/L    Glucose 151 (H) 70 - 99 mg/dL    Urea Nitrogen 150 (H) 7 - 30 mg/dL    Creatinine 7.98 (H) 0.66 - 1.25 mg/dL    GFR Estimate 7 (L) >60 mL/min/1.7m2    GFR Estimate If Black 8 (L) >60 mL/min/1.7m2    Calcium 7.1 (L) 8.5 - 10.1 mg/dL   Glucose by meter   Result Value Ref Range    Glucose 96 70 - 99 mg/dL   Glucose by meter   Result Value Ref Range    Glucose 394 (H) 70 - 99 mg/dL   CBC with platelets differential   Result Value Ref Range    WBC 10.9 4.0 - 11.0 10e9/L    RBC Count 2.42 (L) 4.4 - 5.9 10e12/L    Hemoglobin 7.3 (L) 13.3 - 17.7 g/dL    Hematocrit 22.9 (L) 40.0 - 53.0 %    MCV 95 78 - 100 fl    MCH 30.2 26.5 - 33.0 pg    MCHC 31.9 31.5 - 36.5 g/dL    RDW 14.8 10.0 - 15.0 %    Platelet Count 255 150 - 450 10e9/L    Diff Method       Manual Differential   Slide reviewed by Pathologist  LEFT SHIFTED GRANULOCYTIC SERIES WITH TOXIC CHANGES  SUGGESTIVE OF CURRENT INFECTION/INFLAMATION  MILD DYSGRANULOPOIESIS  Hanna Swanson M.D., Pathologist  /AM      % Neutrophils 89.0 %    % Lymphocytes 4.5 %    % Monocytes 6.5 %    % Eosinophils 0.0 %    % Basophils 0.0 %    Absolute Neutrophil 9.7 (H) 1.6 - 8.3 10e9/L    Absolute Lymphocytes 0.5 (L) 0.8 - 5.3 10e9/L    Absolute Monocytes 0.7 0.0 - 1.3 10e9/L    Absolute Eosinophils 0.0 0.0 - 0.7 10e9/L    Absolute Basophils 0.0 0.0 - 0.2 10e9/L    Polychromasia Slight     Dohle Bodies Present     Toxic Granulation Present     RBC Morphology Consistent with reported results     Platelet Estimate Normal    Lactic acid whole blood (AM Draw)   Result Value Ref Range    Lactic Acid 2.7 (H) 0.7 - 2.1 mmol/L   Blood gas venous with oxyhemoglobin (AM Draw)   Result Value Ref Range    Ph Venous 7.31 (L) 7.32 - 7.43 pH    PCO2 Venous 36 (L) 40 - 50 mm Hg    PO2 Venous 43 25 - 47 mm Hg    Bicarbonate Venous 18 (L) 21 - 28 mmol/L    FIO2 50%  Ventilator       Oxyhemoglobin Venous 71 %    Base Deficit Venous  7.7 mmol/L     *Note: Due to a large number of results and/or encounters for the requested time period, some results have not been displayed. A complete set of results can be found in Results Review.[DM1.2]          Attestation:  I have reviewed today's vital signs, notes, medications, labs and imaging.  Amount of time performed on this consult: 45 minutes.[DM1.1]    Gómez Dale MD[DM1.2]          Revision History        User Key Date/Time User Provider Type Action    > DM1.2 6/20/2017  4:53 PM Gómez Dale MD Physician Sign     DM1.1 6/20/2017  4:21 PM Gómez Dale MD Physician             Consults by Angelita Silva RD, LD at 6/17/2017  9:13 AM     Author:  Angelita Silva RD, LD Service:  Nutrition Author Type:  Registered Dietitian    Filed:  6/17/2017  9:13 AM Date of Service:  6/17/2017  9:13 AM Creation Time:  6/17/2017  8:52 AM    Status:  Signed :  Angelita Silva RD, LD (Registered Dietitian)     Consult Orders:    1. Nutrition Services Adult IP Consult [[id number]] ordered by Desiree Guevara MD at 06/17/17 0807                CLINICAL NUTRITION SERVICES BRIEF NOTE    MD Consult received for trophic tube feeds    New Findings -   - TF reached goal Nepro @ 45 ml/hr at 0100 yesterday. Pt had large emesis around 1100 --> TF has been off since.   - Family has decided to go ahead with g-tube to g/j conversion, planned for Monday.    ASSESSED NUTRITION NEEDS (PER APPROVED PRACTICE GUIDELINES; DW: 48.5 kg):  Estimated Energy Needs: 8488-7794 kcals (35-40 Kcal/Kg)  Justification: repletion  Estimated Protein Needs: 58-87+ grams protein (1.2-1.8+ g pro/Kg)  Justification: wound healing and dialysis    Implementation -   Start Trophic feeds: Nepro @ 15 ml/hr --> provides 648 kcal/day (13 kcal/kg), 29 g pro (0.6 g/kg).  -standard 60 mL q4 hours or per MD discretion     Collaboration and Referral of Nutrition Care -- discussed plan to being trophic feeding, no increase until G/J tube placed  Monday.    Angelita Silva RD, LD[AK1.1]     Revision History        User Key Date/Time User Provider Type Action    > AK1.1 6/17/2017  9:13 AM Angelita Silva RD, LD Registered Dietitian Sign            Consults by Madonna Redd RD, LD at 6/14/2017 10:40 AM     Author:  Madonna Redd RD, LD Service:  Nutrition Author Type:  Registered Dietitian    Filed:  6/14/2017 10:40 AM Date of Service:  6/14/2017 10:40 AM Creation Time:  6/14/2017 10:35 AM    Status:  Signed :  Madonna Redd RD, LD (Registered Dietitian)     Consult Orders:    1. Nutrition Services Adult IP Consult [[id number]] ordered by Hesham Wiley MD at 06/14/17 1010                NUTRITION BRIEF NOTE + CONSULT     See RD note 6/13 for full assessment details  Consult: TF Assess and Order    New findings in last 24 hours:    Remains intubated, EN on hold    Weight: 56.5 kg (up ~8 kg since admit), HD 6/13    BM: 6/14    Labs and meds: reviewed, 2 pressors    Skin per WOCN 6/13: coccyx with DTI that is unroofing to reveal shallow stage 3; L buttock with spotty erosion    Assessed Nutrition Needs (DW: 48.5 kg):  Estimated Energy Needs: 2033-6229 kcals (35-40 Kcal/Kg)  Justification: repletion  Estimated Protein Needs: 58-87+ grams protein (1.2-1.8+ g pro/Kg)  Justification: wound healing and dialysis  Estimated Fluid Needs: >1 mL/Kcal  Justification: maintenance    Interventions:      Resume TF - start at 10 mL/hr and hold. Goal rate as follows:    Enteral Regimen: Nepro at 45 mL/hr x 24 hours    Total Enteral Provisions: 1080 mL per day provides 1944 kcals, 87 g protein, 788 mL free H2O, 174 g CHO and 14 g Fiber daily. Meets >100% of DRI's.    3434 International units Vitamin A, 113 mg Vitamin C, 29 mg Zinc sulfate    Free Water Flush: standard 60 mL q4 hours, increase to 200 mL once IVF off/per MD discretion    Renal MVI per pressure injury protocol once tolerating    1 packet Prosource TF until goal rate reached    Entered orders for  regimen outlined above    Collaboration and Referral of care: Discussed patient during interdisciplinary care rounds this morning including possible need for J extension if family continues enteral feedings    Please page/consult as needed.       THANIA Blank  3rd floor/ICU: [phone]  All other floors: [phone]  Weekend/holiday: [phone]  Office: [phone][RP1.1]     Revision History        User Key Date/Time User Provider Type Action    > RP1.1 6/14/2017 10:40 AM Madonna Redd RD, LD Registered Dietitian Sign            Consults by Madonna Redd RD, LD at 6/13/2017  7:55 AM     Author:  Madonna Redd RD, LD Service:  Nutrition Author Type:  Registered Dietitian    Filed:  6/13/2017 12:11 PM Date of Service:  6/13/2017  7:55 AM Creation Time:  6/13/2017  7:36 AM    Status:  Signed :  Madonna Redd RD, LD (Registered Dietitian)         CLINICAL NUTRITION SERVICES  -  ASSESSMENT NOTE      Future/Additional Recommendations:    G to G/J conversion if nutrition support continuation is desired by family    Nepro at 45 mL/hr x 24 hours    Renal MVI     REASON FOR ASSESSMENT  Hesham Ramos is a 67 year old male seen by the dietitian for positive nutrition risk screen - TF or Parenteral Nutrition    NUTRITION HISTORY  - Information obtained from[RP1.1] chart review[RP1.2]  - Patient is on a strict NPO diet at home with reliance on enteral nutrition, G tube placed March 2017. Per chart review, multiple notes indicate vomiting of EN formula in last 2-3 months[RP1.1]. Receiving Nepro at 90 mL/hr x 12 hours (8 pm to 8 am) with 30 mL fluid flush before and after each cycle, 22 mL with each medication. Also receiving[RP1.2] Vitamin D 1000 international units,[RP1.1] Certavite[RP1.2]  - Food allergies/intolerances: NKFA  - Hx of CHF, ESKD on  HD, CVA, Renal carcinoma[RP1.1]  - Familiar with G tube placement due to clinic calling [facility] inpatient RD office requesting consult for  "insurance coverage documentation. Reviewed chart at this time to direct clinic on how to obtain a formal consult but did not document as this was not a patient of ours and did not have a consult to see --> at this time, I noted to the clinic that POLST clearly indicated no FT desired. G tube was actually placed at [facility] and no [place] RD consult was obtained[RP1.2]    CURRENT NUTRITION ORDERS  - Diet: NPO and EN on hold due to ongoing emesis    PHYSICAL FINDINGS  Observed[RP1.1]  Clavicle pronounced, anterior and posterior thigh region with moderate or greater depletion, moderate to severe temporal depletion with scooping noted, pectoralis thin; grossly thin suspected at baseline[RP1.2]  Obtained from Chart/Interdisciplinary Team  BM: none since admit  Multiple emesis noted  Dionisio nutrition score: 2; total score: 10  Skin: R buttock/sacrum area with pressure injury, WOCN eval pending    ANTHROPOMETRICS  Height: 5' 5\"  Weight: 54.6 kg (48.5 kg admit weight --> BMI of 17.83 kg/m^2[RP1.1])[RP1.2]  Body mass index is 20.03 kg/(m^2).  Weight Status:  Underweight using admit weight  IBW: 62 kg +/- 10%  % IBW:  78%  Weight History:  No HD in past 2 days so likely increase in weight is related to fluid  Wt Readings from Last 10 Encounters:   06/13/17 54.6 kg (120 lb 5.9 oz)   05/02/17 49.9 kg (110 lb)   04/18/17 50 kg (110 lb 3.7 oz)   03/23/17 56.7 kg (125 lb)   12/29/16 56.7 kg (125 lb)   10/26/16 44.4 kg (97 lb 12.8 oz)   09/13/16 44.2 kg (97 lb 8 oz)   12/03/15 44.2 kg (97 lb 8 oz)   11/18/15 53.8 kg (118 lb 9.7 oz)   09/03/15 56.7 kg (125 lb)       LABS  Labs reviewed  Cr 7.98 (H), P04 6.1 9 (6/12)  Vitamin D 85 (6/11)  BG variable     MEDICATIONS  Medications reviewed  Precedex  Insulin gtt  Sodium bicarb 150 mL/hr  Pressors    PROCEDURES WITH NUTRITIONAL IMPLICATIONS  March 2017: G tube  6/12: intubated for respiratory failure[RP1.1]  HD 6/13[RP1.2]    ASSESSED NUTRITION NEEDS (PER APPROVED " PRACTICE GUIDELINES, Dosing weight: 48.5 kg):  Estimated Energy Needs: 4371-9004 kcals (35-40 Kcal/Kg)  Justification: repletion  Estimated Protein Needs: 58-87+ grams protein (1.2-1.8+ g pro/Kg)  Justification: wound healing and dialysis  Estimated Fluid Needs: >1 mL/Kcal  Justification: maintenance    MALNUTRITION:  % Weight Loss: Unable to determine with fluid shifts  % Intake:[RP1.1] Does not meet criteria with EN reliance[RP1.2]  Subcutaneous Fat Loss:[RP1.1] Mild or greater, as noted above[RP1.2]  Muscle Loss:[RP1.1] Moderate to severe, as noted above[RP1.2]  Fluid Retention:[RP1.1]None noted[RP1.2]    Malnutrition Diagnosis:[RP1.1] Patient does not meet two of the above criteria necessary for diagnosing malnutrition but difficult to assess true weight trends with fluid[RP1.2]    NUTRITION DIAGNOSIS:  Inadequate enteral nutrition infusion related to vomiting and EN intolerance as evidenced by EN reliance, currently meeting 0% of estimated needs since admit    INTERVENTIONS  Recommendations / Nutrition Prescription  Once able to resume EN, recommend regimen as follows:      Enteral Regimen: Nepro at 45 mL/hr[RP1.1] x 24 hours[RP1.2]    Total Enteral Provisions: 1080 mL per day provides 1944 kcals, 87 g protein, 788 mL free H2O, 174 g CHO and 14 g Fiber daily. Meets >100% of DRI's.    3434 International units Vitamin A, 113 mg Vitamin C, 29 mg Zinc sulfate    Free Water Flush: standard 60 mL q4 hours, increase to 200 mL once IVF off/per MD discretion[RP1.1]    Renal MVI[RP1.2] per pressure injury protocol[RP1.1]    Additional protein pending WOCN input[RP1.2]    Implementation  Nutrition education:[RP1.1] Not appropriate at this time due to patient condition[RP1.2]  Collaboration and Referral of care: Discussed patient during interdisciplinary care rounds this morning[RP1.1] and with palliative care[RP1.2]    Goals  TF to resume within 24-48 hours  TF to meet >80% of estimated needs    MONITORING AND  EVALUATION:  Enteral intake/access - Monitor for ability to resume  Nutrition-focused physical findings - staging of coccyx wound  Progress towards goals will be monitored and evaluated per protocol and Practice Guidelines      THANIA Blank  3rd floor/ICU: [phone]  All other floors: [phone]  Weekend/holiday: [phone]  Office: [phone][RP1.1]     Revision History        User Key Date/Time User Provider Type Action    > RP1.2 6/13/2017 12:11 PM Madonna Redd RD, LD Registered Dietitian Sign     RP1.1 6/13/2017  7:36 AM Madonna Redd RD, LD Registered Dietitian             Consults by Giuliana Vega APRN CNS at 6/12/2017 11:24 AM     Author:  Giuliana Vega APRN CNS Service:  Palliative Author Type:  Clinical Nurse Specialist    Filed:  6/12/2017  5:11 PM Date of Service:  6/12/2017 11:24 AM Creation Time:  6/12/2017  4:24 PM    Status:  Signed :  Giuliana Vega APRN CNS (Clinical Nurse Specialist)         [facility]    Palliative Care Consultation   Text Page    Date of Admission:  6/11/2017    Assessment & Plan   Hesham Ramos is a 67 year old male who was admitted on 6/11/2017. I was asked to see the patient for  Review of goals of care and code status.    Recommendations:  1.Patient lacks medical capacity for decision making- Patient has legal co-guardians for healthcare decisions in a legal document dated 3/28/13.  Hemant Ramos and Carlos Ramos.  Plan for support of surrogate as decision maker.  Goal of Care:Full Code, restorative.  See discussion below.     Disease Process/es & Symptoms:  Hesham Ramos is a 67 year old patient admitted with symptoms of respiratory distress. He has been treated for likely aspiration pneumonia with noted vomiting.  Concern now for other abdominal process. Abdominal x-ray showed air in liver of unclear significance and patient appears to be in pain upon exam of abdomen.  Lactic  "acid is elevated and over the course of the day he became hypotensive and lethargic today and subsequently has been intubated and on pressors.  CT of abdoman is pending.     This is in the setting of Hx CVA with current aphasia and right hemiparesis, ESRD on chronic dialysis, CHF with diastolic failure, seizure disorder. He has been ventilated in the past I believe related to airway protection with seizures and has known dysphagia with decision to not proceed with a PEG tube but now has new PEG tube in the past 3 months. He has  been hospitalized 2 times in the past 3 months. His is at baseline completely dependant for all of his care needs living in a group home, noted as \"pretty stable\" by niece. There is no documented weight loss.     The following symptoms are noted by patient as concerning to his quality of life.  Unable, appear uncomfortable to palpation of abdomen.    Vomiting noted.    Psychosocial/Spiritual Needs:  Appreciate  support.    Oriented to Spiritual Health and Social Work Services as part of Palliative Care team.    Decision-Making & Goals of Care:  Discussion/counseling today about goals of care/decisions:  Limited discussion via phone with Hemant patient's niece and co-guardian as she reports being here until 330 in the AM.  We reviewed the events of the night and she appear to have a good understanding of current situation.   I reviewed our previous discussion below and POLST which indicates DNR/DNI.  I reviewed the current orders that are \"Full Code.\"  I asked if something had changed.  She stated that yes they had decided for Full Code as they know when it is his time to die he will die anyway despite what we do.  I relayed my concern that intubation of CPR may prolong his life but would not improve it from baseline and he is at high risk of not surviving this hospitalization with or without further life support.  She says Full Code is the right thing to do for now and she will be in " "later in the evening.  11/18/2015: Discussed with co-guardians, Niece Hemant and Nephew Mane.  They have seen him decline over the years and wish there was something that could have been done to prevent the current chronic illnesses as he is noted to have led a healthy lifestyle, active non smoker etc.  They acknowledge that he has entered the final stage of life with recent decline and is fragile, noting we all will come to this end and they wish to keep him comfortable.  We discussed code status and they both agree that attempting CPR or intubating patient would \"only prolong his life\"  and would not help him improve from this already significantly debilitated state. He is made DNR/DNI.  Both note dialysis as a life support but feel actively stopping this would feel like death at their hands, and have concerns family would feel this way.  They are open to stopping dialysis when it is not effective or \"the right thing to do\"  per the nephrologist.  We discussed ways of maintaining comfort as patient declines over time with our without dialysis.    Patient has decision-making capacity Unreliable  Hesham Ramos has  court-appointed co-guardians for healthcare decisions in a legal document dated 3/28/13.  See maribel valenzuela copy under ACP tab.  Name: Hemant Ramos, Relationship: unknown, Phone(s): [phone].  Name: Carlos Ramos, Relationship: unknown, Phone(s): [phone].      Patient has a completed health care directive available in the chart (Y/N):Np  Physician orders for life-sustaining treatment (POLST) form is on file but will likely need revision prior to disharge based on today's discussion above.  Code Status: Full code     Findings & plan of care discussed with: MD and nursing.  Follow-up plan from palliative team: Will continue to follow in support of decision making.  Thank you for involving us in the patient's care.     Giuliana SEGAL, CNS  Pain Management and Palliative " "Care  [facility]  Pgr: [phone]    Time Spent on this Encounter   I spent  35 minutes in assessment of the patient and discussion with the patient and family. Another 35 minutes in review of chart, documentation and discussion with the health care team.    Reason for Consult   Reason for consult: I was asked by Dr. Mclaughlin to evaluate this patient for Goals of care  Decisional support  Patient and family support.    Primary Care Physician   Fabien Sherman MD    Chief Complaint   Unable    History is obtained from the electronic health record and niece who is his co-guardian.    History of Present Illness   Hesham Ramos is a 67 year old male who presents with symptoms of respiratory distress. He has been treated for likely aspiration pneumonia with noted vomiting.  Concern now for other abdominal process. Abdominal x-ray showed air in liver of unclear significance and patient appears to be in pain upon exam of abdomen.  Lactic acid is elevated and over the course of the day he became hypotensive and lethargic today and subsequently has been intubated and on pressors.  CT of abdoman is pending.     This is in the setting of Hx CVA with current aphasia and right hemiparesis, ESRD on chronic dialysis, CHF with diastolic failure, seizure disorder. He has been ventilated in the past I believe related to airway protection with seizures and has known dysphagia with decision to not proceed with a PEG tube but now has new PEG tube in the past 3 months. He has  been hospitalized 2 times in the past 3 months. His is at baseline completely dependant for all of his care needs living in a group home, noted as \"pretty stable\" by niece. There is no documented weight loss.     The following symptoms are noted by patient as concerning to his quality of life.  Unable, appear uncomfortable to palpation of abdomen.    Vomiting noted.      Past Medical History   I have reviewed this patient's medical history " and updated it with pertinent information if needed.[AK1.1]   Past Medical History:   Diagnosis Date     Cognitive impairment      Diastolic CHF, chronic (H)      End-stage renal disease on hemodialysis (H)      Essential hypertension     Off all BP meds as of late  due to hypotension     Renal cancer (H)     s/p cryoablation     Seizures (H) Onset 2014    On dilantin     Stroke (H) ,     Hemiplegia, aphasia[AK1.2]       Past Surgical History   I have reviewed this patient's surgical history and updated it with pertinent information if needed.[AK1.1]  Past Surgical History:   Procedure Laterality Date     Cryoablation of kidney cancer       ESOPHAGOSCOPY, GASTROSCOPY, DUODENOSCOPY (EGD), COMBINED  2014    Procedure: COMBINED ESOPHAGOSCOPY, GASTROSCOPY, DUODENOSCOPY (EGD), BIOPSY SINGLE OR MULTIPLE;  Surgeon: Raheel Mixon MD;  Location:  GI     HEMODIALYSIS VIA AV FISTULA       PEG tube NOS       right arm skin graft[AK1.2]         Prior to Admission Medications   Prior to Admission Medications   Prescriptions Last Dose Informant Patient Reported? Taking?   ACE/ARB NOT PRESCRIBED, INTENTIONAL,   No No   Sig: Please choose reason not prescribed, below   ASPIRIN PO   Yes No   Sig: Take 81 mg by mouth daily   BETA BLOCKER NOT PRESCRIBED, INTENTIONAL,   No No   Sig: Beta Blocker not prescribed intentionally due to Hypotension (SBP < 90)   DONEPEZIL HYDROCHLORIDE 10 MG TBDP   No No   Sig: 10 mg by Per G Tube route daily ODT- patient has swallowing issues   Docusate Sodium 150 MG/15ML LIQD   No No   Sig: 10 mLs by Per G Tube route 2 times daily   acetaminophen (TYLENOL) 32 mg/mL solution   Yes Yes   Si-650 mg by Gastric Tube route every 6 hours as needed for fever or mild pain   cholecalciferol (VITAMIN D/D-VI-SOL) 400 UNIT/ML LIQD liquid   Yes Yes   Sig: Take 1,200 Units by mouth daily   multivitamins with minerals (CERTAVITE/CEROVITE) LIQD liquid   No No   Sig: 15 mLs by Per G Tube route  daily   order for DME   No No   Si wet sling with head support  1 dry sling with head support   phenytoin (DILANTIN) 125 MG/5ML suspension   Yes No   Sig: Take 300 mg by mouth At Bedtime Dose = 12 ml per group home (125mg/5ml).   polyethylene glycol (MIRALAX/GLYCOLAX) Packet   Yes Yes   Si g by Gastric Tube route 2 times daily as needed for constipation   sennosides (SENOKOT) 8.6 MG tablet   No No   Si tablets by Per G Tube route 2 times daily   simvastatin (ZOCOR) 40 MG tablet   No No   Si tablet (40 mg) by Per G Tube route At Bedtime   triamcinolone (KENALOG) 0.1 % cream   No No   Sig: Apply sparingly to affected area twice daily as needed for itching.      Facility-Administered Medications: None     Allergies   No Known Allergies    Social History   I have updated and reviewed the following Social History Narrative:   Social History     Social History Narrative    Originally from [facility] [place] [facility].     Moved to [facility] on 3/1/2016.      Living situation: Patient lives in Group home, previously with his niece Dannie and her  as caregivers.  Family system: Niece Dannie and nephew joelle, Patient is noted to be , He moved from [facility] in  and originally lived with his son.   Functional status (needs help with ADLs or IADLs): needs assist with all ADL's non ambulatory  Employment/education: [employer]  Use of community resources: unknown, has [facility]   Activities/interests: un[employer]  History of substance use/abuse: unknown  Christianity affiliation: Catholic  Involvement in allie community: unknown    Family History   I have reviewed this patient's family history and updated it with pertinent information if needed.[AK1.1]   Family History   Problem Relation Age of Onset     Family History Negative Mother      Unknown/Adopted No family hx of[AK1.3]        Review of Systems   Review of systems not obtained due to patient factors - mental status and unable      Physical Exam[AK1.1]    Temp:  [94.5  F (34.7  C)-102.2  F (39  C)] 100.8  F (38.2  C)  Pulse:  [85] 85  Heart Rate:  [] 100  Resp:  [28-56] 56  BP: ()/(26-97) 110/42  FiO2 (%):  [21 %] 21 %  SpO2:  [94 %-100 %] 96 %[AK1.4]  106 lbs 14.77 oz  GEN:  Appears awake but does not respond to questions, commands.  Grimace with exam.  HEENT:  Normocephalic/atraumatic, no scleral icterus, no nasal discharge, mouth moist.  CV:  tachycardic, S1, S2; no murmurs or other irregularities noted.  +3 DP/PT pulses bilatererally; no edema BLE.  RESP:  Coarse to auscultation bilaterally Symmetric chest rise on inhalation noted.  Mild labored respiratory effort.  ABD:  Rounded, soft, appears tender/non-distended.  +BS peg in place.  EXT:  Edema & pulses as noted above.  CMS intact x 4.     M/S:   Thin appearing.    SKIN:  Dry to touch, no exanthems noted in the visualized areas.      Data[AK1.1]   Results for orders placed or performed during the hospital encounter of 06/11/17 (from the past 24 hour(s))   EKG 12 lead   Result Value Ref Range    Interpretation ECG Click View Image link to view waveform and result    Glucose by meter   Result Value Ref Range    Glucose 173 (H) 70 - 99 mg/dL   Chest  XR, 1 view PORTABLE    Narrative    XR CHEST PORT 1 VW  6/11/2017 11:04 PM      HISTORY: Shortness of breath.     COMPARISON: 4/24/2017.    FINDINGS: Right IJ infusion catheter in place. Vascular stents in the  right arm. The heart size is normal. The thoracic aorta is calcified  and tortuous. There is a band of scar or atelectasis at the left lung  base. The lungs are otherwise clear. No pneumothorax or pleural  effusion.      Impression    IMPRESSION: Mild left basilar atelectasis.   Blood culture ONE site   Result Value Ref Range    Specimen Description Blood Left Arm     Special Requests Aerobic and anaerobic bottles received     Culture Micro No growth after 11 hours     Micro Report Status Pending    CBC (platelets, no diff)   Result Value Ref  Range    WBC 15.8 (H) 4.0 - 11.0 10e9/L    RBC Count 3.94 (L) 4.4 - 5.9 10e12/L    Hemoglobin 11.7 (L) 13.3 - 17.7 g/dL    Hematocrit 38.2 (L) 40.0 - 53.0 %    MCV 97 78 - 100 fl    MCH 29.7 26.5 - 33.0 pg    MCHC 30.6 (L) 31.5 - 36.5 g/dL    RDW 15.1 (H) 10.0 - 15.0 %    Platelet Count 424 150 - 450 10e9/L   Comprehensive metabolic panel   Result Value Ref Range    Sodium 135 133 - 144 mmol/L    Potassium 5.5 (H) 3.4 - 5.3 mmol/L    Chloride 97 94 - 109 mmol/L    Carbon Dioxide 19 (L) 20 - 32 mmol/L    Anion Gap 19 (H) 3 - 14 mmol/L    Glucose 286 (H) 70 - 99 mg/dL    Urea Nitrogen 147 (H) 7 - 30 mg/dL    Creatinine 7.89 (H) 0.66 - 1.25 mg/dL    GFR Estimate 7 (L) >60 mL/min/1.7m2    GFR Estimate If Black 8 (L) >60 mL/min/1.7m2    Calcium 9.7 8.5 - 10.1 mg/dL    Bilirubin Total 0.4 0.2 - 1.3 mg/dL    Albumin 3.2 (L) 3.4 - 5.0 g/dL    Protein Total 9.4 (H) 6.8 - 8.8 g/dL    Alkaline Phosphatase 219 (H) 40 - 150 U/L    ALT 70 0 - 70 U/L    AST 72 (H) 0 - 45 U/L   Lactic acid whole blood   Result Value Ref Range    Lactic Acid 4.4 (HH) 0.7 - 2.1 mmol/L   INR   Result Value Ref Range    INR 1.23 (H) 0.86 - 1.14   Magnesium   Result Value Ref Range    Magnesium 3.1 (H) 1.6 - 2.3 mg/dL   Phosphorus   Result Value Ref Range    Phosphorus 6.7 (H) 2.5 - 4.5 mg/dL   Vitamin D Deficiency   Result Value Ref Range    Vitamin D Deficiency screening 85 (H) 20 - 75 ug/L   ISTAT Basic Met ICa HCT POCT   Result Value Ref Range    Sodium 138 133 - 144 mmol/L    Potassium 5.8 (H) 3.4 - 5.3 mmol/L    Chloride 104 94 - 109 mmol/L    Total CO2 22 20 - 32 mmol/L    Anion Gap 12 6 - 17 mmol/L    Glucose 283 (H) 70 - 99 mg/dL    Urea Nitrogen 134 (H) 7 - 30 mg/dL    Creatinine 8.1 (H) 0.66 - 1.25 mg/dL    GFR Estimate 7 (L) >60 mL/min/1.7m2    GFR Estimate If Black 8 (L) >60 mL/min/1.7m2    Calcium Ionized 3.8 (L) 4.4 - 5.2 mg/dL    Hemoglobin 13.3 13.3 - 17.7 g/dL    Hematocrit - POCT 39 (L) 40.0 - 53.0 %PCV   Troponin POCT   Result Value  Ref Range    Troponin I 0.18 (HH) 0.00 - 0.10 ug/L   Head CT w/o contrast    Narrative    CT HEAD W/O CONTRAST 6/11/2017 11:54 PM      HISTORY: Unresponsive.    TECHNIQUE: Routine noncontrast head CT. Radiation dose for this scan  was reduced using automated exposure control, adjustment of the mA  and/or kV according to patient size, or iterative reconstruction  technique.    COMPARISON: 12/29/2016.    FINDINGS: There is generalized brain atrophy. No mass, mass effect or  intracranial hemorrhage. No evidence of acute infarct. Old cerebellar  infarcts. Periventricular low attenuation is consistent with chronic  small vessel ischemic disease. The visualized paranasal sinuses are  clear.      Impression    IMPRESSION:  No acute abnormality.    AMBIKA PERLA MD   Blood culture ONE site   Result Value Ref Range    Specimen Description Blood Central line Blue     Special Requests Aerobic and anaerobic bottles received     Culture Micro No growth after 6 hours     Micro Report Status Pending    Blood gas arterial and oxyhgb   Result Value Ref Range    pH Arterial 7.30 (L) 7.35 - 7.45 pH    pCO2 Arterial 32 (L) 35 - 45 mm Hg    pO2 Arterial 111 (H) 80 - 105 mm Hg    Bicarbonate Arterial 16 (L) 21 - 28 mmol/L    FIO2 100%  HIGH FLOW NC       Oxyhemoglobin Arterial 96 92 - 100 %    Base Deficit Art 9.7 mmol/L   Lactic acid whole blood   Result Value Ref Range    Lactic Acid 4.7 (HH) 0.7 - 2.1 mmol/L   Potassium   Result Value Ref Range    Potassium 5.0 3.4 - 5.3 mmol/L   Methicillin Resistant Staph Aureus PCR   Result Value Ref Range    Specimen Description Nares     S Aur Meth Resis PCR  NEG     Negative  MRSA Negative: SA Negative  MRSA and Staphylococcus aureus target DNA not   detected, presumed negative for MRSA and SA colonization or the number of   bacteria present may be below the limit of detection for the assay. FDA   approved assay performed using [employer] GeneXpert(R) real-time PCR.     Troponin I   Result Value  Ref Range    Troponin I ES 0.132 (HH) 0.000 - 0.045 ug/L   Glucose   Result Value Ref Range    Glucose 294 (H) 70 - 99 mg/dL   Glucose by meter   Result Value Ref Range    Glucose 184 (H) 70 - 99 mg/dL   XR Abdomen Port 1 View    Narrative    XR ABDOMEN PORT F1 VW  6/12/2017 4:37 AM      HISTORY: Aspiration.     COMPARISON: None.    FINDINGS: G-tube in the midline. Right inguinal catheter in place.  Catheter projects over the lower pelvis. The bowel gas pattern is  within normal limits. There is air within the liver; this could be  biliary or portal venous. Right hemidiaphragm is elevated.      Impression    IMPRESSION: Air within the liver which may be biliary or portal  venous. If there is clinical concern for portal venous gas, CT is  recommended.   Glucose by meter   Result Value Ref Range    Glucose 262 (H) 70 - 99 mg/dL   Lactic acid whole blood   Result Value Ref Range    Lactic Acid 4.1 (HH) 0.7 - 2.1 mmol/L   Hemoglobin A1c   Result Value Ref Range    Hemoglobin A1C 5.1 4.3 - 6.0 %   Glucose by meter   Result Value Ref Range    Glucose 197 (H) 70 - 99 mg/dL   Troponin I   Result Value Ref Range    Troponin I ES 0.118 (H) 0.000 - 0.045 ug/L   Lactic acid whole blood   Result Value Ref Range    Lactic Acid 4.6 (HH) 0.7 - 2.1 mmol/L   XR Chest Port 1 View    Narrative    CHEST ONE VIEW PORTABLE June 12, 2017 12:45 PM     HISTORY: Endotracheal tube placement.    COMPARISON: 6/11/2017.      Impression    IMPRESSION: Endotracheal tube in good position. Elevated right  diaphragm. Interstitial densities in both lungs may be partly due to  shallow inspiration. Normal heart size and pulmonary vascularity.  Central venous catheter is unchanged. Nasogastric tube is coiled in  the esophagus and must be repositioned. Apparent stent grafts over the  right arm medially.    THEODORE SWAN MD   Glucose by meter   Result Value Ref Range    Glucose 121 (H) 70 - 99 mg/dL   Blood gas arterial and oxyhgb   Result Value Ref Range     pH Arterial 7.26 (L) 7.35 - 7.45 pH    pCO2 Arterial 28 (L) 35 - 45 mm Hg    pO2 Arterial 228 (H) 80 - 105 mm Hg    Bicarbonate Arterial 13 (L) 21 - 28 mmol/L    FIO2 80%  Ventilator       Oxyhemoglobin Arterial 99 92 - 100 %    Base Deficit Art 13.4 mmol/L   Basic metabolic panel   Result Value Ref Range    Sodium 142 133 - 144 mmol/L    Potassium 5.6 (H) 3.4 - 5.3 mmol/L    Chloride 107 94 - 109 mmol/L    Carbon Dioxide 15 (L) 20 - 32 mmol/L    Anion Gap 20 (H) 3 - 14 mmol/L    Glucose 140 (H) 70 - 99 mg/dL    Urea Nitrogen 143 (H) 7 - 30 mg/dL    Creatinine 7.77 (H) 0.66 - 1.25 mg/dL    GFR Estimate 7 (L) >60 mL/min/1.7m2    GFR Estimate If Black 8 (L) >60 mL/min/1.7m2    Calcium 7.5 (L) 8.5 - 10.1 mg/dL   Lactic acid   Result Value Ref Range    Lactic Acid 4.7 (HH) 0.4 - 2.0 mmol/L   CT Abdomen Pelvis w/o Contrast    Narrative    CT ABDOMEN AND PELVIS WITHOUT CONTRAST   6/12/2017 4:31 PM     HISTORY: Sepsis thought to be due to aspiration of tube feeds but with  air in liver on x-ray. Evaluate for biliary obstruction, infection,  etc.    TECHNIQUE: CT of the abdomen and pelvis was performed without  intravenous contrast. Radiation dose for this scan was reduced using  automated exposure control, adjustment of the mA and/or kV according  to patient size, or iterative reconstruction technique.    COMPARISON: CT of the abdomen/pelvis dated 2/25/2009.    FINDINGS:    This is a very limited exam due to motion artifact and lack of  intravenous contrast.    Again identified are cysts within the liver. No definite air within  the biliary tree is seen. No definite intrahepatic biliary duct  dilatation although without contrast, sensitivity for detection is  decreased. No definite extrahepatic biliary duct dilatation.    Since the previous exam, a G-tube has been placed.    Evaluation of the remaining solid organs is limited due to a lack of  intravenous contrast. The kidneys are atrophic with  multiple  calcifications.    There are distended loops of small bowel throughout the abdomen.    A La catheter is in place.    There is no free air and no definite free fluid in the abdomen or  pelvis.    There are COPD changes at the lung bases. Bibasilar atelectasis and/or  infiltrates.      Impression    IMPRESSION:   1. Limited exam. No definite air within the biliary tree or biliary  duct dilatation identified on this study.  2. Dilated loops of small bowel throughout the abdomen. This is  nonspecific. It could represent a diffuse ileus.  3. Bibasilar atelectasis and/or infiltrates.[AK1.5]                Revision History        User Key Date/Time User Provider Type Action    > AK1.5 6/12/2017  5:11 PM Klopp, Giuliana Jimenez, PHILIPP CNS Clinical Nurse Specialist Sign     AK1.4 6/12/2017  5:06 PM Klopp, Giuliana Jimenez, APRN CNS Clinical Nurse Specialist      AK1.3 6/12/2017  5:05 PM Klopp, Giuliana Jimenez, PHILIPP CNS Clinical Nurse Specialist      AK1.2 6/12/2017  5:02 PM Klopp, Giuliana Jimenez, APRN CNS Clinical Nurse Specialist      AK1.1 6/12/2017  4:24 PM Klopp, Giuliana Jimenez, APRN CNS Clinical Nurse Specialist                      Progress Notes - Physician (Notes from 06/25/17 through 06/28/17)      Progress Notes by Rashmi Coates at 6/28/2017  2:10 PM     Author:  Rashmi Coates Service:  Care Coordinator Author Type:      Filed:  6/28/2017  2:11 PM Date of Service:  6/28/2017  2:10 PM Creation Time:  6/28/2017  2:10 PM    Status:  Signed :  Rashmi Coates ()         Your information has been submitted on June 28th, 2017 at 02:10:29 PM CDT. The confirmation number is [id number][NM1.1]       Revision History        User Key Date/Time User Provider Type Action    > NM1.1 6/28/2017  2:11 PM Rashmi Coates  Sign            Progress Notes by Hesham Todd MD at 6/28/2017  1:56 PM     Author:  Hesham Todd MD Service:  Hospitalist Author Type:  Physician     Filed:  6/28/2017  1:57 PM Date of Service:  6/28/2017  1:56 PM Creation Time:  6/28/2017  1:56 PM    Status:  Signed :  Hesham Todd MD (Physician)         I saw patient and discussed discharge to hospice with family.  40 min spent on discharge.[DF1.1]       Revision History        User Key Date/Time User Provider Type Action    > DF1.1 6/28/2017  1:57 PM Hesham Todd MD Physician Sign            Progress Notes by Elaina Moeller APRN CNS at 6/28/2017  1:29 PM     Author:  Elaina Moeller APRN CNS Service:  Palliative Author Type:  Clinical Nurse Specialist    Filed:  6/28/2017  1:33 PM Date of Service:  6/28/2017  1:29 PM Creation Time:  6/28/2017  1:29 PM    Status:  Signed :  Elaina Moeller APRN CNS (Clinical Nurse Specialist)         [facility]  Palliative Care Progress Note  Text Page     Appreciate notification from  Corinne White, LSW regarding the families plan to have Hesham transferred to LTC at [facility]. I did call the patient's niece and co-guardian Hemant Ramos at [phone] regarding the results of today's laboratory values. Unfortunately the Potassium, Carbon Dioxide, Urea Nitrogen, Creatine and GFR are still pending.     Elaina Moeller MS, RN, CNS, APRN, ACHPN, FAACVPR  Pain and Palliative Care  Pager [phone]  Office [phone][AM1.1]          Revision History        User Key Date/Time User Provider Type Action    > AM1.1 6/28/2017  1:33 PM Elaina Moeller APRN CNS Clinical Nurse Specialist Sign            Progress Notes by White, Corinne C, LSW at 6/28/2017 10:50 AM     Author:  White, Corinne C, LSW Service:  (none) Author Type:      Filed:  6/28/2017  1:28 PM Date of Service:  6/28/2017 10:50 AM Creation Time:  6/28/2017 10:50 AM    Status:  Addendum :  White, Corinne C, LSW ()         CM: Met with pt's family. They agree that NH placement at this  time is the better option for pt. They are aware that  setting made the decision last night to not accept pt back.  Family have been offered TCU bed at 2 of the three locations. Once facility is able to offer private room for pt,. The other two are shared. Family will leave after Hospice meeting has finished and tour   P:[CW1.1]     06/28/17 1000   [facility] Resources   [facility] [phone], Fax: [phone]   [facility] [phone], Fax: [phone][CW1.2]   Will need stretcher transport[CW1.1]     CM: spoke with Ramona Rascon at [facility]. She is aware of the plan. Spoke with MD that family toured and liked facility. Hemant wanted [facility] to set up transport set up for 1530[CW1.3]     Revision History        User Key Date/Time User Provider Type Action    > CW1.3 6/28/2017  1:28 PM White, Corinne C, LSW  Addend     CW1.2 6/28/2017 10:53 AM White, Corinne C, LSW  Sign     CW1.1 6/28/2017 10:50 AM White, Corinne C, LSW              Progress Notes by Elaina Moeller APRN CNS at 6/28/2017 10:37 AM     Author:  Elaina Moeller APRN CNS Service:  Palliative Author Type:  Clinical Nurse Specialist    Filed:  6/28/2017 11:04 AM Date of Service:  6/28/2017 10:37 AM Creation Time:  6/28/2017 10:37 AM    Status:  Addendum :  Elaina Moeller APRN CNS (Clinical Nurse Specialist)         [facility]  Palliative Care Progress Note  Text Page     Joined family care conference with [facility] and unit  Corinne White, LSW in the fifth floor conference room. The patient's co-guardians; his niece and nephew Hemant and Mane Ramos along with Hesham's sister and another nephew had questions in regard to the current status of the patient. We discussed Hesham's comorbidities at length. They are insistent in wanting current lab data, so I paged Hospitalist Hesham Todd MD for  this request. The family does not want any possible option for restorative intervention to be overlooked. They plan to look at LTC facilities today with a plan to dismiss with the support of [facility].[AM1.1]      Oxygen Saturation at 92% on room air. Reasonable to dismiss without supplemental oxygen.[AM1.2]      Total time: 9:40 AM until 10:35 AM in assessment of patient and discussion with family.    Elaina Moeller MS, RN, CNS, APRN, ACHPN, FAACVPR  Pain and Palliative Care  Pager [phone]  Office [phone][AM1.1]        Revision History        User Key Date/Time User Provider Type Action    > AM1.2 6/28/2017 11:04 AM Elaina Moeller APRN CNS Clinical Nurse Specialist Addend     [N/A] 6/28/2017 10:49 AM Elaina Moeller APRN CNS Clinical Nurse Specialist Addend     AM1.1 6/28/2017 10:48 AM Elaina Moeller APRN CNS Clinical Nurse Specialist Sign            Progress Notes by Elaina Moeller APRN CNS at 6/27/2017  6:57 AM     Author:  Elaina Moeller APRN CNS Service:  Palliative Author Type:  Clinical Nurse Specialist    Filed:  6/28/2017 10:49 AM Date of Service:  6/27/2017  6:57 AM Creation Time:  6/27/2017  6:57 AM    Status:  Addendum :  Elaina Moeller APRN CNS (Clinical Nurse Specialist)         [facility]  Palliative Care Progress Note  Text Page[AM1.1]    Patient resting comfortably at time of assessment. No family at bedside. I would be happy to discuss Hesham's care if they should arrive later today, please call or page.[AM1.2]      Assessment & Plan       1.  Tachypnea[AM1.1] and abdominal pain[AM1.2] - Needed 4.5 mg IV Dilaudid during the past day to maintain comfort.[AM1.1]  Patient no longer has and IV site, so would use Roxicodone as needed for comfort.[AM1.2]      2.[AM1.1]  Death rattle[AM1.2] -[AM1.1] IV[AM1.2] Robinul[AM1.1] discontinued as patient no longer has IV access[AM1.2].[AM1.1] Use Atropine sublingual to keep  secretions at bay.[AM1.2]      3. Patient[AM1.1] does not respond to commands. Patient[AM1.2] does not demonstrate medical capacity. Legal co-guardians for healthcare decisions Hemant Ramos (niece) and Carlos Ramos (nephew) are assisting in plan of care.      4. Goal of Care: DNR/[AM1.1]D[AM1.2]NI - Comfort plan       Elaina Moeller MS, RN, CNS, APRN, ACHPN, FAACVPR  Pain and Palliative Care  Pager [phone]  Office [phone]    Time Spent on this Encounter   I spent[AM1.1]  15[AM1.2] minutes in assessment of the patient. Another[AM1.1] 20[AM1.2] minutes in review of chart, documentation and discussion with the health care team.    Interval History[AM1.1]   Chart reviewed[AM1.2]    Review of Systems[AM1.1]     Non-verbal and does not respond to commands[AM1.2]    Physical Exam   Temp:  [98.2  F (36.8  C)] 98.2  F (36.8  C)  Heart Rate:  [76-92] 80  Resp:  [19-35] 28  BP: (103-135)/(25-46) 135/45  FiO2 (%):  [35 %] 35 %  SpO2:  [31 %-100 %] 97 %  130 lbs 15.25 oz  GEN:  Alert,[AM1.1] looking blankly about, but does not focus on person speaking and does not follow commands.[AM1.2]   HEENT:  Normocephalic/atraumatic, no scleral icterus, no nasal discharge, mouth[AM1.1] dry[AM1.2].  CV:  RRR, S1, S2; no murmurs or other irregularities noted.  +3 DP/PT pulses[AM1.1] bilaterally[AM1.2]; no edema BLE.  RESP:[AM1.1]  Coarse bilateral breath sounds which are diminished at bases.[AM1.2] Symmetric chest rise on inhalation noted.  Normal respiratory effort.  ABD:  Rounded, soft,[AM1.1] wincing with palpation[AM1.2]/non-distended.[AM1.1]  Absent B[AM1.2]S  EXT:[AM1.1]  Right hand is edematous and contracted.[AM1.2]   SKIN:[AM1.1]  Warm and d[AM1.2]ry to touch, no exanthems noted in the visualized areas.    PAIN BEHAVIOR:[AM1.1] Looks comfortable[AM1.2].    Medications        glycopyrrolate  0.3 mg Intravenous Q4H     sodium chloride 0.9%  1,000 mL Intravenous Once       Data   No results found for this or any  previous visit (from the past 24 hour(s)).[AM1.1]       Revision History        User Key Date/Time User Provider Type Action    > [N/A] 6/28/2017 10:49 AM Elaina Moeller APRN CNS Clinical Nurse Specialist Addend     AM1.2 6/27/2017  7:28 AM Elaina Moeller APRN CNS Clinical Nurse Specialist Sign     AM1.1 6/27/2017  6:57 AM Elaina Moeller APRN CNS Clinical Nurse Specialist             Progress Notes by White, Corinne C, LSW at 6/28/2017  8:31 AM     Author:  White, Corinne C, LSW Service:  (none) Author Type:      Filed:  6/28/2017  8:32 AM Date of Service:  6/28/2017  8:31 AM Creation Time:  6/28/2017  8:31 AM    Status:  Signed :  White, Corinne C, LSW ()         CM:  staff made decision last night that they are NOT able to accept pt back to their home  P: Hospice meeting set for 9:30 today. Will send referral out for LTC bed in NH setting with the support of Hospice.[CW1.1]      Revision History        User Key Date/Time User Provider Type Action    > CW1.1 6/28/2017  8:32 AM White, Corinne C, LSW  Sign            Progress Notes by Gómez Solorzano at 6/28/2017  8:26 AM     Author:  Gómez Solorzano Service:  Spiritual Health Author Type:      Filed:  6/28/2017  8:29 AM Date of Service:  6/28/2017  8:26 AM Creation Time:  6/28/2017  8:26 AM    Status:  Signed :  Gómez Solorzano ()         SPIRITUAL HEALTH SERVICES Progress Note  [facility] Med/Surg 5th floor    Pt currently on comfort cares and will be meeting with hospice this morning. Visited pt this morning, his eyes are open but not focusing and does not acknowledge my presence. Pt appears comfortable. Family has shared in the past that pt is Lutheran and appreciates prayer. Prayed with pt and read 23rd Psalm. Will attempt to connect with family later today and assess needs. I and the other chaplains remain available per pt/family needs/request.     SHAD Godwin.  Staff     Pager #[phone][DM1.1]        Revision History        User Key Date/Time User Provider Type Action    > DM1.1 6/28/2017  8:29 AM Gómez Solorzano Sign            Progress Notes by Elaina Moeller APRN CNS at 6/28/2017  6:36 AM     Author:  Elaina Moeller APRN CNS Service:  Palliative Author Type:  Clinical Nurse Specialist    Filed:  6/28/2017  7:05 AM Date of Service:  6/28/2017  6:36 AM Creation Time:  6/28/2017  6:36 AM    Status:  Signed :  Elaina Moeller APRN CNS (Clinical Nurse Specialist)         [facility]  Palliative Care Progress Note  Text Page[AM1.1]    Patient looks comfortable resting in bed. No family at bedside at this time, but I will me mindful to stop by for the meeting with hospice at 9 AM.[AM1.2]       Assessment & Plan      1.  Tachypnea and abdominal pain -[AM1.1] 5 mg sublingual Roxicodone used[AM1.2] during the past day to maintain comfort.         2.  Death rattle -[AM1.1]Continue[AM1.2] Atropine sublingual to keep secretions at bay.       3. Patient does not respond to command[AM1.1] -[AM1.2] Patient does not demonstrate medical capacity. Legal co-guardians for healthcare decisions Hemant Matthewsdiogregmatthew (niece) and Carlos Matthewsdelmy (nephew) are assisting in plan of care.      4. Goal of Care: DNR/DNI - Comfort plan[AM1.1]. Hospice meeting with group home care providers and family at 9 AM today. Patient does look stable for transfer today.[AM1.2]       Elaina Moeller MS, RN, CNS, APRN, ACHPN, FAACVPR  Pain and Palliative Care  Pager [phone]  Office [phone]    Time Spent on this Encounter   I spent[AM1.1]  15[AM1.2] minutes in assessment of the patient. Another[AM1.1] 10[AM1.2] minutes in review of chart, documentation and discussion with the health care team.    Interval History[AM1.1]   Chart reviewed and discussed with night shift nurses Deandre Paz RN and Radha Slade LPN[AM1.2]    Review of Systems[AM1.1]      Patient is not verbal[AM1.2]        Physical Exam   Resp:  [18-20] 18  130 lbs 15.25 oz  GEN:  Alert,[AM1.1] looking aimlessly around the room and non-verbal[AM1.2], appears comfortable.  HEENT:  Normocephalic/atraumatic, no scleral icterus, no nasal discharge, mouth[AM1.1] dry.[AM1.2]  CV:  RRR[AM1.1] at 80[AM1.2], S1, S2;[AM1.1] rub[AM1.2].  +3 DP/PT pulses[AM1.1] bilaterally[AM1.2];[AM1.1] right hand/arm[AM1.2] edema.  RESP:[AM1.1]  Tachypnic at 28. Bilateral coarse lung sounds.[AM1.2] Symmetric chest rise on inhalation noted.  Normal respiratory effort.  ABD:  Rounded, soft, non-tender/non-distended.[AM1.1]  Absent bowel sounds.[AM1.2]   SKIN:[AM1.1]  Warm and d[AM1.2]ry to touch, no exanthems noted in the visualized areas.      Medications           Data[AM1.1]   Results for orders placed or performed during the hospital encounter of 06/11/17 (from the past 24 hour(s))   Social Work IP Consult    Narrative    White, Corinne C, LSW     6/27/2017  2:22 PM  Care Transition Initial Assessment -   Reason For Consult: discharge planning, end of life/hospice  Met with. Left VM message for both Hemant and Mane. Spoke to RN   Patience at [facility] [phone]  Active Problems:    Respiratory distress         DATA  Lives With: facility resident  Living Arrangements: group home  Description of Support System: Supportive, Involved  Who is your support system?: Facility resident(s)/Staff  Support Assessment: Adequate family and caregiver support,   Adequate social supports. Spoke with RN at  setting. They were   provided full support for pt prior to admission.   Identified issues/concerns regarding health management: PT is now   comfort care.  staff would like to attend meeting for dc   planning. They are aware that Hospice is the recommendation for   pt at this time.        Resources List: Hospice     Quality Of Family Relationships: supportive  Transportation Available:  (stretcher)      ASSESSMENT  Pt has 2 co  decision makers for HCD. Left Vm message for both pre   conversation with [facility] team.  PT is able to return to his    setting once Hospice has signed onto care.  Jessica RN from [facility]   would also like to attend to coordinate needs for for pt.   Called [facility] and set up meeting for tomorrow at 9:30     setting has RN on site m-f from 8am-12:00 than from 4pm-8am  Weekends, evening and 12-4 pt is care for by PCA support when RN   staff not there  PLAN  Financial costs for the patient includes Pt has medicare. For   Hospice support.  .  Patient given options and choices for discharge Family would like   pt to return to his current  setting  .  Discharge Planner   Discharge Plans in progress: Hospice meeting set up for Wed @   9:30  Barriers to discharge plan: medical stability for dc  Follow up plan: Waiting for call back from both Decision makers   about time        Entered by: Corinne C. White 06/27/2017 11:24 AM       CM: Ramona PABON [phone] from [facility] called about   an update on pt and his needs.[AM1.3]           Revision History        User Key Date/Time User Provider Type Action    > AM1.2 6/28/2017  7:05 AM Elaina Moeller APRN CNS Clinical Nurse Specialist Sign     AM1.3 6/28/2017  6:37 AM Elaina Moeller APRN CNS Clinical Nurse Specialist      AM1.1 6/28/2017  6:36 AM Elaina Moeller APRN CNS Clinical Nurse Specialist             Progress Notes by Taqueria Tobias at 6/27/2017  3:09 PM     Author:  Taqueria Tobias Service:  Spiritual Health Author Type:      Filed:  6/27/2017  3:13 PM Date of Service:  6/27/2017  3:09 PM Creation Time:  6/27/2017  3:09 PM    Status:  Signed :  Taqueria Tobias ()         SPIRITUAL HEALTH SERVICES  SPIRITUAL ASSESSMENT Progress Note  [facility]    Visited pt per staff's consult requesting additional spiritual support for family.    On arriving, family had left and none were expected until 8:30am on 6/28.  At the suggestion of his  nurse and based on previous  interaction, prayed with Hesham.  Though his eyes were open and he moved around gently during our time together, he did not give any clear responses.    Will notify the palliative  of our visit.      Taqueria Tobias M.Div.  Staff   Pager [phone][KJ1.1]       Revision History        User Key Date/Time User Provider Type Action    > KJ1.1 6/27/2017  3:13 PM Taqueria Tobias Sign            Progress Notes by Elaina Moeller APRN CNS at 6/27/2017  1:05 PM     Author:  Elaina Moeller APRN CNS Service:  Palliative Author Type:  Clinical Nurse Specialist    Filed:  6/27/2017  1:17 PM Date of Service:  6/27/2017  1:05 PM Creation Time:  6/27/2017  1:05 PM    Status:  Signed :  Elaina Moeller APRN CNS (Clinical Nurse Specialist)         [facility]  Palliative Care Progress Note  Text Page     Thanks to bedside nurse Raven Rincon RN for paging me to let me know that Hesham's family was at bedside asking questions. The patient's nephew (from [place]) was asking about nutrition. He indicated that the family had a discussion with Mane and agree with the plan for comfort and hospice, yet he was concerned about Hesham's comfort. We discussed the problems with tube feeding due to recurrent SBO and problems with IV hydration and dialysis. He plans to leave tomorrow afternoon and wanted to make certain everything is done for comfort. Hesham does look somewhat agitated this afternoon, so I have requested to try a dose of Lorazepam for comfort. Hesham's nephew called his brother Mane to discuss the above. Both nephews plan to be here tomorrow morning for the 9 AM meeting with the group home and hospice.    TOTAL TIME: 20 minutes in assessment of patient and discussion with family.    Elaina Moeller MS, RN, CNS, APRN, ACHPN, FAACVPR  Pain and Palliative Care  Pager [phone]  Office [phone][AM1.1]        Revision History         User Key Date/Time User Provider Type Action    > AM1.1 6/27/2017  1:17 PM Elaina Moeller APRN CNS Clinical Nurse Specialist Sign            Progress Notes by Hesham Todd MD at 6/27/2017 12:08 PM     Author:  Hesham Todd MD Service:  Hospitalist Author Type:  Physician    Filed:  6/27/2017 12:20 PM Date of Service:  6/27/2017 12:08 PM Creation Time:  6/27/2017 12:08 PM    Status:  Signed :  Hesham Todd MD (Physician)         [facility]  Hospitalist Progress Note[DF1.1]    Hesham Todd MD 06/27/2017[DF1.2]  Text Page      Reason for Stay (Diagnosis): aspiration pneumonia         Assessment and Plan:      Summary of Stay: Summary of Stay: Hesham Ramos is a 67 year old male who was admitted on 6/11/2017. Patient with a history of prior debilitating strokes, sz d/o, htn/diastolic CHF, ESRD on HD, and significantly debilitated who lives in a group home.  He had a feeding tube placed approximately 3 months ago for dysphagia, and has continued to decline since that time.  He was admitted on 6/11/2017 with hypoxia/fever consistent with aspiration pna complicated by sepsis (leukocytosis/lactic acidosis/fever) which progressed to septic shock necessitating norepi/vasopressin support and intubation on 6/12 for respiratory failure. He was been weaned from pressors on 6/15/17 But again was restarted on levophed 6/19 as he was hypotensive during dialysis. He remained intubated but with minimal ventilatory support undergoing daily weans that are limited by tachypnea/low tidal volumes.   During this admission he had  BUN upto 150s, despite HD, and hgb has been drifting down, and dark stools have been noted, all suspicious for slow GIB.  He has required 2 prbc units.  His TFs had been resumed to challenge the G-tube, he had been transitioned up to goal rate, noted to have 150cc residual and before it had be stopped had large emesis occurring  "6/16/17, subsequently developed tachycardia and fever consistent with another aspiration event, with e/o sepsis with hypotension/fever/worsening leukocytosis. TF held for 24 hours and resumed 6/17 at trophic rate, and on 6/19 had another aspiration event with evidence of bowel obstruction. Tube feeds were discontinued. 6/20 CT abd  Showed transition point and surgery was consulted. J tube is suctioned and surgery will follow. Overnight patient had increased need for pressors. Palliative team and ICU team had been discussing goals of therapy with family especially that family would not like a trach at this time. They decided to pursue a comfort care approach and he was extubated on 6/26.  He has maintained his respirations.     Septic Shock 2/2 aspiration pna, fungemia ,Aspiration pna with TFs:    -  Stopped abx.  Comfort cares.     Hypoxic Respiratory Failure 2/2 aspiration pneumonia:    --extubated.  Comfort cares.    Small Bowel obstruction  - comfort cares  -- not a good candidate for surgery      Suspected GIB:    --no active bleeding      ESRD on HD:    -- no dialysis ,comfort measure only      Prior debilitating strokes:  Not able to speak at baseline      Dementia;  -- presumed vascular in nature-resume donepezil when able (G-J in place)     Nutrition:    -  Comfort cares  -- held tube feeds now with evidence of obstruction        Goal of care : Comfort measures.   Plan for hospice consult tomorrow with likely return to his group home on Hospice.  Palliative care and SW assisting.             Code Status: DNR/DNI        Interval History (Subjective):      Non verbal.  Extubated yesterday and maintaining respirations.                    Physical Exam:      Last Vital Signs:[DF1.1]  /45  Pulse 120  Temp 98.2  F (36.8  C) (Axillary)  Resp 28  Ht 1.651 m (5' 5\")  Wt 59.4 kg (130 lb 15.3 oz)  SpO2 97%  BMI 21.79 kg/m2[DF1.2]      Vital signs reviewed  General:  Alert, non verbal  CV: regular rate and " rhythm, no murmurs or rubs  Lungs:  Clear to ascultation bilaterally, normal respiratory effort  Abdomen:  Soft,  nondistended, no masses, normal bowel sounds, G tube in place  Extremities:  trace edema  Neuro: unable  Psychiatric: unable           Medications:      All current medications were reviewed with changes reflected in problem list.         Data:      All new lab and imaging data was reviewed.[DF1.1]        Revision History        User Key Date/Time User Provider Type Action    > DF1.2 6/27/2017 12:20 PM Hesham Todd MD Physician Sign     DF1.1 6/27/2017 12:08 PM Hesham Todd MD Physician             Progress Notes by Elaina Moeller APRN CNS at 6/27/2017 10:55 AM     Author:  Elaina Moeller APRN CNS Service:  Palliative Author Type:  Clinical Nurse Specialist    Filed:  6/27/2017 11:01 AM Date of Service:  6/27/2017 10:55 AM Creation Time:  6/27/2017 10:55 AM    Status:  Signed :  Elaina Moeller APRN CNS (Clinical Nurse Specialist)         [facility]  Palliative Care Progress Note  Text Page     Discussion at bedside with the patient's nephew/co-guardian Carlos Richard. He acknowledged that Hesham looks comfortable and explained that his family had asked about restarting dialysis. We discussed options for care at length and Carlos explained that Hesham would want to continue with a plan of comfort. We discussed the option of using the hospice benefit. Carlos will discuss this with his family and asked if Hesham could go back to his group home. I explained that our , Corinne White, LSW could look into this. Thanks Corinne for calling the group home to see if this is a possibility.    Total Time: 20 minutes in assessment of patient and discussion with co-guardian.    Elaina Moeller MS, RN, CNS, APRN, ACHPN, FAACVPR  Pain and Palliative Care  Pager [phone]  Office [phone][AM1.1]        Revision History        User Key  Date/Time User Provider Type Action    > AM1.1 6/27/2017 11:01 AM Elaina Moeller APRN CNS Clinical Nurse Specialist Sign            Progress Notes by Nomi Kumar MD at 6/26/2017  2:25 PM     Author:  Nomi Kumar MD Service:  Hospitalist Author Type:  Physician    Filed:  6/26/2017  2:29 PM Date of Service:  6/26/2017  2:25 PM Creation Time:  6/26/2017  2:25 PM    Status:  Signed :  Nomi Kumar MD (Physician)         Patient was seen and examined by me today.I discussed care plan with and addressed questions and concerns of patient family and staff.    Hesham Ramos is a 67 year old black male with a significant past medical history of renal cancer history , chronic kidney disease, congestive heart failure and stroke who presents with resp distress and has been in ICU since 6/11 on vent and pressor support.Patient family decided to focus on comfort care and he was extubated on 06/26/2017    Family elected for comfort measures only and TLC ,SW following patient to assist with disposition planning.    Currently patient appears comfortable post extubation  and responding well to comfort medications     Plan to continue current care and plan disposition.  -- will transfer to floor   -- hospice consult tomorrow if needed[MA1.1]           Revision History        User Key Date/Time User Provider Type Action    > MA1.1 6/26/2017  2:29 PM Nomi Kumar MD Physician Sign            Progress Notes by Elaina Moeller APRN CNS at 6/26/2017 11:35 AM     Author:  Elaina Moeller APRN CNS Service:  Palliative Author Type:  Clinical Nurse Specialist    Filed:  6/26/2017 11:36 AM Date of Service:  6/26/2017 11:35 AM Creation Time:  6/26/2017 11:28 AM    Status:  Signed :  Elaina Moeller APRN CNS (Clinical Nurse Specialist)         [facility]  Palliative Care Progress Note  Text Page     Many family members including the patient's co-guardians; his  "niece and nephew Hemant and Mane Ramos. Mane explained \"the family is ready to have to take the tube taken out of his mouth\". Discussed the plan to liberate from the ventilator and focus on comfort. The family agrees with plan and moved to the ICU conference room during the extubation process. Appreciate assistance of Respiratory Therapist Jocelyne Healy, PT and bedside nurse Antonieta Maldonado, RN. The patient did become tachypnic shortly after extubation and was given IV Dilaudid for comfort.    TOTAL TIME: 11:00 until 11:35 AM in discussion with family and assessment of patient.    Elaina Moeller MS, RN, CNS, APRN, ACHPN, FAACVPR  Pain and Palliative Care  Pager [phone]  Office [phone][AM1.1]            Revision History        User Key Date/Time User Provider Type Action    > AM1.1 6/26/2017 11:36 AM Elaina Moeller APRN CNS Clinical Nurse Specialist Sign            Progress Notes by Jocelyne Healy RT at 6/26/2017 11:31 AM     Author:  Jocelyne Healy RT Service:  (none) Author Type:  Respiratory Therapist    Filed:  6/26/2017 11:32 AM Date of Service:  6/26/2017 11:31 AM Creation Time:  6/26/2017 11:31 AM    Status:  Signed :  Jocelyne Healy RT (Respiratory Therapist)         RT note: terminally extubated at 1120, placed on 2 lpm NC.     Jocelyne Healy RRT[JP1.1]      Revision History        User Key Date/Time User Provider Type Action    > JP1.1 6/26/2017 11:32 AM Jocelyne Healy RT Respiratory Therapist Sign            Progress Notes by Elaina Moeller APRN CNS at 6/26/2017  8:18 AM     Author:  Elaina Moeller APRN CNS Service:  Palliative Author Type:  Clinical Nurse Specialist    Filed:  6/26/2017  8:29 AM Date of Service:  6/26/2017  8:18 AM Creation Time:  6/26/2017  8:18 AM    Status:  Signed :  Elaina Moeller APRN CNS (Clinical Nurse Specialist)         [facility]  Palliative Care Progress Note  Text Page     Patient's " "nephew/co-guardian Carlos Ramos called our office. I returned his call at [phone]. Hesham's family will be in at 11 AM \"to take the tube out\". I have notified bedside nurse Antonieta Maldonado RN and Hospitalist Nomi Kumar MD regarding the families plan.    Assessment & Plan       1.  Patient unresponsive - Patient does not demonstrate medical capacity. Legal co-guardians for healthcare decisions Hemant Ramos (niece) and Carlos Ramos (nephew) are assisting in plan of care.      2. Copious secretions - Robinul IV now and every 4 hours for comfort.       3.  Dyspnea - Plan to extubate later today for comfort plan.       4. Goal of Care: DNR with no escalation of care and plan for extubation later today.      Elaina Moeller MS, RN, CNS, APRN, ACHPN, FAACVPR  Pain and Palliative Care  Pager [phone]  Office [phone]    Time Spent on this Encounter   I spent  15 minutes in assessment of the patient and discussion with the patient and family. Another 10 minutes in review of chart, documentation and discussion with the health care team.    Interval History   Chart reviewed     Review of Systems       Unable to complete as patient intubated and minimally responsive    Physical Exam   Temp:  [99.7  F (37.6  C)] 99.7  F (37.6  C)  Heart Rate:  [80-93] 80  Resp:  [13-20] 20  BP: (102-129)/(25-56) 124/56  FiO2 (%):  [35 %] 35 %  SpO2:  [93 %-100 %] 93 %  130 lbs 15.25 oz  GEN: Orally intubated black male who appears much older than documented age of 67 years.   RESP: Symmetric chest rise on inhalation noted.  Normal respiratory effort.    Medications     IV fluid REPLACEMENT ONLY       norepinephrine 0.16 mcg/kg/min (06/26/17 0600)     NaCl 10 mL/hr at 06/25/17 0700     NaCl 10 mL/hr at 06/25/17 0700     IV fluid REPLACEMENT ONLY       IV fluid REPLACEMENT ONLY       dexmedetomidine 0.7 mcg/kg/hr (06/26/17 0600)       glycopyrrolate  0.3 mg Intravenous Q4H     insulin aspart  1-12 Units " Subcutaneous Q4H     micafungin  100 mg Intravenous Q24H     pantoprazole  40 mg Intravenous BID     nystatin  500,000 Units Mouth/Throat 4x Daily     fosphenytoin (CEREBYX) intermittent infusion (maintenance)  150 mg PE Intravenous Q12H     ipratropium - albuterol 0.5 mg/2.5 mg/3 mL  3 mL Nebulization 4x daily     sodium chloride 0.9%  1,000 mL Intravenous Once       Data[AM1.1]   No results found for this or any previous visit (from the past 24 hour(s)).[AM1.2]       Revision History        User Key Date/Time User Provider Type Action    > AM1.2 6/26/2017  8:29 AM Elaina Moeller APRN CNS Clinical Nurse Specialist Sign     AM1.1 6/26/2017  8:18 AM Elaina Moeller APRN CNS Clinical Nurse Specialist             Progress Notes by Mary Anne Taylor RT at 6/26/2017  5:12 AM     Author:  Mary Anne Taylor RT Service:  Respiratory Therapy Author Type:  Respiratory Therapist    Filed:  6/26/2017  5:14 AM Date of Service:  6/26/2017  5:12 AM Creation Time:  6/26/2017  5:12 AM    Status:  Signed :  Mary Anne Taylor RT (Respiratory Therapist)         Respiratory Therapy     Patient remains intubated and on mechanical ventilator settings as follows:  Ventilation Mode: CMV/AC  FiO2 (%): 35 %  Rate Set (breaths/minute): 20 breaths/min  Tidal Volume Set (mL): 450 mL  PEEP (cm H2O): 5 cmH2O  Oxygen Concentration (%): 35 %  Resp: 19     Suctioning out small to moderate amounts of thick, white secretions via ETT, patient's breath sounds are coarse bilaterally.Receiving Duoneb QID in line with the ventilator. Will continue to assess and monitor.     Mary Anne Taylor RRT[KT1.1]     Revision History        User Key Date/Time User Provider Type Action    > KT1.1 6/26/2017  5:14 AM Mary Anne Taylor RT Respiratory Therapist Sign            Progress Notes by Giuliana Mosley at 6/25/2017  9:54 PM     Author:  Giuliana Mosley Service:  Respiratory Therapy Author Type:  Technician    Filed:  6/25/2017  9:56 PM  Date of Service:  6/25/2017  9:54 PM Creation Time:  6/25/2017  9:54 PM    Status:  Signed :  Giuliana Mosley (Technician)         Shift Summary:  Vent    No changes made on vent today.  Vital Signs Stable.  BS coarse t/o without much improvement following suctioning.  Duonebs given QID- no improvement post neb.  Will continue to monitor pt for changes in respiratory status.[AS1.1]      Revision History        User Key Date/Time User Provider Type Action    > AS1.1 6/25/2017  9:56 PM Giuliana Mosley Technician Sign            Progress Notes by Nomi Kumar MD at 6/25/2017  2:29 PM     Author:  Nomi Kumar MD Service:  Hospitalist Author Type:  Physician    Filed:  6/25/2017  2:37 PM Date of Service:  6/25/2017  2:29 PM Creation Time:  6/25/2017  2:29 PM    Status:  Signed :  Nomi Kumar MD (Physician)         [facility]    Hospitalist Progress Note  Name: Hesham Ramos    MRN: [id number]    Provider: Nomi Kumar MD    Date of Service: 06/25/2017    Assessment & Plan   Summary of Stay: Hesham Ramos is a 67 year old male who was admitted on 6/11/2017. Patient with a history of prior debilitating strokes, sz d/o, htn/diastolic CHF, ESRD on HD, and significantly debilitated who lives in a group home.  He had a feeding tube placed approximately 3 months ago for dysphagia, and has continued to decline since that time admitted on 6/11/2017 with hypoxia/fever consistent with aspiration pna complicated by sepsis (leukocytosis/lactic acidosis/fever) which progressed to septic shock necessitating norepi/vasopressin support and intubation on 6/12 for respiratory failure. He was been weaned  off  From pressors on 6/15/17 But again was restarted on levophed 6/19 as he was hypotensive during dialysis. He remains intubated but with minimal ventilatory support undergoing daily weans that are limited by tachypnea/low tidal volumes. In his [facility] hospitalization he was also  notable for profound hyperglycemia nececsitating insulin gtt with hgb A1c wnl at 5.1.  During this admission he had  BUN upto 150s, despite HD, and hgb has been drifting down, and dark stools have been noted, all suspicious for slow GIB.  He has required 2 prbc units.  His TFs had been resumed to challenge the G-tube, he had been transitioned up to goal rate, noted to have 150cc residual and before it could be stopped had large emesis occurring 6/16/17, subsequently developed tachycardia and fever consistent with another aspiration event, with e/o sepsis with hypotension/fever/worsening leukocytosis. TF held for 24 hours and resumed 6/17 at trophic rate, and on 6/19 had another aspiration event with evidence of bowel obstruction. Tube feeds were discontinued. 6/20 CT abd  Showed transition point and surgery was consulted. J tube is suctioned and surgery will follow. Overnight patient had increased need for pressors. Palliative team and ICU team have been discussing goals of therapy with family especially that family would not like a trach at this time. Family meeting for 1030 am 6/22.    Septic Shock 2/2 aspiration pna, fungemia   -- requiring pressor support   -- continue current med for now       Aspiration pna with TFs:    --changed Gtube to J tube on 6/19 to decrease aspiration from re-occurring   -- Recurrent aspiration pneumonia sec to tube feeds on 6/18/17.  procal 69.7   --Rec'd 3 days of pip tazo, then started on amp-sulbactam until 6/19.  -- Respiratory cultures grew Enterobactor an P Aeruginosa was switched to fluoroquinolones 6/20   - ID following , Plan to continue abx     Hypoxic Respiratory Failure 2/2 aspiration pneumonia:    --not extubatable due to tachypnea and low tidal volumes .  -- continue vent support until Tuesday     Small Bowel obstruction  -- 6/19 had  large emesis  -- Xray  showed SBO  -- feeding stopped  -- G tube to suction with minimal out  -- CT with transitional point on scan  --  surgery consulted and following   -- not a good candidate for surgery     Suspected GIB:    --no active bleeding     ESRD on HD:    --Nephrology consulted and following   HD am 6/16, 6/19 and  6/21  -- no dialysis ,comfort measure only     Prior debilitating strokes:  Not able to speak at baseline   --Home regimen is asa and simva - both on hold-asa for GIB, statin for increased transaminases-    Dementia;  -- presumed vascular in nature-resume donepezil when able (G-J in place)    Nutrition:    --Clearly can't use G-tube for goal tube feeds, resumed them at trophic levels for gut protection 6/17 and now off for recurrent aspiration event with just trophic dosing and NOW new SBO  -- held tube feeds now with evidence of obstruction      Goal of care : Comfort measures with no escalation of care.Plan to extubate on Tuesday , hospice consult for Monday.Family questions and concerns addressed.  No lab draws      DVT Prophylaxis: Pneumatic Compression Devices     Code Status: DNR/DNI    Disposition: Limited ICU care .DNR , plan to do terminal extubation on Tuesday to allow  [place].        Interval History      Patient is seen and examined by me today and medical record reviewed.Overnight events noted and care discussed with nursing staff.  No change   Appears comfortable               Physical Exam   Temp: 99  F (37.2  C) Temp src: Axillary BP: (!) 133/31   Heart Rate: 77 Resp: 20 SpO2: 100 % O2 Device: Mechanical Ventilator    Vitals:    06/23/17 0430 06/24/17 0000 06/25/17 0000   Weight: 57.8 kg (127 lb 6.8 oz) 61.5 kg (135 lb 9.3 oz) 59.5 kg (131 lb 2.8 oz)     Vital Signs with Ranges  Temp:  [99  F (37.2  C)-99.5  F (37.5  C)] 99  F (37.2  C)  Heart Rate:  [77-94] 77  Resp:  [19-20] 20  BP: (115-141)/(31-70) 133/31  FiO2 (%):  [35 %] 35 %  SpO2:  [94 %-100 %] 100 %  I/O last 3 completed shifts:  In: 1835.06 [I.V.:1535.06; IV Piggyback:300]  Out: 1000 [Emesis/NG  output:1000]      Sedated,intubated  Comfortable appearing  Atrophy of b ue and le with right arm contracture and e/o non-pitting edema of right arm, diffuse edema  Head ncat sclera normal pigmentation  lungs coarse but otherwise clear,  RRR no m/r/g    Abdomen  Distended, no BS  Skin warm and dry     Medications     IV fluid REPLACEMENT ONLY       norepinephrine 0.16 mcg/kg/min (06/25/17 0800)     NaCl 10 mL/hr at 06/25/17 0700     NaCl 10 mL/hr at 06/25/17 0700     IV fluid REPLACEMENT ONLY       IV fluid REPLACEMENT ONLY       dexmedetomidine 0.7 mcg/kg/hr (06/25/17 0800)       insulin aspart  1-12 Units Subcutaneous Q4H     micafungin  100 mg Intravenous Q24H     levofloxacin  500 mg Intravenous Q48H     pantoprazole  40 mg Intravenous BID     nystatin  500,000 Units Mouth/Throat 4x Daily     fosphenytoin (CEREBYX) intermittent infusion (maintenance)  150 mg PE Intravenous Q12H     ipratropium - albuterol 0.5 mg/2.5 mg/3 mL  3 mL Nebulization 4x daily     sodium chloride 0.9%  1,000 mL Intravenous Once     Data       Recent Labs  Lab 06/19/17  1500   PHV 7.41   PO2V 26   PCO2V 41   HCO3V 26       Recent Labs  Lab 06/22/17  0500 06/21/17  1620 06/21/17  0500   WBC 7.4 9.9 11.0   HGB 7.6* 7.7* 7.7*   HCT 23.6* 24.2* 23.9*   MCV 93 93 92    347 352       Recent Labs  Lab 06/23/17  0540 06/22/17  0500 06/21/17  0500    140 138   POTASSIUM 3.8 3.7 4.2   CHLORIDE 101 103 100   CO2 27 29 26   ANIONGAP 9 8 12   * 188* 147*   BUN 53* 34* 67*   CR 4.08* 3.02* 5.58*   GFRESTIMATED 15* 21* 10*   GFRESTBLACK 18* 25* 12*   DOUG 7.8* 7.6* 7.2*       Recent Labs  Lab 06/23/17  0540 06/22/17  0500 06/21/17  1620 06/21/17  1606   CULT No growth after 2 days No growth after 3 days No growth after 4 days Cultured on the 2nd day of incubation: Staphylococcus epidermidisCritical Value/Significant Value, preliminary result only, called to and read back by Sally Davidson RN @ 2229 6/23/17 CS(Note)POSITIVE for  STAPHYLOCOCCUS EPIDERMIDIS and NEGATIVE for the mecAgene (not resistant to methicillin) by Verigene nucleic acid test.The mecA gene was not detected. Final identification andantimicrobial susceptibility testing will be verified by standardmethods.Specimen tested with Verigene multiplex, gram-positive blood culturenucleic acid test for the following targets: Staph aureus, Staphepidermidis, Staph lugdunensis, other Staph species, Enterococcusfaecalis, Enterococcus faecium, Streptococcus species, S. agalactiae,S. anginosus grp., S. pneumoniae, S. pyogenes, Listeria sp., mecA(methicillin resistance) and Tristen/B (vancomycin resistance).Critical Value/Significant Value called to and read back by GEORGE Yusuf, @ 0105 06.24.2017 BL*     No results for input(s): NTBNPI, NTBNP in the last 168 hours.    Recent Labs  Lab 06/22/17  0500   *   *   ALKPHOS 113   BILITOTAL 0.7       Recent Labs  Lab 06/22/17  0500   INR 1.57*       Recent Labs  Lab 06/19/17  0755   LACT 1.3     No results for input(s): LIPASE in the last 168 hours.  No results for input(s): TROPONIN, TROPI, TROPR in the last 168 hours.    Invalid input(s): TROP, TROPONINIES  No results for input(s): COLOR, APPEARANCE, URINEGLC, URINEBILI, URINEKETONE, SG, UBLD, URINEPH, PROTEIN, UROBILINOGEN, NITRITE, LEUKEST, RBCU, WBCU in the last 168 hours.    No results found for this or any previous visit (from the past 24 hour(s)).  Critical care time:45min[MA1.1]             Revision History        User Key Date/Time User Provider Type Action    > MA1.1 6/25/2017  2:37 PM Nomi Kumar MD Physician Sign            Progress Notes by Rashmi Sosa RT at 6/25/2017  1:56 PM     Author:  Rashmi Sosa RT Service:  (none) Author Type:  Respiratory Therapist    Filed:  6/25/2017  1:58 PM Date of Service:  6/25/2017  1:56 PM Creation Time:  6/25/2017  1:56 PM    Status:  Signed :  Coval, Rashmi Leila, RT (Respiratory Therapist)         RT-  patient remains intubated with ETT and bite block secure and patent. Current vent settings as charted:[NC1.1]    Ventilation Mode: CMV/AC  FiO2 (%): 35 %  Rate Set (breaths/minute): 20 breaths/min  Tidal Volume Set (mL): 450 mL  PEEP (cm H2O): 5 cmH2O  Oxygen Concentration (%): 35%  Resp: 20[NC1.2]    Breath sounds coarse, diminished at bases. ETT suctioned for small to moderate, thick, white/cloudy secretions.    Patient getting Duoneb in line as ordered. BVM at bedside.    RT Gaby  6/25/2017 1:58 PM[NC1.1]           Revision History        User Key Date/Time User Provider Type Action    > NC1.2 6/25/2017  1:58 PM Rashmi Sosa RT Respiratory Therapist Sign     NC1.1 6/25/2017  1:56 PM Rashmi Sosa RT Respiratory Therapist             Progress Notes by Arpan Farooq MD at 6/25/2017 10:21 AM     Author:  Arpan Farooq MD Service:  ICU Author Type:  Physician    Filed:  6/25/2017 10:23 AM Date of Service:  6/25/2017 10:21 AM Creation Time:  6/25/2017 10:21 AM    Status:  Signed :  Arpan Farooq MD (Physician)         ICU Attending Note    I have seen and examined Hesham Ramos, reviewed the patient's history, pertinent labs, vital signs, medications, physical exam, and radiographs.  The patient is critically ill by my examination and requires continued ICU monitoring and cares    Hesham Ramos is admitted to ICU with respiratory failure after aspiration event. Had a g tube placed in March.  Was observed aspirating.  Intubated on admission.  Lives in a group home.     Recent Events:  No new changes.  Seems comfortable.      Exam:[JC1.1]  Temp:  [99  F (37.2  C)-99.5  F (37.5  C)] 99  F (37.2  C)  Heart Rate:  [77-94] 77  Resp:  [19-20] 20  BP: (108-141)/(31-70) 133/31  FiO2 (%):  [35 %] 35 %  SpO2:  [94 %-100 %] 100 %[JC1.2]    Intake/Output Summary (Last 24 hours) at 06/19/17 1043  Last data filed at 06/19/17 1000   Gross per 24 hour   Intake           1533.57 ml   Output                0 ml   Net          1533.57 ml[JC1.1]     Ventilation Mode: CMV/AC  FiO2 (%): 35 %  Rate Set (breaths/minute): 20 breaths/min  Tidal Volume Set (mL): 450 mL  PEEP (cm H2O): 5 cmH2O  Oxygen Concentration (%): 35 %  Resp: 20[JC1.2]    lungs coarse  abd distended, typmanic  Ext warm    Results:  ABG[JC1.1]   No lab results found in last 7 days.[JC1.2]  CBC[JC1.1]    Recent Labs  Lab 06/22/17  0500 06/21/17  1620 06/21/17  0500 06/20/17  0535   WBC 7.4 9.9 11.0 12.6*   HGB 7.6* 7.7* 7.7* 7.5*   HCT 23.6* 24.2* 23.9* 24.3*    347 352 312[JC1.2]     BMP[JC1.1]    Recent Labs  Lab 06/23/17  0540 06/22/17  0500 06/21/17  0500 06/20/17  0535    140 138 142   POTASSIUM 3.8 3.7 4.2 4.3   CHLORIDE 101 103 100 102   CO2 27 29 26 27   BUN 53* 34* 67* 55*   CR 4.08* 3.02* 5.58* 4.92*   * 188* 147* 205*[JC1.2]     LFT[JC1.1]    Recent Labs  Lab 06/22/17  0500   *   *   ALKPHOS 113   BILITOTAL 0.7   ALBUMIN 1.6*   INR 1.57*[JC1.2]     Pancreas[JC1.1]No lab results found in last 7 days.[JC1.2]  INR  Lab Results   Component Value Date    INR 1.38 06/17/2017    INR 1.76 06/14/2017    INR 1.23 06/11/2017    INR 1.03 04/23/2017       Current Issues:  1.  Neurology: baseline unresponsive, dexmedatomidine and pain meds for relief of pain and suffering.  2.  Respiratory failure:  Continue vent support. No weaning.  Planned terminal extubation on Tuesday  3.  End stage renal disease: dialysis held  4.  Pneumonia:off antibiotics  5.  Protein calorie malnutrition: off tpn  6.  Small bowel obstruction: no change, comfort care planned    Arpan Farooq MD  Acute Care Surgery/Critical Care[JC1.1]       Revision History        User Key Date/Time User Provider Type Action    > JC1.2 6/25/2017 10:23 AM Arpan Farooq MD Physician Sign     JC1.1 6/25/2017 10:21 AM Arpan Farooq MD Physician                   Procedure Notes     No notes of this type exist for this  encounter.         Progress Notes - Therapies (Notes from 06/25/17 through 06/28/17)      Progress Notes by Jocelyne Healy RT at 6/26/2017 11:31 AM     Author:  Jocelyne Healy RT Service:  (none) Author Type:  Respiratory Therapist    Filed:  6/26/2017 11:32 AM Date of Service:  6/26/2017 11:31 AM Creation Time:  6/26/2017 11:31 AM    Status:  Signed :  Jocelyne Healy RT (Respiratory Therapist)         RT note: terminally extubated at 1120, placed on 2 lpm NC.     Jocelyne Healy RRT[JP1.1]      Revision History        User Key Date/Time User Provider Type Action    > JP1.1 6/26/2017 11:32 AM Jocelyne Healy RT Respiratory Therapist Sign            Progress Notes by Mary Anne Taylor RT at 6/26/2017  5:12 AM     Author:  Mary Anne Taylor RT Service:  Respiratory Therapy Author Type:  Respiratory Therapist    Filed:  6/26/2017  5:14 AM Date of Service:  6/26/2017  5:12 AM Creation Time:  6/26/2017  5:12 AM    Status:  Signed :  Mary Anne Taylor RT (Respiratory Therapist)         Respiratory Therapy     Patient remains intubated and on mechanical ventilator settings as follows:  Ventilation Mode: CMV/AC  FiO2 (%): 35 %  Rate Set (breaths/minute): 20 breaths/min  Tidal Volume Set (mL): 450 mL  PEEP (cm H2O): 5 cmH2O  Oxygen Concentration (%): 35 %  Resp: 19     Suctioning out small to moderate amounts of thick, white secretions via ETT, patient's breath sounds are coarse bilaterally.Receiving Duoneb QID in line with the ventilator. Will continue to assess and monitor.     Mary Anne Taylor RRT[KT1.1]     Revision History        User Key Date/Time User Provider Type Action    > KT1.1 6/26/2017  5:14 AM Mary Anne Taylor RT Respiratory Therapist Sign            Progress Notes by Rashmi Sosa RT at 6/25/2017  1:56 PM     Author:  Rashmi Sosa RT Service:  (none) Author Type:  Respiratory Therapist    Filed:  6/25/2017  1:58 PM Date of Service:  6/25/2017  1:56 PM Creation Time:   6/25/2017  1:56 PM    Status:  Signed :  Rashmi Sosa RT (Respiratory Therapist)         RT- patient remains intubated with ETT and bite block secure and patent. Current vent settings as charted:[NC1.1]    Ventilation Mode: CMV/AC  FiO2 (%): 35 %  Rate Set (breaths/minute): 20 breaths/min  Tidal Volume Set (mL): 450 mL  PEEP (cm H2O): 5 cmH2O  Oxygen Concentration (%): 35%  Resp: 20[NC1.2]    Breath sounds coarse, diminished at bases. ETT suctioned for small to moderate, thick, white/cloudy secretions.    Patient getting Duoneb in line as ordered. BVM at bedside.    RT Gaby  6/25/2017 1:58 PM[NC1.1]           Revision History        User Key Date/Time User Provider Type Action    > NC1.2 6/25/2017  1:58 PM Rashmi Sosa RT Respiratory Therapist Sign     NC1.1 6/25/2017  1:56 PM Rashmi Sosa RT Respiratory Therapist

## 2017-06-11 NOTE — IP AVS SNAPSHOT
` [place] [id number] [facility]: [phone]            Medication Administration Report for Hesham Ramos as of 06/28/17 1442   Legend:    Given Hold Not Given Due Canceled Entry Other Actions    Time Time (Time) Time  Time-Action       Inactive    Active    Linked        Medications 06/22/17 06/23/17 06/24/17 06/25/17 06/26/17 06/27/17 06/28/17    acetaminophen (TYLENOL) Suppository 650 mg  Dose: 650 mg Freq: EVERY 4 HOURS PRN Route: RE  PRN Reason: fever  Start: 06/16/17 1258   Admin Instructions: Maximum acetaminophen dose from all sources = 75 mg/kg/day not to exceed 4 grams/day.               albuterol neb solution 2.5 mg  Dose: 2.5 mg Freq: EVERY 2 HOURS PRN Route: NEBULIZATION  PRN Comment: dyspnea  Start: 06/12/17 0339              artificial saliva (BIOTENE MT) solution 2 spray  Dose: 2 spray Freq: EVERY 1 HOUR PRN Route: SWISH & SPIT  PRN Reason: dry mouth  Start: 06/27/17 0717          0857 (2 spray)-Given           atropine 1 % ophthalmic solution 1-2 drop  Dose: 1-2 drop Freq: EVERY 1 HOUR PRN Route: SL  PRN Reason: other  PRN Comment: secretions  Start: 06/26/17 1151              carboxymethylcellulose (REFRESH PLUS) 0.5 % ophthalmic solution 1-2 drop  Dose: 1-2 drop Freq: EVERY 8 HOURS PRN Route: Both Eyes  PRN Reason: dry eyes  Start: 06/26/17 1151   Admin Instructions: Offer every shift.          1651 (1 drop)-Given            ipratropium - albuterol 0.5 mg/2.5 mg/3 mL (DUONEB) neb solution 3 mL  Dose: 3 mL Freq: EVERY 4 HOURS PRN Route: NEBULIZATION  PRN Reason: wheezing  Start: 06/26/17 1145              LORazepam (ATIVAN) tablet 0.5-1 mg  Dose: 0.5-1 mg Freq: EVERY 3 HOURS PRN Route: SL  PRN Reasons: anxiety,seizures,nausea  PRN Comment: dyspnea  Start: 06/26/17 1151   Admin Instructions: Avoid if delirium present.  Use fourth line for nausea.          1312 (0.5 mg)-Given        0625 (0.5 mg)-Given           oxyCODONE (ROXICODONE INTENSOL) 100 MG/5ML (HIGH CONC) solution 5  mg  Dose: 5 mg Freq: EVERY 1 HOUR PRN Route: PO  PRN Reasons: moderate pain,moderate to severe pain,breakthrough pain  PRN Comment: respiratory rate greater than 20  Start: 06/26/17 1328   Admin Instructions: CAUTION: solution is 20 times more concentrated than standard solution. Verify dose volume.           0244 (5 mg)-Given       1053 (5 mg)-Given       1440 (5 mg)-Given          Completed Medications  Medications 06/22/17 06/23/17 06/24/17 06/25/17 06/26/17 06/27/17 06/28/17         Dose: 500 mg Freq: EVERY 48 HOURS Route: IV  Indications of Use: ASPIRATION PNEUMONIA  Last Dose: 500 mg (06/26/17 0251)  Start: 06/22/17 0200   End: 06/26/17 0351   Admin Instructions: Irritant. Administer at a rate of no greater than 100 mL/hr     0217 (500 mg)-New Bag         0125 (500 mg)-New Bag         0251 (500 mg)-New Bag            Discontinued Medications  Medications 06/22/17 06/23/17 06/24/17 06/25/17 06/26/17 06/27/17 06/28/17         Dose: 1,000 mL Freq: ONCE Route: IV  Start: 06/12/17 1345   End: 06/27/17 0713         0713-Med Discontinued          Rate: 10 mL/hr Freq: CONTINUOUS Route: IV  Start: 06/16/17 0345   End: 06/26/17 1150    2000 ( )-Rate/Dose Verify       2200 ( )-Rate/Dose Verify        0000 ( )-Rate/Dose Verify       0200 ( )-Rate/Dose Verify       0400 ( )-Rate/Dose Verify       0600 ( )-Rate/Dose Verify       0700 ( )-Rate/Dose Verify       2020 ( )-New Bag        0700 ( )-Rate/Dose Verify        0700 ( )-Rate/Dose Verify        1150-Med Discontinued           Rate: 10 mL/hr Freq: CONTINUOUS Route: IV  Start: 06/16/17 0345   End: 06/26/17 1150 2000 ( )-Rate/Dose Verify       2200 ( )-Rate/Dose Verify        0000 ( )-Rate/Dose Verify       0200 ( )-Rate/Dose Verify       0400 ( )-Rate/Dose Verify       0600 ( )-Rate/Dose Verify       0700 ( )-Rate/Dose Verify        0700 ( )-Rate/Dose Verify        0700 ( )-Rate/Dose Verify        1150-Med Discontinued           Dose: 1,000 mg Freq: EVERY 8 HOURS PRN  Route: IV  PRN Reasons: mild pain,fever  Last Dose: 1,000 mg (06/17/17 0829)  Start: 06/16/17 1258   End: 06/26/17 1150   Admin Instructions: Maximum acetaminophen dose from all sources = 75 mg/kg/day not to exceed 4 grams/day.         1150-Med Discontinued           Dose: 1 spray Freq: EVERY 6 HOURS PRN Route: MT  PRN Reason: dry mouth  Start: 06/27/17 1311   End: 06/27/17 1318         1318-Med Discontinued          Rate: 2.4-8.5 mL/hr Freq: CONTINUOUS Route: IV  Last Dose: Stopped (06/26/17 1500)  Start: 06/12/17 1515   End: 06/26/17 1150   Admin Instructions: For range orders: start at lowest dose ordered. Titrate by 0.1 mcg/kg/hr every 5 minutes to achieve the defined goals of therapy     0000 (0.7 mcg/kg/hr)-Rate/Dose Verify       0233 (0.7 mcg/kg/hr)-New Bag       0800 (0.7 mcg/kg/hr)-Rate/Dose Verify       1000 (0.7 mcg/kg/hr)-Rate/Dose Verify       1200 (0.7 mcg/kg/hr)-Rate/Dose Verify       1253 (0.7 mcg/kg/hr)-New Bag       1400 (0.7 mcg/kg/hr)-Rate/Dose Verify       1600 (0.7 mcg/kg/hr)-Rate/Dose Verify       1800 (0.7 mcg/kg/hr)-Rate/Dose Verify       2000 (0.7 mcg/kg/hr)-Rate/Dose Verify       2200 (0.7 mcg/kg/hr)-Rate/Dose Verify        0000 (0.7 mcg/kg/hr)-Rate/Dose Verify       0200 (0.7 mcg/kg/hr)-Rate/Dose Verify       0217 (0.7 mcg/kg/hr)-New Bag       0400 (0.7 mcg/kg/hr)-Rate/Dose Verify       0600 (0.7 mcg/kg/hr)-Rate/Dose Verify       0700 (0.7 mcg/kg/hr)-Rate/Dose Verify       1528 (0.7 mcg/kg/hr)-New Bag       2000 (0.7 mcg/kg/hr)-Rate/Dose Verify              2200 (0.7 mcg/kg/hr)-Rate/Dose Verify        0000 (0.7 mcg/kg/hr)-Rate/Dose Verify       0200 (0.7 mcg/kg/hr)-Rate/Dose Verify       0400 (0.7 mcg/kg/hr)-Rate/Dose Verify       0426 (0.7 mcg/kg/hr)-New Bag       0700 (0.7 mcg/kg/hr)-Rate/Dose Verify       1731 (0.7 mcg/kg/hr)-New Bag       2000 (0.7 mcg/kg/hr)-Rate/Dose Verify       2200 (0.7 mcg/kg/hr)-Rate/Dose Verify        0000 (0.7 mcg/kg/hr)-Rate/Dose Verify       0200 (0.7  mcg/kg/hr)-Rate/Dose Verify       0400 (0.7 mcg/kg/hr)-Rate/Dose Verify       0600 (0.7 mcg/kg/hr)-Rate/Dose Verify       0659 (0.7 mcg/kg/hr)-New Bag       0800 (0.7 mcg/kg/hr)-Rate/Dose Verify       2000 (0.7 mcg/kg/hr)-Rate/Dose Verify       2200 (0.7 mcg/kg/hr)-Rate/Dose Verify        0000 (0.7 mcg/kg/hr)-Rate/Dose Verify       0200 (0.7 mcg/kg/hr)-Rate/Dose Verify       0400 (0.7 mcg/kg/hr)-Rate/Dose Verify       0600 (0.7 mcg/kg/hr)-Rate/Dose Verify       0842 (0.7 mcg/kg/hr)-New Bag       1150-Med Discontinued  1500-Stopped               Freq: CONTINUOUS PRN Route: IV  PRN Comment:    Start: 06/21/17 1333   End: 06/26/17 1150   Admin Instructions: For Hypoglycemia Prevention for patients on long-acting subcutaneous basal insulin (Glargine, Detemir, NPH) or continuous insulin infusion. Whenever nutrition support is held or interrupted:  1) Infuse IV D10W at nutrition support rate   2) Notify provider for further instructions         1150-Med Discontinued           Freq: CONTINUOUS PRN Route: IV  PRN Comment: Hypoglycemia prevention  Start: 06/14/17 1040   End: 06/26/17 1150   Admin Instructions: For Hypoglycemia Prevention for patients on long-acting subcutaneous basal insulin (Glargine, Detemir, NPH) or continuous insulin infusion. Whenever nutrition support is held or interrupted:   1) Infuse IV D10W at nutrition support rate  2) Notify provider for further instructions         1150-Med Discontinued           Freq: CONTINUOUS PRN Route: IV  PRN Comment: Give if on IV Insulin Infusion, and Parenteral or Enteral nutrition held or cycled off.   Start: 06/13/17 0030   End: 06/26/17 1150   Admin Instructions: Infuse IV D10W at TPN/TF rate whenever nutrition is held or cycled off.         1150-Med Discontinued           Dose: 150 mg PE Freq: EVERY 12 HOURS Route: IV  Last Dose: 150 mg PE (06/19/17 0410)  Start: 06/14/17 1630   End: 06/26/17 1150   Admin Instructions: Maintenance dose is to be given over 20-30  minutes. *For Adults: Max rate is 150 mg PE/min. * For Pediatrics: Max loading dose rate 1 to 3 mg PE/kg/minute. Max fosphenytoin concentration of 25 mg PE/mL.     0505 (150 mg PE)-New Bag       1550 (150 mg PE)-New Bag        0449 (150 mg PE)-New Bag       1534 (150 mg PE)-New Bag        0420 (150 mg PE)-New Bag       1620 (150 mg PE)-New Bag        0405 (150 mg PE)-New Bag       1640 (150 mg PE)-New Bag        0356 (150 mg PE)-New Bag       1150-Med Discontinued           Dose: 15-30 g Freq: EVERY 15 MIN PRN Route: PO  PRN Reason: low blood sugar  Start: 06/13/17 1342   End: 06/26/17 1150   Admin Instructions: Give 15 g for BG 51 to 69 mg/dL IF patient is conscious and able to swallow. Give 30 g for BG less than or equal to 50 mg/dL IF patient is conscious and able to swallow. Do NOT give glucose gel via enteral tube.  IF patient has enteral tube: give apple juice 120 mL (4 oz or 15 g of CHO) via enteral tube for BG 51 to 69 mg/dL.  Give apple juice 240 mL (8 oz or 30 g of CHO) via enteral tube for BG less than or equal to 50 mg/dL.         1150-Med Discontinued        Or    Dose: 25-50 mL Freq: EVERY 15 MIN PRN Route: IV  PRN Reason: low blood sugar  Start: 06/13/17 1342   End: 06/26/17 1150   Admin Instructions: Use if have IV access, BG less than 70 mg/dL and meet dose criteria below:  Dose if conscious and alert (or disorientated) and NPO = 25 mL  Dose if unconscious / not alert = 50 mL  Vesicant.         1150-Med Discontinued        Or    Dose: 1 mg Freq: EVERY 15 MIN PRN Route: SC  PRN Reason: low blood sugar  PRN Comment: May repeat x 1 only  Start: 06/13/17 1342   End: 06/26/17 1150   Admin Instructions: May give SQ or IM. IF BG less than or equal to 50 mg/dL and no IV access.  ONLY use glucagon IF patient has NO IV access AND is UNABLE to swallow.         1150-Med Discontinued           Dose: 0.2 mg Freq: EVERY 4 HOURS PRN Route: IV  PRN Reason: other  PRN Comment: oral secretions  Start: 06/23/17 0955    End: 06/26/17 1150        1150-Med Discontinued           Dose: 0.3 mg Freq: EVERY 4 HOURS Route: IV  Start: 06/26/17 0815   End: 06/27/17 0713        0823 (0.3 mg)-Given       1329 (0.3 mg)-Given       (1726)-Not Given [C]       (1929)-Not Given [C]       (3772)-Not Given [C]        (4285)-Not Given [C]       0713-Med Discontinued          Dose: 1-2 mg Freq: EVERY 1 HOUR PRN Route: IV  PRN Reason: moderate to severe pain  PRN Comment: respiratory rate greater than 20  Start: 06/26/17 1326   End: 06/27/17 0717         0717-Med Discontinued          Dose: 1-2 mg Freq: EVERY 1 HOUR PRN Route: IV  PRN Reasons: moderate to severe pain,other  PRN Comment: respiratory rate greater than 20  Start: 06/26/17 0809   End: 06/26/17 1150        1122 (1 mg)-Given [C]       1150-Med Discontinued           Dose: 0.5-1 mg Freq: EVERY 1 HOUR PRN Route: IV  PRN Reasons: moderate to severe pain,other  PRN Comment: respiratory rate greater than 20  Start: 06/23/17 0950   End: 06/26/17 0809     1130 (1 mg)-Given        0250 (0.5 mg)-Given       0741 (1 mg)-Given       2209 (0.5 mg)-Given        0746 (1 mg)-Given       0958 (1 mg)-Given       2015 (0.5 mg)-Given        0045 (1 mg)-Given       0456 (1 mg)-Given       0809-Med Discontinued           Dose: 1-12 Units Freq: EVERY 4 HOURS Route: SC  Start: 06/23/17 1200   End: 06/26/17 1150   Admin Instructions: Correction Scale - HIGH INSULIN RESISTANCE DOSING     Do Not give Correction Insulin if BG less than 140  For  - 164 give 1 unit.  For  - 189 give 2 units.  For  - 214 give 3 units.  For  - 239 give 4 units.  For  - 264 give 5 units.  For  - 289 give 6 units.  For  - 314 give 7 units.  For  - 339 give 8 units  For  - 364 give 9 units  For  - 389 give 10 units  For  - 414 give 11 units  For BG greater than or equal to 415 give 12 units  Check blood glucose Q4H and administer based on blood glucose.  Notify provider if  glucose greater than or equal to 350 mg/dL after administration of correction dose.  If given at mealtime, must be administered 5 min before meal or immediately after.      (1248)-Not Given                                                                                                                          (0830)-Not Given [C]       1150-Med Discontinued           Dose: 3 mL Freq: 4 TIMES DAILY Route: NEBULIZATION  Start: 06/12/17 0800   End: 06/26/17 1141    0718 (3 mL)-Given       1111 (3 mL)-Given       1526 (3 mL)-Given       1946 (3 mL)-Given        0718 (3 mL)-Given       1120 (3 mL)-Given       1517 (3 mL)-Given       2010 (3 mL)-Given        0735 (3 mL)-Given       1229 (3 mL)-Given       1515 (3 mL)-Given       1914 (3 mL)-Given        0753 (3 mL)-Given       1109 (3 mL)-Given       1558 (3 mL)-Given       1934 (3 mL)-Given        0731 (3 mL)-Given       (1131)-Not Given [C]       1141-Med Discontinued           Dose: 0.5 mg Freq: EVERY 2 HOURS PRN Route: IV  PRN Reason: anxiety  Start: 06/12/17 1340   End: 06/26/17 1150   Admin Instructions: For IV PUSH: Dilute with equal volume of NS.         1150-Med Discontinued           Dose: 0.5-1 mg Freq: EVERY 3 HOURS PRN Route: IV  PRN Reasons: anxiety,seizures,nausea  PRN Comment: dyspnea  Start: 06/26/17 1151   End: 06/27/17 0717   Admin Instructions: Avoid if delirium present.  Use fourth line for nausea.  For IV PUSH: Dilute with equal volume of NS.          0717-Med Discontinued                    Or    Dose: 0.5-1 mg Freq: EVERY 3 HOURS PRN Route: PO  PRN Reasons: anxiety,seizures,nausea  PRN Comment: dyspnea  Start: 06/26/17 1151   End: 06/27/17 0718   Admin Instructions: Avoid if delirium present.  Use fourth line for nausea.          0718-Med Discontinued                       Dose: 4 g Freq: EVERY 4 HOURS PRN Route: IV  PRN Reason: magnesium supplementation  Start: 06/22/17 1311   End: 06/26/17 1150   Admin Instructions: For serum Mg++ less than 1.6  mg/dL  Give 4 g and recheck magnesium level 2 hours after dose, and next AM.     1355 (4 g)-New Bag           1150-Med Discontinued           Dose: 100 mg Freq: EVERY 24 HOURS Route: IV  Indications of Use: CANDIDEMIA  Start: 06/21/17 1600   End: 06/26/17 1150   Admin Instructions: Infuse over 1 hour; do not administer as an IV bolus injection; an existing IV line should be flushed with NS prior to infusion.  Protect from light.            1612 (100 mg)-New Bag        1647 (100 mg)-New Bag        1620 (100 mg)-New Bag                1150-Med Discontinued           Start: 06/15/17 1058   End: 06/26/17 1802   Admin Instructions: Andree Carpio : cabinet override         1802-Med Discontinued           Dose: 5-10 mg Freq: EVERY 2 HOURS PRN Route: PO  PRN Reason: moderate to severe pain  PRN Comment: or dyspnea  Start: 06/26/17 1152   End: 06/26/17 1328        1328-Med Discontinued        Or    Dose: 5-10 mg Freq: EVERY 2 HOURS PRN Route: SL  PRN Reasons: moderate to severe pain,other  PRN Comment: or dyspnea  Start: 06/26/17 1152   End: 06/26/17 1328   Admin Instructions: Caution: Solution is 10 times more concentrated than standard solution. Verify dose volume.         1328-Med Discontinued           Dose: 0.1-0.4 mg Freq: EVERY 2 MIN PRN Route: IV  PRN Reason: opioid reversal  Start: 06/12/17 2053   End: 06/26/17 1150   Admin Instructions: For respiratory rate LESS than or EQUAL to 8.  Partial reversal dose:  0.1 mg titrated q 2 minutes for Analgesia Side Effects Monitoring Sedation Level of 3 (frequently drowsy, arousable, drifts to sleep during conversation).Full reversal dose:  0.4 mg bolus for Analgesia Side Effects Monitoring Sedation Level of 4 (somnolent, minimal or no response to stimulation).         1150-Med Discontinued           Rate: 1.6-21.2 mL/hr Freq: CONTINUOUS Route: IV  Last Dose: Stopped (06/26/17 1117)  Start: 06/19/17 1515   End: 06/26/17 1150   Admin Instructions: For range orders: start at  lowest dose ordered. Titrate by 0.03 to 0.1 mcg/kg/min every 5 minutes to keep MAP greater than 65 mmHg.  Notify prescriber if higher doses are required to achieve blood pressure goals.  Protect from light. Vesicant.     0000 (0.2 mcg/kg/min)-Rate/Dose Verify       0115 (0.24 mcg/kg/min)-Rate/Dose Change       0800 (0.24 mcg/kg/min)-Rate/Dose Verify       1000 (0.24 mcg/kg/min)-Rate/Dose Verify       1200 (0.24 mcg/kg/min)-Rate/Dose Verify       1400 (0.24 mcg/kg/min)-Rate/Dose Verify       1435 (0.22 mcg/kg/min)-Rate/Dose Change       1500 (0.19 mcg/kg/min)-Rate/Dose Change       1541 (0.19 mcg/kg/min)-New Bag       1600 (0.19 mcg/kg/min)-Rate/Dose Verify       1800 (0.19 mcg/kg/min)-Rate/Dose Verify       2000 (0.19 mcg/kg/min)-Rate/Dose Verify       2114 (0.22 mcg/kg/min)-Rate/Dose Change [C]       2200 (0.22 mcg/kg/min)-Rate/Dose Verify        0000 (0.22 mcg/kg/min)-Rate/Dose Verify       0036 (0.25 mcg/kg/min)-Rate/Dose Change       0052 (0.22 mcg/kg/min)-Rate/Dose Change       0200 (0.22 mcg/kg/min)-Rate/Dose Verify       0335 (0.19 mcg/kg/min)-Rate/Dose Change       0400 (0.19 mcg/kg/min)-Rate/Dose Verify       0415 (0.16 mcg/kg/min)-Rate/Dose Change       0600 (0.16 mcg/kg/min)-Rate/Dose Verify       0700 (0.16 mcg/kg/min)-Rate/Dose Verify       2000 (0.16 mcg/kg/min)-Rate/Dose Verify       2021 (0.16 mcg/kg/min)-New Bag       2200 (0.16 mcg/kg/min)-Rate/Dose Verify        0000 (0.16 mcg/kg/min)-Rate/Dose Verify       0200 (0.16 mcg/kg/min)-Rate/Dose Verify       0400 (0.16 mcg/kg/min)-Rate/Dose Verify       0700 (0.16 mcg/kg/min)-Rate/Dose Verify       2000 (0.16 mcg/kg/min)-Rate/Dose Verify       2200 (0.16 mcg/kg/min)-Rate/Dose Verify        0000 (0.16 mcg/kg/min)-Rate/Dose Verify       0200 (0.16 mcg/kg/min)-Rate/Dose Verify       0307 (0.16 mcg/kg/min)-New Bag       0400 (0.16 mcg/kg/min)-Rate/Dose Verify       0600 (0.16 mcg/kg/min)-Rate/Dose Verify       0800 (0.16 mcg/kg/min)-Rate/Dose Verify        2000 (0.16 mcg/kg/min)-Rate/Dose Verify       2200 (0.16 mcg/kg/min)-Rate/Dose Verify        0000 (0.16 mcg/kg/min)-Rate/Dose Verify       0200 (0.16 mcg/kg/min)-Rate/Dose Verify       0400 (0.16 mcg/kg/min)-Rate/Dose Verify       0600 (0.16 mcg/kg/min)-Rate/Dose Verify       1117-Stopped       1150-Med Discontinued           Dose: 500,000 Units Freq: 4 TIMES DAILY Route: MT  Start: 06/16/17 1400   End: 06/26/17 1150   Admin Instructions: Shake Well.  Swab to tongue qid     0810 (500,000 Units)-Given       1248 (500,000 Units)-Given       1710 (500,000 Units)-Given        0022 (500,000 Units)-Given       0918 (500,000 Units)-Given              2107 (500,000 Units)-Given               0742 (500,000 Units)-Given              1414 (500,000 Units)-Given       1752 (500,000 Units)-Given       2152 (500,000 Units)-Given        0746 (500,000 Units)-Given              1410 (500,000 Units)-Given              2136 (500,000 Units)-Given        0829 (500,000 Units)-Given       1150-Med Discontinued           Dose: 4 mg Freq: EVERY 6 HOURS PRN Route: PO  PRN Reason: nausea  Start: 06/12/17 0341   End: 06/26/17 1150   Admin Instructions: This is Step 1 of nausea and vomiting management.  If nausea not resolved in 15 minutes, go to Step 2 prochlorperazine (COMPAZINE). Do not push through foil backing. Peel back foil and gently remove. Place on tongue immediately. Administration with liquid unnecessary         1150-Med Discontinued        Or    Dose: 4 mg Freq: EVERY 6 HOURS PRN Route: IV  PRN Reasons: nausea,vomiting  Start: 06/12/17 0341   End: 06/26/17 1150   Admin Instructions: This is Step 1 of nausea and vomiting management.  If nausea not resolved in 15 minutes, go to Step 2 prochlorperazine (COMPAZINE).  Irritant.         1150-Med Discontinued           Dose: 40 mg Freq: 2 TIMES DAILY Route: IV  Start: 06/19/17 2130   End: 06/26/17 1150   Admin Instructions: Irritant.     0807 (40 mg)-Given       2036 (40 mg)-Given         0918 (40 mg)-Given       2107 (40 mg)-Given        0742 (40 mg)-Given              2152 (40 mg)-Given        0746 (40 mg)-Given              2136 (40 mg)-Given        0827 (40 mg)-Given       1150-Med Discontinued           Dose: 5 mg Freq: EVERY 6 HOURS PRN Route: IV  PRN Reasons: nausea,vomiting  Start: 06/12/17 0341   End: 06/26/17 1150   Admin Instructions: This is Step 2 of nausea and vomiting management.   If nausea not resolved in 15 minutes, give metoclopramide (REGLAN) if ordered (step 3 of nausea and vomiting management)         1150-Med Discontinued        Or    Dose: 5 mg Freq: EVERY 6 HOURS PRN Route: PO  PRN Reason: vomiting  Start: 06/12/17 0341   End: 06/26/17 1150   Admin Instructions: This is Step 2 of nausea and vomiting management.   If nausea not resolved in 15 minutes, give metoclopramide (REGLAN) if ordered (step 3 of nausea and vomiting management)         1150-Med Discontinued        Or    Dose: 12.5 mg Freq: EVERY 12 HOURS PRN Route: RE  PRN Reasons: nausea,vomiting  Start: 06/12/17 0341   End: 06/26/17 1150   Admin Instructions: This is Step 2 of nausea and vomiting management.   If nausea not resolved in 15 minutes, give metoclopramide (REGLAN) if ordered (step 3 of nausea and vomiting management)         1150-Med Discontinued      Medications 06/22/17 06/23/17 06/24/17 06/25/17 06/26/17 06/27/17 06/28/17

## 2017-06-11 NOTE — IP AVS SNAPSHOT
"` `           [place] [address] MEDICAL SURGICAL: [phone]                                              INTERAGENCY TRANSFER FORM - NURSING   2017                    Hospital Admission Date: 2017  HESHAM JENSEN   : 1950  Sex: Male        Attending Provider: (none)    Allergies:  No Known Allergies    Infection:  None   Service:  HOSPITALIST    Ht:  1.651 m (5' 5\")   Wt:  59.4 kg (130 lb 15.3 oz)   Admission Wt:  48.5 kg (106 lb 14.8 oz)    BMI:  21.79 kg/m 2   BSA:  1.65 m 2            Patient PCP Information     Provider PCP Type    Fabien Sherman MD, MD General      Current Code Status     Date Active Code Status Order ID Comments User Context       Prior      Code Status History     Date Active Date Inactive Code Status Order ID Comments User Context    2017  1:56 PM  DNR/DNI [id number]  Hesham Todd MD Outpatient    2017 11:52 AM 2017  1:56 PM DNR/DNI [id number] Code status discussion is appropriately documented in the chart. Nomi Kumar MD Inpatient    2017  9:45 AM 2017 11:52 AM DNR/DNI [id number] Code status discussion is appropriately documented in the chart. Elaina Moeller APRN CNS Inpatient    2017 11:37 AM 2017  9:45 AM DNR [id number]  Elaina Moeller APRN CNS Inpatient    2017  4:14 AM 2017 11:37 AM Full Code [id number]  Dinesh Mclaughlin,  Inpatient    2017  3:42 AM 2017  4:14 AM DNR/DNI [id number]  Dinesh Mclaughlin,  Inpatient    10/26/2016  1:18 PM 2017  3:42 AM DNR/DNI [id number]  Schuyler Arellano MD Outpatient    10/24/2016 11:44 PM 10/26/2016  1:18 PM DNR/DNI [id number]  Brian Conn MD Inpatient    2015  2:13 PM 10/24/2016 11:44 PM DNR/DNI [id number]  Schuyler Arellano MD Outpatient    2015 10:02 AM 2015  2:13 PM DNR/DNI [id number] See POLST created 2015  See record of discussion in progress note on same date. Giuliana Vega, PHILIPP CNS " Inpatient    11/12/2015  4:52 PM 11/19/2015 10:02 AM Full Code [id number]  Cristian Ellison MD Inpatient    8/17/2014 11:27 PM 8/21/2014  9:37 PM Full Code [id number]  Sofia Talavera MD Inpatient    6/3/2014 10:24 PM 6/6/2014  9:41 PM Full Code [id number]  Taqueria Batres MD Inpatient    3/15/2014  1:36 PM 6/3/2014 10:24 PM Full Code [id number]  Juany Braun MD Outpatient    3/13/2014  1:33 AM 3/15/2014  1:36 PM Full Code [id number]  Alana Moore RN Inpatient    8/2/2013  9:33 AM 8/6/2013  8:35 PM Full Code [id number]  Jesus Harrison, DO Inpatient      Advance Directives        Does patient have a scanned Advance Directive/ACP document in EPIC?           Yes        Hospital Problems as of 6/28/2017              Priority Class Noted POA    Respiratory distress Medium  6/12/2017 Yes      Non-Hospital Problems as of 6/28/2017              Priority Class Noted    Cerebrovascular accident (CVA) due to occlusion of cerebral artery (H)   Unknown    Chronic diastolic congestive heart failure (H)   Unknown    Essential hypertension   Unknown    HYPERLIPIDEMIA LDL GOAL <100   10/31/2010    Advance Care Planning   11/1/2011    ESRF (end stage renal failure) (H)   5/16/2013    Cognitive impairment   6/21/2013    CKD (chronic kidney disease) stage 5, GFR less than 15 ml/min (H)   7/23/2013    Syncope   8/2/2013    Anemia   8/2/2013    EKG abnormality   8/2/2013    Encephalopathy   8/2/2013    Hypoxia   3/13/2014    Renal cancer (H)   4/25/2014    Hematemesis   6/3/2014    Seizures (HCC)   8/17/2014    Health Care Home   8/25/2014    Septic shock (H) Medium  11/12/2015    Diastolic CHF, chronic (H) Medium  Unknown    Hyperkalemia Medium  10/24/2016    Hemodialysis catheter dysfunction, initial encounter (H) Medium  10/25/2016    Feeding by G-tube (H) Medium  3/31/2017    Aspiration into airway, sequela Medium  3/31/2017      Immunizations     Name Date      Hepatitis B 07/18/06     Hepatitis B 06/20/06      Influenza (High Dose) 3 valent vaccine 10/26/16     Influenza (High Dose) 3 valent vaccine 11/17/15     Influenza (High Dose) 3 valent vaccine 10/01/10     Influenza (IIV3) 09/30/14     Influenza (IIV3) 10/01/13     Influenza (IIV3) 12/04/12     Influenza (IIV3) 11/01/11     Mantoux 07/15/15     Pneumococcal 23 valent 06/05/14     Pneumococcal 23 valent 05/30/11     TD (ADULT, 7+) 06/29/05     TDAP Vaccine (Adacel) 07/23/13     Varicella 06/20/06     Varicella 01/31/06          END      ASSESSMENT     Discharge Profile Flowsheet     EXPECTED DISCHARGE     Patient's communication style  spoken language (English or Bilingual) 06/11/17 2259    Expected Discharge Date  -- (TBD<Group Home hospice) 06/27/17 1149   FINAL RESOURCES      DISCHARGE NEEDS ASSESSMENT     Resources List  Home Care;Skilled Nursing Facility 06/28/17 1049    Medical Team notified of plan?  yes 06/27/17 1123   Other Resources  Group Home 06/27/17 1134    Anticipated Changes Related to Illness  inability to care for self 06/27/17 1123   Group Home Agency  [facility] 06/27/17 1134    Transportation Available  -- (stretcher) 06/27/17 1123   Group Home Contact Info  [phone] (Jessica) 06/27/17 1134    Current Discharge Risk  terminally ill 06/27/17 1123   Group Home Status  Active 06/27/17 1134    # of Referrals Placed by [facility]  Communication hand-offs to next level of Care Providers 11/22/15 1344   Home Care  [facility] [phone], Fax: [phone] 06/28/17 1049    Equipment Used at Home  bath bench;wheelchair;walker, standard 11/18/15 1359   Hospice  [facility] [phone], Fax: [phone] 06/28/17 1046    ASSESSMENT OF FUNCTIONAL STATUS     Skilled Nursing Facility  [facility] [phone], Fax: [phone] 06/28/17 1049    Prior to admission patient needed assistance with:  Medications;Meal preparation;Laundry/Housekeeping;Shopping;Transportation 06/27/17 1123   PAS Number  [id number] 06/28/17 8957  "   Assesssment of Functional Status  Not at baseline with ADL Functioning;Not at baseline with mobility;Not at  functional baseline 06/27/17 1123   SKIN      GASTROINTESTINAL (ADULT,PEDIATRIC,OB)     Inspection  Full 06/28/17 1145    GI WDL  ex 06/28/17 1145   Skin areas NOT inspected  Scapula, left;Scapula, right;Spine;Buttock, left;Buttock, right;Sacrum;Coccyx 06/23/17 0105    Abdominal Appearance  -- 06/27/17 1057   Skin WDL  ex 06/28/17 1145    All Quadrants Bowel Sounds  not audible 06/28/17 1145   Skin Moisture  dry;flaky 06/28/17 1145    Last Bowel Movement  06/28/17 06/28/17 0353   Skin Integrity  drain/device(s);pressure ulcer(s);wound(s) 06/28/17 1145    GI Signs/Symptoms  diarrhea;fecal incontinence 06/28/17 1145   SAFETY      Passing flatus  -- (unknown) 06/22/17 1028   Safety WDL  WDL 06/28/17 1145    COMMUNICATION ASSESSMENT     Safety Equipment  oxygen flowmeter;manual resusciator with appropriate mask;suction regulator;suction equipment 06/25/17 2127                 Assessment WDL (Within Defined Limits) Definitions           Safety WDL     Effective: 09/28/15    Row Information: <b>WDL Definition:</b> Bed in low position, wheels locked; call light in reach; upper side rails up x 2; ID band on<br> <font color=\"gray\"><i>Item=AS safety wdl>>List=AS safety wdl>>Version=F14</i></font>      Skin WDL     Effective: 09/28/15    Row Information: <b>WDL Definition:</b> Warm; dry; intact; elastic; without discoloration; pressure points without redness<br> <font color=\"gray\"><i>Item=AS skin wdl>>List=AS skin wdl>>Version=F14</i></font>      Vitals     Vital Signs Flowsheet     VITAL SIGNS     NISREEN COMA SCALE      Temp  98.2  F (36.8  C) 06/26/17 0849   Best Eye Response  3-->(E3) to speech 06/26/17 1729    Temp src  Axillary 06/26/17 0849   Best Motor Response  1-->(M1) none 06/26/17 1729    Resp  18 06/28/17 1136   Best Verbal Response  1-->(V1) none 06/26/17 1729    Pulse  120 06/19/17 1542   Nisreen Coma " "Scale Score  5 06/26/17 1729    Heart Rate  80 06/26/17 1500   Assessment Qualifiers  other (see comments) (pt non mobile and non verbal) 06/26/17 1729    Pulse/Heart Rate Source  Monitor 06/14/17 1945   HEIGHT AND WEIGHT      BP  135/45 06/26/17 1500   Height  1.651 m (5' 5\") 06/12/17 0600    BP Location  Left leg 06/25/17 2122   Weight  59.4 kg (130 lb 15.3 oz) 06/26/17 0337    OXYGEN THERAPY     BSA (Calculated - sq m)  1.49 06/12/17 0600    SpO2  97 % 06/26/17 2352   BMI (Calculated)  17.83 06/12/17 0600    O2 Device  Nasal cannula 06/26/17 2352   POSITIONING      FiO2 (%)  35 % 06/26/17 0849   Body Position  left, side-lying 06/28/17 1347    Oxygen Delivery  1 LPM 06/26/17 1500   Head of Bed (HOB)  HOB at 20-30 degrees 06/28/17 1347    PAIN/COMFORT     Positioning/Transfer Devices  pillows 06/28/17 1347    Patient Currently in Pain  INES 06/28/17 1347   DAILY CARE      Preferred Pain Scale  CPOT (Critical-Care Pain Observation Tool) 06/26/17 0849   Activity Type  bedrest 06/28/17 1347    FACES Pain Rating  0-->no hurt 06/23/17 2124   Activity Level of Assistance  assistance, 2 people 06/28/17 1347    Nonverbal Indicators Of Pain  other (see comments) (no indicators of pain present) 06/12/17 0543   Activity Assistive Device  mechanical lift 06/26/17 1936    Pain Management Interventions  analgesia administered 06/26/17 0752   ECG      Response to Interventions  Absence of nonverbal indicators of pain 06/28/17 1347   ECG Rhythm  Normal sinus rhythm 06/26/17 1500    CRITICAL-CARE PAIN OBSERVATION TOOL (CPOT)     Lead Monitored  Lead II 06/25/17 2122    Facial Expression  0 06/26/17 1500   Ectopy  PVC;PAC 06/25/17 2122    Body Movements  0 06/26/17 1500   Ectopy Frequency  Occasional 06/25/17 2122    Compliance w/ventilator (intubated patients)  Extubated 06/26/17 1500   ANALGESIA SIDE EFFECTS MONITORING      Vocalization (extubated patients)  0 06/26/17 1500   Side Effects Monitoring: Respiratory Quality  R " 06/28/17 1347    Muscle Tension  0 06/26/17 1500   Side Effects Monitoring: Respiratory Depth  N 06/28/17 1347    Total  0 06/26/17 1500   Side Effects Monitoring: Sedation Level  1 06/28/17 1347            Patient Lines/Drains/Airways Status    Active LINES/DRAINS/AIRWAYS     Name: Placement date: Placement time: Site: Days: Last dressing change:    Gastrostomy/Enterostomy Gastrojejunostomy LUQ 16 fr 06/19/17   0952   LUQ   9     Hemodialysis Vascular Access Arteriovenous fistula Right Arm       Arm        Hemodialysis Vascular Access Arteriovenous fistula Left Arm       Arm        Wound 06/12/17 Sacrum L buttock/sacrococcygeal 06/12/17   0334   Sacrum   16     Wound 06/21/17 Left Ear small pea sized erosion ?from oximeter 06/21/17   0900   Ear   7             Patient Lines/Drains/Airways Status    Active PICC/CVC     Name: Placement date: Placement time: Site: Days: Additional Info Last dressing change:    CVC Double Lumen Right Internal jugular       Internal jugular    Description : Tunneled            Dressing Intervention: Chlorhexidine sponge;Transparent            Orientation: Right               Intake/Output Detail Report     Date Intake                 Output     Net    Shift P.O. I.V. Blood NG/GT IV Piggyback TPN Colloid Enteral Blood Components Total Urine Emesis/NG output Other Total       Noc 06/26/17 2300 - 06/27/17 0659 -- -- -- -- -- -- -- -- -- -- -- 100 -- 100 -100    Day 06/27/17 0700 - 06/27/17 1459 -- -- -- -- -- -- -- -- -- -- -- 400 -- 400 -400    Maryann 06/27/17 1500 - 06/27/17 2259 0 -- -- -- -- -- -- -- -- -- -- 325 -- 325 -325    Noc 06/27/17 2300 - 06/28/17 0659 -- -- -- -- -- -- -- -- -- -- -- -- -- -- 0    Day 06/28/17 0700 - 06/28/17 1459 0 -- -- -- -- -- -- -- -- -- -- 750 -- 750 -750      Last Void/BM       Most Recent Value    Urine Occurrence     Stool Occurrence 1 at 06/28/2017 0800      Case Management/Discharge Planning     Case Management/Discharge Planning Flowsheet      REFERRAL INFORMATION     Assesssment of Functional Status  Not at baseline with ADL Functioning;Not at baseline with mobility;Not at  functional baseline 06/27/17 1123    Did the Initial Social Work Assessment result in a Social Work Case?  Yes 06/27/17 1110   COPING/STRESS      Admission Type  inpatient 06/27/17 1110   Major Change/Loss/Stressor  unable to assess 06/12/17 0313    Arrived From  home or self-care 06/27/17 1110   EXPECTED DISCHARGE      Referral Source  physician 06/27/17 1110   Expected Discharge Date  -- (TBD<Group Home hospice) 06/27/17 1149    # of Referrals Placed by CTS  Communication hand-offs to next level of Care Providers 11/22/15 1344   DISCHARGE PLANNING      Reason For Consult  discharge planning;end of life/hospice 06/27/17 1123   Medical Team notified of plan?  yes 06/27/17 1123    Record Reviewed  history and physical;medical record 06/27/17 1110   Anticipated Changes Related to Illness  inability to care for self 06/27/17 1123     Assigned to Case  Corinne 06/27/17 1110   Transportation Available  -- (stretcher) 06/27/17 1123    Primary Care Clinic Name  [facility] 06/27/17 1110   Current Discharge Risk  terminally ill 06/27/17 1123    Primary Care MD Name  Lane 06/27/17 1110   Equipment Used at Home  bath bench;wheelchair;walker, standard 11/18/15 1359    LIVING ENVIRONMENT     FINAL RESOURCES      Lives With  facility resident 06/27/17 1110   Resources List  Home Care;Skilled Nursing Facility 06/28/17 1049    Living Arrangements  group home 06/27/17 1110   Other Resources  Group Home 06/27/17 1134    Provides Primary Care For  no one 06/27/17 1110   Group Home Agency  [facility] 06/27/17 1134    Quality Of Family Relationships  supportive 06/27/17 1123   Group Home Contact Info  [phone] (Jessica) 06/27/17 1134    Able to Return to Prior Living Arrangements  yes 06/27/17 1123   Group Home Status  Active 06/27/17 1134    Living Arrangement Comments  Lives in [facility]  setting  06/27/17 1123   Home Care  [facility] [phone], Fax: [phone] 06/28/17 1049    HOME SAFETY     Hospice  [facility] [phone], Fax: [phone] 06/28/17 1049    Patient Feels Safe Living in Home?  yes 06/27/17 1123   Skilled Nursing Facility  [facility] [phone], Fax: [phone] 06/28/17 1046    ASSESSMENT OF FAMILY/SOCIAL SUPPORT     [facility] Number  [id number] 06/28/17 1412    Marital Status   06/27/17 1123   ABUSE RISK SCREEN      Who is your support system?  Facility resident(s)/Staff 06/27/17 1123   QUESTION TO PATIENT:  Has a member of your family or a partner(now or in the past) intimidated, hurt, manipulated, or controlled you in any way?  patient declined to answer or is unable to answer 06/12/17 0313    Description of Support System  Supportive;Involved 06/27/17 1123   QUESTION TO PATIENT: Do you feel safe going back to the place where you are living?  patient declined to answer or is unable to answer 06/12/17 0313    Support Assessment  Adequate family and caregiver support;Adequate social supports 06/27/17 1123   OBSERVATION: Is there reason to believe there has been maltreatment of a vulnerable adult (ie. Physical/Sexual/Emotional abuse, self neglect, lack of adequate food, shelter, medical care, or financial exploitation)?  no 06/12/17 0313    ASSESSMENT OF FUNCTIONAL STATUS     (R) MENTAL HEALTH SUICIDE RISK      Prior to admission patient needed assistance with:  Medications;Meal preparation;Laundry/Housekeeping;Shopping;Transportation 06/27/17 1123   Are you depressed or being treated for depression?  No 06/12/17 0313

## 2017-06-12 PROBLEM — R06.03 RESPIRATORY DISTRESS: Status: ACTIVE | Noted: 2017-01-01

## 2017-06-12 NOTE — PROGRESS NOTES
[facility]  WOC Nurse Inpatient Adult Pressure Injury (PI) Wound Assessment     Assessment of PI(s) on pt's:   Coccyx     Data:   Patient History:      per MD note(s):  67-year-old male with likely aspiration pneumonia. Complicated medical history, pt lives in group home on tube feeds.     Current mattress:  Evolve ICU mattress    Current Diet / Nutrition:           Active Diet Order      NPO for Medical/Clinical Reasons Except for: No Exceptions        Dionisio Assessment and sub scores:   Dionisio Score  Avg: 10  Min: 10  Max: 10   Labs:   Recent Labs   Lab Test  06/12/17   0639  06/11/17   2321   ALBUMIN   --   3.2*   HGB   --   11.7*   INR   --   1.23*   WBC   --   15.8*   A1C  5.1   --                                                                                                                           Pressure Injury Assessment  (location #1):   coccyx    Wound History:   RN reports that pt is unable to turn over to side to assess coccyx due to respiratory difficulty and vomiting coffee ground material. Asking to return later today or tomorrow.          Intervention:     Patient's chart evaluated.      Dionisio Interventions:  Current Dionisio Interventions and Care Plan reviewed and updated, appropriate at this time.    Wound was not assessed.    Wound Care: was done by RN,    Orders  Reviewed    Supplies  In room    Discussed plan of care with Nurse           Assessment:     Pressure Injury located on coccyx: unable to assess wound    Status: wound  Will need to be assessed tomorrow, pt critically ill, constantly wretching and needed suctioning of thick coffee ground material from throat.          Plan:     Nursing to notify the Provider(s) and re-consult the WOC Nurse if wound(s) deteriorate(s)or if the wound care plan needs reevaluation.    Plan for wound care to wound located on coccyx: will return in am    WOC Nurse will return: am  Face to face time: 15 min

## 2017-06-12 NOTE — ED NOTES
Pt minimally responsive to painful stimuli.  RR 60 with increased work of breathing.    Per EMS, pt from group home.  Pt was at his baseline at 1400 today.  Pt progressively becoming less responsive with increasing respiratory rate through day.  EMS with SaO2 99% on Room air with 106/p BP.  Unkown what pt's baseline mentation is.  MD notified to examine pt.

## 2017-06-12 NOTE — PROGRESS NOTES
RT- patient placed on high flow nasal cannula for CPAP support secondary to tachypnea and abdominal muscle use.    Placed on 50 LPM with FIO2 21% and oxymask at 15 LPM due to patient being a mouth breather.     Patient given Duoneb 6ml via in line.     Breath sounds diminished throughout.    Rashmi Sosa, RT  6/12/2017 12:18 AM

## 2017-06-12 NOTE — PROGRESS NOTES
RT- patient transported to  from ER on 15 LPM oxymask.     Patient placed back on 50 LPM high flow nasal cannula upon arriving in the ICU.    Will continue to monitor patient.    Rashmi Sosa, RT  6/12/2017 2:47 AM

## 2017-06-12 NOTE — PROGRESS NOTES
"Pt was intubated for resp distress with an 8.0 ETT that was secured at 24 cm at the lips. He continues to be on the ventilator throughout the rest of the day with the only change was to < the FIO2 from 80 to 50% and < the RR from 18 to 14. We are suctioning out a moderate amount of thick tar like secretions. Will continue to monitor and assess.    Current vent settings are CMV 14/ 450/ 50%/ +5    Vital signs:  Temp: 101.8  F (38.8  C) Temp src: Bladder BP: (!) 82/52 Pulse: 85 Heart Rate: 101 Resp: (!) 41 SpO2: 96 % O2 Device: High Flow Nasal Cannula (HFNC) Oxygen Delivery:  (50 LPM) Height: 165.1 cm (5' 5\") Weight: 48.5 kg (106 lb 14.8 oz)  Estimated body mass index is 17.79 kg/(m^2) as calculated from the following:    Height as of this encounter: 1.651 m (5' 5\").    Weight as of this encounter: 48.5 kg (106 lb 14.8 oz).      PAST MEDICAL HISTORY:   Past Medical History:   Diagnosis Date     Cognitive impairment      Diastolic CHF, chronic (H)      End-stage renal disease on hemodialysis (H)      Essential hypertension     Off all BP meds as of late 2015 due to hypotension     Renal cancer (H)     s/p cryoablation     Seizures (H) Onset 8/2014    On dilantin     Stroke (H) 2000, 2007    Hemiplegia, aphasia       PAST SURGICAL HISTORY:   Past Surgical History:   Procedure Laterality Date     Cryoablation of kidney cancer       ESOPHAGOSCOPY, GASTROSCOPY, DUODENOSCOPY (EGD), COMBINED  8/5/2014    Procedure: COMBINED ESOPHAGOSCOPY, GASTROSCOPY, DUODENOSCOPY (EGD), BIOPSY SINGLE OR MULTIPLE;  Surgeon: Raheel Mixon MD;  Location:  GI     HEMODIALYSIS VIA AV FISTULA       PEG tube NOS       right arm skin graft         FAMILY HISTORY:   Family History   Problem Relation Age of Onset     Family History Negative Mother      Unknown/Adopted No family hx of        SOCIAL HISTORY:   Social History   Substance Use Topics     Smoking status: Never Smoker     Smokeless tobacco: Never Used     Alcohol use No       Recent " Labs  Lab 06/12/17  1405 06/12/17  0208   PH 7.26* 7.30*   PCO2 28* 32*   PO2 228* 111*   HCO3 13* 16*   O2PER 80%Ventilator 100%HIGH FLOW GROVER Lugo RT  6/12/2017

## 2017-06-12 NOTE — PLAN OF CARE
Problem: Goal Outcome Summary  Goal: Goal Outcome Summary  Outcome: No Change  ICU End of Shift Summary.  For vital signs and complete assessments, please see documentation flowsheets.      Pertinent assessments: Pt admitted to ICU this shift for SOB/respiratory failure. Nonverbal and immobile at baseline due to an old stroke. Family at bedside on transfer and stated he is at baseline with his mental status. LS diminished throughout. Productive cough at times with small amount of thick, brown sputum suctioned PRN. Satting mid to high 90s on high ailyn 50L and 15L oxymask due to patient being a mouth breather per RT. Tele SR BBB with inverted Ts. Gtube present, but tube feedings held at this time. Afebrile.  Major Shift Events: Lactic acid remains elevated. Given another 500 cc bolus with recheck this AM.  Plan (Upcoming Events): Continue antibiotics. Monitor respiratory status closely. Bipap if needed per MD. Nephro consult with possible dialysis today. Palliative consult to clarify code status.  Discharge/Transfer Needs: Back to group home when stable.     Bedside Shift Report Completed   Bedside Safety Check Completed

## 2017-06-12 NOTE — ED PROVIDER NOTES
History     Chief Complaint:  Difficulty Breathing     HPI   Patient history limited. History provided by group [facility] staff and niece    Hesham Ramos is a 67 year old male with a history of end stage renal disease on dialysis, renal cancer, seizures, and stroke x2 who presents from group [facility] via EMS for evaluation of difficult breathing. Per group [facility] staff, the patient is nonverbal, bed bound, feed by G tube, and does not follow commands at baseline. Staff report that the patient was at his baseline earlier this afternoon. The patient was found by staff around 8pm with tube feeds coming out of his nose and mouth and difficulty breathing, so they called 911. He was brought to the ED by EMS for evaluation. Blood sugar was 243 and pulse 110. On arrival, staff report that the patient is at his baseline mental status. The patient is otherwise unable to provide any meaningful history. Group home staff report he has not missed dialysis recently.  The patient's niece who is his health care proxy states that the patient is full code and she would like everything done. Family notes a chronic right sided facial droop.    Allergies:  No Known Allergies     Medications:    Docusate Sodium 150 MG/15ML LIQD  DONEPEZIL HYDROCHLORIDE 10 MG TBDP  sennosides (SENOKOT) 8.6 MG tablet  simvastatin (ZOCOR) 40 MG tablet  calcium carbonate (TUMS) 500 MG chewable tablet  ACE/ARB NOT PRESCRIBED, INTENTIONAL,  BETA BLOCKER NOT PRESCRIBED, INTENTIONAL,  polyethylene glycol (MIRALAX/GLYCOLAX) Packet  psyllium (METAMUCIL/KONSYL) Packet  multivitamins with minerals (CERTAVITE/CEROVITE) LIQD liquid  Cholecalciferol (VITAMIN D3) 1200 UNIT/15ML LIQD  ASPIRIN PO  Multiple Vitamins-Minerals (MULTIVITAMIN ADULT) TABS  phenytoin (DILANTIN) 125 MG/5ML suspension  triamcinolone (KENALOG) 0.1 % cream  cholecalciferol (VITAMIN  -D) 1000 UNITS capsule    Past Medical History:    Cognitive impairment  Diastolic CHF  End stage renal disease on  hemodialysis  Hypertension  Renal cancer  Seizures  Stroke x2  Septic shock  Feeding by G tube    Past Surgical History:    Cryoablation of kidney cancer  EGD  Hemodialysis via AV fistula  PEG tube NOS  Right arm skin graft    Family History:    History reviewed. No pertinent family history.     Social History:  Smoking status: No  Alcohol use: No  Patient lives in a group home  Patient's niece is primary guardian  Marital Status:   [4]     Review of Systems   Respiratory: Positive for shortness of breath.    Gastrointestinal: Positive for vomiting.   ROS limited      Physical Exam     Patient Vitals for the past 24 hrs:   BP Temp Temp src Pulse Heart Rate Resp SpO2   06/12/17 0220 134/48 97.9  F (36.6  C) - - 91 (!) 38 98 %   06/12/17 0215 - 98.1  F (36.7  C) - - 88 (!) 32 100 %   06/12/17 0200 101/68 98.6  F (37  C) - - 91 (!) 35 100 %   06/12/17 0140 111/73 99  F (37.2  C) - - 98 (!) 42 100 %   06/12/17 0110 92/49 99.5  F (37.5  C) - - 96 - 100 %   06/12/17 0100 (!) 91/38 99.5  F (37.5  C) - - 92 - 100 %   06/12/17 0040 (!) 123/33 99.3  F (37.4  C) - - 99 - -   06/12/17 0037 129/43 99.3  F (37.4  C) - - 106 - -   06/12/17 0034 - - - - 97 - -   06/12/17 0028 110/53 - - - - - -   06/12/17 0005 - - - - - - 98 %   06/11/17 2330 (!) 124/26 - - - 102 - -   06/11/17 2315 91/62 - - - 103 - -   06/11/17 2309 - - - - - - 96 %   06/11/17 2305 100/48 102.2  F (39  C) Temporal 85 - 28 -          Physical Exam  Constitutional:  Minimally responsive. Moves his right side spontaneously. Cognitively at baseline. In acute respiratory distress. Cachectic. Chronically ill.   HENT:   Head:    Normocephalic.   Mouth/Throat:   Oropharynx is clear and moist.   Eyes:    Eyes are pinpoint and minimally reactive.   Neck:    Neck supple.   Cardiovascular:  Normal rate, regular rhythm and normal heart sounds.      Exam reveals no gallop and no friction rub.       No murmur heard.  Pulmonary/Chest:  In acute respiratory  distress     Breath sounds normal.      No wheezes. No rales.   Abdominal:   Soft. No distension. No signs of pain with palpation of the abdomen. No rebound and no guarding.   Musculoskeletal:  No pitting edema. 2+ distal pulses.   Neurological:   He is nonverbal. Minimally responsive. At baseline. Moves right upper and lower extremities minimally spontaneously. Does not follow commands. Right facial droop which is old.   Skin:    Stage 3 decubitus ulcer to left and right buttocks. No rash noted.     Emergency Department Course   ECG (22:54:44):  Rate 109 bpm. MA interval 164. QRS duration 94. QT/QTc 328/441. P-R-T axes 68 -17 100. Poor data quality. Sinus tachycardia.ST & T wave abnormality, consider anterolateral ischemia. Abnormal ECG   Interpreted at 2254 by Billy Morel MD.    Repeat ECG (22:55:02):  Rate 112 bpm. MA interval 164. QRS duration 96. QT/QTc 332/453. P-R-T axes 66 -15 97. Sinus tachycardia. T wave abnormality, consider lateral ischemia. Abnormal ECG   No significant changes when compared to ECG from 4/23/2017  Interpreted at 2255 by Billy Morel MD.    Imaging:  Radiographic findings were communicated with the patient who voiced understanding of the findings.    X-ray Chest, 1 view portable:  Mild left basilar atelectasis.  Result per radiology.     CT Head w/o contrast  No acute abnormality.  As read by Radiology.    Laboratory:  Troponin POCT (2324): 0.18(HH)  CBC: WBC 15.8(H), HGB 11.7(L), o/w WNL ()   CMP: Potassium 5.5(H), Carbon dioxide 19(L), Anion gap 19(H), Glucose 286(H), (H), Creatinine 7.89(H), GFR 8(L), Albumin 3.2(L), Protein total 9.4(H), Alkphos 219(H), AST 72(H), o/w WNL   Blood gas arterial: pH 7.30(L), pCO2 32(L), pO2 111(H), Bicarbonate arterial 16(L), o/w WNL  Lactic acid: 4.4(HH)  INR: 1.23(H)  Glucose by meter (2302): 173(H)    Blood cultures: in process    Procedures:    Central Line Placement     Procedure:  Central Line Placement with Ultrasound  Guidance.    Indications: Vascular access, Central pressure monitoring and Sepsis (monitoring of mixed venous oxygen saturations)    Consent:  Risks (including but not limited to: pneumothorax,bleeding, infection or artery puncture), benefits and alternatives were discussed with  medical advocate and consent for procedure was obtained.    Timeout:  Universal protocol was followed. TIME OUT conducted just prior to starting procedure confirmed patient identity, site/side, procedure, patient position, and availability of correct equipment and implants.?  Yes    Procedure note:  Right Femoral and the right femoral area was prepped, cleansed and draped in a sterile fashion.  Mask, gown and gloves were used per sterile protocol.  Patient was then placed into Trendelenburg position and lidocaine was used for local anesthesia.  Vascular probe with ultrasound was used in a sterile fashion for guidence.  Introducer needle was then used to gain access to the central venous circulation.  Using Seldinger technique the  Triple lumen catheter was placed.  Catheter port(s) were aspirated and flushed.  Central line was sutured in place and sterile dressing applied.     Patient Status:  Patient tolerated the procedure well.  There were no complications.      Interventions:  0002: Zosyn 2.25 g IV  0005: Duoneb 6 mL  0006: Tylenol suppository 975 mg rectal   0214: Vancomycin 1 g IV  0213: Lactated ringers bolus 1,497 IV    Emergency Department Course:  2247: The patient arrived in the emergency department via EMS. RT called on arrival.  Past medical records, nursing notes, and vitals reviewed.  2248: I performed an exam of the patient and obtained history, as documented above.   IV inserted and blood drawn. The patient was placed on continuous cardiac monitoring and pulse oximetry.    2254: Patient placed on face max supplemental oxygen.  2255: ECG ordered, results above.   2258: Portable chest x-ray obtained, results above.     2303:  Group home staff present in ED.  2307: I spoke to Eugene, the patient's niece who is his health care proxy. She states he is full code.   2309: Patient at 96% on supplemental oxygen.     2315: IV inserted and blood drawn.  2323: The patient's niece arrived in the ED.    2331: 100% on nasal cannula oxygen.  2333: Critical lactic acid 4.4.    The patient was sent for a Head CT while in the emergency department, findings above.  2358: I rechecked the patient.     0005: Patient started on high flow oxygen.     0014: I rechecked the patient. Explained findings to family.     0023: I spoke to Dr. Mclaughlin of the hospitalist service who accepts the patient for admission.   0028: I rechecked the patient.   0040: I rechecked the patient.    0108: I placed a central line per the above procedure note.    0121: I attempted arterial line placement under sterile conditions and same fashion as central line. After 3 attempts, arteries noted to be just under femoral vein, was unable to cannulate.     0141: I spoke to Dr. Mclaughlin of the hospitalist service again who agrees with plan to hold on intubation.    Findings and plan explained to the caregiver who consents to admission.   Discussed the patient with Dr. Mclaughlin, who will admit the patient to an ICU bed for further monitoring, evaluation, and treatment.     Impression & Plan      CMS Diagnoses:   The patient has signs of Severe Sepsis as evidenced by:    1. 2 SIRS criteria, AND  2. Suspected infection, AND   3. Organ dysfunction: Lactic Acid >2    Time severe sepsis diagnosis confirmed = 2334 as this was the time when Lactate resulted, and the level was >2      3 Hour Severe Sepsis Bundle Completion:  1. Initial Lactic Acid Result:   Recent Labs   Lab Test  06/11/17   2321  04/24/17   0005  12/29/16   1221   LACT  4.4*  0.6*  1.4     2. Blood Cultures before Antibiotics: Yes  3. Broad Spectrum Antibiotics Administered: Yes     Anti-infectives     None        4. 1,497 ml of IV  fluids.     the patient was transferred out of the ED prior to the 6 hour miya.     Medical Decision Making:  This is a 67 year old male with a history of stroke, bed bound, nonverbal, unresponsive at baseline, on dialysis for end stage renal disease who was found in acute respiratory distress with tube feeds coming out of his nose and mouth. Differential includes aspiration pneumonia, sepsis, stroke, intracranial hemorrhage, seizure, or other causes. He did end up having the work up as seen. Despite no obvious infiltrate on his chest x-ray, he does have atelectasis and I am suspicious of aspiration as the source for his fever as I am unable to find other alternative source. He does have stage 3 decubitus ulcers that do not appear to be infected. CT shows no acute findings. While the patient is minimally responsive, this is his baseline per family as well as his right facial droop and right sided movements spontaneously. Initially the patient had respiratory rate in the mid 50s. Currently on high flow the patient is at 98% and respiratory rate is 30-32 and significantly improved. Therefore, I do not believe the patient needs immediate intubation and I do not believe BiPap would be a good option for potential further aspiration. He has been observed here for greater than 3 hours and I have noticed respiratory improvement.  I did end up getting a central venous access as the patient had poor access with bilateral fistula and subclavian access for dialysis. Arterial line was attempted and unfortunately unsuccessful as this was just below the femoral vein. I do believe it is relatively contraindicated and would given inaccurate readings in his upper extremities with fistulas, nor do I believe that this is immediately needed as he is not currently hypotensive. While he does have significant lactic acidosis, pressures have remained stable here. He is currently getting a 30 cc/kg fluid bolus. I do not believe he needs  pressers started. The patient has been started on broad spectrum antibiotics and will be admitted for close observation.    Critical Care time:  was 90 minutes for this patient excluding procedures.    Diagnosis:    ICD-10-CM   1. Respiratory distress R06.00   2. Aspiration pneumonitis (H) J69.0   3. Severe sepsis (H) A41.9    R65.20     Disposition: Admission to ICU    Anastasia Steven  6/11/2017   [facility] EMERGENCY DEPARTMENT    I, Anastasia Steven, am serving as a scribe at 10:48 PM on 6/11/2017 to document services personally performed by Billy Morel MD based on my observations and the provider's statements to me.          Billy Morel MD  06/12/17 0344

## 2017-06-12 NOTE — PROGRESS NOTES
SPIRITUAL HEALTH SERVICES Progress Note  [facility] ICU 3rd floor    SH visit per chart request. Chart review indicates pt, Hesham, is nonverbal with a history of strokes. Contacted Emergency , Hemant, to help determine what might be important to offer patient with regards to spiritual health support. Alinelandy shares that pt identifies as [employer] and connected to the [employer] [employer]. Hemant notes that  prayer is very important to him.    Also inquired if Alinelandy would appreciated  support and she declined at this time.   Visited Hesham. Hesham is currently intubated and appeared to be asleep. Spent time in prayer with Hesham during which he briefly opened his eyes.   I and the other chaplains remain available per pt need/request. Will continue to follow per length of stay.   SHAD Godwin.  Staff    Pager #[phone]

## 2017-06-12 NOTE — PROGRESS NOTES
Consult is in the chart. Pt with ESRD, admitted with septic shock from aspiration PNA. SBP is currently in the 70s. Starting pressor. BP is too low for hemodialysis and CRRT. Will bolus one more liter of NS. Check renal panel, If patient needs urgent RRT, then he would need to be transferred to [facility] or the [facility]. I discussed the case with the ICU team

## 2017-06-12 NOTE — PROGRESS NOTES
Chart was reviewed.  The patient was seen and examined. He has history of stroke and dysphagia for which he receives tube feeds.  He presented from her care facility with tube feeds coming from her mouth and nose.  He was admitted with sepsis due to suspected aspiration pneumonia.  Abdominal x-ray was obtained to check positioning of feeding tube.  This showed some air in the liver of uncertain significance.  It also showed seemingly adequate feeding tube placement.  Over the course of the day Hesham developed hypotension.  He remained to tachypneic.  He was intubated for respiratory failure.   He was started on Levophed for hypotension.  He is being sent for CT scan of the abdomen to evaluate for other potential causes of sepsis. Code status was affirmed with family by Palliative Care.,    Treat for suspected sepsis due to aspiration pneumonia and rule out intra-abdominal cause of sepsis. Continue support on ventilator, IV Zosyn and vancomycin, Levophed, and NPO status.  Nephrology is following and will plan to dialyze once blood pressure is adequate.  Dialysis is not essential today.  Full note to follow tomorrow.

## 2017-06-12 NOTE — PHARMACY-ADMISSION MEDICATION HISTORY
Admission medication history interview status for this patient is complete. See [facility] admission navigator for allergy information, prior to admission medications and immunization status.     Medication history interview source(s):[address] [place] [address] med list  Medication history resources (including written lists, pill bottles, clinic record):see above  Primary pharmacy:----    Changes made to PTA medication list:  Added: tylenol susp, vitamin d 1200 unit daily  Deleted: calcium carb 500mg daily, vitamin d 15 mls daily, vitamin d 1000 unit daily, mvi tablet, psyllium 1 pkt daily   Changed: miralax from prn to bid prn per med list    Actions taken by pharmacist (provider contacted, etc):None     Additional medication history information:None    Medication reconciliation/reorder completed by provider prior to medication history? Yes, sticky note left fo rMD      Prior to Admission medications    Medication Sig Last Dose Taking? Auth Provider   acetaminophen (TYLENOL) 32 mg/mL solution 325-650 mg by Gastric Tube route every 6 hours as needed for fever or mild pain  Yes Unknown, Entered By History   polyethylene glycol (MIRALAX/GLYCOLAX) Packet 17 g by Gastric Tube route 2 times daily as needed for constipation  Yes Unknown, Entered By History   cholecalciferol (VITAMIN D/D-VI-SOL) 400 UNIT/ML LIQD liquid Take 1,200 Units by mouth daily  Yes Unknown, Entered By History   Docusate Sodium 150 MG/15ML LIQD 10 mLs by Per G Tube route 2 times daily   Fabien Sherman MD   DONEPEZIL HYDROCHLORIDE 10 MG TBDP 10 mg by Per G Tube route daily ODT- patient has swallowing issues   Fabien Sherman MD   sennosides (SENOKOT) 8.6 MG tablet 2 tablets by Per G Tube route 2 times daily   Fabien Sherman MD   simvastatin (ZOCOR) 40 MG tablet 1 tablet (40 mg) by Per G Tube route At Bedtime   Fabien Sherman MD   multivitamins with minerals (CERTAVITE/CEROVITE) LIQD liquid 15 mLs by Per G Tube route daily   Dennise Cuellar APRN  CNP   ASPIRIN PO Take 81 mg by mouth daily   Unknown, Entered By History   phenytoin (DILANTIN) 125 MG/5ML suspension Take 300 mg by mouth At Bedtime Dose = 12 ml per group home (125mg/5ml).   Unknown, Entered By History   triamcinolone (KENALOG) 0.1 % cream Apply sparingly to affected area twice daily as needed for itching.   Fabien Sherman MD

## 2017-06-12 NOTE — ED NOTES
Upon arrival pt has G-tube in place and dialysis port on right upper chest.      Per family, pt receives all nutrition through the G-tube.

## 2017-06-12 NOTE — CONSULTS
"[facility]    Palliative Care Consultation   Text Page    Date of Admission:  6/11/2017    Assessment & Plan   Hesham Ramos is a 67 year old male who was admitted on 6/11/2017. I was asked to see the patient for  Review of goals of care and code status.    Recommendations:  1.Patient lacks medical capacity for decision making- Patient has legal co-guardians for healthcare decisions in a legal document dated 3/28/13.  Hemant Ramos and Carlos Ramos.  Plan for support of surrogate as decision maker.  Goal of Care:Full Code, restorative.  See discussion below.     Disease Process/es & Symptoms:  Hesham Ramos is a 67 year old patient admitted with symptoms of respiratory distress. He has been treated for likely aspiration pneumonia with noted vomiting.  Concern now for other abdominal process. Abdominal x-ray showed air in liver of unclear significance and patient appears to be in pain upon exam of abdomen.  Lactic acid is elevated and over the course of the day he became hypotensive and lethargic today and subsequently has been intubated and on pressors.  CT of abdoman is pending.     This is in the setting of Hx CVA with current aphasia and right hemiparesis, ESRD on chronic dialysis, CHF with diastolic failure, seizure disorder. He has been ventilated in the past I believe related to airway protection with seizures and has known dysphagia with decision to not proceed with a PEG tube but now has new PEG tube in the past 3 months. He has  been hospitalized 2 times in the past 3 months. His is at baseline completely dependant for all of his care needs living in a group home, noted as \"pretty stable\" by niece. There is no documented weight loss.     The following symptoms are noted by patient as concerning to his quality of life.  Unable, appear uncomfortable to palpation of abdomen.    Vomiting noted.    Psychosocial/Spiritual Needs:  Appreciate  support.    Oriented to " "Spiritual Health and Social Work Services as part of Palliative Care team.    Decision-Making & Goals of Care:  Discussion/counseling today about goals of care/decisions:  Limited discussion via phone with Hemant patient's niece and co-guardian as she reports being here until 330 in the AM.  We reviewed the events of the night and she appear to have a good understanding of current situation.   I reviewed our previous discussion below and POLST which indicates DNR/DNI.  I reviewed the current orders that are \"Full Code.\"  I asked if something had changed.  She stated that yes they had decided for Full Code as they know when it is his time to die he will die anyway despite what we do.  I relayed my concern that intubation of CPR may prolong his life but would not improve it from baseline and he is at high risk of not surviving this hospitalization with or without further life support.  She says Full Code is the right thing to do for now and she will be in later in the evening.  11/18/2015: Discussed with co-guardians, Niece Hemant and Nephew Roycerufus.  They have seen him decline over the years and wish there was something that could have been done to prevent the current chronic illnesses as he is noted to have led a healthy lifestyle, active non smoker etc.  They acknowledge that he has entered the final stage of life with recent decline and is fragile, noting we all will come to this end and they wish to keep him comfortable.  We discussed code status and they both agree that attempting CPR or intubating patient would \"only prolong his life\"  and would not help him improve from this already significantly debilitated state. He is made DNR/DNI.  Both note dialysis as a life support but feel actively stopping this would feel like death at their hands, and have concerns family would feel this way.  They are open to stopping dialysis when it is not effective or \"the right thing to do\"  per the nephrologist.  We discussed " ways of maintaining comfort as patient declines over time with our without dialysis.    Patient has decision-making capacity Unreliable  Hesham Ramos has  court-appointed co-guardians for healthcare decisions in a legal document dated 3/28/13.  See maribel valenzuela copy under ACP tab.  Name: Hemant Ramos, Relationship: unknown, Phone(s): [phone].  Name: Carlos Ramos, Relationship: unknown, Phone(s): [phone].      Patient has a completed health care directive available in the chart (Y/N):Np  Physician orders for life-sustaining treatment (POLST) form is on file but will likely need revision prior to disharge based on today's discussion above.  Code Status: Full code     Findings & plan of care discussed with: MD and nursing.  Follow-up plan from palliative team: Will continue to follow in support of decision making.  Thank you for involving us in the patient's care.     Giuliana SEGAL, CNS  Pain Management and Palliative Care  [facility]  Pgr: [phone]    Time Spent on this Encounter   I spent  35 minutes in assessment of the patient and discussion with the patient and family. Another 35 minutes in review of chart, documentation and discussion with the health care team.    Reason for Consult   Reason for consult: I was asked by Dr. Mcalughlin to evaluate this patient for Goals of care  Decisional support  Patient and family support.    Primary Care Physician   Fabien Sherman MD    Chief Complaint   Unable    History is obtained from the electronic health record and niece who is his co-guardian.    History of Present Illness   Hesham Ramos is a 67 year old male who presents with symptoms of respiratory distress. He has been treated for likely aspiration pneumonia with noted vomiting.  Concern now for other abdominal process. Abdominal x-ray showed air in liver of unclear significance and patient appears to be in pain upon exam of abdomen.  Lactic acid is  "elevated and over the course of the day he became hypotensive and lethargic today and subsequently has been intubated and on pressors.  CT of abdoman is pending.     This is in the setting of Hx CVA with current aphasia and right hemiparesis, ESRD on chronic dialysis, CHF with diastolic failure, seizure disorder. He has been ventilated in the past I believe related to airway protection with seizures and has known dysphagia with decision to not proceed with a PEG tube but now has new PEG tube in the past 3 months. He has  been hospitalized 2 times in the past 3 months. His is at baseline completely dependant for all of his care needs living in a group home, noted as \"pretty stable\" by niece. There is no documented weight loss.     The following symptoms are noted by patient as concerning to his quality of life.  Unable, appear uncomfortable to palpation of abdomen.    Vomiting noted.      Past Medical History   I have reviewed this patient's medical history and updated it with pertinent information if needed.   Past Medical History:   Diagnosis Date     Cognitive impairment      Diastolic CHF, chronic (H)      End-stage renal disease on hemodialysis (H)      Essential hypertension     Off all BP meds as of late 2015 due to hypotension     Renal cancer (H)     s/p cryoablation     Seizures (H) Onset 8/2014    On dilantin     Stroke (H) 2000, 2007    Hemiplegia, aphasia       Past Surgical History   I have reviewed this patient's surgical history and updated it with pertinent information if needed.  Past Surgical History:   Procedure Laterality Date     Cryoablation of kidney cancer       ESOPHAGOSCOPY, GASTROSCOPY, DUODENOSCOPY (EGD), COMBINED  8/5/2014    Procedure: COMBINED ESOPHAGOSCOPY, GASTROSCOPY, DUODENOSCOPY (EGD), BIOPSY SINGLE OR MULTIPLE;  Surgeon: Raheel Mixon MD;  Location: [facility]     HEMODIALYSIS VIA AV FISTULA       PEG tube NOS       right arm skin graft         Prior to Admission Medications "   Prior to Admission Medications   Prescriptions Last Dose Informant Patient Reported? Taking?   ACE/ARB NOT PRESCRIBED, INTENTIONAL,   No No   Sig: Please choose reason not prescribed, below   ASPIRIN PO   Yes No   Sig: Take 81 mg by mouth daily   BETA BLOCKER NOT PRESCRIBED, INTENTIONAL,   No No   Sig: Beta Blocker not prescribed intentionally due to Hypotension (SBP < 90)   DONEPEZIL HYDROCHLORIDE 10 MG TBDP   No No   Sig: 10 mg by Per G Tube route daily ODT- patient has swallowing issues   Docusate Sodium 150 MG/15ML LIQD   No No   Sig: 10 mLs by Per G Tube route 2 times daily   acetaminophen (TYLENOL) 32 mg/mL solution   Yes Yes   Si-650 mg by Gastric Tube route every 6 hours as needed for fever or mild pain   cholecalciferol (VITAMIN D/D-VI-SOL) 400 UNIT/ML LIQD liquid   Yes Yes   Sig: Take 1,200 Units by mouth daily   multivitamins with minerals (CERTAVITE/CEROVITE) LIQD liquid   No No   Sig: 15 mLs by Per G Tube route daily   order for DME   No No   Si wet sling with head support  1 dry sling with head support   phenytoin (DILANTIN) 125 MG/5ML suspension   Yes No   Sig: Take 300 mg by mouth At Bedtime Dose = 12 ml per group home (125mg/5ml).   polyethylene glycol (MIRALAX/GLYCOLAX) Packet   Yes Yes   Si g by Gastric Tube route 2 times daily as needed for constipation   sennosides (SENOKOT) 8.6 MG tablet   No No   Si tablets by Per G Tube route 2 times daily   simvastatin (ZOCOR) 40 MG tablet   No No   Si tablet (40 mg) by Per G Tube route At Bedtime   triamcinolone (KENALOG) 0.1 % cream   No No   Sig: Apply sparingly to affected area twice daily as needed for itching.      Facility-Administered Medications: None     Allergies   No Known Allergies    Social History   I have updated and reviewed the following Social History Narrative:   Social History     Social History Narrative    Originally from [facility].     Moved to [facility] on 3/1/2016.      Living situation: Patient lives in Group  home, previously with his niece Dannie and her  as caregivers.  Family system: Niece Dannie and nephew joelle, Patient is noted to be , He moved from [facility] in 2005 and originally lived with his son.   Functional status (needs help with ADLs or IADLs): needs assist with all ADL's non ambulatory  Employment/education: [employer]  Use of community resources: unknown, has [facility]   Activities/interests: unknwon  History of substance use/abuse: unknown  Mandaeism affiliation: Mu-ism  Involvement in allie community: unknown    Family History   I have reviewed this patient's family history and updated it with pertinent information if needed.   Family History   Problem Relation Age of Onset     Family History Negative Mother      Unknown/Adopted No family hx of        Review of Systems   Review of systems not obtained due to patient factors - mental status and unable      Physical Exam   Temp:  [94.5  F (34.7  C)-102.2  F (39  C)] 100.8  F (38.2  C)  Pulse:  [85] 85  Heart Rate:  [] 100  Resp:  [28-56] 56  BP: ()/(26-97) 110/42  FiO2 (%):  [21 %] 21 %  SpO2:  [94 %-100 %] 96 %  106 lbs 14.77 oz  GEN:  Appears awake but does not respond to questions, commands.  Grimace with exam.  HEENT:  Normocephalic/atraumatic, no scleral icterus, no nasal discharge, mouth moist.  CV:  tachycardic, S1, S2; no murmurs or other irregularities noted.  +3 DP/PT pulses bilatererally; no edema BLE.  RESP:  Coarse to auscultation bilaterally Symmetric chest rise on inhalation noted.  Mild labored respiratory effort.  ABD:  Rounded, soft, appears tender/non-distended.  +BS peg in place.  EXT:  Edema & pulses as noted above.  CMS intact x 4.     M/S:   Thin appearing.    SKIN:  Dry to touch, no exanthems noted in the visualized areas.      Data   Results for orders placed or performed during the hospital encounter of 06/11/17 (from the past 24 hour(s))   EKG 12 lead   Result Value Ref Range     Interpretation ECG Click View Image link to view waveform and result    Glucose by meter   Result Value Ref Range    Glucose 173 (H) 70 - 99 mg/dL   Chest  XR, 1 view PORTABLE    Narrative    XR CHEST PORT 1 VW  6/11/2017 11:04 PM      HISTORY: Shortness of breath.     COMPARISON: 4/24/2017.    FINDINGS: Right IJ infusion catheter in place. Vascular stents in the  right arm. The heart size is normal. The thoracic aorta is calcified  and tortuous. There is a band of scar or atelectasis at the left lung  base. The lungs are otherwise clear. No pneumothorax or pleural  effusion.      Impression    IMPRESSION: Mild left basilar atelectasis.   Blood culture ONE site   Result Value Ref Range    Specimen Description Blood Left Arm     Special Requests Aerobic and anaerobic bottles received     Culture Micro No growth after 11 hours     Micro Report Status Pending    CBC (platelets, no diff)   Result Value Ref Range    WBC 15.8 (H) 4.0 - 11.0 10e9/L    RBC Count 3.94 (L) 4.4 - 5.9 10e12/L    Hemoglobin 11.7 (L) 13.3 - 17.7 g/dL    Hematocrit 38.2 (L) 40.0 - 53.0 %    MCV 97 78 - 100 fl    MCH 29.7 26.5 - 33.0 pg    MCHC 30.6 (L) 31.5 - 36.5 g/dL    RDW 15.1 (H) 10.0 - 15.0 %    Platelet Count 424 150 - 450 10e9/L   Comprehensive metabolic panel   Result Value Ref Range    Sodium 135 133 - 144 mmol/L    Potassium 5.5 (H) 3.4 - 5.3 mmol/L    Chloride 97 94 - 109 mmol/L    Carbon Dioxide 19 (L) 20 - 32 mmol/L    Anion Gap 19 (H) 3 - 14 mmol/L    Glucose 286 (H) 70 - 99 mg/dL    Urea Nitrogen 147 (H) 7 - 30 mg/dL    Creatinine 7.89 (H) 0.66 - 1.25 mg/dL    GFR Estimate 7 (L) >60 mL/min/1.7m2    GFR Estimate If Black 8 (L) >60 mL/min/1.7m2    Calcium 9.7 8.5 - 10.1 mg/dL    Bilirubin Total 0.4 0.2 - 1.3 mg/dL    Albumin 3.2 (L) 3.4 - 5.0 g/dL    Protein Total 9.4 (H) 6.8 - 8.8 g/dL    Alkaline Phosphatase 219 (H) 40 - 150 U/L    ALT 70 0 - 70 U/L    AST 72 (H) 0 - 45 U/L   Lactic acid whole blood   Result Value Ref Range     Lactic Acid 4.4 (HH) 0.7 - 2.1 mmol/L   INR   Result Value Ref Range    INR 1.23 (H) 0.86 - 1.14   Magnesium   Result Value Ref Range    Magnesium 3.1 (H) 1.6 - 2.3 mg/dL   Phosphorus   Result Value Ref Range    Phosphorus 6.7 (H) 2.5 - 4.5 mg/dL   Vitamin D Deficiency   Result Value Ref Range    Vitamin D Deficiency screening 85 (H) 20 - 75 ug/L   ISTAT Basic Met ICa HCT POCT   Result Value Ref Range    Sodium 138 133 - 144 mmol/L    Potassium 5.8 (H) 3.4 - 5.3 mmol/L    Chloride 104 94 - 109 mmol/L    Total CO2 22 20 - 32 mmol/L    Anion Gap 12 6 - 17 mmol/L    Glucose 283 (H) 70 - 99 mg/dL    Urea Nitrogen 134 (H) 7 - 30 mg/dL    Creatinine 8.1 (H) 0.66 - 1.25 mg/dL    GFR Estimate 7 (L) >60 mL/min/1.7m2    GFR Estimate If Black 8 (L) >60 mL/min/1.7m2    Calcium Ionized 3.8 (L) 4.4 - 5.2 mg/dL    Hemoglobin 13.3 13.3 - 17.7 g/dL    Hematocrit - POCT 39 (L) 40.0 - 53.0 %PCV   Troponin POCT   Result Value Ref Range    Troponin I 0.18 (HH) 0.00 - 0.10 ug/L   Head CT w/o contrast    Narrative    CT HEAD W/O CONTRAST 6/11/2017 11:54 PM      HISTORY: Unresponsive.    TECHNIQUE: Routine noncontrast head CT. Radiation dose for this scan  was reduced using automated exposure control, adjustment of the mA  and/or kV according to patient size, or iterative reconstruction  technique.    COMPARISON: 12/29/2016.    FINDINGS: There is generalized brain atrophy. No mass, mass effect or  intracranial hemorrhage. No evidence of acute infarct. Old cerebellar  infarcts. Periventricular low attenuation is consistent with chronic  small vessel ischemic disease. The visualized paranasal sinuses are  clear.      Impression    IMPRESSION:  No acute abnormality.    AMBIKA PERLA MD   Blood culture ONE site   Result Value Ref Range    Specimen Description Blood Central line Blue     Special Requests Aerobic and anaerobic bottles received     Culture Micro No growth after 6 hours     Micro Report Status Pending    Blood gas arterial and  oxyhgb   Result Value Ref Range    pH Arterial 7.30 (L) 7.35 - 7.45 pH    pCO2 Arterial 32 (L) 35 - 45 mm Hg    pO2 Arterial 111 (H) 80 - 105 mm Hg    Bicarbonate Arterial 16 (L) 21 - 28 mmol/L    FIO2 100%  HIGH FLOW NC       Oxyhemoglobin Arterial 96 92 - 100 %    Base Deficit Art 9.7 mmol/L   Lactic acid whole blood   Result Value Ref Range    Lactic Acid 4.7 (HH) 0.7 - 2.1 mmol/L   Potassium   Result Value Ref Range    Potassium 5.0 3.4 - 5.3 mmol/L   Methicillin Resistant Staph Aureus PCR   Result Value Ref Range    Specimen Description Nares     S Aur Meth Resis PCR  NEG     Negative  MRSA Negative: SA Negative  MRSA and Staphylococcus aureus target DNA not   detected, presumed negative for MRSA and SA colonization or the number of   bacteria present may be below the limit of detection for the assay. FDA   approved assay performed using Si TVpert(R) real-time PCR.     Troponin I   Result Value Ref Range    Troponin I ES 0.132 (HH) 0.000 - 0.045 ug/L   Glucose   Result Value Ref Range    Glucose 294 (H) 70 - 99 mg/dL   Glucose by meter   Result Value Ref Range    Glucose 184 (H) 70 - 99 mg/dL   XR Abdomen Port 1 View    Narrative    XR ABDOMEN PORT F1 VW  6/12/2017 4:37 AM      HISTORY: Aspiration.     COMPARISON: None.    FINDINGS: G-tube in the midline. Right inguinal catheter in place.  Catheter projects over the lower pelvis. The bowel gas pattern is  within normal limits. There is air within the liver; this could be  biliary or portal venous. Right hemidiaphragm is elevated.      Impression    IMPRESSION: Air within the liver which may be biliary or portal  venous. If there is clinical concern for portal venous gas, CT is  recommended.   Glucose by meter   Result Value Ref Range    Glucose 262 (H) 70 - 99 mg/dL   Lactic acid whole blood   Result Value Ref Range    Lactic Acid 4.1 (HH) 0.7 - 2.1 mmol/L   Hemoglobin A1c   Result Value Ref Range    Hemoglobin A1C 5.1 4.3 - 6.0 %   Glucose by meter    Result Value Ref Range    Glucose 197 (H) 70 - 99 mg/dL   Troponin I   Result Value Ref Range    Troponin I ES 0.118 (H) 0.000 - 0.045 ug/L   Lactic acid whole blood   Result Value Ref Range    Lactic Acid 4.6 (HH) 0.7 - 2.1 mmol/L   XR Chest Port 1 View    Narrative    CHEST ONE VIEW PORTABLE June 12, 2017 12:45 PM     HISTORY: Endotracheal tube placement.    COMPARISON: 6/11/2017.      Impression    IMPRESSION: Endotracheal tube in good position. Elevated right  diaphragm. Interstitial densities in both lungs may be partly due to  shallow inspiration. Normal heart size and pulmonary vascularity.  Central venous catheter is unchanged. Nasogastric tube is coiled in  the esophagus and must be repositioned. Apparent stent grafts over the  right arm medially.    THEODORE SWAN MD   Glucose by meter   Result Value Ref Range    Glucose 121 (H) 70 - 99 mg/dL   Blood gas arterial and oxyhgb   Result Value Ref Range    pH Arterial 7.26 (L) 7.35 - 7.45 pH    pCO2 Arterial 28 (L) 35 - 45 mm Hg    pO2 Arterial 228 (H) 80 - 105 mm Hg    Bicarbonate Arterial 13 (L) 21 - 28 mmol/L    FIO2 80%  Ventilator       Oxyhemoglobin Arterial 99 92 - 100 %    Base Deficit Art 13.4 mmol/L   Basic metabolic panel   Result Value Ref Range    Sodium 142 133 - 144 mmol/L    Potassium 5.6 (H) 3.4 - 5.3 mmol/L    Chloride 107 94 - 109 mmol/L    Carbon Dioxide 15 (L) 20 - 32 mmol/L    Anion Gap 20 (H) 3 - 14 mmol/L    Glucose 140 (H) 70 - 99 mg/dL    Urea Nitrogen 143 (H) 7 - 30 mg/dL    Creatinine 7.77 (H) 0.66 - 1.25 mg/dL    GFR Estimate 7 (L) >60 mL/min/1.7m2    GFR Estimate If Black 8 (L) >60 mL/min/1.7m2    Calcium 7.5 (L) 8.5 - 10.1 mg/dL   Lactic acid   Result Value Ref Range    Lactic Acid 4.7 (HH) 0.4 - 2.0 mmol/L   CT Abdomen Pelvis w/o Contrast    Narrative    CT ABDOMEN AND PELVIS WITHOUT CONTRAST   6/12/2017 4:31 PM     HISTORY: Sepsis thought to be due to aspiration of tube feeds but with  air in liver on x-ray. Evaluate for  biliary obstruction, infection,  etc.    TECHNIQUE: CT of the abdomen and pelvis was performed without  intravenous contrast. Radiation dose for this scan was reduced using  automated exposure control, adjustment of the mA and/or kV according  to patient size, or iterative reconstruction technique.    COMPARISON: CT of the abdomen/pelvis dated 2/25/2009.    FINDINGS:    This is a very limited exam due to motion artifact and lack of  intravenous contrast.    Again identified are cysts within the liver. No definite air within  the biliary tree is seen. No definite intrahepatic biliary duct  dilatation although without contrast, sensitivity for detection is  decreased. No definite extrahepatic biliary duct dilatation.    Since the previous exam, a G-tube has been placed.    Evaluation of the remaining solid organs is limited due to a lack of  intravenous contrast. The kidneys are atrophic with multiple  calcifications.    There are distended loops of small bowel throughout the abdomen.    A La catheter is in place.    There is no free air and no definite free fluid in the abdomen or  pelvis.    There are COPD changes at the lung bases. Bibasilar atelectasis and/or  infiltrates.      Impression    IMPRESSION:   1. Limited exam. No definite air within the biliary tree or biliary  duct dilatation identified on this study.  2. Dilated loops of small bowel throughout the abdomen. This is  nonspecific. It could represent a diffuse ileus.  3. Bibasilar atelectasis and/or infiltrates.

## 2017-06-12 NOTE — PLAN OF CARE
Problem: Infection, Risk/Actual (Adult)  Goal: Identify Related Risk Factors and Signs and Symptoms  Related risk factors and signs and symptoms are identified upon initiation of Human Response Clinical Practice Guideline (CPG)   Outcome: Declining  ICU End of Shift Summary.  For vital signs and complete assessments, please see documentation flowsheets.      Pertinent assessments: Alert this am. Unable to communicate verbally. Min movement on own. Freq vomiting of gastric contents. Strong cough but increasing tachypnea with RR 50's. NG placed. Family wanting pt to be full code despite previous DNR/ DNI. Intubated for resp failure. Lrg amt gastric contents suctions via ETT. T 102.5. Continue to be tachypnic 30's despite pecedex gtt. Prn ativan. Hypotensive fluid bolus and norepi gtt infusing. No dialysis today.    Major Shift Events: SEE above  Plan (Upcoming Events): Cont cares.  Discharge/Transfer Needs: TBD     Bedside Shift Report Completed :   Bedside Safety Check Completed:

## 2017-06-12 NOTE — ED NOTES
Bed: ED02  Expected date: 6/11/17  Expected time: 10:41 PM  Means of arrival: Ambulance  Comments:  BV2

## 2017-06-12 NOTE — H&P
CHIEF COMPLAINT:  Respiratory distress.      HISTORY OF PRESENT ILLNESS:  Hesham Ramos is a 67-year-old male with history of stroke who is unable to provide any medical history himself.  All information is obtained from the chart, from speaking with the emergency room physician, and from speaking with the family who is at his bedside.  The patient normally is on tube feedings.  Apparently at his group home, on 06/11, he began having what appeared to be tube feeding coming out of his nose and mouth, and he appeared to be in respiratory distress.  He was brought to the emergency room for further evaluation.  Per the emergency room physician, he was in quite a bit of respiratory distress when he first arrived.  His respiratory status currently is quite a bit better after medications in the emergency room.  It was noted that DuoNeb(s) seemed to help quite a bit.  The patient currently is looking fairly comfortable with high-flow nasal cannula at this time.  The family states the patient has not had an event like this happen in the past where he appears to have tube feedings coming out of his nose and mouth.  Otherwise, he has not been having any symptoms that they had been concerned about prior to this episode.      PAST MEDICAL HISTORY:   1.  Seizure disorder.   2.  Strokes.   3.  Renal carcinoma.   4.  Hypertension.   5.  End-stage kidney disease, on hemodialysis.   6.  Diastolic congestive heart failure.      ALLERGIES:  No known drug allergies.      MEDICATIONS:  The patient currently, per list, is on:   1.  Docusate twice a day.   2.  Donepezil 10 mg a day.   3.  Senna twice a day.   4.  Simvastatin 40 mg a day.   5.  Calcium supplement once a day.   6.  MiraLax as needed.   7.  Psyllium as needed.   8.  Multivitamin once a day.   9.  Cholecalciferol 1200 international units a day.   10.  Aspirin 81 mg a day.   11.  Multivitamin.   12.  Dilantin 300 mg a day.      SOCIAL HISTORY:  The patient does not smoke,  does not drink alcohol.      FAMILY HISTORY:  Family does not know of any coronary artery disease or cancer.      REVIEW OF SYSTEMS:  Positive for respiratory distress.  The patient himself does not speak and does not offer any help on a review of systems, unfortunately rendering this unobtainable.      PHYSICAL EXAM:   VITAL SIGNS:  Today show a temperature of 97.0 degrees Fahrenheit, heart rate 87, blood pressure 113/84, respiratory rate 32, oxygen saturation 99%.   GENERAL:  The patient is a thin, elderly-appearing male who currently appears to be in no acute distress, and seems to be awake but does not answer any questions.   EYES:  Extraocular muscles appear to be intact in general just with eye movements.  Sclerae are nonicteric.   OROPHARYNX:  No obvious lesions noted.  Membranes appear moist.   NECK:  No masses present.   HEART:  Regular.  No murmurs noted.   LUNGS:  A few crackles at the bases bilaterally.  The patient appears to be breathing easily without any significant increased work of breathing at this time.  No obvious accessory muscle use noted.   ABDOMEN:  There is a feeding tube in place.  Positive bowel sounds.  Soft, nondistended.   SKIN:  Warm and dry to touch.  No rash over examined skin.   EXTREMITIES:  No pitting edema in the lower extremities bilaterally.  No cyanosis.      LABORATORY TESTING:  Metabolic panel shows a potassium initially of 5.5, , creatinine 7.89, alkaline phosphatase 219, AST 78, lactic acid initially 4.4, troponin initially 0.18, glucose 286, otherwise unremarkable.  Arterial blood gas shows pH of 7.3, pCO2 of 32, pO2 of 111.  Complete blood count shows hemoglobin of 11.7, white count of 15.8, platelets 424,000.      ASSESSMENT AND PLAN:  A 67-year-old male with likely aspiration pneumonia.   1.  Likely aspiration pneumonia:  I suspect that the patient does have an aspiration pneumonitis at the very least due to his tube feedings likely being aspirated and could  have pneumonia.  He was started on IV antibiotics with Zosyn and vancomycin in the emergency room.  Because of this suspected aspiration pneumonitis with possible pneumonia, we will continue him on IV Zosyn for now.   2.  Respiratory distress:  The patient did have acute respiratory distress initially, currently appearing quite a bit better in the ICU at this time with high flow and close monitoring.  It is possible that he may need to have BiPAP used if his respiratory status were to worsen and will monitor.  Have the patient on DuoNeb four times a day, or more often if needed for his respiratory distress.   3.  CODE STATUS:  According to [facility], this is DNR/DNI.   4.  Hyperglycemia:  Have the patient on NovoLog sliding scale.   5.  Tube feedings:  Hold tube feedings for now as the patient appeared to have some aspiration which caused hospitalization at this time.   6.  Seizure disorder:  Will be able to restart antiepileptic once pharmacy has verified dose.  Tube should be able to be used for medications once the pharmacy has verified doses of home medications.   7.  Hyperlipidemia:  Would restart simvastatin once verified by pharmacy.   8.  Aspiration:  We will check an abdominal x-ray to evaluate for possible obstruction.  The patient did have tube feeding coming out of his nose and mouth earlier,   9.  End-stage kidney disease:  Have the patient seen in consultation by Nephrology.   10.  Lactic acidosis:  Give the patient a 500 cc bolus of fluid now.  Recheck a lactic acid after bolus has been completed.   11.  Elevated troponin:  Suspect due to his sepsis.  We will recheck two more troponin levels.   12.  Sepsis due to aspiration event:  Have the patient on antibiotics with Zosyn.  Also have him on continuous IV fluids after bolus is given.   13.  Hyperkalemia:  This was improved on recheck.  We will monitor.  Likely the patient will need to have dialysis later this morning.         AMBIKA WOODS, DO              D: 2017 03:54   T: 2017 05:44   MT: [id number]      Name:     HUGH JENSEN   MRN:      [id number]        Account:      [id number]   :      1950           Admitted:     [id number]      Document: [id number]       cc: Fabien Sherman MD

## 2017-06-12 NOTE — DOWNTIME EVENT NOTE
The EMR was down for  Four hours on 6/12/2017.    Staff was responsible for completing the paper charting during this time period.     The following information was re-entered into the system by Alexia Guidry: Flowsheet data, Intake and output, Orders and MAR    The following information will remain in the paper chart: RADHA Guidry  6/12/2017

## 2017-06-12 NOTE — PROGRESS NOTES
Respiratory Therapy  ABG drawn from right radial with not complications.    Mary Anne Taylor RRT  6/12/2017

## 2017-06-13 NOTE — PROGRESS NOTES
ARTERIAL LINE PLACEMENT, LEFT FEMORAL ARTERY    06/13/17 9170  Indication: Hemodynamic monitoring  Attending: Kimber Mansfield MD    A time-out was completed verifying correct patient, procedure, and site. The patient was given 2 mg Versed prior to the procedure. The patient's left groin was prepped and draped in sterile fashion. 1% Lidocaine was used to anesthetize the area. Ultrasound was used to guide entry of needle from Arrow catheter kit into the femoral artery. Two attempts were first made where artery was accessed, evidenced by pulsatile blood return, but pulsatile flow through needle ceased as guide wire was being introduced. Pressure was applied manually to the site after these attempts, hemostasis achieved quickly. On the third attempt, guide wire was successfully inserted through the needle. The catheter was threaded over the guide wire and the guide wire was removed with appropriate pulsatile blood return. The catheter was connected to the pressure monitor; appropriate readings consistent with pressure cuff were obtained. The catheter was then sutured in place to the skin, and a Biopatch and Tegaderm were applied. Estimated blood loss was 20 mL. The patient tolerated the procedure well and there were no complications.

## 2017-06-13 NOTE — PROGRESS NOTES
[facility] Nurse Inpatient Adult Pressure Injury (PI) Wound Assessment     Assessment of PI(s) on pt's:   Coccyx, left buttock    Data:   Patient History:      per MD note(s):  Hesham Ramos is a 67 year old male who was admitted on 2017. He was living in a nursing home and brought to the ED for respiratory distress after aspirating tube feedings     Current mattress:  AtmosAir  Current pressure relieving devices:  Foam dressing and Pillows    Moisture Management:  Incontinence Protocol and Urinary Catheter    Catheter secured? Yes      Current Diet / Nutrition:       Active Diet Order      NPO for Medical/Clinical Reasons Except for: No Exceptions      Dionisio Assessment and sub scores:   Dionisio Score  Av.5  Min: 9  Max: 10     Labs:   Recent Labs   Lab Test  17   0911  17   0530  17   2320   17   0639  17   2321   ALBUMIN   --    --   2.0*   < >   --   3.2*   HGB  7.0*  7.3*   --    --    --   13.3  11.7*   INR   --    --    --    --    --   1.23*   WBC   --   10.9   --    --    --   15.8*   A1C   --    --    --    --   5.1   --     < > = values in this interval not displayed.                                                                                                                          Pressure Injury Assessment  (location #1):   Coccyx, left buttock    Wound History:   Present on admission; pt with unstable pressures recently so has been difficult to reposition.  Doing better this afternoon and tolerating turning well.  Remains vented.     Specific Dimensions (length x width x depth, in cm) :   Approx. 5 x 3 x 0.2cm to coccyx; approx. 10 x 8 cm general area to left buttock    Tunneling:  N/A    Undermining: N/A    Wound Base: coccyx: thick macerated grey-yellow tissue that was sloughing off at assessment, revealing dryish red tissue below.  Small darker red-purple area.  Left buttock is large area of scattered spotty denudement/erosion of  epidermis, but fairly dry.     Palpation of the wound bed:  normal and firm    Slough appearance:  adherent, moist, gray  and yellow    Eschar appearance:  none    Periwound Skin: denuded, erosion of epidermis, erythema and maceration,      Color: normal to a bit darker than surrounding tissue    Temperature  normal     Drainage:  Moderate creamy/serosang to old coccyx dressing    Odor: mild to old dressing/drainage only    Pain:  unable to assess          Intervention:     Patient's chart evaluated.      Dionisio Interventions:  Current Dionisio Interventions and Care Plan reviewed and updated, appropriate at this time.    Wound was assessed.    Wound Care: was done: cleansed and dressed wounds with Aquacel ag and Mepilex, pt repositioned with help of staff    Orders  Written    Supplies  Reviewed and Ordered: Aquacel ag #[id number]    Discussed plan of care with Nurse           Assessment:     Pressure Injury located on coccyx:  appears likely a DTI that is unroofing to reveal shallow Stage 3, present on admission.  Tissue very macerated and hard to fully assess base, will need to see how evolves.  No s/s infection.  Will dress with Aquacel Ag to help manage moisture and provide antimicrobidal.     Left buttock with spotty scattered erosion/denudement, not exactly a PI but probably a mix of moisture, friction, and pressure.   Pt apparently favors lying on his right side but right butt/hip clear and intact.      Pt had been unstable and hard to reposition, but tolerating turns better this afternoon.           Plan:     Nursing to notify the Provider(s) and re-consult the [facility] Nurse if wound(s) deteriorate(s)or if the wound care plan needs reevaluation.      Plan for wound care to coccyx, left buttock: Daily and prn:  1.  Cleanse with MicroKlenz, pat dry.   2.  Swab periwound and left buttock generously with Cavilon no-sting skin barrier wipes, let dry.  3.  Cover coccyx wound with Aquacel Ag (SPD# [id number]).  4.  Cover with  Mepilex 4x4s, one to coccyx and one left buttock.  Label with time/date/initials.  5.  Follow PIP (pressure injury prevention) measures, ensuring pt repositioning every 1-2 hrs, side to side.  Notify WOC if worsens or any concerns.     WOC Nurse will return: weekly and prn  Face to face time: 16-30 Minutes

## 2017-06-13 NOTE — PROGRESS NOTES
Potassium   Date Value Ref Range Status   06/13/2017 4.6 3.4 - 5.3 mmol/L Final   ]  Lab Results   Component Value Date    HGB 7.0 06/13/2017     Weight: 54.6 kg (120 lb 5.9 oz)    DIALYSIS PROCEDURE NOTE    Patient dialyzed for 4 hrs on a 2 K bath with a  net fluid gain of 0.6L.  A BFR of 250ml/min was obtained via Right tunneled CVC and all connections secured.   Patient was seen by  during treatment.  Total heparin received during treatment:: 0units.  Heparin instilled in both ports post run.  Meds given:none Complications:none   Procedure and ESRD teaching done and questions answered.  Received 2 units RBCs during treatment. See flowsheet in EPIC for further details.    Patient dialyzed at bedside in ICU.  Catheter Access:  Aseptic prep done for both on/off.  Water alarm in place and used.  [facility] consent form signed yes  .

## 2017-06-13 NOTE — PROGRESS NOTES
Patient continues on mechanical ventilator with current settings:    Ventilation Mode: CMV/AC  FiO2 (%): 50 %  Rate Set (breaths/minute): 24 breaths/min  Tidal Volume Set (mL): 450 mL  PEEP (cm H2O): 5 cmH2O  Oxygen Concentration (%): 50 %  Resp: 24     BS coarse and diminished bilaterally, suctioning moderate brown thick secretions. Continues to receive duoneb QID via aerogen. No PS trial done today, RT will continue to follow.

## 2017-06-13 NOTE — PROGRESS NOTES
[facility]  Hospitalist Progress Note  Patient Name: Hesham Ramos    MRN: [id number]  Provider: Hesham Wiley MD  06/13/17    Initial presenting complaint/issue to hospital (Diagnosis): respiratory distress and tube feeds in mouth and nose         Assessment and Plan:      Summary of Stay: Hesham Ramos is a 67 year old male with history of previous stroke, group home care,seizure disorder, renal carcinoma, hypertension, diastolic congestive heart failure, and end-stage kidney disease for which she is on chronic dialysis.  He lives in a group home where he requires heavy care due to his previous stroke. Fairly recently he had a feeding tube placed.  It sounds like he has had some problems tolerating tube feeds since this was placed.   He presented to the emergency department on 6/11/2017 with difficulty breathing.  It was noted that he had tube feeds coming from his mouth and nose.  He had a fever with temperature 102.2. He was tachycardic with heart rate of 102. Respiratory rate was in the 30s.  He was visibly dyspneic and uncomfortable. Basic metabolic panel suggested end-stage renal disease  Lactic acid was elevated at 4.4. White blood cell count was elevated at 15.8. Troponin was slightly elevated at 0.18.  Blood sugar was elevated at 286. Chest x-ray showed some left base infiltrate versus atelectasis.  CT of head was unremarkable.  EKG showed no definite coronary ischemia.  He was admitted to the intensive care unit with sepsis due to suspected aspiration pneumonia. After admission, abdominal x-ray was obtained to check placement of feeding tube.  Feeding tube looked appropriately placed but there was question of possibly some air in the liver.  After admission Hesham continued to  Become more ill appearing. He seemed to have some abdominal discomfort.   CT scan of abdomen and pelvis was obtained and showed no evidence of biliary obstruction or other obvious acute intra-abdominal  catastrophe.  Zosyn was continued for suspected aspiration pneumonia.  Vancomycin was also continued. He developed hypotension so was started on levo fed. He developed respiratory distress and was intubated.  He was seen by nephrology on hospital day one but dialysis was not pursued due to low blood pressure and in stable condition.  On hospital day 2 vasopressin had been added.  Blood pressure was better so dialysis was performed.  This seemed to improve his hemodynamics.  On hospital day 2 hemoglobin was noted to be 7.3 (had been 13.3 at presentation).  There hasbeen no visible blood loss, but GI bleed was considered.  Transfusion of red blood cells was given on dialysis.    Problem List:   1.  Sepsis, thought to be due to aspiration pneumonia.  Continue Zosyn. Continue vasopressor support with levothyroid and vasopressin.  Arterial line is being placed by intensivist.    2.  Respiratory distress, due to sepsis and aspiration pneumonia.  Continue ventilator support.    3.  End-stage renal disease, chronically on hemodialysis.  Patient had dialysis today.  Nephrology consultation appreciated.    4.  Acute drop in hemoglobin.  Consider GI bleed, although no visible blood loss has been noted.  Transfusion was given on dialysis.  Monitor hemoglobin.  IV Protonix is being given twice daily.  Consider GI consult if visible blood loss is noted.    5.  Seizure disorder.  He normally takes Dilantin 300 mg at night. I will order Dilantin 300 mg IV each night.  I will obtain Dilantin level tomorrow.    6.  Mild troponin leak.  I suspect that this is due to respiratory distress and end-stage renal disease. No further evaluation planned.    7.  Hypercholesterolemia.   Simvastatin is currently on hold until able to take things by feeding tube.    8.  Hyperglycemia.  Resolved.  Monitor.      DVT Prophylaxis:  PCD's  Code Status: Full Code.  This has been confirmed with family by palliative care.  Patient was DO NOT  "RESUSCITATE/DO NOT INTUBATE during previous hospitalizations.  Discharge Dispo: Group home  Estimated Disch Date / # of Days until Discharge: many        Interval History:      Patient is unresponsive. Patient had ongoing hypotension overnight. Patient remains on ventilator.                  Physical Exam:      Last Vital Signs:  /55  Pulse 90  Temp 100.4  F (38  C)  Resp 24  Ht 1.651 m (5' 5\")  Wt 54.6 kg (120 lb 5.9 oz)  SpO2 100%  BMI 20.03 kg/m2    Intake/Output Summary (Last 24 hours) at 06/13/17 1615  Last data filed at 06/13/17 1215   Gross per 24 hour   Intake          2009.15 ml   Output                0 ml   Net          2009.15 ml       GENERAL:  Comfortable appearing on vent  PSYCH:  No acute distress.  EYES: PERRLA, Normal conjunctiva.  HEART:  Regular rate and rhythm. No JVD. Pulses normal. No edema.  LUNGS:  Clear to auscultation, normal Respiratory effort.  ABDOMEN:  Soft, no hepatosplenomegaly, normal bowel sounds.  EXTREMETIES: No clubbing, cyanosis or ischemia  SKIN:  Dry to touch, No rash.           Medications:      All current medications were reviewed.         Data:      All new lab and imaging data was reviewed.   Labs:    Recent Labs  Lab 06/12/17  2138 06/12/17  0143 06/11/17  2320   CULT Culture negative < 24 hours, reincubate No growth after 1 day No growth after 1 day          Lab Results   Component Value Date     06/13/2017     06/12/2017     06/12/2017    Lab Results   Component Value Date    CHLORIDE 105 06/13/2017    CHLORIDE 106 06/12/2017    CHLORIDE 107 06/12/2017    Lab Results   Component Value Date     06/13/2017     06/12/2017     06/12/2017      Lab Results   Component Value Date    POTASSIUM 4.6 06/13/2017    POTASSIUM 5.8 06/12/2017    POTASSIUM 5.7 06/12/2017    Lab Results   Component Value Date    CO2 21 06/13/2017    CO2 15 06/12/2017    CO2 14 06/12/2017    Lab Results   Component Value Date    CR 7.98 06/13/2017    " CR 7.92 06/12/2017    CR 7.83 06/12/2017          Recent Labs  Lab 06/13/17  0911 06/13/17  0530 06/11/17  2321   WBC  --  10.9 15.8*   HGB 7.0* 7.3* 13.3  11.7*   HCT  --  22.9* 38.2*   MCV  --  95 97   PLT  --  [phone]

## 2017-06-13 NOTE — PLAN OF CARE
Problem: Goal Outcome Summary  Goal: Goal Outcome Summary  Outcome: Declining  ICU End of Shift Summary.  For vital signs and complete assessments, please see documentation flowsheets.      Pertinent assessments: Patient has little voluntary movement; opens eyes spontaneously occasionally. Tmax 99.4 axillary. 500cc fluid bolus given for hypotension. Tachypnic with respirations in high 20s-low 30s.    Major Shift Events: Vasopressin started. Levophed titrated to max ordered in attempt to increase BP and MAPs. Insulin gtt started. [employer] contacted throughout shift re: low BP and blood sugars. Zero UOP per [place]. Weight up 12%. Brown to tan creamy secretions per ETT and orally.   Plan (Upcoming Events): Continue plan of care. Dialysis today?  Discharge/Transfer Needs: Pending     Bedside Shift Report Completed :   Bedside Safety Check Completed:

## 2017-06-13 NOTE — PROGRESS NOTES
Inpatient Dialysis Progress Note        Assessment and Plan:     1. ESRD  2. Admitted with aspiration PNA and septic shock  3. Anemia. Hgb decreased from 13.3-->7.3  4. Non-verbal at baseline due to stroke  5. History of seizure  6. Lactic acidosis from #2. Improved    Plan.   1. 4hrs HD, Qb 200-250, no UF/heparin, RIJ tunneled CVC. + Albumin prime and during treatment.         Interval History:     Pt is on NE at 0.4 mcg and vasopressin. SBP is better. Leukocytosis and lactic acid level improved. Blood and sputum cultures NGTD. Acute dropped in Hgb. He will be getting 2 units prbc.          Dialysis Parameters:     Vitals:    06/12/17 0255 06/13/17 0600   Weight: 48.5 kg (106 lb 14.8 oz) 54.6 kg (120 lb 5.9 oz)     I/O last 3 completed shifts:  In: 2042.45 [I.V.:1542.45; IV Piggyback:500]  Out: 0   Temp:  [99.5  F (37.5  C)-102  F (38.9  C)] 100.8  F (38.2  C)  Pulse:  [90] 90  Heart Rate:  [] 89  Resp:  [12-59] 34  BP: ()/() 78/29  FiO2 (%):  [50 %] 50 %  SpO2:  [74 %-100 %] 100 %    Current Weight: 54.6  Dry Weight:   Dialysis Temp: 36.5  C  Access Device: CVC  Access Site: RIJ tunneled CVC  Dialyzer: revaclear  Dialysis Bath: 2K  Sodium Profile: none  UF Goal: none  Blood Flow Rate (mL/min): 200-250  Total Treatment Time (hrs): 4 hrs  Heparin: none    Review of Systems:          Medications:     none    Physical Exam:     Vitals were reviewed  Patient Vitals for the past 24 hrs:   BP Temp Temp src Pulse Heart Rate Resp SpO2 Weight   06/13/17 0900 (!) 78/29 100.8  F (38.2  C) - - 89 (!) 34 100 % -   06/13/17 0800 92/54 100.9  F (38.3  C) - - 95 25 100 % -   06/13/17 0750 - - - - - - 100 % -   06/13/17 0700 (!) 70/30 100.8  F (38.2  C) - - 90 29 100 % -   06/13/17 0645 (!) 74/30 100.9  F (38.3  C) - - 90 26 100 % -   06/13/17 0630 (!) 117/29 100.9  F (38.3  C) - - 99 (!) 36 100 % -   06/13/17 0615 (!) 69/44 100.9  F (38.3  C) - - 91 26 100 % -   06/13/17 0611 (!) 63/23 100.9  F (38.3  C) - - 90  24 100 % -   06/13/17 0600 - - - - - - - 54.6 kg (120 lb 5.9 oz)   06/13/17 0500 (!) 75/30 100.6  F (38.1  C) - - 88 28 100 % -   06/13/17 0430 (!) 83/28 100.6  F (38.1  C) - - 87 28 100 % -   06/13/17 0415 (!) 115/20 100.4  F (38  C) - - 91 (!) 32 100 % -   06/13/17 0400 125/85 100.4  F (38  C) Bladder - 89 29 100 % -   06/13/17 0342 - - - - 91 (!) 31 100 % -   06/13/17 0315 98/60 100.2  F (37.9  C) - - 86 29 100 % -   06/13/17 0245 (!) 90/20 100  F (37.8  C) - - 89 (!) 34 100 % -   06/13/17 0226 (!) 93/27 100  F (37.8  C) - - 91 (!) 34 100 % -   06/13/17 0205 (!) 64/22 100  F (37.8  C) - - 84 (!) 32 100 % -   06/13/17 0200 (!) 63/24 100  F (37.8  C) - - 83 (!) 32 100 % -   06/13/17 0155 (!) 72/23 99.9  F (37.7  C) - - 82 (!) 33 100 % -   06/13/17 0150 (!) 71/20 99.9  F (37.7  C) - - 83 (!) 35 100 % -   06/13/17 0145 (!) 75/24 99.9  F (37.7  C) - - 89 (!) 42 90 % -   06/13/17 0140 95/63 99.7  F (37.6  C) - - 83 26 100 % -   06/13/17 0135 (!) 71/22 99.7  F (37.6  C) - - 79 28 100 % -   06/13/17 0130 (!) 59/20 99.7  F (37.6  C) - - 82 (!) 32 100 % -   06/13/17 0126 (!) 61/23 99.7  F (37.6  C) - - 82 (!) 32 99 % -   06/13/17 0120 (!) 64/23 99.7  F (37.6  C) - - 81 28 100 % -   06/13/17 0110 (!) 73/20 99.5  F (37.5  C) - - 81 25 100 % -   06/13/17 0100 (!) 81/33 99.5  F (37.5  C) - - 84 (!) 33 100 % -   06/13/17 0055 (!) 75/20 99.5  F (37.5  C) - - 80 28 100 % -   06/13/17 0045 (!) 70/21 99.5  F (37.5  C) - - 84 27 100 % -   06/13/17 0031 (!) 61/21 99.7  F (37.6  C) - - 80 26 100 % -   06/13/17 0020 (!) 73/23 99.7  F (37.6  C) - - 80 28 100 % -   06/13/17 0015 (!) 62/28 99.7  F (37.6  C) - - 82 29 99 % -   06/13/17 0003 (!) 73/32 - - - 81 29 100 % -   06/13/17 0000 (!) 69/55 99.7  F (37.6  C) Bladder - 83 29 100 % -   06/12/17 2356 (!) 72/26 99.7  F (37.6  C) - - 81 28 100 % -   06/12/17 2341 (!) 58/31 99.7  F (37.6  C) - - 84 (!) 35 - -   06/12/17 2301 - - - - - - 100 % -   06/12/17 2245 (!) 123/110 99.7  F (37.6  C)  - - 82 25 100 % -   06/12/17 2230 (!) 127/99 99.7  F (37.6  C) - - 85 18 100 % -   06/12/17 2215 93/74 99.7  F (37.6  C) - - 80 30 99 % -   06/12/17 2200 - 99.7  F (37.6  C) - - 80 30 100 % -   06/12/17 2145 - 99.7  F (37.6  C) - - 81 (!) 32 97 % -   06/12/17 2130 (!) 101/29 99.7  F (37.6  C) - - 81 (!) 36 100 % -   06/12/17 2115 95/41 99.7  F (37.6  C) - - 85 (!) 31 100 % -   06/12/17 2030 (!) 55/22 100.4  F (38  C) - - 87 (!) 34 100 % -   06/12/17 2015 (!) 84/44 100.6  F (38.1  C) - - 86 (!) 32 100 % -   06/12/17 2000 (!) 81/27 100.6  F (38.1  C) Bladder - 91 (!) 31 100 % -   06/12/17 1945 100/73 100.8  F (38.2  C) - - 93 (!) 33 99 % -   06/12/17 1930 (!) 76/29 100.9  F (38.3  C) - - 93 16 100 % -   06/12/17 1928 - - - 90 - (!) 39 98 % -   06/12/17 1915 (!) 88/54 100.9  F (38.3  C) - - 94 20 99 % -   06/12/17 1810 (!) 87/39 101.5  F (38.6  C) - - 101 (!) 41 98 % -   06/12/17 1750 (!) 85/31 101.8  F (38.8  C) - - 101 (!) 38 97 % -   06/12/17 1745 (!) 61/33 101.8  F (38.8  C) - - 101 (!) 41 96 % -   06/12/17 1715 (!) 82/52 102  F (38.9  C) - - 105 (!) 41 98 % -   06/12/17 1700 (!) 55/27 102  F (38.9  C) - - 107 (!) 44 91 % -   06/12/17 1645 (!) 84/32 102  F (38.9  C) - - 105 (!) 41 99 % -   06/12/17 1600 92/49 101.1  F (38.4  C) - - 99 (!) 38 100 % -   06/12/17 1545 (!) 75/29 101.1  F (38.4  C) - - 96 (!) 36 100 % -   06/12/17 1530 (!) 84/48 101.1  F (38.4  C) - - 95 (!) 34 100 % -   06/12/17 1515 (!) 73/43 101.3  F (38.5  C) - - 95 (!) 36 100 % -   06/12/17 1500 (!) 74/20 101.1  F (38.4  C) - - 101 (!) 40 100 % -   06/12/17 1445 (!) 62/22 101.3  F (38.5  C) - - 97 (!) 36 (!) 88 % -   06/12/17 1430 (!) 72/24 101.3  F (38.5  C) - - 97 (!) 35 100 % -   06/12/17 1420 (!) 79/24 101.3  F (38.5  C) - - 98 (!) 39 94 % -   06/12/17 1415 (!) 76/29 101.3  F (38.5  C) - - 102 (!) 45 - -   06/12/17 1345 (!) 73/33 101.1  F (38.4  C) - - 99 (!) 44 (!) 81 % -   06/12/17 1310 (!) 80/45 101.1  F (38.4  C) - - 95 (!) 42 99 % -    17 1230 101/78 - - - 103 12 (!) 74 % -   17 1200 109/65 - - - - - - -   17 1130 (!) 81/71 100.8  F (38.2  C) - - 103 (!) 59 98 % -   17 1115 (!) 111/97 100.8  F (38.2  C) - - 104 (!) 59 100 % -   17 0945 110/42 100.8  F (38.2  C) - - 100 (!) 56 96 % -       Temperatures:  Current - Temp: 100.8  F (38.2  C); Max - Temp  Av.5  F (38.1  C)  Min: 99.5  F (37.5  C)  Max: 102  F (38.9  C)  Respiration range: Resp  Av  Min: 12  Max: 59  Pulse range: Pulse  Av  Min: 90  Max: 90  Blood pressure range: Systolic (24hrs), Av , Min:55 , Max:127   ; Diastolic (24hrs), Av, Min:20, Max:110    Pulse oximetry range: SpO2  Av.4 %  Min: 74 %  Max: 100 %  I/O last 3 completed shifts:  In: .45 [I.V.:1542.45; IV Piggyback:500]  Out: 0     Intake/Output Summary (Last 24 hours) at 17 0931  Last data filed at 17 0800   Gross per 24 hour   Intake          1942.45 ml   Output                0 ml   Net          1942.45 ml       Gen: critically ill  CV: RRR. No MGR  Pulm: Ctab. No wheezes  Abd: Soft  Ext: no edema  Neuro: sedated      Data:     Recent Labs   Lab Test  17   0530  17   2320   NA  142  138   POTASSIUM  4.6  5.8*   CHLORIDE  105  106   CO2  21  15*   ANIONGAP  16*  17*   GLC  151*  437*   BUN  150*  161*   CR  7.98*  7.92*   DOUG  7.1*  7.4*       Hemoglobin   Date Value Ref Range Status   2017 7.3 (L) 13.3 - 17.7 g/dL Final     Comment:     Called to  [facility] IN ICU @ 0700 ON 17, NB                Bernardo Carter MD

## 2017-06-13 NOTE — CONSULTS
"CLINICAL NUTRITION SERVICES  -  ASSESSMENT NOTE      Future/Additional Recommendations:    G to G/J conversion if nutrition support continuation is desired by family    Nepro at 45 mL/hr x 24 hours    Renal MVI     REASON FOR ASSESSMENT  Hesham Ramos is a 67 year old male seen by the dietitian for positive nutrition risk screen - TF or Parenteral Nutrition    NUTRITION HISTORY  - Information obtained from chart review  - Patient is on a strict NPO diet at home with reliance on enteral nutrition, G tube placed March 2017. Per chart review, multiple notes indicate vomiting of EN formula in last 2-3 months. Receiving Nepro at 90 mL/hr x 12 hours (8 pm to 8 am) with 30 mL fluid flush before and after each cycle, 22 mL with each medication. Also receiving Vitamin D 1000 international units, Certavite  - Food allergies/intolerances: NKFA  - Hx of CHF, ESKD on  HD, CVA, Renal carcinoma  - Familiar with G tube placement due to clinic calling [facility] inpatient RD office requesting consult for insurance coverage documentation. Reviewed chart at this time to direct clinic on how to obtain a formal consult but did not document as this was not a patient of ours and did not have a consult to see --> at this time, I noted to the clinic that POLST clearly indicated no FT desired. G tube was actually placed at [facility] and no [place] [facility] consult was obtained    CURRENT NUTRITION ORDERS  - Diet: NPO and EN on hold due to ongoing emesis    PHYSICAL FINDINGS  Observed  Clavicle pronounced, anterior and posterior thigh region with moderate or greater depletion, moderate to severe temporal depletion with scooping noted, pectoralis thin; grossly thin suspected at baseline  Obtained from Chart/Interdisciplinary Team  BM: none since admit  Multiple emesis noted  Dionisio nutrition score: 2; total score: 10  Skin: R buttock/sacrum area with pressure injury, WOCN eval pending    ANTHROPOMETRICS  Height: 5' 5\"  Weight: 54.6 kg " (48.5 kg admit weight --> BMI of 17.83 kg/m^2)  Body mass index is 20.03 kg/(m^2).  Weight Status:  Underweight using admit weight  IBW: 62 kg +/- 10%  % IBW:  78%  Weight History:  No HD in past 2 days so likely increase in weight is related to fluid  Wt Readings from Last 10 Encounters:   06/13/17 54.6 kg (120 lb 5.9 oz)   05/02/17 49.9 kg (110 lb)   04/18/17 50 kg (110 lb 3.7 oz)   03/23/17 56.7 kg (125 lb)   12/29/16 56.7 kg (125 lb)   10/26/16 44.4 kg (97 lb 12.8 oz)   09/13/16 44.2 kg (97 lb 8 oz)   12/03/15 44.2 kg (97 lb 8 oz)   11/18/15 53.8 kg (118 lb 9.7 oz)   09/03/15 56.7 kg (125 lb)       LABS  Labs reviewed  Cr 7.98 (H), P04 6.1 9 (6/12)  Vitamin D 85 (6/11)  BG variable     MEDICATIONS  Medications reviewed  Precedex  Insulin gtt  Sodium bicarb 150 mL/hr  Pressors    PROCEDURES WITH NUTRITIONAL IMPLICATIONS  March 2017: G tube  6/12: intubated for respiratory failure  HD 6/13    ASSESSED NUTRITION NEEDS (PER APPROVED PRACTICE GUIDELINES, Dosing weight: 48.5 kg):  Estimated Energy Needs: 1648-4325 kcals (35-40 Kcal/Kg)  Justification: repletion  Estimated Protein Needs: 58-87+ grams protein (1.2-1.8+ g pro/Kg)  Justification: wound healing and dialysis  Estimated Fluid Needs: >1 mL/Kcal  Justification: maintenance    MALNUTRITION:  % Weight Loss: Unable to determine with fluid shifts  % Intake: Does not meet criteria with EN reliance  Subcutaneous Fat Loss: Mild or greater, as noted above  Muscle Loss: Moderate to severe, as noted above  Fluid Retention:None noted    Malnutrition Diagnosis: Patient does not meet two of the above criteria necessary for diagnosing malnutrition but difficult to assess true weight trends with fluid    NUTRITION DIAGNOSIS:  Inadequate enteral nutrition infusion related to vomiting and EN intolerance as evidenced by EN reliance, currently meeting 0% of estimated needs since admit    INTERVENTIONS  Recommendations / Nutrition Prescription  Once able to resume EN,  recommend regimen as follows:      Enteral Regimen: Nepro at 45 mL/hr x 24 hours    Total Enteral Provisions: 1080 mL per day provides 1944 kcals, 87 g protein, 788 mL free H2O, 174 g CHO and 14 g Fiber daily. Meets >100% of DRI's.    3434 International units Vitamin A, 113 mg Vitamin C, 29 mg Zinc sulfate    Free Water Flush: standard 60 mL q4 hours, increase to 200 mL once IVF off/per MD discretion    Renal MVI per pressure injury protocol    Additional protein pending WOCN input    Implementation  Nutrition education: Not appropriate at this time due to patient condition  Collaboration and Referral of care: Discussed patient during interdisciplinary care rounds this morning and with palliative care    Goals  TF to resume within 24-48 hours  TF to meet >80% of estimated needs    MONITORING AND EVALUATION:  Enteral intake/access - Monitor for ability to resume  Nutrition-focused physical findings - staging of coccyx wound  Progress towards goals will be monitored and evaluated per protocol and Practice Guidelines      THANIA Blank  3rd floor/ICU: [phone]  All other floors: [phone]  Weekend/holiday: [phone]  Office: [phone]

## 2017-06-13 NOTE — PROGRESS NOTES
[facility] Intensive Care Progress Note    Problem List  ESRD on chronic HD  Shock, septic and hemorrhagic?  Aspiration event(s) around the time of admission  Malnutrition, PEG placed March 2017  H/o stroke, aphasia resulting    Date of Service (when I saw the patient): 06/13/2017     Assessment & Plan   Hesham Ramso is a 67 year old male who was admitted on 6/11/2017. He was living in a nursing home and brought to the ED for respiratory distress after aspirating tube feedings    Neurology:  At baseline, not apparently able to communicate: normally able to protect airway  -swallow dysfunction on tube feedings  - on Precedex    Cardiovascular:  shock: presumably septic; hemorrhagic component?  - noninvasive BP difficult to measure due to prior dialysis access points  - discuss with family arterial line placement today    Pulmonary:        Intubated 6/12 for increased work of breathing: suspected aspiration  - no microbiologic evidence of pneumonia, some linear basilar nonspecific infiltrates    Ventilation Mode: CMV/AC  FiO2 (%): 50 %  Rate Set (breaths/minute): 24 breaths/min  Tidal Volume Set (mL): 450 mL  PEEP (cm H2O): 5 cmH2O  Oxygen Concentration (%): 50 %  Resp: 36    Renal  ESRD: dialysis access via tunneled catheter  - HD today, had been receiving regularly    ID:         Empiric therapy for aspiration pneumonia: sputum showed yeast only  - narrow antibiotics today - d/c vanc    GI  Possible slow GI bleed?: coffee ground gastric tube output, dramatic Hgb drop and unexplained Uremia  - give 2 units PRBC today    Nutrition  Tube feeding on hold: NPO  -    Endocrine:  Measured hypoglycemia, spurious overnight: briefly on insulin to treat iatrogenic hyperglycemia due to falsely low measured glucose  -    DVT Prophylaxis: Pneumatic Compression Devices  GI Prophylaxis: PPI    Restraints: Restraints for medical healing needed: YES    Family update by me today: Yes      Kimber Mansfield MD    Time Spent on this Encounter   Billing:  I spent 35 minutes, exclusive of procedures and teaching, bedside and on the inpatient unit today managing the critical care of Hesham Ramos in relation to the issues listed in this note.    Main Plans for Today   Hemodialysis  Update family  Arterial line placement    Interval History   Intubated yesterday afternoon for tachypnea, increased work of breathing.     Physical Exam   Temp: 99.5  F (37.5  C) Temp src: Bladder Temp  Min: 99.3  F (37.4  C)  Max: 102  F (38.9  C) BP: 126/56 Pulse: 90 Heart Rate: 114 Resp: (!) 36 SpO2: 100 % O2 Device: Mechanical Ventilator    Vitals:    06/12/17 0255 06/13/17 0600   Weight: 48.5 kg (106 lb 14.8 oz) 54.6 kg (120 lb 5.9 oz)     I/O last 3 completed shifts:  In: 2042.45 [I.V.:1542.45; IV Piggyback:500]  Out: 0     Current Vitals:Temp:  [99.3  F (37.4  C)-102  F (38.9  C)] 99.5  F (37.5  C)  Pulse:  [90] 90  Heart Rate:  [] 114  Resp:  [12-45] 36  BP: ()/() 126/56  FiO2 (%):  [50 %] 50 %  SpO2:  [74 %-100 %] 100 %   Awake, alert and following commands  PERRL and conjugate gaze  Orally intubated  Lungs clear  H-RR w/o murmur  Abdomen-soft, non tender, non distended  Extremities-warm and no edema; bilateral upper extremities with old dialysis grafts  Lines: No erythema or discharge at entry site for Central Venous Catheter  Current lines are required for patient management    Medications     - MEDICATION INSTRUCTIONS -       IV fluid REPLACEMENT ONLY       insulin (regular) Stopped (06/13/17 0800)     norepinephrine 0.36 mcg/kg/min (06/13/17 1149)     dexmedetomidine 0.5 mcg/kg/hr (06/13/17 0800)     vasopressin (PITRESSIN) infusion ADULT (40 mL) 2.4 Units/hr (06/13/17 0855)       ipratropium - albuterol 0.5 mg/2.5 mg/3 mL  3 mL Nebulization 4x daily     piperacillin-tazobactam  2.25 g Intravenous Q8H     sodium chloride 0.9%  1,000 mL Intravenous Once     - MEDICATION INSTRUCTIONS for  Dialysis Patients -   Does not apply See Admin Instructions     pantoprazole  40 mg Intravenous Daily       Data     Recent Labs  Lab 06/13/17  0911 06/13/17  0530 06/12/17 2320 06/12/17 2020 06/12/17  1020 06/12/17 0407  06/11/17 2324 06/11/17 2321   WBC  --  10.9  --   --   --   --   --   --   --  15.8*   HGB 7.0* 7.3*  --   --   --   --   --   --   --  13.3  11.7*   MCV  --  95  --   --   --   --   --   --   --  97   PLT  --  255  --   --   --   --   --   --   --  424   INR  --   --   --   --   --   --   --   --   --  1.23*   NA  --  142 138 141  < >  --   --   --   --  138  135   POTASSIUM  --  4.6 5.8* 5.7*  < >  --   --   < >  --  5.8*  5.5*   CHLORIDE  --  105 106 107  < >  --   --   --   --  104  97   CO2  --  21 15* 14*  < >  --   --   --   --  19*   BUN  --  150* 161* 150*  < >  --   --   --   --  134*  147*   CR  --  7.98* 7.92* 7.83*  < >  --   --   --   --  7.89*   ANIONGAP  --  16* 17* 20*  < >  --   --   --   --  12  19*   DOUG  --  7.1* 7.4* 7.4*  < >  --   --   --   --  9.7   GLC  --  151* 437* 152*  < >  --  294*  --   --  283*  286*   ALBUMIN  --   --  2.0* 2.2*  --   --   --   --   --  3.2*   PROTTOTAL  --   --   --   --   --   --   --   --   --  9.4*   BILITOTAL  --   --   --   --   --   --   --   --   --  0.4   ALKPHOS  --   --   --   --   --   --   --   --   --  219*   ALT  --   --   --   --   --   --   --   --   --  70   AST  --   --   --   --   --   --   --   --   --  72*   TROPI  --   --   --   --   --  0.118* 0.132*  --   --   --    TROPONIN  --   --   --   --   --   --   --   --  0.18*  --    < > = values in this interval not displayed.  Recent Results (from the past 24 hour(s))   XR Chest Port 1 View    Narrative    CHEST ONE VIEW PORTABLE June 12, 2017 12:45 PM     HISTORY: Endotracheal tube placement.    COMPARISON: 6/11/2017.      Impression    IMPRESSION: Endotracheal tube in good position. Elevated right  diaphragm. Interstitial densities in both lungs may be partly due  to  shallow inspiration. Normal heart size and pulmonary vascularity.  Central venous catheter is unchanged. Nasogastric tube is coiled in  the esophagus and must be repositioned. Apparent stent grafts over the  right arm medially.    THEODORE SWAN MD   CT Abdomen Pelvis w/o Contrast    Narrative    CT ABDOMEN AND PELVIS WITHOUT CONTRAST   6/12/2017 4:31 PM     HISTORY: Sepsis thought to be due to aspiration of tube feeds but with  air in liver on x-ray. Evaluate for biliary obstruction, infection,  etc.    TECHNIQUE: CT of the abdomen and pelvis was performed without  intravenous contrast. Radiation dose for this scan was reduced using  automated exposure control, adjustment of the mA and/or kV according  to patient size, or iterative reconstruction technique.    COMPARISON: CT of the abdomen/pelvis dated 2/25/2009.    FINDINGS:    This is a very limited exam due to motion artifact and lack of  intravenous contrast.    Again identified are cysts within the liver. No definite air within  the biliary tree is seen. No definite intrahepatic biliary duct  dilatation although without contrast, sensitivity for detection is  decreased. No definite extrahepatic biliary duct dilatation.    Since the previous exam, a G-tube has been placed.    Evaluation of the remaining solid organs is limited due to a lack of  intravenous contrast. The kidneys are atrophic with multiple  calcifications.    There are distended loops of small bowel throughout the abdomen.    A La catheter is in place.    There is no free air and no definite free fluid in the abdomen or  pelvis.    There are COPD changes at the lung bases. Bibasilar atelectasis and/or  infiltrates.      Impression    IMPRESSION:   1. Limited exam. No definite air within the biliary tree or biliary  duct dilatation identified on this study.  2. Dilated loops of small bowel throughout the abdomen. This is  nonspecific. It could represent a diffuse ileus.  3. Bibasilar  atelectasis and/or infiltrates.    SHANTI ANGEL MD

## 2017-06-13 NOTE — PROGRESS NOTES
"[facility]  Palliative Care Progress Note  Text Page    Assessment & Plan   Recommendations:  1.Patient lacks medical capacity for decision making- Patient has legal co-guardians for healthcare decisions in a legal document dated 3/28/13. Co-Gaurdians are Hemant Ramos and Carlos Ramos.  Plan for support of surrogate as decision maker.  Goal of Care:Full Code, restorative.  See discussion below. Hemant and Carlos will both be here tomorrow AM for furhter discussion.     Disease Process/es & Symptoms:  Hesham Ramos is a 67 year old patient admitted with symptoms of respiratory distress. He has been treated for likely aspiration pneumonia with noted vomiting.  Concern now for other abdominal process. Abdominal x-ray showed air in liver of unclear significance and patient appears to be in pain upon exam of abdomen.  Lactic acid is elevated and over the course of the day he became hypotensive and lethargic today and subsequently has been intubated and on pressors.  CT of abdomen was limited but did not show any acute process.  Concern for GI bleed with Hgb 7.0 this AM, now S/P 2 Units PRBC.    This is in the setting of Hx CVA with current aphasia and right hemiparesis, ESRD on chronic dialysis, CHF with diastolic failure, seizure disorder. He has been ventilated in the past I believe related to airway protection with seizures and has known dysphagia with decision to not proceed with a PEG tube but now has new PEG tube in the past 3 months. He has  been hospitalized 2 times in the past 3 months. His is at baseline completely dependant for all of his care needs living in a group home, noted as \"pretty stable\" by niece. There is no documented weight loss.      The following symptoms are noted by patient as concerning to his quality of life.  Unable, appear uncomfortable to palpation of abdomen.    Vomiting noted.     Psychosocial/Spiritual Needs:  Appreciate  support. Hemant notes " "Hesham was becoming more Sabianism before his stroke. He was Worship.  She notes he never worried about the future but instead lived in the present and knew he would be cared for. Hemant again relays Hesham has been through a lot in his life.  He has been more like a father figure to her and her brother.    Spiritual Health is following.     Decision-Making & Goals of Care:  Discussion/counseling today about goals of care/decisions:  Met with Hemant in the waiting room.  She tells me he has declined since we last met in 2015.  He began coughing with eating so the staff would stop feeding him.  They decided to change group homes and put in a feeding tube.  We reviewed our previous conversations and Hemant relays distress in considering that she would have made a decision that could have shortened his life.  She does not want it to be on her shoulders when he dies. We discussed code status and she will review with her brother.  They will likely be able to come in tomorrow at 1030 in the morning to discuss together.  6/12/2016: Limited discussion via phone with Hemant patient's niece and co-guardian as she reports being here until 330 in the AM.  We reviewed the events of the night and she appear to have a good understanding of current situation.   I reviewed our previous discussion below and POLST which indicates DNR/DNI.  I reviewed the current orders that are \"Full Code.\"  I asked if something had changed.  She stated that yes they had decided for Full Code as they know when it is his time to die he will die anyway despite what we do.  I relayed my concern that intubation of CPR may prolong his life but would not improve it from baseline and he is at high risk of not surviving this hospitalization with or without further life support.  She says Full Code is the right thing to do for now and she will be in later in the evening.  11/18/2015: Discussed with co-guardians, Niece Hemant and Nephew Mane.  They have " "seen him decline over the years and wish there was something that could have been done to prevent the current chronic illnesses as he is noted to have led a healthy lifestyle, active non smoker etc.  They acknowledge that he has entered the final stage of life with recent decline and is fragile, noting we all will come to this end and they wish to keep him comfortable.  We discussed code status and they both agree that attempting CPR or intubating patient would \"only prolong his life\"  and would not help him improve from this already significantly debilitated state. He is made DNR/DNI.  Both note dialysis as a life support but feel actively stopping this would feel like death at their hands, and have concerns family would feel this way.  They are open to stopping dialysis when it is not effective or \"the right thing to do\"  per the nephrologist.  We discussed ways of maintaining comfort as patient declines over time with our without dialysis.     Patient has decision-making capacity Unreliable  Hesham Matthewsshaquillematthew has  court-appointed co-guardians for healthcare decisions in a legal document dated 3/28/13.  See maribel valenzuela copy under ACP tab.  Name: Hemant Ramos, Relationship: unknown, Phone(s): [phone].  Name: Carlos Ramos, Relationship: unknown, Phone(s): [phone].        Patient has a completed health care directive available in the chart (Y/N):Np  Physician orders for life-sustaining treatment (POLST) form is on file but will likely need revision prior to disharge based on today's discussion above.  Code Status:                     Full code      Findings & plan of care discussed with: MD and nursing.  Follow-up plan from palliative team: Will continue to follow in support of decision making.  Thank you for involving us in the patient's care.     Giuliana SEGAL, CNS  Pain Management and Palliative Care  [facility]  Pgr: [phone]    Time Spent on this Encounter   I " spent  25 minutes in assessment of the patient and discussion with the patient and family. Another 15 minutes in review of chart, documentation and discussion with the health care team.    Interval History   Transfused 2 Units PRBC overnight.  Hgb 7.0 today.  Dialysis today.    Review of Systems   Unable    Physical Exam   Temp:  [99.3  F (37.4  C)-101.8  F (38.8  C)] 100.9  F (38.3  C)  Pulse:  [90] 90  Heart Rate:  [] 107  Resp:  [16-42] 24  BP: ()/() 119/61  FiO2 (%):  [50 %] 50 %  SpO2:  [90 %-100 %] 100 %  120 lbs 5.94 oz  GEN:  Alert vented, appears comfortable, NAD.  HEENT:  Normocephalic/atraumatic, no scleral icterus, no nasal discharge, mouth dry.  CV:  RRR, S1, S2; no murmurs or other irregularities noted.  +3 DP/PT pulses bilatererally; no edema BLE.  RESP:  Clear to auscultation bilaterally without rales/rhonchi/wheezing/retractions.  Symmetric chest rise on inhalation noted.  Normal respiratory effort.  ABD:  Firm, non-tender/non-distended.  +BS PEG tube in place  EXT:  Edema & pulses as noted above.  CMS intact x 4.     M/S:  Contracted. Muscle wasting.  SKIN:  Dry to touch, no exanthems noted in the visualized areas.    NEURO: weak, does not follow commands.      Medications     IV fluid REPLACEMENT ONLY       norepinephrine 0.33 mcg/kg/min (06/13/17 1518)     dexmedetomidine 0.7 mcg/kg/hr (06/13/17 1215)     vasopressin (PITRESSIN) infusion ADULT (40 mL) 2.4 Units/hr (06/13/17 0855)       pantoprazole  40 mg Intravenous BID     insulin aspart  1-4 Units Subcutaneous Q4H     ipratropium - albuterol 0.5 mg/2.5 mg/3 mL  3 mL Nebulization 4x daily     piperacillin-tazobactam  2.25 g Intravenous Q8H     sodium chloride 0.9%  1,000 mL Intravenous Once     - MEDICATION INSTRUCTIONS for Dialysis Patients -   Does not apply See Admin Instructions       Data   Results for orders placed or performed during the hospital encounter of 06/11/17 (from the past 24 hour(s))   Renal panel   Result  Value Ref Range    Sodium 141 133 - 144 mmol/L    Potassium 5.7 (H) 3.4 - 5.3 mmol/L    Chloride 107 94 - 109 mmol/L    Carbon Dioxide 14 (L) 20 - 32 mmol/L    Anion Gap 20 (H) 3 - 14 mmol/L    Glucose 152 (H) 70 - 99 mg/dL    Urea Nitrogen 150 (H) 7 - 30 mg/dL    Creatinine 7.83 (H) 0.66 - 1.25 mg/dL    GFR Estimate 7 (L) >60 mL/min/1.7m2    GFR Estimate If Black 8 (L) >60 mL/min/1.7m2    Calcium 7.4 (L) 8.5 - 10.1 mg/dL    Phosphorus 5.6 (H) 2.5 - 4.5 mg/dL    Albumin 2.2 (L) 3.4 - 5.0 g/dL   Glucose by meter   Result Value Ref Range    Glucose 16 (LL) 70 - 99 mg/dL   Glucose by meter   Result Value Ref Range    Glucose 23 (LL) 70 - 99 mg/dL   Blood gas venous   Result Value Ref Range    Ph Venous 7.27 (L) 7.32 - 7.43 pH    PCO2 Venous 32 (L) 40 - 50 mm Hg    PO2 Venous 40 25 - 47 mm Hg    Bicarbonate Venous 15 (L) 21 - 28 mmol/L    Base Deficit Venous 11.2 mmol/L    FIO2 50%VENT    Calcium, ionized whole blood (1200)   Result Value Ref Range    Calcium Ionized Whole Blood 4.2 (L) 4.4 - 5.2 mg/dL   Glucose by meter   Result Value Ref Range    Glucose 14 (LL) 70 - 99 mg/dL   Sputum Culture Aerobic Bacterial   Result Value Ref Range    Specimen Description Sputum     Special Requests Endotracheal     Culture Micro Culture negative < 24 hours, reincubate     Micro Report Status Pending    Gram stain   Result Value Ref Range    Specimen Description Sputum     Special Requests Endotracheal     Gram Stain <25 PMNs/low power field  Many Budding yeast   (A)     Micro Report Status FINAL 06/13/2017    Glucose by meter   Result Value Ref Range    Glucose 44 (LL) 70 - 99 mg/dL   Glucose by meter   Result Value Ref Range    Glucose 31 (LL) 70 - 99 mg/dL   Glucose by meter   Result Value Ref Range    Glucose 300 (H) 70 - 99 mg/dL   Blood gas arterial with oxyhemoglobin   Result Value Ref Range    pH Arterial 7.27 (L) 7.35 - 7.45 pH    pCO2 Arterial 26 (L) 35 - 45 mm Hg    pO2 Arterial 108 (H) 80 - 105 mm Hg    Bicarbonate  Arterial 12 (L) 21 - 28 mmol/L    FIO2 50%  Ventilator       Oxyhemoglobin Arterial 97 92 - 100 %    Base Deficit Art 13.9 mmol/L   Renal panel   Result Value Ref Range    Sodium 138 133 - 144 mmol/L    Potassium 5.8 (H) 3.4 - 5.3 mmol/L    Chloride 106 94 - 109 mmol/L    Carbon Dioxide 15 (L) 20 - 32 mmol/L    Anion Gap 17 (H) 3 - 14 mmol/L    Glucose 437 (H) 70 - 99 mg/dL    Urea Nitrogen 161 (H) 7 - 30 mg/dL    Creatinine 7.92 (H) 0.66 - 1.25 mg/dL    GFR Estimate 7 (L) >60 mL/min/1.7m2    GFR Estimate If Black 8 (L) >60 mL/min/1.7m2    Calcium 7.4 (L) 8.5 - 10.1 mg/dL    Phosphorus 6.1 (H) 2.5 - 4.5 mg/dL    Albumin 2.0 (L) 3.4 - 5.0 g/dL   Lactic acid whole blood   Result Value Ref Range    Lactic Acid 4.4 (HH) 0.7 - 2.1 mmol/L   Glucose by meter   Result Value Ref Range    Glucose 96 70 - 99 mg/dL   Glucose by meter   Result Value Ref Range    Glucose 394 (H) 70 - 99 mg/dL   Glucose by meter   Result Value Ref Range    Glucose 370 (H) 70 - 99 mg/dL   Glucose by meter   Result Value Ref Range    Glucose 279 (H) 70 - 99 mg/dL   Glucose by meter   Result Value Ref Range    Glucose 233 (H) 70 - 99 mg/dL   Basic metabolic panel (AM Draw)   Result Value Ref Range    Sodium 142 133 - 144 mmol/L    Potassium 4.6 3.4 - 5.3 mmol/L    Chloride 105 94 - 109 mmol/L    Carbon Dioxide 21 20 - 32 mmol/L    Anion Gap 16 (H) 3 - 14 mmol/L    Glucose 151 (H) 70 - 99 mg/dL    Urea Nitrogen 150 (H) 7 - 30 mg/dL    Creatinine 7.98 (H) 0.66 - 1.25 mg/dL    GFR Estimate 7 (L) >60 mL/min/1.7m2    GFR Estimate If Black 8 (L) >60 mL/min/1.7m2    Calcium 7.1 (L) 8.5 - 10.1 mg/dL   CBC with platelets differential   Result Value Ref Range    WBC 10.9 4.0 - 11.0 10e9/L    RBC Count 2.42 (L) 4.4 - 5.9 10e12/L    Hemoglobin 7.3 (L) 13.3 - 17.7 g/dL    Hematocrit 22.9 (L) 40.0 - 53.0 %    MCV 95 78 - 100 fl    MCH 30.2 26.5 - 33.0 pg    MCHC 31.9 31.5 - 36.5 g/dL    RDW 14.8 10.0 - 15.0 %    Platelet Count 255 150 - 450 10e9/L    Diff Method        Manual Differential   Slide reviewed by Pathologist  LEFT SHIFTED GRANULOCYTIC SERIES WITH TOXIC CHANGES  SUGGESTIVE OF CURRENT INFECTION/INFLAMATION  MILD DYSGRANULOPOIESIS  Hanna Swanson M.D., Pathologist  /AM      % Neutrophils 89.0 %    % Lymphocytes 4.5 %    % Monocytes 6.5 %    % Eosinophils 0.0 %    % Basophils 0.0 %    Absolute Neutrophil 9.7 (H) 1.6 - 8.3 10e9/L    Absolute Lymphocytes 0.5 (L) 0.8 - 5.3 10e9/L    Absolute Monocytes 0.7 0.0 - 1.3 10e9/L    Absolute Eosinophils 0.0 0.0 - 0.7 10e9/L    Absolute Basophils 0.0 0.0 - 0.2 10e9/L    Polychromasia Slight     Dohle Bodies Present     Toxic Granulation Present     RBC Morphology Consistent with reported results     Platelet Estimate Normal    Lactic acid whole blood (AM Draw)   Result Value Ref Range    Lactic Acid 2.7 (H) 0.7 - 2.1 mmol/L   Blood gas venous with oxyhemoglobin (AM Draw)   Result Value Ref Range    Ph Venous 7.31 (L) 7.32 - 7.43 pH    PCO2 Venous 36 (L) 40 - 50 mm Hg    PO2 Venous 43 25 - 47 mm Hg    Bicarbonate Venous 18 (L) 21 - 28 mmol/L    FIO2 50%  Ventilator       Oxyhemoglobin Venous 71 %    Base Deficit Venous 7.7 mmol/L   Glucose by meter   Result Value Ref Range    Glucose 148 (H) 70 - 99 mg/dL   Glucose by meter   Result Value Ref Range    Glucose 103 (H) 70 - 99 mg/dL   Glucose by meter   Result Value Ref Range    Glucose 54 (L) 70 - 99 mg/dL   Glucose by meter   Result Value Ref Range    Glucose 60 (L) 70 - 99 mg/dL   Glucose by meter   Result Value Ref Range    Glucose 189 (H) 70 - 99 mg/dL   ABO/Rh type and screen   Result Value Ref Range    Units Ordered 2     ABO B     RH(D)  Pos     Antibody Screen Neg     Test Valid Only At [facility]     Specimen Expires 06/16/2017     Crossmatch Red Blood Cells    Hemoglobin   Result Value Ref Range    Hemoglobin 7.0 (L) 13.3 - 17.7 g/dL   Blood component   Result Value Ref Range    Unit Number [id number]     Blood Component Type Red Blood Cells Leukocyte  Reduced     Division Number 00     Status of Unit Released to care unit 06/13/2017 1057     Blood Product Code V7977T55     Unit Status ISS    Blood component   Result Value Ref Range    Unit Number [id number]     Blood Component Type Red Blood Cells Leukocyte Reduced     Division Number 00     Status of Unit Released to care unit 06/13/2017 1154     Blood Product Code [id number]     Unit Status ISS    Glucose by meter   Result Value Ref Range    Glucose 142 (H) 70 - 99 mg/dL   Glucose by meter   Result Value Ref Range    Glucose 101 (H) 70 - 99 mg/dL   Glucose by meter   Result Value Ref Range    Glucose 138 (H) 70 - 99 mg/dL

## 2017-06-13 NOTE — PROGRESS NOTES
Patient remains on full ventilator support    Ventilation Mode: CMV/AC  FiO2 (%): 50 %  Rate Set (breaths/minute): 24 breaths/min  Tidal Volume Set (mL): 500 mL  PEEP (cm H2O): 5 cmH2O  Oxygen Concentration (%): 50 %  Resp: 28      Increased RR from 14 to 24 this shift, RR 28-42, Breath sounds coarse bilaterally, suctioning thick black-brown-creamy secretions through ETT, Received scheduled Duoneb via aerogen, No PS trial done this morning, RT will continue to follow.    Isidra Alexander  June 13, 2017.5:22 AM

## 2017-06-14 NOTE — PROGRESS NOTES
[facility] Intensive Care Progress Note    Problem List  ESRD on chronic HD  Shock, septic and hemorrhagic, improved  Aspiration event(s) around the time of admission  Malnutrition, PEG placed March 2017  H/o stroke, aphasia resulting    Date of Service (when I saw the patient): 06/14/2017     Assessment & Plan   Hesham Ramos is a 67 year old male who was admitted on 6/11/2017. He was living in a nursing home and brought to the ED for respiratory distress after aspirating tube feedings    Neurology:  At baseline, not apparently able to communicate: normally able to protect airway  -swallow dysfunction on tube feedings  - on Precedex    Cardiovascular:  shock: presumably septic; hemorrhagic component?  - noninvasive BP difficult to measure due to prior dialysis access points  - discuss with family arterial line placement today    Pulmonary:        Intubated 6/12 for increased work of breathing: suspected aspiration  - no microbiologic evidence of pneumonia, some linear basilar nonspecific infiltrates    Ventilation Mode: CMV/AC  FiO2 (%): 50 %  Rate Set (breaths/minute): 20 breaths/min  Tidal Volume Set (mL): 450 mL  PEEP (cm H2O): 5 cmH2O  Oxygen Concentration (%): 50 %  Resp: 20    Renal  ESRD: dialysis access via tunneled catheter  - HD today, had been receiving regularly    ID:         Empiric therapy for aspiration pneumonia: sputum showed yeast only  - narrow antibiotics today - Unasyn    GI  Possible slow GI bleed?: coffee ground gastric tube output, dramatic Hgb drop and unexplained Uremia  - give 2 units PRBC today    Nutrition  Tube feeding on hold: NPO  -    Endocrine:  Measured hypoglycemia, spurious overnight: briefly on insulin to treat iatrogenic hyperglycemia due to falsely low measured glucose  -    DVT Prophylaxis: Pneumatic Compression Devices  GI Prophylaxis: PPI    Restraints: Restraints for medical healing needed: YES    Family update by me today: Yes      Kimber Mansfield MD    Time Spent on this Encounter   Billing:  I spent 35 minutes bedside and on the inpatient unit today managing the critical care of Hesham Ramos in relation to the issues listed in this note.    Main Plans for Today   Wean sedation and vent  Hemodialysis  Family meeting - discussed assistance for [facility] family members to visit and they will consider G-J tube conversion    Interval History   Received 2 units PRBC and HD yesterday, improved pressor needs    Physical Exam   Temp: 99.5  F (37.5  C) Temp src: Bladder Temp  Min: 95  F (35  C)  Max: 100.9  F (38.3  C) BP: 132/64   Heart Rate: 85 Resp: 20 SpO2: 100 % O2 Device: Mechanical Ventilator    Vitals:    06/12/17 0255 06/13/17 0600 06/14/17 0045   Weight: 48.5 kg (106 lb 14.8 oz) 54.6 kg (120 lb 5.9 oz) 56.5 kg (124 lb 9 oz)     I/O last 3 completed shifts:  In: 586.17 [I.V.:586.17]  Out: 100 [Emesis/NG output:100]    Current Vitals:Temp:  [95  F (35  C)-100.9  F (38.3  C)] 99.5  F (37.5  C)  Heart Rate:  [] 85  Resp:  [15-36] 20  BP: ()/(23-86) 132/64  MAP:  [58 mmHg-117 mmHg] 69 mmHg  Arterial Line BP: ()/(35-77) 109/49  FiO2 (%):  [50 %] 50 %  SpO2:  [84 %-100 %] 100 %   Not interactive, resists movements and care   PERRL and conjugate gaze  Orally intubated  Lungs clear  H-RR w/o murmur  Abdomen-soft, non tender, non distended  Extremities-warm and no edema; bilateral upper extremities with old dialysis grafts  Lines: No erythema or discharge at entry site for arterial line, central line   Current lines are required for patient management    Medications     - MEDICATION INSTRUCTIONS -       IV fluid REPLACEMENT ONLY       norepinephrine 0.14 mcg/kg/min (06/14/17 0529)     dexmedetomidine 0.7 mcg/kg/hr (06/14/17 0530)     vasopressin (PITRESSIN) infusion ADULT (40 mL) 2.4 Units/hr (06/14/17 0530)       pantoprazole  40 mg Intravenous BID     insulin aspart  1-4 Units Subcutaneous Q4H     ipratropium - albuterol  0.5 mg/2.5 mg/3 mL  3 mL Nebulization 4x daily     piperacillin-tazobactam  2.25 g Intravenous Q8H     sodium chloride 0.9%  1,000 mL Intravenous Once     - MEDICATION INSTRUCTIONS for Dialysis Patients -   Does not apply See Admin Instructions       Data     Recent Labs  Lab 06/14/17  0525 06/13/17  1732 06/13/17  0911 06/13/17  0530 06/12/17  2320  06/12/17  1020 06/12/17  0407  06/11/17  2324 06/11/17  2321   WBC 11.6* 11.5*  --  10.9  --   --   --   --   --   --  15.8*   HGB 9.4* 9.7* 7.0* 7.3*  --   --   --   --   --   --  13.3  11.7*   MCV 89 90  --  95  --   --   --   --   --   --  97    206  --  255  --   --   --   --   --   --  424   INR 1.76*  --   --   --   --   --   --   --   --   --  1.23*     --   --  142 138  < >  --   --   --   --  138  135   POTASSIUM 3.2*  --   --  4.6 5.8*  < >  --   --   < >  --  5.8*  5.5*   CHLORIDE 100  --   --  105 106  < >  --   --   --   --  104  97   CO2 28  --   --  21 15*  < >  --   --   --   --  19*   BUN 68*  --   --  150* 161*  < >  --   --   --   --  134*  147*   CR 4.25*  --   --  7.98* 7.92*  < >  --   --   --   --  7.89*   ANIONGAP 11  --   --  16* 17*  < >  --   --   --   --  12  19*   DOUG 7.8*  --   --  7.1* 7.4*  < >  --   --   --   --  9.7   *  --   --  151* 437*  < >  --  294*  --   --  283*  286*   ALBUMIN 1.9*  --   --   --  2.0*  < >  --   --   --   --  3.2*   PROTTOTAL 6.0*  --   --   --   --   --   --   --   --   --  9.4*   BILITOTAL 0.5  --   --   --   --   --   --   --   --   --  0.4   ALKPHOS 126  --   --   --   --   --   --   --   --   --  219*   *  --   --   --   --   --   --   --   --   --  70   *  --   --   --   --   --   --   --   --   --  72*   TROPI  --   --   --   --   --   --  0.118* 0.132*  --   --   --    TROPONIN  --   --   --   --   --   --   --   --   --  0.18*  --    < > = values in this interval not displayed.  No results found for this or any previous visit (from the past 24 hour(s)).

## 2017-06-14 NOTE — PROGRESS NOTES
[facility]  Hospitalist Progress Note  Patient Name: Hesham Ramos    MRN: [id number]  Provider: Hesham Wiley MD  06/14/17    Initial presenting complaint/issue to hospital (Diagnosis): respiratory distress and tube feeds in mouth and nose         Assessment and Plan:      Summary of Stay: Hesham Ramos is a 67 year old male with history of previous stroke, group home care,seizure disorder, renal carcinoma, hypertension, diastolic congestive heart failure, and end-stage kidney disease for which she is on chronic dialysis.  He lives in a group home where he requires heavy care due to his previous stroke. Fairly recently he had a feeding tube placed.  It sounds like he has had some problems tolerating tube feeds since this was placed.   He presented to the emergency department on 6/11/2017 with difficulty breathing.  It was noted that he had tube feeds coming from his mouth and nose.  He had a fever with temperature 102.2. He was tachycardic with heart rate of 102. Respiratory rate was in the 30s.  He was visibly dyspneic and uncomfortable. Basic metabolic panel suggested end-stage renal disease  Lactic acid was elevated at 4.4. White blood cell count was elevated at 15.8. Troponin was slightly elevated at 0.18.  Blood sugar was elevated at 286. Chest x-ray showed some left base infiltrate versus atelectasis.  CT of head was unremarkable.  EKG showed no definite coronary ischemia.  He was admitted to the intensive care unit with sepsis due to suspected aspiration pneumonia. After admission, abdominal x-ray was obtained to check placement of feeding tube.  Feeding tube looked appropriately placed but there was question of possibly some air in the liver.  After admission Hesham continued to  Become more ill appearing. He seemed to have some abdominal discomfort.   CT scan of abdomen and pelvis was obtained and showed no evidence of biliary obstruction or other obvious acute intra-abdominal  catastrophe.  Zosyn was continued for suspected aspiration pneumonia.  Vancomycin was also continued. He developed hypotension so was started on levo fed. He developed respiratory distress and was intubated.  He was seen by nephrology on hospital day one but dialysis was not pursued due to low blood pressure and in stable condition.  On hospital day 2 vasopressin had been added.  Blood pressure was better so dialysis was performed.  This seemed to improve his hemodynamics.  On hospital day 2 hemoglobin was noted to be 7.3 (had been 13.3 at presentation).  There hasbeen no visible blood loss, but GI bleed was considered.  Transfusion of red blood cells was given on dialysis with recovery of hemoglobin to 9.7.     Problem List:   1.  Sepsis, thought to be due to aspiration pneumonia.  Continue Zosyn. Continue vasopressor support with levophed and vasopressin- wean as tolerated.     2.  Respiratory distress, due to sepsis and aspiration pneumonia.  Continue ventilator support.     3.  End-stage renal disease, chronically on hemodialysis.  Patient had dialysis again today.  Next dialysis will be Friday. Nephrology consultation appreciated.     4.  Acute drop in hemoglobin. Suspect some hemoconcentration on presentation with subsequent dilution.  Consider also some component due to GI bleed, although no visible blood loss has been noted.  Transfusion was given on dialysis on 6/13 with rebound of HGB to 6.7.  Monitor hemoglobin.  IV Protonix is being given twice daily.  Consider GI consult if visible blood loss is noted.     5.  Seizure disorder.  He normally takes Dilantin 300 mg at night. I will order Dilantin 300 mg IV each night.  I will obtain Dilantin level tomorrow.    6.  Eleveated LFT's.  Ab US negative.  Some degree of shock liver due to hypotension earlier in stay? Monitor.      7.  Mild troponin leak.  I suspect that this is due to respiratory distress and end-stage renal disease. No further evaluation  "planned.     8.  Hypercholesterolemia.   Simvastatin is currently on hold until able to take things by feeding tube.     9.  Hyperglycemia.  Resolved.  Monitor.    10.  Care plan.  Family is considering getting distant family here to help make a decision regarding future aggressive cares.          DVT Prophylaxis:  PCD's  Code Status: DNR per discussion today  Discharge Dispo: Group home if recovers  Estimated Disch Date / # of Days until Discharge: many        Interval History:      Patient had an uneventful night. No new problems, though.                    Physical Exam:      Last Vital Signs:  /64  Pulse 90  Temp 98.7  F (37.1  C) (Axillary)  Resp 20  Ht 1.651 m (5' 5\")  Wt 56.5 kg (124 lb 9 oz)  SpO2 100%  BMI 20.73 kg/m2    Intake/Output Summary (Last 24 hours) at 06/14/17 1452  Last data filed at 06/14/17 1200   Gross per 24 hour   Intake           545.65 ml   Output              100 ml   Net           445.65 ml       GENERAL:  Comfortable appearing on vent.  Sedated.  PSYCH: No acute distress.  EYES: PERRLA, Normal conjunctiva.  HEART:  Regular rate and rhythm. No JVD. Pulses normal. No edema.  LUNGS:  Clear to auscultation, normal Respiratory effort.  ABDOMEN:  Soft, no hepatosplenomegaly, normal bowel sounds.  EXTREMETIES: No clubbing, cyanosis or ischemia  SKIN:  Dry to touch, No rash.           Medications:      All current medications were reviewed.         Data:      All new lab and imaging data was reviewed.   Labs:    Recent Labs  Lab 06/12/17  2138 06/12/17  0143 06/11/17  2320   CULT Moderate growth Yeast Identification to follow.Plus Moderate growth Normal dahlia* No growth after 2 days No growth after 2 days          Lab Results   Component Value Date     06/14/2017     06/13/2017     06/12/2017    Lab Results   Component Value Date    CHLORIDE 100 06/14/2017    CHLORIDE 105 06/13/2017    CHLORIDE 106 06/12/2017    Lab Results   Component Value Date    BUN 68 " 06/14/2017     06/13/2017     06/12/2017      Lab Results   Component Value Date    POTASSIUM 3.2 06/14/2017    POTASSIUM 4.6 06/13/2017    POTASSIUM 5.8 06/12/2017    Lab Results   Component Value Date    CO2 28 06/14/2017    CO2 21 06/13/2017    CO2 15 06/12/2017    Lab Results   Component Value Date    CR 4.25 06/14/2017    CR 7.98 06/13/2017    CR 7.92 06/12/2017          Recent Labs  Lab 06/14/17  0525 06/13/17  1732 06/13/17  0911 06/13/17  0530   WBC 11.6* 11.5*  --  10.9   HGB 9.4* 9.7* 7.0* 7.3*   HCT 27.7* 29.2*  --  22.9*   MCV 89 90  --  95    206  --  255

## 2017-06-14 NOTE — PROGRESS NOTES
Patient remains on full ventilator support    Ventilation Mode: CMV/AC  FiO2 (%): 50 %  Rate Set (breaths/minute): 20 breaths/min  Tidal Volume Set (mL): 450 mL  PEEP (cm H2O): 5 cmH2O  Oxygen Concentration (%): 50 %  Resp: 23      Decreased rate from 24 to 20 this shift. Breath sounds coarse bilaterally, suctioning small amounts of thick tan secretions through ETT, Received scheduled Duoneb, No PS trial done this morning patient remains on 2 pressors.    Isidra Alexander  June 14, 2017.5:51 AM

## 2017-06-14 NOTE — PROGRESS NOTES
Received request for morning lab. Reviewed chart. Ordered CBC, comprehensive metabolic panel, INR, lactate.     wtb

## 2017-06-14 NOTE — PROGRESS NOTES
"[facility]  Palliative Care Progress Note  Text Page    Family care conference with the patients niece, Hemant Ramos and nephew, Carlos Richard who are co-guardians for healthcare decisions. Appreciate Intensivist Kimber Mansfield MD for assisting in a review of the patient's current status. Carlos gave insight into their decisions guiding Hesham's healthcare \"We want to make certain we do no stop any care that can possibly help him until his natural death\". Alinepatienceban request a new POLST for the facility Hesham resides. Alinepatienceban and Carlos request DNR (allow natural death) and full treatment (including intubation and tube feedings).       Assessment & Plan      1. Cognitive impairment - Patient does not demonstrate medical capacity. Legal co-guardians for healthcare decisions Hemant Richard (niece) and Carlos Richard (nephew) are assisting in plan of care.    2. Dysphagia - Healthcare agents request continued tube feeding and are receptive to converting from at G-tube to J-tube in hopes to diminish risk of aspiration.    3.  Dyspnea - Healthcare agents are receptive to re-intubation if needed.     4. Goal of Care: DNR - Restorative care. Healthcare agents agree that the patient would not CPR, but would want to continue or re-start intubation and ventilation if needed.    Elaina Moeller MS, RN, CNS, APRN, ACHPN, FAACVPR  Pain and Palliative Care  Pager [phone]  Office [phone]       Time Spent on this Encounter   I spent from 10:30 AM until 11:45 AM in assessment of the patient and discussion with the family. Another 20 minutes in review of chart, documentation and discussion with the health care team.    Interval History   Chart reviewed     Review of Systems    Patient intubated and sedated- unable to complete    Physical Exam   Temp:  [95  F (35  C)-100.9  F (38.3  C)] 98.7  F (37.1  C)  Heart Rate:  [] 88  Resp:  [15-30] 20  BP: (118-134)/(55-64) 132/64  MAP:  [58 mmHg-117 " mmHg] 71 mmHg  Arterial Line BP: ()/(35-77) 110/49  FiO2 (%):  [40 %-50 %] 40 %  SpO2:  [84 %-100 %] 100 %  124 lbs 8.96 oz  GEN:  Frail elderly black male. Orally intubated and sedated.   HEENT:  Normocephalic/atraumatic, no scleral icterus, no nasal discharge, mouth moist.  RESP:  Symmetric chest rise with ventilator.      Medications     IV fluid REPLACEMENT ONLY       IV fluid REPLACEMENT ONLY       norepinephrine 0.02 mcg/kg/min (06/14/17 1244)     dexmedetomidine 0.6 mcg/kg/hr (06/14/17 1159)     vasopressin (PITRESSIN) infusion ADULT (40 mL) 2.4 Units/hr (06/14/17 1200)       [START ON 6/15/2017] B and C vitamin Complex with folic acid  5 mL Per Feeding Tube Daily     Prosource TF protein modulator  1 packet Per G Tube Daily     pantoprazole  40 mg Per Feeding Tube BID     ampicillin-sulbactam (UNASYN) IV  3 g Intravenous Daily     insulin aspart  1-4 Units Subcutaneous Q4H     ipratropium - albuterol 0.5 mg/2.5 mg/3 mL  3 mL Nebulization 4x daily     sodium chloride 0.9%  1,000 mL Intravenous Once     - MEDICATION INSTRUCTIONS for Dialysis Patients -   Does not apply See Admin Instructions       Data   Results for orders placed or performed during the hospital encounter of 06/11/17 (from the past 24 hour(s))   Glucose by meter   Result Value Ref Range    Glucose 138 (H) 70 - 99 mg/dL   CBC (AM Draw)   Result Value Ref Range    WBC 11.5 (H) 4.0 - 11.0 10e9/L    RBC Count 3.26 (L) 4.4 - 5.9 10e12/L    Hemoglobin 9.7 (L) 13.3 - 17.7 g/dL    Hematocrit 29.2 (L) 40.0 - 53.0 %    MCV 90 78 - 100 fl    MCH 29.8 26.5 - 33.0 pg    MCHC 33.2 31.5 - 36.5 g/dL    RDW 14.5 10.0 - 15.0 %    Platelet Count 206 150 - 450 10e9/L   Blood gas arterial with oxyhemoglobin (1200)   Result Value Ref Range    pH Arterial 7.52 (H) 7.35 - 7.45 pH    pCO2 Arterial 31 (L) 35 - 45 mm Hg    pO2 Arterial 94 80 - 105 mm Hg    Bicarbonate Arterial 25 21 - 28 mmol/L    FIO2 50%  Ventricle       Oxyhemoglobin Arterial 97 92 - 100 %     Base Excess Art 2.1 mmol/L   Glucose by meter   Result Value Ref Range    Glucose 149 (H) 70 - 99 mg/dL   Glucose by meter   Result Value Ref Range    Glucose 179 (H) 70 - 99 mg/dL   Glucose by meter   Result Value Ref Range    Glucose 149 (H) 70 - 99 mg/dL   Lactic acid whole blood (AM Draw)   Result Value Ref Range    Lactic Acid 1.1 0.7 - 2.1 mmol/L   Comprehensive metabolic panel   Result Value Ref Range    Sodium 139 133 - 144 mmol/L    Potassium 3.2 (L) 3.4 - 5.3 mmol/L    Chloride 100 94 - 109 mmol/L    Carbon Dioxide 28 20 - 32 mmol/L    Anion Gap 11 3 - 14 mmol/L    Glucose 142 (H) 70 - 99 mg/dL    Urea Nitrogen 68 (H) 7 - 30 mg/dL    Creatinine 4.25 (H) 0.66 - 1.25 mg/dL    GFR Estimate 14 (L) >60 mL/min/1.7m2    GFR Estimate If Black 17 (L) >60 mL/min/1.7m2    Calcium 7.8 (L) 8.5 - 10.1 mg/dL    Bilirubin Total 0.5 0.2 - 1.3 mg/dL    Albumin 1.9 (L) 3.4 - 5.0 g/dL    Protein Total 6.0 (L) 6.8 - 8.8 g/dL    Alkaline Phosphatase 126 40 - 150 U/L     (H) 0 - 70 U/L     (H) 0 - 45 U/L   CBC (AM Draw)   Result Value Ref Range    WBC 11.6 (H) 4.0 - 11.0 10e9/L    RBC Count 3.11 (L) 4.4 - 5.9 10e12/L    Hemoglobin 9.4 (L) 13.3 - 17.7 g/dL    Hematocrit 27.7 (L) 40.0 - 53.0 %    MCV 89 78 - 100 fl    MCH 30.2 26.5 - 33.0 pg    MCHC 33.9 31.5 - 36.5 g/dL    RDW 14.9 10.0 - 15.0 %    Platelet Count 199 150 - 450 10e9/L   INR (AM Draw)   Result Value Ref Range    INR 1.76 (H) 0.86 - 1.14   Glucose by meter   Result Value Ref Range    Glucose 122 (H) 70 - 99 mg/dL   US Abdomen Limited Portable    Narrative    ULTRASOUND ABDOMEN LIMITED PORTABLE  6/14/2017 8:16 AM     HISTORY: Elevated liver enzymes. Elevated INR.    COMPARISON: CT of the abdomen and pelvis performed 6/12/2017.    FINDINGS: Scattered liver cysts are noted, with the largest on the  right measuring 3.2 cm. No evidence for fatty infiltration of the  liver. The gallbladder wall appears thickened, measuring up to 0.6 cm.  No shadowing  gallstones are identified. Evaluation of the gallbladder  is limited related to patient immobility. No pericholecystic fluid is  seen. No intra- or extrahepatic bile duct dilatation. Common hepatic  duct is normal in diameter. The entire common duct could not be  visualized on this scan. The right kidney could not be visualized.  Pancreas is partially obscured by overlying bowel gas, but appears  normal where seen. The abdominal aorta and IVC are of normal caliber  where visualized. A small amount of ascites is noted in the right  upper quadrant.      Impression    IMPRESSION:   1. There is mild gallbladder wall thickening. No other convincing  sonographic evidence for cholecystitis.  2. Small amount of ascites is noted in the right upper quadrant.  3. Multiple hepatic cysts are identified  4. Nonvisualization of the right kidney.    MAYRA MERCADO MD   [facility] Adult IP Consult    Madonna Denton, RD, LD     6/14/2017 10:40 AM  NUTRITION BRIEF NOTE + CONSULT     See RD note 6/13 for full assessment details  Consult: TF Assess and Order    New findings in last 24 hours:    Remains intubated, EN on hold    Weight: 56.5 kg (up ~8 kg since admit), HD 6/13    BM: 6/14    Labs and meds: reviewed, 2 pressors    Skin per WOCN 6/13: coccyx with DTI that is unroofing to reveal   shallow stage 3; L buttock with spotty erosion    Assessed Nutrition Needs (DW: 48.5 kg):  Estimated Energy Needs: 7966-6756 kcals (35-40 Kcal/Kg)  Justification: repletion  Estimated Protein Needs: 58-87+ grams protein (1.2-1.8+ g pro/Kg)  Justification: wound healing and dialysis  Estimated Fluid Needs: >1 mL/Kcal  Justification: maintenance    Interventions:      Resume TF - start at 10 mL/hr and hold. Goal rate as follows:    Enteral Regimen: Nepro at 45 mL/hr x 24 hours    Total Enteral Provisions: 1080 mL per day provides 1944 kcals,   87 g protein, 788 mL free H2O, 174 g CHO and 14 g Fiber daily.   Meets >100% of  DRI's.    3434 International units Vitamin A, 113 mg Vitamin C, 29 mg   Zinc sulfate    Free Water Flush: standard 60 mL q4 hours, increase to 200 mL   once IVF off/per MD discretion    Renal MVI per pressure injury protocol once tolerating    1 packet Prosource TF until goal rate reached    Entered orders for regimen outlined above    Collaboration and Referral of care: Discussed patient during   interdisciplinary care rounds this morning including possible   need for J extension if family continues enteral feedings    Please page/consult as needed.       Madonna Redd RDN   3rd floor/ICU: [phone]  All other floors: [phone]  Weekend/holiday: [phone]  Office: [phone]   Glucose by meter   Result Value Ref Range    Glucose 118 (H) 70 - 99 mg/dL

## 2017-06-14 NOTE — PROGRESS NOTES
Patient continues on mechanical ventilator with current settings:     Ventilation Mode: CMV/AC  FiO2 (%): 40 %  Rate Set (breaths/minute): 20 breaths/min  Tidal Volume Set (mL): 450 mL  PEEP (cm H2O): 5 cmH2O  Pressure Support (cm H2O): 12 cmH2O  Oxygen Concentration (%): 40 %  Resp: 21        BS coarse and diminished bilaterally, suctioning moderate tan thick secretions. Continues to receive duoneb QID via aerogen. PS trail done this afternoon, started with 7/5 40% but had to increase to 12/5 due to low TVs. Patient did well on 12/5 for 30 minutes but was switched back because no plans to extubate at this time. RT will continue to follow.

## 2017-06-14 NOTE — PHARMACY
Pharmacy Tube Feeding Consult    Medication reviewed for administration by feeding tube and for potential food/drug interactions.    Recommendation: Recommend changing the following medications to a liquid dosage form: protonix     Pharmacy will continue to follow as new medications are ordered.

## 2017-06-14 NOTE — PROGRESS NOTES
" Renal Medicine Progress Note            Assessment/Plan:     1. ESRD  2. Admitted with aspiration PNA and septic shock. Improving.  3. Anemia. Hgb decreased from 13.3-->7.3, s/p transfusion  4. Non-verbal and bed-bound at baseline  5. History of seizure    Plan.   1. Next HD treatment is on Friday. We will try some UF with HD on Friday. Continue to wean pressors        Interval History:     Pt had an eventful HD treatment his morning. No UF. NE is down to 0.02 mcg. He is till on vasopressin. SBP is much better. Pt open eyes to voice and moves his left fingers and toes on verbal commands.          Medications and Allergies:       [START ON 6/15/2017] B and C vitamin Complex with folic acid  5 mL Per Feeding Tube Daily     Prosource TF protein modulator  1 packet Per G Tube Daily     pantoprazole  40 mg Per Feeding Tube BID     ampicillin-sulbactam (UNASYN) IV  3 g Intravenous Daily     insulin aspart  1-4 Units Subcutaneous Q4H     ipratropium - albuterol 0.5 mg/2.5 mg/3 mL  3 mL Nebulization 4x daily     sodium chloride 0.9%  1,000 mL Intravenous Once     - MEDICATION INSTRUCTIONS for Dialysis Patients -   Does not apply See Admin Instructions      No Known Allergies         Physical Exam:   Vitals were reviewed  Heart Rate: 83, Blood pressure 132/64, pulse 90, temperature 98.7  F (37.1  C), temperature source Axillary, resp. rate 17, height 1.651 m (5' 5\"), weight 56.5 kg (124 lb 9 oz), SpO2 100 %.    Wt Readings from Last 3 Encounters:   06/14/17 56.5 kg (124 lb 9 oz)   05/02/17 49.9 kg (110 lb)   04/18/17 50 kg (110 lb 3.7 oz)       Intake/Output Summary (Last 24 hours) at 06/14/17 1308  Last data filed at 06/14/17 1200   Gross per 24 hour   Intake           530.65 ml   Output              100 ml   Net           430.65 ml     Gen: critically ill  CV: RRR. No MGR  Pulm: Ctab. No wheezes  Abd: Soft  Ext: no edema  Neuro: open eyes and moves left fingers and left toes with verbal commands  RIJ tunneled CVC " d/c/i         Data:     CBC RESULTS:     Recent Labs  Lab 06/14/17  0525 06/13/17  1732 06/13/17  0911 06/13/17  0530 06/11/17  2321   WBC 11.6* 11.5*  --  10.9 15.8*   RBC 3.11* 3.26*  --  2.42* 3.94*   HGB 9.4* 9.7* 7.0* 7.3* 13.3  11.7*   HCT 27.7* 29.2*  --  22.9* 38.2*    206  --  255 424       Basic Metabolic Panel:    Recent Labs  Lab 06/14/17  0525 06/13/17  0530 06/12/17  2320 06/12/17 2020 06/12/17  1435 06/12/17  0407 06/12/17 0228 06/11/17 2321    142 138 141 142  --   --  138  135   POTASSIUM 3.2* 4.6 5.8* 5.7* 5.6*  --  5.0 5.8*  5.5*   CHLORIDE 100 105 106 107 107  --   --  104  97   CO2 28 21 15* 14* 15*  --   --  19*   BUN 68* 150* 161* 150* 143*  --   --  134*  147*   CR 4.25* 7.98* 7.92* 7.83* 7.77*  --   --  7.89*   * 151* 437* 152* 140* 294*  --  283*  286*   DOUG 7.8* 7.1* 7.4* 7.4* 7.5*  --   --  9.7       INR  Recent Labs  Lab 06/14/17  0525 06/11/17  2321   INR 1.76* 1.23*      Attestation:   I have reviewed today's relevant vital signs, notes, medications, labs and imaging.    Bernardo Carter MD  [facility] - Nephrology  Office phone :[phone]  Pager: [phone]

## 2017-06-14 NOTE — CONSULTS
NUTRITION BRIEF NOTE + CONSULT     See RD note 6/13 for full assessment details  Consult: TF Assess and Order    New findings in last 24 hours:    Remains intubated, EN on hold    Weight: 56.5 kg (up ~8 kg since admit), HD 6/13    BM: 6/14    Labs and meds: reviewed, 2 pressors    Skin per WOCN 6/13: coccyx with DTI that is unroofing to reveal shallow stage 3; L buttock with spotty erosion    Assessed Nutrition Needs (DW: 48.5 kg):  Estimated Energy Needs: 7600-4156 kcals (35-40 Kcal/Kg)  Justification: repletion  Estimated Protein Needs: 58-87+ grams protein (1.2-1.8+ g pro/Kg)  Justification: wound healing and dialysis  Estimated Fluid Needs: >1 mL/Kcal  Justification: maintenance    Interventions:      Resume TF - start at 10 mL/hr and hold. Goal rate as follows:    Enteral Regimen: Nepro at 45 mL/hr x 24 hours    Total Enteral Provisions: 1080 mL per day provides 1944 kcals, 87 g protein, 788 mL free H2O, 174 g CHO and 14 g Fiber daily. Meets >100% of DRI's.    3434 International units Vitamin A, 113 mg Vitamin C, 29 mg Zinc sulfate    Free Water Flush: standard 60 mL q4 hours, increase to 200 mL once IVF off/per MD discretion    Renal MVI per pressure injury protocol once tolerating    1 packet Prosource TF until goal rate reached    Entered orders for regimen outlined above    Collaboration and Referral of care: Discussed patient during interdisciplinary care rounds this morning including possible need for J extension if family continues enteral feedings    Please page/consult as needed.       Madonna Redd RDN   3rd floor/ICU: [phone]  All other floors: [phone]  Weekend/holiday: [phone]  Office: [phone]

## 2017-06-14 NOTE — PROGRESS NOTES
Focus: Coccyx wound care  D: dressings to rafal rectal not effective with stooling rafal discussion with RN and WOC will change plan of care.  I: 10 min to discuss POC update orders, order supplies  A/P: read WOC assessment on 6/13, ordered AquaCel Ag and Mepilex but close to rafal rectal area and pt incontinent  Plan of care for wound to Coccyx and buttocks: BID and PRN  1.  Gently cleanse stool off PRN  2. Cleanse wound with MicroKlenz spray BID for skin assessment, pat dry  3. Dust open wounds with Stoma powder   4. Apply thick layer of TRIAD paste [id number] to cover wounds and erosion. May cover with ABD if needed  5. May use Critic aid paste to rafal rectal area  6. Follow PIP pressure injury prevention measures, reposition Q1-2hrs, float heels, etc.

## 2017-06-14 NOTE — PLAN OF CARE
Problem: Goal Outcome Summary  Goal: Goal Outcome Summary  Outcome: No Change  .ICU End of Shift Summary.  For vital signs and complete assessments, please see documentation flowsheets.      Pertinent assessments: Patient remains intubated and sedated.  Precedex, Levophed, and Vasopressin infusing.  Patient opens his eyes spontaneously at times.  LS coarse; coughing at times.  Suctioned small amount of creamy tan secretions via ET tube.  La with no urine output.  Major Shift Events: Weaning Levophed; VENT changes made per Tele Hub.    Plan (Upcoming Events): Continue to wean Levophed  Discharge/Transfer Needs: TBD     Bedside Shift Report Completed :   Bedside Safety Check Completed:

## 2017-06-14 NOTE — PROGRESS NOTES
Potassium   Date Value Ref Range Status   06/14/2017 3.2 (L) 3.4 - 5.3 mmol/L Final   ]  Lab Results   Component Value Date    HGB 9.4 06/14/2017     Weight: 56.5 kg (124 lb 9 oz)    DIALYSIS PROCEDURE NOTE    Patient dialyzed for 3 hrs on a 4 K bath with a  net fluid removal of 0L.  A BFR of 250ml/min was obtained via right tunneled CVC and all connections secured.   Patient treatment plan discussed with Dr. Carter.  Total heparin received during treatment:: 0units.  Heparin instilled in both ports post run.  Meds given:none Complications:none   Procedure and ESRD teaching done and questions answered.  See flowsheet in EPIC for further details.    Patient dialyzed at bedside in ICU.  Catheter Access:  Aseptic prep done for both on/off.  Water alarm in place and used.  [facility] consent form signed yes

## 2017-06-15 NOTE — PROGRESS NOTES
Patient continues on mechanical ventilator with current settings:    Ventilation Mode: CMV/AC  FiO2 (%): 40 %  Rate Set (breaths/minute): 20 breaths/min  Tidal Volume Set (mL): 450 mL  PEEP (cm H2O): 5 cmH2O  Pressure Support (cm H2O): 7 cmH2O  Oxygen Concentration (%): 40 %  Resp: 7    PS trial done today 7/5 for 90 minutes. Patient did well at first but RR went up to 30s and TV around 300. No plans to extubate today, will try again tomorrow. No vent changes made today. Suctioning small-moderate tan thick secretions. BS coarse bilaterally. RT will continue to follow.

## 2017-06-15 NOTE — PLAN OF CARE
Problem: Goal Outcome Summary  Goal: Goal Outcome Summary  Outcome: No Change  ICU End of Shift Summary.  For vital signs and complete assessments, please see documentation flowsheets.      Pertinent assessments: Patient continues to bed intubated; sedated with precedex. Opens eyes spontaneously. Tmax 100.1; all other VSS. LS coarse; frequent cough; thick tan/white/creamy secretions. TF continued at 10 mL/hr.   Major Shift Events: Zero UOP. Awake off and on throughout night.   Plan (Upcoming Events): Continue to wean vasopressin and precedex as able. Possible trial wean?  Discharge/Transfer Needs: Pending     Bedside Shift Report Completed :   Bedside Safety Check Completed:

## 2017-06-15 NOTE — PLAN OF CARE
Problem: Goal Outcome Summary  Goal: Goal Outcome Summary  Outcome: No Change  ICU End of Shift Summary.  For vital signs and complete assessments, please see documentation flowsheets.      Pertinent assessments: RASS -1, follows some commands, LS coarse frequent coughs thin tan sputum, no n/v/d 2 loose BM's maroon in color, escobar d/c'd, coccyx wound dsg changed per care plan orders, PCD applied.   Major Shift Events: abx narrowed to Unasyn, Levo stopped, Precedex weaned, Vent weaning trial, TF started, dialysis done, code status changed to DNR, bilat soft wrist mittens applied for safety.  Plan (Upcoming Events): vent weaning trial, monitor labs, monitor BP  Discharge/Transfer Needs: TBD      Bedside Shift Report Completed :   Bedside Safety Check Completed:

## 2017-06-15 NOTE — PROGRESS NOTES
[facility]  Hospitalist Progress Note  Desiree Guevara MD 06/15/2017    Reason for Stay (Diagnosis): respiratory distress 2/2 aspiration of tube feeds         Assessment and Plan:      Summary of Stay: Hesham Ramos is a 67 year old male with a history of prior debilitating strokes, sz d/o, htn/diastolic CHF, ESRD on HD, and significantly debilitated who lives in a group home.  He had a feeding tube placed approximately 3 months ago for dysphagia, and has continued to decline since that time admitted on 6/11/2017 with e/o TF coming through his mouth and nose, with hypoxia/fever consistent with aspiration pna complicated by sepsis (leukocytosis/lactic acidosis/fever) which progressed to septic shock necessitating norepi/vasopressin support which has been weaned to off 6/15/17.  His hypoxic respiratory failure progressed and he ultimately required intubation on 6/12/17.  He remains intubated but with minimal ventilatory support undergoing daily weans that are limited by tachypnea/low tidal volumes. His early hospitalization is also notable for profound hyperglycemia nececsitating insulin gtt with hgb A1c wnl at 5.1    Also of note during this hospitalization is on admission BUN was noted to be quite high in the 150 range, despite HD, and hb has been drifting down, and dark stools have been noted, all suspicious for slow GIB.  He has required transfusion.      Problem List:   1. Septic Shock 2/2 aspiration pna, both pressors now weaned off, cont to monitor closely  2.  aspiration pna with TFs:  How it exactly happened is not clear-family members report that he was laid flat after bolus feeds.  It has been discussed with family the need to change from a G-tube to J-tube to decrease this from re-occurring.  They are leaning towards pursuing this but wanted the opportunity to discuss it with other family members.  Rec'd 3 days of pip tazo, then started on amp-sulbactam, so on day 5 of abx-likely will need  a 14 day course  3. Hypoxic Respiratory Failure 2/2 aspiration pna:  Minimally ventilatory support, so doing better, still not extubatable due to tachypnea and low tidal volumes.  Cont qd weaning trials  4. Suspected GIB:  Given high BUN/drifting down of hgb, episodic dark stools-has rec'd 2 u PRBCs, currently at 8.4-usually in 10-11 range.  Monitor for now, cont to hold asa  5. ESRD on HD:  Appreciate Dr. Rose's input-HD planned for tomorrow to get him back on his regular schedule  6. Transaminitis:  LFTs have been consistently mildly elevated throughout hospitalization, ? Due to TF vs mild shock liver vs other.  No w/o hepatic failure  7. Htn:  Previously treated by now off all meds since 2015 due to hypotension (? 2/2 weight loss?)  8. Seizure d/o:  phenytoin 300 mg at bedtime is home regimen-currently on  mg bid fosphenytoin  9. Diastolic HF: most recent echo in 11/2015 did show apical hypertrophy-recs at that time were for cardiac MRI but I don't see that was ever completed  10. Volume overload:  Appropriately in the setting of sepsis:  No diuretics today  11. Prior debilitating strokes:  Home regimen is asa and simva - both on hold-asa for GIB, statin for increased transaminases  12. Dementia; presumed vascular in nature-resume donepezil when able to take pills per FT  13. Hyperglycemia:  hgb A1c 5.1 %, no hx of dm, needed transient insulin gtt, now on ISS with minimal requirements.  This is likely to change once refeeding begins.   14. Trop leak:  2/2 demand ischemia  15.  ASx UE edema:  No e/o DVT by US  DVT Prophylaxis: Low Risk/Ambulatory with no VTE prophylaxis indicated  Code Status: DNR-prior to this hospitalization he was full code, family is interested in getting distant family members here to see how poor his quality of life is to consider de-escalating cares.    Discharge Dispo: back to group home  Estimated Disch Date / # of Days until Disch: unclear - continues to require ICU level  "care, is slowly improving from a medical standpoint, but baseline is poor        Interval History (Subjective):      Levofloxacin stopped last night, stop vaso this am.  Respiratory status is stable with minimal vent support (40% FiO2, peep 5) tolerated 90 minutes of PS trial complicated by tachypnea and low lung volumes.  Dark blackish stool overnight in discussion with RN.                  Physical Exam:      Last Vital Signs:  /58  Pulse 95  Temp 98.6  F (37  C) (Axillary)  Resp 19  Ht 1.651 m (5' 5\")  Wt 57.7 kg (127 lb 3.3 oz)  SpO2 100%  BMI 21.17 kg/m2    Up 4 L by I/O and up 9 kg by weight.  Also volume up on exam.  No diuretics given just today weaning off pressors    Sedated w/dexmedetomidine,intubated    Comfortable appearing, disuse atrophy of b ue and le with right arm contracture and e/o non-pitting edema of right arm, diffuse 1 + anasarca head ncat sclera normal pigmentation, lungs coarse but otherwise clear, RRR no m/r/g  Skin warm and dry no c/c, sedated.             Medications:      All current medications were reviewed with changes reflected in problem list.         Data:      All new lab and imaging data was reviewed.   Labs:    Recent Labs  Lab 06/15/17  0520      POTASSIUM 3.4   CHLORIDE 104   CO2 28   ANIONGAP 12   *   BUN 38*   CR 3.47*   GFRESTIMATED 18*   GFRESTBLACK 21*   DOUG 8.2*       Recent Labs  Lab 06/15/17  0520   WBC 10.3   HGB 8.4*   HCT 25.7*   MCV 92         Imaging:         "

## 2017-06-15 NOTE — PLAN OF CARE
Problem: Goal Outcome Summary  Goal: Goal Outcome Summary  Outcome: No Change  ICU End of Shift Summary.  For vital signs and complete assessments, please see documentation flowsheets.      Pertinent assessments: RASS -2, LS coarse remains on vent support, strong productive coughs with white/tan thick secretions via ETT, PEG tube with TF at 25cc/hr advance per orders, no BM noted this shift, anuric-on hemodialysis scheduled for tomorrow, butt wound dsg changed per care plan.  Major Shift Events: vent weaning trial attempted, RUE US for swelling see results, vasopressin cut to half dose per discussion in rounds, pt did not tolerate, titrated up again to maintain Map >65.  Plan (Upcoming Events): continue vent weaning trial, sedation weaning when able, vasopressin weaning if BP allows, monitor Hgb, blood sugars,  Discharge/Transfer Needs: TBD     Bedside Shift Report Completed :   Bedside Safety Check Completed:

## 2017-06-15 NOTE — PROGRESS NOTES
Patient remains on full vent support    Ventilation Mode: CMV/AC  FiO2 (%): 40 %  Rate Set (breaths/minute): 20 breaths/min  Tidal Volume Set (mL): 450 mL  PEEP (cm H2O): 5 cmH2O  Pressure Support (cm H2O): 12 cmH2O  Oxygen Concentration (%): 40 %  Resp: 19      No vent settings made this shift, breath sounds coarse bilaterally, suctioning small tan thick secretions through ETT. RT will continue to follow.    Isidra Alexander  Mihaela 15, 2017.6:01 AM

## 2017-06-15 NOTE — PROGRESS NOTES
Nephrology Progress Note          Assessment and Plan:   ESRD status quo.  Satisfactory chemistries and fluid balance today.  No need to dialyze until planned treatment tomorrow.  Try some fluid removal with tomorrow's run.      Respiratory distress    * No resolved hospital problems. *               Interval History:   Intubated and sedated.  Eyes open; tracks examiner.  No other interaction.  Still on Vasopressin gtt, but VS better.  Low grade fever.  Tolerating CPAP trial; good SaO2 on 40%.  BP ~120/50.  Wt up ~1kg after 6/14 dialysis with minimal fluid removal.  Tolerating slow rate TF so far.  No UO.  Meds and labs reviewed.  Lytes nl.  K+ 3.4.  BUN/Cr in expected range.  LFT's better.  WBC down to nl.  Hgb drifting lower, now 8.4.                Medications:       B and C vitamin Complex with folic acid  5 mL Per Feeding Tube Daily     Prosource TF protein modulator  1 packet Per G Tube Daily     pantoprazole  40 mg Per Feeding Tube BID     ampicillin-sulbactam (UNASYN) IV  3 g Intravenous Daily     fosphenytoin (CEREBYX) intermittent infusion (maintenance)  150 mg PE Intravenous Q12H     insulin aspart  1-4 Units Subcutaneous Q4H     ipratropium - albuterol 0.5 mg/2.5 mg/3 mL  3 mL Nebulization 4x daily     sodium chloride 0.9%  1,000 mL Intravenous Once     - MEDICATION INSTRUCTIONS for Dialysis Patients -   Does not apply See Admin Instructions       IV fluid REPLACEMENT ONLY       IV fluid REPLACEMENT ONLY       norepinephrine Stopped (06/14/17 1600)     dexmedetomidine 0.6 mcg/kg/hr (06/15/17 0800)     vasopressin (PITRESSIN) infusion ADULT (40 mL) 2.4 Units/hr (06/15/17 0901)                    Physical Exam:       Vital Sign Ranges  Temp:  [97.8  F (36.6  C)-100.2  F (37.9  C)] 100.2  F (37.9  C)  Pulse:  [95] 95  Heart Rate:  [81-99] 96  Resp:  [8-37] 31  BP: (101-135)/(45-59) 128/58  MAP:  [62 mmHg-101 mmHg] 63 mmHg  Arterial Line BP: ()/(41-71) 105/43  FiO2 (%):  [40 %] 40 %  SpO2:  [70 %-100  %] 100 %    Weight, current:  57.7 kg (actual weight)  Weight change: 1.2 kg (2 lb 10.3 oz)    I/O last 3 completed shifts:  In: 989.37 [I.V.:574.37; NG/GT:245]  Out: 150 [Emesis/NG output:150]    Physical Exam:   General:  Patient comfortable on vent, in no apparent distress.  Awake.  Neck:  Supple, no JVD.  Lungs:  Few rhonchi to auscultation bilaterally.  Cardiac:  Regular rate and rhythm, no murmurs, rub, or gallops.  Abdomen:  Soft, nontender, physiologic sounds.  Extremities:  Without edema.  2+ pulses.  Skin:  Warm, dry.  Neurologic:  No focal deficits.             Data:        Lab Results   Component Value Date     06/15/2017    Lab Results   Component Value Date    CHLORIDE 104 06/15/2017    Lab Results   Component Value Date    BUN 38 (H) 06/15/2017      Lab Results   Component Value Date    POTASSIUM 3.4 06/15/2017    Lab Results   Component Value Date    CO2 28 06/15/2017    Lab Results   Component Value Date    CR 3.47 (H) 06/15/2017        Lab Results   Component Value Date     06/15/2017     06/14/2017     06/13/2017     Lab Results   Component Value Date    POTASSIUM 3.4 06/15/2017    POTASSIUM 3.2 (L) 06/14/2017    POTASSIUM 4.6 06/13/2017     Lab Results   Component Value Date    CHLORIDE 104 06/15/2017    CHLORIDE 100 06/14/2017    CHLORIDE 105 06/13/2017     Lab Results   Component Value Date    CO2 28 06/15/2017    CO2 28 06/14/2017    CO2 21 06/13/2017     Lab Results   Component Value Date    CR 3.47 (H) 06/15/2017    CR 4.25 (H) 06/14/2017    CR 7.98 (H) 06/13/2017     Lab Results   Component Value Date    BUN 38 (H) 06/15/2017    BUN 68 (H) 06/14/2017     (H) 06/13/2017     Lab Results   Component Value Date    HGB 8.4 (L) 06/15/2017    HGB 9.4 (L) 06/14/2017    HGB 9.7 (L) 06/13/2017     Lab Results   Component Value Date    PH 7.52 (H) 06/13/2017    PO2 94 06/13/2017    PCO2 31 (L) 06/13/2017    HCO3 25 06/13/2017    WENDY 2.1 06/13/2017             Raheel ARMIJO  Karol WALKER  Nephrology; [employer]  [phone]

## 2017-06-15 NOTE — PROGRESS NOTES
[facility] Intensive Care Progress Note    Problem List  ESRD on chronic HD  Shock, septic and hemorrhagic, improved  Aspiration event(s) around the time of admission  Malnutrition, PEG placed March 2017  H/o stroke, aphasia resulting    Date of Service (when I saw the patient): 06/15/2017     Assessment & Plan   Hesham Ramos is a 67 year old male who was admitted on 6/11/2017. He was living in a nursing home and brought to the ED for respiratory distress after aspirating tube feedings    Neurology:  At baseline, not apparently able to communicate: normally able to protect airway   -swallow dysfunction and weight loss, on tube feedings  - on Precedex    Cardiovascular:  shock: presumably septic; hemorrhagic component?  - noninvasive BP difficult to measure due to prior dialysis access points  - discuss with family arterial line placement today    Pulmonary:        Intubated 6/12 for increased work of breathing: suspected aspiration  - no microbiologic evidence of pneumonia, some linear basilar nonspecific infiltrates    Ventilation Mode: CMV/AC  FiO2 (%): 40 %  Rate Set (breaths/minute): 20 breaths/min  Tidal Volume Set (mL): 450 mL  PEEP (cm H2O): 5 cmH2O  Pressure Support (cm H2O): 8 cmH2O  Oxygen Concentration (%): 40 %  Resp: 31    Renal  ESRD: dialysis access via tunneled catheter  - HD M/W/F, had been receiving regularly    ID:         Empiric therapy for aspiration pneumonia: sputum showed yeast only  - narrow antibiotics - Unasyn    GI  Probable GI bleed, either resolved or slow?: coffee ground gastric tube output, dramatic Hgb drop and unexplained Uremia  - improved with 2 units PRBC 6/13    Nutrition  Tube feeding resumed, progressing to goal: NPO  -    Endocrine:  Requiring minimal sc insulin to maintain euglycemia  -    DVT Prophylaxis: Pneumatic Compression Devices  GI Prophylaxis: PPI    Restraints: Restraints for medical healing needed: YES    Family update  by me today: Yes     Kimber Mansfield MD    Time Spent on this Encounter   Billing:  I spent 35 minutes bedside and on the inpatient unit today managing the critical care of Hesham Ramos in relation to the issues listed in this note.    Main Plans for Today   Wean sedation and vent  Continue supportive care  Advance tube feedings    Interval History   No events overnight, hiccups today    Physical Exam   Temp: 100.2  F (37.9  C) Temp src: Axillary Temp  Min: 99.3  F (37.4  C)  Max: 100.2  F (37.9  C) BP: 141/58 Pulse: 95 Heart Rate: 90 Resp: (!) 31 SpO2: 100 % O2 Device: Mechanical Ventilator    Vitals:    06/13/17 0600 06/14/17 0045 06/15/17 0430   Weight: 54.6 kg (120 lb 5.9 oz) 56.5 kg (124 lb 9 oz) 57.7 kg (127 lb 3.3 oz)     I/O last 3 completed shifts:  In: 989.37 [I.V.:574.37; NG/GT:245]  Out: 150 [Emesis/NG output:150]    Current Vitals:Temp:  [99.3  F (37.4  C)-100.2  F (37.9  C)] 100.2  F (37.9  C)  Pulse:  [95] 95  Heart Rate:  [85-99] 90  Resp:  [8-37] 31  BP: (101-141)/(45-59) 141/58  MAP:  [63 mmHg-93 mmHg] 63 mmHg  Arterial Line BP: ()/(41-59) 105/43  FiO2 (%):  [40 %] 40 %  SpO2:  [70 %-100 %] 100 %   Not interactive, resists movements and care   PERRL and conjugate gaze  Orally intubated  Lungs clear  H-RR w/o murmur  Abdomen-soft, non tender, non distended  Extremities-warm and no edema; bilateral upper extremities with old dialysis grafts  Lines: No erythema or discharge at entry site for arterial line, central line   Current lines are required for patient management    Medications     IV fluid REPLACEMENT ONLY       IV fluid REPLACEMENT ONLY       norepinephrine Stopped (06/14/17 1600)     dexmedetomidine 0.6 mcg/kg/hr (06/15/17 0800)     vasopressin (PITRESSIN) infusion ADULT (40 mL) 2.4 Units/hr (06/15/17 0901)       [START ON 6/16/2017] doxercalciferol  4 mcg Intravenous Once per day on Mon Wed Fri     [START ON 6/16/2017] epoetin arnulfo (EPOGEN,PROCRIT) inj ESRD  3,000 Units  Intravenous Once per day on Mon Wed Fri     midazolam         B and C vitamin Complex with folic acid  5 mL Per Feeding Tube Daily     Prosource TF protein modulator  1 packet Per G Tube Daily     pantoprazole  40 mg Per Feeding Tube BID     ampicillin-sulbactam (UNASYN) IV  3 g Intravenous Daily     fosphenytoin (CEREBYX) intermittent infusion (maintenance)  150 mg PE Intravenous Q12H     insulin aspart  1-4 Units Subcutaneous Q4H     ipratropium - albuterol 0.5 mg/2.5 mg/3 mL  3 mL Nebulization 4x daily     sodium chloride 0.9%  1,000 mL Intravenous Once     - MEDICATION INSTRUCTIONS for Dialysis Patients -   Does not apply See Admin Instructions       Data     Recent Labs  Lab 06/15/17  0520 06/14/17  0525 06/13/17  1732  06/13/17  0530  06/12/17  1020 06/12/17  0407  06/11/17  2324 06/11/17  2321   WBC 10.3 11.6* 11.5*  --  10.9  --   --   --   --   --  15.8*   HGB 8.4* 9.4* 9.7*  < > 7.3*  --   --   --   --   --  13.3  11.7*   MCV 92 89 90  --  95  --   --   --   --   --  97    199 206  --  255  --   --   --   --   --  424   INR  --  1.76*  --   --   --   --   --   --   --   --  1.23*    139  --   --  142  < >  --   --   --   --  138  135   POTASSIUM 3.4 3.2*  --   --  4.6  < >  --   --   < >  --  5.8*  5.5*   CHLORIDE 104 100  --   --  105  < >  --   --   --   --  104  97   CO2 28 28  --   --  21  < >  --   --   --   --  19*   BUN 38* 68*  --   --  150*  < >  --   --   --   --  134*  147*   CR 3.47* 4.25*  --   --  7.98*  < >  --   --   --   --  7.89*   ANIONGAP 12 11  --   --  16*  < >  --   --   --   --  12  19*   DOUG 8.2* 7.8*  --   --  7.1*  < >  --   --   --   --  9.7   * 142*  --   --  151*  < >  --  294*  --   --  283*  286*   ALBUMIN 2.0* 1.9*  --   --   --   < >  --   --   --   --  3.2*   PROTTOTAL 5.9* 6.0*  --   --   --   --   --   --   --   --  9.4*   BILITOTAL 0.6 0.5  --   --   --   --   --   --   --   --  0.4   ALKPHOS 135 126  --   --   --   --   --   --   --   --   219*   * 202*  --   --   --   --   --   --   --   --  70   * 199*  --   --   --   --   --   --   --   --  72*   TROPI  --   --   --   --   --   --  0.118* 0.132*  --   --   --    TROPONIN  --   --   --   --   --   --   --   --   --  0.18*  --    < > = values in this interval not displayed.  Recent Results (from the past 24 hour(s))   US Lower Extremity Venous Duplex Right    Narrative    VENOUS ULTRASOUND RIGHT LEG  6/15/2017 9:11 AM     HISTORY: Swelling in the right leg    COMPARISON: None.    FINDINGS:  Examination of the deep veins with graded compression and  color flow Doppler with spectral wave form analysis was performed.   The right common femoral vein and saphenofemoral junction were not  visualized due to overlying bandage. Visualized deep veins are patent  without thrombus.      Impression    IMPRESSION: No evidence of deep venous thrombosis.    SHANTI ANGEL MD

## 2017-06-15 NOTE — PROGRESS NOTES
[facility] Nurse Inpatient Adult Pressure Injury (PI) Wound Assessment     Assessment of PI(s) on pt's:   Coccyx, left buttock    Data:   Patient History:      per MD note(s):  Hesham Ramos is a 67 year old male who was admitted on 2017. He was living in a nursing home and brought to the ED for respiratory distress after aspirating tube feedings     Current mattress:  AtmosAir and will plan to order Pulsate today  Current pressure relieving devices:  Pillows    Moisture Management:  Incontinence Protocol, dialysis    Current Diet / Nutrition:   Tube feeds    Active Diet Order      NPO for Medical/Clinical Reasons Except for: No Exceptions      Dionisio Assessment and sub scores:   Dionisio Score  Av.5  Min: 9  Max: 10     Labs:   Recent Labs   Lab Test  06/15/17   0520  17   0525   17   0639   ALBUMIN  2.0*  1.9*   < >   --    HGB  8.4*  9.4*   < >   --    INR   --   1.76*   --    --    WBC  10.3  11.6*   < >   --    A1C   --    --    --   5.1    < > = values in this interval not displayed.                                                                                                                      Pressure Injury Assessment  (location #1):   Coccyx, Left Buttock    Wound History:   Present on admission; pt with unstable pressures, vented, skin moisture concern     Specific Dimensions (length x width x depth, in cm) :   Approx. 5 x 3 x 0.2cm to coccyx; approx. 10 x 8 cm general area to left buttock    Wound Base:     coccyx: macerated grey-yellow tissue continues to slough, wound base moist red/ pink dermis with scattered darker red-purple areas.      Left buttock: scattered areas of superficial erosion of epidermis, exposed pink dermis, moist, irregular shape    Palpation of the wound bed:  normal     Slough appearance:  adherent, moist, gray/ yellow    Eschar appearance:  none    Periwound Skin: denuded, erosion of epidermis, erythema and maceration,      Color:  normal to a bit darker than surrounding tissue    Temperature  normal     Drainage:  small creamy color    Odor: none    Pain:  unable to assess, intubated         Intervention:     Patient's chart evaluated.      Dionisio Interventions:  Current Dionisio Interventions and Care Plan reviewed and updated, appropriate at this time.    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  Tissue remains slightly  Macerated on right edge of coccyx wound, continue to assess wound evolution. No s/s infection. Continue powder and paste Hydrocolloid.     Left buttock superficial scattered erosion/denudement suspect combination of moisture and pressure/ friction.     Pt had been unstable and hard to reposition, but tolerating turns better now since intubated.            Plan:     Nursing to notify the Provider(s) and re-consult the WOC Nurse if wound(s) deteriorate(s)or if the wound care plan needs reevaluation.      Plan for wound care to coccyx, left buttock: Daily and prn:  1.  Cleanse with MicroKlenz, pat dry.   2.  Dust wound with stoma powder, apply layer of Triad paste to cover open wound, cover with ABD  3. Follow PIP (pressure injury prevention) measures, ensuring pt repositioning every 1-2 hrs, side to side.  Notify WOC if worsens or any concerns.  Pulsate air bed    WOC Nurse will return: weekly/ PRN  Face to face time: 25 Minutes

## 2017-06-15 NOTE — PROGRESS NOTES
CLINICAL NUTRITION SERVICES - REASSESSMENT NOTE    Recommendations Ordered by Registered Dietitian (RD):   Advance TF regimen to goal rate   Future/Additional Recommendations:     G to G/J conversion?     EVALUATION OF PROGRESS TOWARD GOALS/NEW FINDINGS   Enteral intake/access - Monitor for ability to resume  Nutrition-focused physical findings - staging of coccyx wound    Patient remains NPO, vented and on TF as follows:     Enteral Regimen: Nepro at 45 mL/hr x 24 hours    Total Enteral Provisions: 1080 mL per day provides 1944 kcals, 87 g protein, 788 mL free H2O, 174 g CHO and 14 g Fiber daily. Meets >100% of DRI's.    3434 International units Vitamin A, 113 mg Vitamin C, 29 mg Zinc sulfate    Free Water Flush: standard 60 mL q4 hours, increase to 200 mL once IVF off/per MD discretion    Renal MVI per pressure injury protocol      Meds: Nephronex, Precedex + pressor x 1    Labs: Reviewed    BM: 6/15    Skin per WOCN 6/13: coccyx with DTI that is unroofing to reveal shallow stage 3; L buttock with spotty erosion    Weight: 57.7 kg, up ~9 kg since admit    ASSESSED NUTRITION NEEDS (PER APPROVED PRACTICE GUIDELINES; DW: 48.5 kg):  Estimated Energy Needs: 8407-2617 kcals (35-40 Kcal/Kg)  Justification: repletion  Estimated Protein Needs: 58-87+ grams protein (1.2-1.8+ g pro/Kg)  Justification: wound healing and dialysis  Estimated Fluid Needs: >1 mL/Kcal  Justification: maintenance    Previous Goals:   TF to resume within 24-48 hours  Evaluation: Met  TF to meet >80% of estimated needs  Evaluation: Not met    Previous Nutrition Diagnosis:   Inadequate enteral nutrition infusion related to vomiting and EN intolerance as evidenced by EN reliance, currently meeting 0% of estimated needs since admit  Evaluation: Improving    CURRENT NUTRITION DIAGNOSIS  Inadequate enteral nutrition infusion related to vomiting and previous EN intolerance as evidenced by slow advancement to goal rate, pressure injury requiring increased  protein needs with HD    INTERVENTIONS  Recommendations / Nutrition Prescription  Advance to EN goal. Hold on increased fluid flushes - per  Nephrology note, satisfactory fluid balance. 1 packet Prosource daily for additional 40 kcal, 11 g protein  500 mg Vitamin C per pressure injury protocol.    Implementation  EN Composition, EN Schedule and Feeding Tube Flush: Updated orders  Collaboration and Referral of care: Discussed patient during interdisciplinary care rounds this morning    Goals  TF at goal rate to meet % of estimated needs within 24 hours      MONITORING AND EVALUATION:  Enteral intake - Monitor for adequacy, tolerance, stooling, labs  Enteral access - conversion to G/J  Progress towards goals will be monitored and evaluated per protocol and Practice Guidelines      THANIA Blank  3rd floor/ICU: [phone]  All other floors: [phone]  Weekend/holiday: [phone]  Office: [phone]

## 2017-06-16 NOTE — PROGRESS NOTES
Potassium   Date Value Ref Range Status   06/16/2017 3.1 (L) 3.4 - 5.3 mmol/L Final   ]  Lab Results   Component Value Date    HGB 8.2 06/16/2017     Weight: 57.7 kg (127 lb 3.3 oz)    DIALYSIS PROCEDURE NOTE    Patient dialyzed for 3 hrs on a 4 K bath with a  net fluid removal of 2.8L.  A BFR of 400ml/min was obtained via RIJ and all connections secured.   Patient was seen by  during treatment.  Total heparin received during treatment:: 0units.  Heparin instilled in both ports post run.  Meds given:epogen and hectorol Complications:none   Procedure and ESRD teaching done and questions answered.  See flowsheet in EPIC for further details.    Patient dialyzed at bedside in the ICU.  Catheter Access:  Aseptic prep done for both on/off.  Water alarm in place and used.  [facility] consent form signed yes  .

## 2017-06-16 NOTE — PROGRESS NOTES
ICU End of Shift Summary.  For vital signs and complete assessments, please see documentation flowsheets.     Pertinent assessments: large TF emesis-md notified. MD stated to hold TF for today. Per Discussion with family they would like to go forward with changing gtube to g-j tube-plan to be done Monday. Temp 101.4 ax-MDs aware-no bld cx at this time-tylenol given. Remains on precedex for sedation with vent. Vasopressin DC'd today. Dialysis done today per RN took off 2.8L. CXR done today.   Major Shift Events: see above  Plan (Upcoming Events): wean vent as natasha in am.   Discharge/Transfer Needs: TBD    Bedside Shift Report Completed : yes  Bedside Safety Check Completed: yes

## 2017-06-16 NOTE — PROGRESS NOTES
[facility] Intensive Care Progress Note    Problem List  ESRD on chronic HD  Shock, septic and hemorrhagic, improved  Aspiration event(s) around the time of admission  Malnutrition, PEG placed March 2017  H/o stroke, aphasia resulting    Date of Service (when I saw the patient): 06/16/2017     Assessment & Plan   Hesham Ramos is a 67 year old male who was admitted on 6/11/2017. He was living in a nursing home and brought to the ED for respiratory distress after aspirating tube feedings    Neurology:  At baseline, not apparently able to communicate: normally able to protect airway   -swallow dysfunction and weight loss, on tube feedings  - on Precedex    Cardiovascular:  shock: presumably septic; hemorrhagic component?  - noninvasive BP difficult to measure due to prior dialysis access points  - discuss with family arterial line placement today    Pulmonary:        Intubated 6/12 for increased work of breathing: suspected aspiration  - no microbiologic evidence of pneumonia, some linear basilar nonspecific infiltrates  - failed spontaneous breathing trial today again, CXR after HD    Ventilation Mode: CMV/AC  FiO2 (%): 40 %  Rate Set (breaths/minute): 20 breaths/min  Tidal Volume Set (mL): 450 mL  PEEP (cm H2O): 5 cmH2O  Pressure Support (cm H2O): 7 cmH2O  Oxygen Concentration (%): 40 %  Resp: 23    Renal  ESRD: dialysis access via tunneled catheter  - HD M/W/F, had been receiving regularly  - ultrafiltration today for the first time since admission    ID:         Empiric therapy for aspiration pneumonia: sputum showed yeast only  - narrow antibiotics - Unasyn    GI  Probable GI bleed, either resolved or slow?: coffee ground gastric tube output, dramatic Hgb drop and unexplained Uremia  - improved with 2 units PRBC 6/13    Nutrition  Tube feeding resumed, progressing to goal: NPO  -    Endocrine:  Requiring minimal sc insulin to maintain euglycemia  -    DVT Prophylaxis:  Pneumatic Compression Devices  GI Prophylaxis: PPI    Restraints: Restraints for medical healing needed: YES    Family update by me today: No    Kimber Mansfield MD    Time Spent on this Encounter   Billing:  I spent 35 minutes bedside and on the inpatient unit today managing the critical care of Hesham Ramos in relation to the issues listed in this note.    Main Plans for Today   Wean vasopressin to off  Spontaneous breathing trial  HD with UF  CXR    Interval History   No events overnight, HD today    Physical Exam   Temp: 98.6  F (37  C) Temp src: Axillary Temp  Min: 98.2  F (36.8  C)  Max: 98.6  F (37  C) BP: 131/82   Heart Rate: 94 Resp: 23 SpO2: 100 % O2 Device: Mechanical Ventilator    Vitals:    06/14/17 0045 06/15/17 0430 06/16/17 0600   Weight: 56.5 kg (124 lb 9 oz) 57.7 kg (127 lb 3.3 oz) 57.7 kg (127 lb 3.3 oz)     I/O last 3 completed shifts:  In: 1809.94 [I.V.:669.94; NG/GT:420]  Out: 10 [Emesis/NG output:10]    Current Vitals:Temp:  [98.2  F (36.8  C)-98.6  F (37  C)] 98.6  F (37  C)  Heart Rate:  [] 94  Resp:  [5-27] 23  BP: (110-159)/(55-82) 131/82  MAP:  [59 mmHg-127 mmHg] 85 mmHg  Arterial Line BP: ()/() 131/58  FiO2 (%):  [40 %] 40 %  SpO2:  [99 %-100 %] 100 %   Not interactive, resists movements and care   PERRL and conjugate gaze  Orally intubated  Lungs clear  H-RR w/o murmur  Abdomen-soft, non tender, non distended  Extremities-warm and no edema; bilateral upper extremities with old dialysis grafts  Lines: No erythema or discharge at entry site for arterial line, central line   Current lines are required for patient management    Medications     - MEDICATION INSTRUCTIONS -       NaCl 10 mL/hr at 06/16/17 0435     NaCl 10 mL/hr at 06/16/17 0341     IV fluid REPLACEMENT ONLY       IV fluid REPLACEMENT ONLY       norepinephrine Stopped (06/14/17 1600)     dexmedetomidine 0.6 mcg/kg/hr (06/16/17 0800)       sodium chloride 0.9%  250 mL Hemodialysis Machine Once     sodium  chloride 0.9%  250-1,000 mL Intravenous Once in dialysis     simvastatin  40 mg Oral or Feeding Tube QPM     insulin aspart  1-6 Units Subcutaneous Q4H     ascorbic acid  250 mg Oral Daily     doxercalciferol  4 mcg Intravenous Once per day on Mon Wed Fri     epoetin arnulfo (EPOGEN,PROCRIT) inj ESRD  3,000 Units Intravenous Once per day on Mon Wed Fri     B and C vitamin Complex with folic acid  5 mL Per Feeding Tube Daily     Prosource TF protein modulator  1 packet Per G Tube Daily     pantoprazole  40 mg Per Feeding Tube BID     ampicillin-sulbactam (UNASYN) IV  3 g Intravenous Daily     fosphenytoin (CEREBYX) intermittent infusion (maintenance)  150 mg PE Intravenous Q12H     ipratropium - albuterol 0.5 mg/2.5 mg/3 mL  3 mL Nebulization 4x daily     sodium chloride 0.9%  1,000 mL Intravenous Once     - MEDICATION INSTRUCTIONS for Dialysis Patients -   Does not apply See Admin Instructions       Data     Recent Labs  Lab 06/16/17  0500 06/15/17  0520 06/14/17  0525  06/12/17  1020 06/12/17  0407  06/11/17  2324 06/11/17  2321   WBC 12.0* 10.3 11.6*  < >  --   --   --   --  15.8*   HGB 8.2* 8.4* 9.4*  < >  --   --   --   --  13.3  11.7*   MCV 93 92 89  < >  --   --   --   --  97    166 199  < >  --   --   --   --  424   INR  --   --  1.76*  --   --   --   --   --  1.23*    144 139  < >  --   --   --   --  138  135   POTASSIUM 3.1* 3.4 3.2*  < >  --   --   < >  --  5.8*  5.5*   CHLORIDE 106 104 100  < >  --   --   --   --  104  97   CO2 28 28 28  < >  --   --   --   --  19*   BUN 47* 38* 68*  < >  --   --   --   --  134*  147*   CR 4.78* 3.47* 4.25*  < >  --   --   --   --  7.89*   ANIONGAP 9 12 11  < >  --   --   --   --  12  19*   DOUG 8.3* 8.2* 7.8*  < >  --   --   --   --  9.7   * 147* 142*  < >  --  294*  --   --  283*  286*   ALBUMIN 2.1* 2.0* 1.9*  < >  --   --   --   --  3.2*   PROTTOTAL 6.4* 5.9* 6.0*  --   --   --   --   --  9.4*   BILITOTAL 0.6 0.6 0.5  --   --   --   --   --  0.4    ALKPHOS 156* 135 126  --   --   --   --   --  219*   * 133* 202*  --   --   --   --   --  70   AST 54* 100* 199*  --   --   --   --   --  72*   TROPI  --   --   --   --  0.118* 0.132*  --   --   --    TROPONIN  --   --   --   --   --   --   --  0.18*  --    < > = values in this interval not displayed.  Recent Results (from the past 24 hour(s))   US Extremity Upper Venous rt    Narrative    ULTRASOUND VENOUS RIGHT UPPER EXTREMITY  6/15/2017 11:38 AM     HISTORY: Evaluate for DVT.    COMPARISON: None.    TECHNIQUE: Ultrasound gray scale, Color Doppler flow, and spectral  Doppler waveform analysis performed.    FINDINGS: There are no intraluminal filling defects. The right  internal jugular, axillary, cephalic, basilic, brachial, radial, and  ulnar veins demonstrate normal compressibility. The right internal  jugular, innominate, subclavian, and axillary veins have normal venous  waveforms. Technologist noted the presence of a PICC on the right.      Impression    IMPRESSION: No evidence for DVT.    NATALIYA LIU MD

## 2017-06-16 NOTE — PROGRESS NOTES
[facility]  Hospitalist Progress Note  Desiree Guevara MD 06/16/2017    Reason for Stay (Diagnosis): respiratory distress 2/2 aspiration of tube feeds         Assessment and Plan:      Summary of Stay: Hesham Ramos is a 67 year old male with a history of prior debilitating strokes, sz d/o, htn/diastolic CHF, ESRD on HD, and significantly debilitated who lives in a group home.  He had a feeding tube placed approximately 3 months ago for dysphagia, and has continued to decline since that time admitted on 6/11/2017 with e/o TF coming through his mouth and nose, with hypoxia/fever consistent with aspiration pna complicated by sepsis (leukocytosis/lactic acidosis/fever) which progressed to septic shock necessitating norepi/vasopressin support which has been weaned to off 6/15/17.  His hypoxic respiratory failure progressed and he ultimately required intubation on 6/12/17.  He remains intubated but with minimal ventilatory support undergoing daily weans that are limited by tachypnea/low tidal volumes. His early hospitalization is also notable for profound hyperglycemia nececsitating insulin gtt with hgb A1c wnl at 5.1    Also of note during this hospitalization is on admission BUN was noted to be quite high in the 150 range, despite HD, and hb has been drifting down, and dark stools have been noted, all suspicious for slow GIB.  He has required transfusion.     His TFs have been resumed to challenge the G-tube, he has been transitioned up to goal rate, noted to have 150cc residual and before it could be stopped had large emesis occurring 6/16/17, now with tachycardia and fever consistent with another aspiration event     Problem List:   1. Septic Shock 2/2 aspiration pna, off nor-epi, plan was to wean vasopressin yesterday although when halved BP's dropped so went back up.  Was on HD with volume removal this am, so will wean as able post HD , cont to monitor closely  2.  aspiration pna with TFs:  How it  initially happened is not clear-family members report that he was laid flat after bolus feeds.  It has been discussed with family the need to change from a G-tube to J-tube to decrease this from re-occurring.  And now that it re-occurred they are good with pursing exchange-discussed with IR and they will do it on Monday.  Rec'd 3 days of pip tazo, then started on amp-sulbactam, so on day 6 of abx-likely will need a 14 day course, maybe more now that this reocurred.    3. Hypoxic Respiratory Failure 2/2 aspiration pna:  Minimally ventilatory support, so doing better, still not extubatable due to tachypnea and low tidal volumes.  Cont qd weaning trials.  Repeat aspiration bodes poorly for future extubation   4. Suspected GIB:  Given high BUN/drifting down of hgb, episodic dark stools-has rec'd 2 u PRBCs, currently at 8.2-usually in 10-11 range.  Monitor for now, cont to hold asa,no AC.  No hematemesis with episode of emesis 6/16  5. ESRD on HD:  Appreciate Dr. Rose's input-HD this am with 3 L fluid removal  6. Transaminitis:  LFTs have been consistently mildly elevated throughout hospitalization, ? Due to TF vs mild shock liver vs other.  No w/o hepatic failure  7. Htn:  Previously treated by now off all meds since 2015 due to hypotension (? 2/2 weight loss?)  8. Seizure d/o:  phenytoin 300 mg at bedtime is home regimen-currently on  mg bid fosphenytoin  9. Diastolic HF: most recent echo in 11/2015 did show apical hypertrophy-recs at that time were for cardiac MRI but I don't see that was ever completed  10. Volume overload:  Appropriately in the setting of sepsis: ESRD-s/p 3 L removal of fluid today on HD  11. Prior debilitating strokes:  Home regimen is asa and simva - both on hold-asa for GIB, statin for increased transaminases  12. Dementia; presumed vascular in nature-resume donepezil when able (G-J in place)  13. Hyperglycemia:  hgb A1c 5.1 %, no hx of dm, needed transient insulin gtt, now on ISS  "with minimal requirements.  This is likely to change once refeeding begins.   14. Trop leak:  2/2 demand ischemia  15.  ASx UE edema:  No e/o DVT by US  DVT Prophylaxis: PCDs, no pharmacological AC in light of suspected slow GIB  Code Status: DNR-prior to this hospitalization he was full code, family is interested in getting distant family members here to see how poor his quality of life is to consider de-escalating cares.    Discharge Dispo: back to group home  Estimated Disch Date / # of Days until Disch: unclear - continues to require ICU level care, looked to be slowly improving from a medical standpoint, but now with recurrent aspiration         Interval History (Subjective):      Norepi-off since yesterday-weaning vaso today after HD.  HD successful in pulling off 3 L of fluid today.  Vent status fairly stable even after recurrent aspiration of a large amount of TFs late morning, no hematemesis.  This was followed by a fever to 101 and tachycardia                  Physical Exam:      Last Vital Signs:  /66  Pulse 95  Temp 101  F (38.3  C) (Axillary)  Resp 22  Ht 1.651 m (5' 5\")  Wt 57.7 kg (127 lb 3.3 oz)  SpO2 99%  BMI 21.17 kg/m2    Up 2.5 L by I/O and up 9 kg by weight. S/p 3 L removal on HD this am    Sedated w/dexmedetomidine,intubated    Comfortable appearing, disuse atrophy of b ue and le with right arm contracture and e/o non-pitting edema of right arm, diffuse 1 + anasarca head ncat sclera normal pigmentation, lungs coarse but otherwise clear, RRR no m/r/g  Skin warm and dry no c/c, sedated.             Medications:      All current medications were reviewed with changes reflected in problem list.         Data:      All new lab and imaging data was reviewed.   Labs:    Recent Labs  Lab 06/16/17  1300      POTASSIUM 3.5   CHLORIDE 102   CO2 33*   ANIONGAP 7   *   BUN 19   CR 2.47*   GFRESTIMATED 26*   GFRESTBLACK 32*   DOUG 9.3       Recent Labs  Lab 06/16/17  0500   WBC 12.0* "   HGB 8.2*   HCT 25.6*   MCV 93         Imaging:

## 2017-06-16 NOTE — PROGRESS NOTES
"Pt continues to be on full ventilator support throughout the day with no vent changes, and is currently resting comfortably. No PS trial was attempted, due to ventilator needs. Will continue to assess and treat. Pt received a SVN x 3 (Albuterol 2.5 mg, Atrovent 0.5 mg) via the Aerogen and the pt tolerated it well. BBS are coarse, suctioning out a small amount of thick tan secretions. ETT was advanced to 24 cm at the lips (where it was original located on intubation)     Ventilation Mode: CMV/AC  FiO2 (%): 40 %  Rate Set (breaths/minute): 20 breaths/min  Tidal Volume Set (mL): 450 mL  PEEP (cm H2O): 5 cmH2O  Pressure Support (cm H2O): 7 cmH2O  Oxygen Concentration (%): 40 %  Resp: 20    Vital signs:  Temp: 101.5  F (38.6  C) Temp src: Axillary BP: (!) 89/48   Heart Rate: 111 Resp: 20 SpO2: 100 % O2 Device: Mechanical Ventilator Oxygen Delivery:  (50 LPM) Height: 165.1 cm (5' 5\") Weight: 57.7 kg (127 lb 3.3 oz)  Estimated body mass index is 21.17 kg/(m^2) as calculated from the following:    Height as of this encounter: 1.651 m (5' 5\").    Weight as of this encounter: 57.7 kg (127 lb 3.3 oz).    PAST MEDICAL HISTORY:   Past Medical History:   Diagnosis Date     Cognitive impairment      Diastolic CHF, chronic (H)      End-stage renal disease on hemodialysis (H)      Essential hypertension     Off all BP meds as of late 2015 due to hypotension     Renal cancer (H)     s/p cryoablation     Seizures (H) Onset 8/2014    On dilantin     Stroke (H) 2000, 2007    Hemiplegia, aphasia       PAST SURGICAL HISTORY:   Past Surgical History:   Procedure Laterality Date     Cryoablation of kidney cancer       ESOPHAGOSCOPY, GASTROSCOPY, DUODENOSCOPY (EGD), COMBINED  8/5/2014    Procedure: COMBINED ESOPHAGOSCOPY, GASTROSCOPY, DUODENOSCOPY (EGD), BIOPSY SINGLE OR MULTIPLE;  Surgeon: Raheel Mixon MD;  Location:  GI     HEMODIALYSIS VIA AV FISTULA       PEG tube NOS       right arm skin graft         FAMILY HISTORY:   Family " History   Problem Relation Age of Onset     Family History Negative Mother      Unknown/Adopted No family hx of        SOCIAL HISTORY:   Social History   Substance Use Topics     Smoking status: Never Smoker     Smokeless tobacco: Never Used     Alcohol use No     Jerry Lugo RT  6/16/2017

## 2017-06-16 NOTE — PROGRESS NOTES
Tele hub made aware of K 3.1 and blood sugars trending up,will address with day MD for K replacement and ? Medium or high SS insulin

## 2017-06-16 NOTE — PLAN OF CARE
Problem: Goal Outcome Summary  Goal: Goal Outcome Summary  Outcome: No Change  ICU End of Shift Summary.  For vital signs and complete assessments, please see documentation flowsheets.      Pertinent assessments: VSS. Frequent suctioning for thick tan secretions. Tolerating tube feeds at goal of 45cc/hr  Major Shift Events: Short weaning trial,pt became tachypneac  after a few minutes  Plan (Upcoming Events): ? Try weaning trial again later  Discharge/Transfer Needs: TBD     Bedside Shift Report Completed :   Bedside Safety Check Completed:

## 2017-06-16 NOTE — PROGRESS NOTES
Respiratory Therapy    Patient remains intubated on mechanical ventilator settings as follows:  Ventilation Mode: CMV/AC  FiO2 (%): 40 %  Rate Set (breaths/minute): 20 breaths/min  Tidal Volume Set (mL): 450 mL  PEEP (cm H2O): 5 cmH2O  Pressure Support (cm H2O): 7 cmH2O  Oxygen Concentration (%): 40 %  Resp: 22    A brief p/s trial on 7/5 was attempted this morning and only last 5 minutes due to patient becoming tachypneic with RR >30 and low tidal volumes <300. Breath sounds are coarse at times when he needs suctioning, otherwise clear/diminished. Duoneb was given QID. Suctioning out tan, thick secretions via ETT. Will continue to assess and monitor.    Mary Anne Taylor RRT  6/16/2017

## 2017-06-16 NOTE — PROGRESS NOTES
Inpatient Dialysis Progress Note           Assessment and Plan:   ESRD status quo.  Stable dialysis today.  Expect good chemical exchange and improved fluid balance by end of run.  Next HD 6/19.  New fever and tachycardia this afternoon likely due to another aspiration.      Respiratory distress    * No resolved hospital problems. *               Interval History:   Intubated and sedated.  Examined during run.  Opens eyes  and gazes at examiner; no other interaction.  VS stable during HD on Vasopressin gtt.  Now febrile and tachycardic after presumed aspiration of vomitus.  Wt same as on 6/15.  Meds and labs reviewed.  Lytes ok, except K+ lower, 3.1.  LFT's sl better.  Other chemistries in expected range.  CBC ~same; WBC 12K, Hgb 8.2.                 Dialysis Details:   Current weight: 57.7 kg (actual weight)  Dry Weight: TBD  Dialysis Temp: 36.5  C  Access Device: IJ catheter  Access Site: right  Dialyzer: Optiflux 160  Dialysis Bath: K+ 4  Sodium Profile: no  UF Goal: 2.8L  Blood Flow Rate (mL/min): 400  Total Treatment Time (hrs): 3  Heparin: none  Medications:  EPO dose: 3000  Zemplar: 4mcg  IV Fe: no            Medications:       simvastatin  40 mg Oral or Feeding Tube QPM     insulin aspart  1-6 Units Subcutaneous Q4H     ascorbic acid  250 mg Oral Daily     nystatin  500,000 Units Mouth/Throat 4x Daily     doxercalciferol  4 mcg Intravenous Once per day on Mon Wed Fri     epoetin arnulfo (EPOGEN,PROCRIT) inj ESRD  3,000 Units Intravenous Once per day on Mon Wed Fri     B and C vitamin Complex with folic acid  5 mL Per Feeding Tube Daily     Prosource TF protein modulator  1 packet Per G Tube Daily     pantoprazole  40 mg Per Feeding Tube BID     ampicillin-sulbactam (UNASYN) IV  3 g Intravenous Daily     fosphenytoin (CEREBYX) intermittent infusion (maintenance)  150 mg PE Intravenous Q12H     ipratropium - albuterol 0.5 mg/2.5 mg/3 mL  3 mL Nebulization 4x daily     sodium chloride 0.9%  1,000 mL Intravenous  Once     - MEDICATION INSTRUCTIONS for Dialysis Patients -   Does not apply See Admin Instructions       NaCl 10 mL/hr at 06/16/17 0435     NaCl 10 mL/hr at 06/16/17 0341     IV fluid REPLACEMENT ONLY       IV fluid REPLACEMENT ONLY       norepinephrine Stopped (06/14/17 1600)     dexmedetomidine 0.7 mcg/kg/hr (06/16/17 1226)                    Physical Exam:       Vital Sign Ranges  Temp:  [97.6  F (36.4  C)-101.4  F (38.6  C)] 101  F (38.3  C)  Heart Rate:  [] 113  Resp:  [7-26] 24  BP: (100-159)/(41-90) 100/43  MAP:  [59 mmHg-127 mmHg] 92 mmHg  Arterial Line BP: ()/() 142/66  FiO2 (%):  [40 %] 40 %  SpO2:  [99 %-100 %] 100 %    Weight, current: 57.7 kg (actual weight)  Weight change: 0 kg (0 lb)    I/O last 3 completed shifts:  In: 1507.59 [I.V.:507.59; NG/GT:255]  Out: 3300 [Emesis/NG output:500; Other:2800]    Physical Exam:   General:  Patient comfortable on vent, in no apparent distress.   Neck:  Supple, no JVD.  Lungs:  Few rhonchi to auscultation bilaterally.  Cardiac:  Regular rate and rhythm, no murmurs, rub, or gallops.  Abdomen:  Soft, nontender, physiologic sounds.  Extremities:  1-2+ edema.  2+ pulses.  Skin:  Warm, dry.  Neurologic:  No focal deficits.             Data:        Lab Results   Component Value Date     06/16/2017    Lab Results   Component Value Date    CHLORIDE 102 06/16/2017    Lab Results   Component Value Date    BUN 19 06/16/2017      Lab Results   Component Value Date    POTASSIUM 3.5 06/16/2017    Lab Results   Component Value Date    CO2 33 (H) 06/16/2017    Lab Results   Component Value Date    CR 2.47 (H) 06/16/2017        Lab Results   Component Value Date     06/16/2017     06/16/2017     06/15/2017     Lab Results   Component Value Date    POTASSIUM 3.5 06/16/2017    POTASSIUM 3.1 (L) 06/16/2017    POTASSIUM 3.4 06/15/2017     Lab Results   Component Value Date    CHLORIDE 102 06/16/2017    CHLORIDE 106 06/16/2017    CHLORIDE 104  06/15/2017     Lab Results   Component Value Date    CO2 33 (H) 06/16/2017    CO2 28 06/16/2017    CO2 28 06/15/2017     Lab Results   Component Value Date    CR 2.47 (H) 06/16/2017    CR 4.78 (H) 06/16/2017    CR 3.47 (H) 06/15/2017     Lab Results   Component Value Date    BUN 19 06/16/2017    BUN 47 (H) 06/16/2017    BUN 38 (H) 06/15/2017     Lab Results   Component Value Date    HGB 8.2 (L) 06/16/2017    HGB 8.4 (L) 06/15/2017    HGB 9.4 (L) 06/14/2017     Lab Results   Component Value Date    PH 7.52 (H) 06/13/2017    PO2 94 06/13/2017    PCO2 31 (L) 06/13/2017    HCO3 25 06/13/2017    WENDY 2.1 06/13/2017           Raheel Roberson MD  Nephrology; [employer]  [phone]

## 2017-06-17 NOTE — PROGRESS NOTES
Pt remains on full vent support on the following settings:     Ventilation Mode: CMV/AC  FiO2 (%): 40 %  Rate Set (breaths/minute): 20 breaths/min  Tidal Volume Set (mL): 450 mL  PEEP (cm H2O): 5 cmH2O  Oxygen Concentration (%): 40 %     Pt suctioned for small thin white secretions. Bs coarse. Spo2 100%. RT will continue to follow.     Placed pt on pressure support trail 7/5.  Pt became tachypneic RR >30 and TV less than 300. Placed back on full support after 5 min.  RT will continue to assess.

## 2017-06-17 NOTE — PROGRESS NOTES
ICU End of Shift Summary.  For vital signs and complete assessments, please see documentation flowsheets.     Pertinent assessments: temp max 101.1ax.-tylenol given. TF restarted at 15ml/hr cont with no increase ordered. One loose tan/brown stool-no s/s of bleeding. Left fem a-line dc'd. BP stable.  Major Shift Events: see above  Plan (Upcoming Events): wean vent as natasha  Discharge/Transfer Needs: tbd    Bedside Shift Report Completed :yes   Bedside Safety Check Completed:yes

## 2017-06-17 NOTE — PROGRESS NOTES
[facility] Intensive Care Progress Note    Problem List  ESRD on chronic HD  Shock, septic and hemorrhagic, improved  Aspiration event(s) around the time of admission  Malnutrition, PEG placed March 2017  H/o stroke, aphasia resulting    Date of Service (when I saw the patient): 06/17/2017     Assessment & Plan   Hesham Ramos is a 67 year old male who was admitted on 6/11/2017. He was living in a nursing home and brought to the ED for respiratory distress after aspirating tube feedings    Neurology:  At baseline, not apparently able to communicate: normally able to protect airway   -swallow dysfunction and weight loss, on tube feedings  - on Precedex    Cardiovascular:  shock: presumably septic, resolved; hemorrhagic component?  -    Pulmonary:        Intubated 6/12 for increased work of breathing: recurrent aspiration (last 6/16)  - no microbiologic evidence of pneumonia, some linear basilar nonspecific infiltrates  - failed spontaneous breathing trial today again, low TV, high rate    Ventilation Mode: CMV/AC  FiO2 (%): 40 %  Rate Set (breaths/minute): 20 breaths/min  Tidal Volume Set (mL): 450 mL  PEEP (cm H2O): 5 cmH2O  Oxygen Concentration (%): 40 %  Resp: 20    Renal  ESRD: dialysis access via tunneled catheter  - HD M/W/F, had been receiving regularly    ID:         Empiric therapy for aspiration pneumonia: sputum showed yeast only  - narrow antibiotics - Unasyn  - fever and leukocytosis today attributed to aspiration  - repeat cultures    GI  Probable GI bleed, either resolved or slow?: coffee ground gastric tube output, dramatic Hgb drop and unexplained Uremia  - improved with 2 units PRBC 6/13    Nutrition  Tube feeding resumed, progressed to goal: massive emesis and aspiration episode  - trophic feeds until we can exchange for G-J    Endocrine:  Requiring minimal sc insulin to maintain euglycemia  -    DVT Prophylaxis: Pneumatic Compression Devices  GI  Prophylaxis: PPI    Restraints: Restraints for medical healing needed: YES    Family update by me today: No    Kimber Mansfield MD    Time Spent on this Encounter   Billing:  I spent 35 minutes bedside and on the inpatient unit today managing the critical care of Hesham Ramos in relation to the issues listed in this note.    Main Plans for Today   Continue supportive care  G-J on Monday    Interval History   Aspiration yesterday, failed spontaneous breathing trial today. Planning for G-J conversion Monday. If he continues to fail spontaneous breathing trial, may need to re-address goals with family (they have reported he wouldn't want tracheostomy for sure)    Physical Exam   Temp: 100.5  F (38.1  C) Temp src: Axillary Temp  Min: 97.6  F (36.4  C)  Max: 101.5  F (38.6  C) BP: 126/86   Heart Rate: 99 Resp: 20 SpO2: 100 % O2 Device: Mechanical Ventilator    Vitals:    06/15/17 0430 06/16/17 0600 06/17/17 0440   Weight: 57.7 kg (127 lb 3.3 oz) 57.7 kg (127 lb 3.3 oz) 56.2 kg (123 lb 14.4 oz)     I/O last 3 completed shifts:  In: 1356.88 [I.V.:661.88; NG/GT:60; IV Piggyback:500]  Out: 3300 [Emesis/NG output:500; Other:2800]    Current Vitals:Temp:  [97.6  F (36.4  C)-101.5  F (38.6  C)] 100.5  F (38.1  C)  Heart Rate:  [] 99  Resp:  [10-31] 20  BP: ()/(32-99) 126/86  MAP:  [47 mmHg-127 mmHg] 84 mmHg  Arterial Line BP: ()/() 131/55  FiO2 (%):  [40 %] 40 %  SpO2:  [91 %-100 %] 100 %   Not interactive, resists movements and care   PERRL and conjugate gaze  Orally intubated  Lungs clear  H-RR w/o murmur  Abdomen-soft, non tender, non distended  Extremities-warm and no edema; bilateral upper extremities with old dialysis grafts  Lines: No erythema or discharge at entry site for arterial line, central line   Current lines are required for patient management    Medications     NaCl 10 mL/hr at 06/16/17 0435     NaCl 10 mL/hr at 06/16/17 0341     IV fluid REPLACEMENT ONLY       IV fluid REPLACEMENT  ONLY       norepinephrine Stopped (06/14/17 1600)     dexmedetomidine 0.6 mcg/kg/hr (06/17/17 0829)       simvastatin  40 mg Oral or Feeding Tube QPM     insulin aspart  1-6 Units Subcutaneous Q4H     ascorbic acid  250 mg Oral Daily     nystatin  500,000 Units Mouth/Throat 4x Daily     doxercalciferol  4 mcg Intravenous Once per day on Mon Wed Fri     epoetin arnulfo (EPOGEN,PROCRIT) inj ESRD  3,000 Units Intravenous Once per day on Mon Wed Fri     B and C vitamin Complex with folic acid  5 mL Per Feeding Tube Daily     Prosource TF protein modulator  1 packet Per G Tube Daily     pantoprazole  40 mg Per Feeding Tube BID     ampicillin-sulbactam (UNASYN) IV  3 g Intravenous Daily     fosphenytoin (CEREBYX) intermittent infusion (maintenance)  150 mg PE Intravenous Q12H     ipratropium - albuterol 0.5 mg/2.5 mg/3 mL  3 mL Nebulization 4x daily     sodium chloride 0.9%  1,000 mL Intravenous Once     - MEDICATION INSTRUCTIONS for Dialysis Patients -   Does not apply See Admin Instructions       Data     Recent Labs  Lab 06/17/17  0820 06/16/17  1300 06/16/17  0500 06/15/17  0520 06/14/17  0525  06/12/17  1020 06/12/17  0407  06/11/17  2324 06/11/17  2321   WBC 14.5*  --  12.0* 10.3 11.6*  < >  --   --   --   --  15.8*   HGB 8.8*  --  8.2* 8.4* 9.4*  < >  --   --   --   --  13.3  11.7*   MCV 96  --  93 92 89  < >  --   --   --   --  97     --  171 166 199  < >  --   --   --   --  424   INR 1.38*  --   --   --  1.76*  --   --   --   --   --  1.23*    142 143 144 139  < >  --   --   --   --  138  135   POTASSIUM 3.7 3.5 3.1* 3.4 3.2*  < >  --   --   < >  --  5.8*  5.5*   CHLORIDE 106 102 106 104 100  < >  --   --   --   --  104  97   CO2 27 33* 28 28 28  < >  --   --   --   --  19*   BUN 30 19 47* 38* 68*  < >  --   --   --   --  134*  147*   CR 3.81* 2.47* 4.78* 3.47* 4.25*  < >  --   --   --   --  7.89*   ANIONGAP 10 7 9 12 11  < >  --   --   --   --  12  19*   DOUG 8.3* 9.3 8.3* 8.2* 7.8*  < >  --   --    --   --  9.7   * 154* 197* 147* 142*  < >  --  294*  --   --  283*  286*   ALBUMIN  --   --  2.1* 2.0* 1.9*  < >  --   --   --   --  3.2*   PROTTOTAL  --   --  6.4* 5.9* 6.0*  --   --   --   --   --  9.4*   BILITOTAL  --   --  0.6 0.6 0.5  --   --   --   --   --  0.4   ALKPHOS  --   --  156* 135 126  --   --   --   --   --  219*   ALT  --   --  103* 133* 202*  --   --   --   --   --  70   AST  --   --  54* 100* 199*  --   --   --   --   --  72*   TROPI  --   --   --   --   --   --  0.118* 0.132*  --   --   --    TROPONIN  --   --   --   --   --   --   --   --   --  0.18*  --    < > = values in this interval not displayed.  Recent Results (from the past 24 hour(s))   XR Chest Port 1 View    Narrative    CHEST ONE VIEW UPRIGHT 6/16/2017 11:26 AM     HISTORY: Failing breathing trial.    COMPARISON: 6/12/2017.      Impression    IMPRESSION: Endotracheal tube tip mid to distal trachea. Large bore  right IJ line unchanged. Scattered trace atelectasis and/or fibrosis.  Previously seen orogastric tube has been removed.    LUCIA MCGUIRE MD

## 2017-06-17 NOTE — PROGRESS NOTES
Left wrist restraints continued 6/17/2017    Clinical Justification: Pulling lines, pulling tubes, and pulling equipment  Less Restrictive Alternative: Repositioning, Disguise equipment, Reorientation  Attending Physician Notified: Yes, Attending Physician's Name: Paola   New orders placed Yes  Length of Order: 1 Day      Leanna Rincon

## 2017-06-17 NOTE — CONSULTS
CLINICAL NUTRITION SERVICES BRIEF NOTE    MD Consult received for trophic tube feeds    New Findings -   - TF reached goal Nepro @ 45 ml/hr at 0100 yesterday. Pt had large emesis around 1100 --> TF has been off since.   - Family has decided to go ahead with g-tube to g/j conversion, planned for Monday.    ASSESSED NUTRITION NEEDS (PER APPROVED PRACTICE GUIDELINES; DW: 48.5 kg):  Estimated Energy Needs: 1663-7481 kcals (35-40 Kcal/Kg)  Justification: repletion  Estimated Protein Needs: 58-87+ grams protein (1.2-1.8+ g pro/Kg)  Justification: wound healing and dialysis    Implementation -   Start Trophic feeds: Nepro @ 15 ml/hr --> provides 648 kcal/day (13 kcal/kg), 29 g pro (0.6 g/kg).  -standard 60 mL q4 hours or per MD discretion     Collaboration and Referral of Nutrition Care -- discussed plan to being trophic feeding, no increase until G/J tube placed Monday.    Angelita Silva, RD, LD

## 2017-06-17 NOTE — PLAN OF CARE
Problem: Sepsis (Adult)  Goal: Signs and Symptoms of Listed Potential Problems Will be Absent or Manageable (Sepsis)  Signs and symptoms of listed potential problems will be absent or manageable by discharge/transition of care (reference Sepsis (Adult) CPG).   Outcome: Improving  ICU End of Shift Summary.  For vital signs and complete assessments, please see documentation flowsheets.      Pertinent assessments: pt is restless periodically, when being turned will have shaking in his legs, but this settles down quickly when left alone, when he gets restless and moves his legs he will also have the same shaking, suctioned q2h for small amts, lungs slightly coarse, after 500 FF, b/p has been better, willi doesn't correlate with the cuff, the cuff is more accurate, pt does bend left leg up and the willi is dampened, wt is down 1 kg, has had 3 small black/brown/red stools, opens eyes spontaneously, doesn't follow commands.  Major Shift Events: none  Plan (Upcoming Events):  Vent wean today  Discharge/Transfer Needs: TBD     Bedside Shift Report Completed : yes  Bedside Safety Check Completed: yes

## 2017-06-17 NOTE — PLAN OF CARE
Problem: Restraint for Non-Violent/Non-Self-Destructive Behavior  Goal: Prevent/Manage Potential Problems  Maintain safety of patient and others during period of restraint.  Promote psychological and physical wellbeing.  Prevent injury to skin and involved body parts.  Promote nutrition, hydration, and elimination.   Outcome: No Change  Pt tries to get hands up to face to pull at ET, restraints are still needed for safety.

## 2017-06-17 NOTE — PROGRESS NOTES
[facility]  Hospitalist Progress Note  Desiree Guevara MD 06/17/2017    Reason for Stay (Diagnosis): respiratory distress 2/2 aspiration of tube feeds         Assessment and Plan:      Summary of Stay: Hesham Ramos is a 67 year old male with a history of prior debilitating strokes, sz d/o, htn/diastolic CHF, ESRD on HD, and significantly debilitated who lives in a group home.  He had a feeding tube placed approximately 3 months ago for dysphagia, and has continued to decline since that time admitted on 6/11/2017 with e/o TF coming through his mouth and nose, with hypoxia/fever consistent with aspiration pna complicated by sepsis (leukocytosis/lactic acidosis/fever) which progressed to septic shock necessitating norepi/vasopressin support which has been weaned to off 6/15/17.  His hypoxic respiratory failure progressed and he ultimately required intubation on 6/12/17.  He remains intubated but with minimal ventilatory support undergoing daily weans that are limited by tachypnea/low tidal volumes. His early hospitalization is also notable for profound hyperglycemia nececsitating insulin gtt with hgb A1c wnl at 5.1    Also of note during this hospitalization is on admission BUN was noted to be quite high in the 150 range, despite HD, and hb has been drifting down, and dark stools have been noted, all suspicious for slow GIB.  He has required 2 prbc units.    His TFs had been resumed to challenge the G-tube, he had been transitioned up to goal rate, noted to have 150cc residual and before it could be stopped had large emesis occurring 6/16/17, now with tachycardia and fever consistent with another aspiration event, with e/o sepsis with hypotension/fever/worsening leukocytosis.      Problem List:   1. Septic Shock 2/2 aspiration pna, off nor-epi, and vaso weaned off post aggressive HD with 3L on 6/16/17, then hypotension recurred but responded to 500 cc LR bolus.  Shock is resolved, but is having e/o  recurring SIRS with worsening leukocytosis/fever-suspected aspiration pneumonitis (infectious vs inflammatory vs both)  2.  aspiration pna with TFs:  How it initially happened is not clear-family members report that he was laid flat after bolus feeds.  It has been discussed with family the need to change from a G-tube to J-tube to decrease this from re-occurring.  And now that it re-occurred they are good with pursing exchange-discussed with IR and they will do it on Monday.  Rec'd 3 days of pip tazo, then started on amp-sulbactam, so on day 7 of abx-likely will need a 14 day course, maybe more now that this reocurred.    3. Hypoxic Respiratory Failure 2/2 aspiration pna:  Minimally ventilatory support, so doing better, still not extubatable due to tachypnea and low tidal volumes (again this am).  Cont qd weaning trials.  Repeat aspiration bodes poorly for future extubation.  In past family has not wanted to pursue trach placement.  On day 5 of intubation   4. Suspected GIB:  Given high BUN/drifting down of hgb, episodic dark stools-has rec'd 2 u PRBCs, currently at 8.8-usually in 10-11 range.  Monitor for now, cont to hold asa,no AC.  No hematemesis with episode of emesis 6/16  5. ESRD on HD:  Appreciate Dr. Rose's input-HD am 6/16 with 3 L fluid removal-did have some hypotension on alejandro of 6/16 so given 500 cc back. Next HD likely 6/19/17  6. Transaminitis:  LFTs have been consistently mildly elevated throughout hospitalization, ? Due to TF vs mild shock liver vs other.  No e/o hepatic failure  7. Htn:  Previously treated by now off all meds since 2015 due to hypotension (? 2/2 weight loss?)  8. Seizure d/o:  phenytoin 300 mg at bedtime is home regimen-currently on  mg bid fosphenytoin  9. Diastolic HF: most recent echo in 11/2015 did show apical hypertrophy-recs at that time were for cardiac MRI but I don't see that was ever completed  10. Volume overload:  Appropriately in the setting of sepsis:  "ESRD-s/p 3 L removal of fluid today on HD  11. Prior debilitating strokes:  Home regimen is asa and simva - both on hold-asa for GIB, statin for increased transaminases  12. Dementia; presumed vascular in nature-resume donepezil when able (G-J in place)  13. Hyperglycemia:  hgb A1c 5.1 %, no hx of dm, needed transient insulin gtt, now on ISS with minimal requirements.  This is likely to change once refeeding begins.   14. Trop leak:  2/2 demand ischemia  15.  ASx UE edema:  No e/o DVT by US  16.  Nutrition:  Clearly can't use G-tube for goal tube feeds, have resumed them at trophic levels for gut protection, will need to be NPO prior to tube excahnge  DVT Prophylaxis: PCDs, no pharmacological AC in light of suspected slow GIB  Code Status: DNR-prior to this hospitalization he was full code, family is interested in getting distant family members here to see how poor his quality of life is to consider de-escalating cares.    Discharge Dispo: back to group home  Estimated Disch Date / # of Days until Disch: unclear - continues to require ICU level care, looked to be slowly improving from a medical standpoint, but now with recurrent aspiration         Interval History (Subjective):      Hypotensive overnight, responded to 500 cc LR bolus, continues to be febrile/tachycardic, with increasing leukocytosis.                   Physical Exam:      Last Vital Signs:  BP (!) 79/42  Pulse 95  Temp 99.8  F (37.7  C) (Axillary)  Resp 19  Ht 1.651 m (5' 5\")  Wt 56.2 kg (123 lb 14.4 oz)  SpO2 98%  BMI 20.62 kg/m2    Up 4 L by I/O and up 8 kg by weight. S/p 3 L removal on HD this am    Sedated w/dexmedetomidine,intubated    Comfortable appearing, disuse atrophy of b ue and le with right arm contracture and e/o non-pitting edema of right arm, diffuse 1 + anasarca head ncat sclera normal pigmentation, lungs coarse but otherwise clear, RRR no m/r/g  Skin warm and dry no c/c, sedated.             Medications:      All current " medications were reviewed with changes reflected in problem list.         Data:      All new lab and imaging data was reviewed.   Labs:    Recent Labs  Lab 06/17/17  0820      POTASSIUM 3.7   CHLORIDE 106   CO2 27   ANIONGAP 10   *   BUN 30   CR 3.81*   GFRESTIMATED 16*   GFRESTBLACK 19*   DOUG 8.3*       Recent Labs  Lab 06/17/17  0820   WBC 14.5*   HGB 8.8*   HCT 28.0*   MCV 96       Procalcitonin 69.7 (!)  Imaging:

## 2017-06-17 NOTE — PROGRESS NOTES
Patient remains on ventilator with current settings of CMV 20/450/+5/40%. Sputum sample sent, ETT is secure. Suctioning small amount of creamy thick. Duo neb QID given in-line. BS coarse t/o.  Sats=%. RT will continue to follow.

## 2017-06-18 NOTE — PROGRESS NOTES
Pt remains on full vent support on the following settings:    Ventilation Mode: CMV/AC  FiO2 (%): 40 %  Rate Set (breaths/minute): 20 breaths/min  Tidal Volume Set (mL): 450 mL  PEEP (cm H2O): 5 cmH2O  Oxygen Concentration (%): 40 %    Bs coarse. Suctioned for small thin tan secretions. Douneb given in line. Spo2 98%. RT will continue to assess.

## 2017-06-18 NOTE — PLAN OF CARE
Problem: Sepsis (Adult)  Goal: Signs and Symptoms of Listed Potential Problems Will be Absent or Manageable (Sepsis)  Signs and symptoms of listed potential problems will be absent or manageable by discharge/transition of care (reference Sepsis (Adult) CPG).   Outcome: No Change  ICU End of Shift Summary.  For vital signs and complete assessments, please see documentation flowsheets.      Pertinent assessments: pt has been restless/calm all shift, he works hard trying to get at the ET and wears himself out and then rests for a while, he rolls his head back and forth trying to pull the tube out, he tries to get his stroke hand close enough and rubs the Et on his hand trying to get it out, also pulling on the restrained hand trying to get it close to his face, VSS, temp is 98.5 ax, con't in a ST, sats are 100% on the vent, lungs are slightly coarse at the beginning of the shift but clear now, has been suction q2h foe a small amt of thin secretions,con't to have small brown stools with a pink tinge to them, abd is firm, a weaning was done this am on PS, with in 5 min pt RR was mid 30's and vol were 250 range, was put back to full vent settings.  Major Shift Events: con't to not tolerate vent wean.  Plan (Upcoming Events): have a family conference about pt inability to wean.  Discharge/Transfer Needs: TBD     Bedside Shift Report Completed : yes  Bedside Safety Check Completed: yes

## 2017-06-18 NOTE — PROGRESS NOTES
[facility] Intensive Care Progress Note    Problem List  ESRD on chronic HD  Shock, septic and hemorrhagic, improved  Aspiration event(s) around the time of admission; recurrent  Malnutrition, PEG placed March 2017  H/o stroke, aphasia resulting    Date of Service (when I saw the patient): 06/18/2017     Assessment & Plan   Hesham Ramos is a 67 year old male who was admitted on 6/11/2017. He was living in a nursing home and brought to the ED for respiratory distress after aspirating tube feedings    Neurology:  At baseline, not apparently able to communicate: normally able to protect airway  -swallow dysfunction and weight loss, on tube feedings  - on Precedex    Cardiovascular:  shock: presumably septic, resolved; hemorrhagic component?  -    Pulmonary:        Intubated 6/12 for increased work of breathing: recurrent aspiration (last 6/16)  - no microbiologic evidence of pneumonia, some linear basilar nonspecific infiltrates  - failed spontaneous breathing trial today again, low TV, high rate    Ventilation Mode: SIMV/PS  FiO2 (%): 40 %  Rate Set (breaths/minute): 14 breaths/min  Tidal Volume Set (mL): 450 mL  PEEP (cm H2O): 5 cmH2O  Pressure Support (cm H2O): 7 cmH2O  Oxygen Concentration (%): 40 %  Resp: 8    Renal  ESRD: dialysis access via tunneled catheter  - HD M/W/F, had been receiving regularly    ID:         Empiric therapy for aspiration pneumonia: sputum showed yeast only  - narrow antibiotics - Unasyn  - fever and leukocytosis today attributed to aspiration  - repeat cultures    GI  Probable GI bleed, either resolved or slow?: coffee ground gastric tube output, dramatic Hgb drop and unexplained Uremia  - improved with 2 units PRBC 6/13    Nutrition  Tube feeding resumed, progressed to goal: massive emesis and aspiration episode  - trophic feeds until we can exchange for G-J    Endocrine:  Requiring minimal sc insulin to maintain euglycemia  -    DVT  Prophylaxis: Pneumatic Compression Devices  GI Prophylaxis: PPI    Restraints: Restraints for medical healing needed: YES    Family update by me today: Yes  I spoke with Hemant about the vomiting and spontaneous breathing trial failure, if he continues to struggle with spontaneous breathing trial after J-tube placement, need to address goals of care, consider withdrawal of ventilator regardless of success.    Kimber Mansfield MD    Time Spent on this Encounter   Billing:  I spent 35 minutes bedside and on the inpatient unit today managing the critical care of Hesham Ramos in relation to the issues listed in this note.    Main Plans for Today   Continue supportive care  G-J on Monday    Interval History   Aspiration again today, failed spontaneous breathing trial today. Planning for G-J conversion Monday. If he continues to fail spontaneous breathing trial, may need to re-address goals with family (they have reported he wouldn't want tracheostomy)    Physical Exam   Temp: 99.8  F (37.7  C) Temp src: Axillary Temp  Min: 98.5  F (36.9  C)  Max: 99.9  F (37.7  C) BP: 110/50   Heart Rate: 110 Resp: 8 SpO2: 99 % O2 Device: Mechanical Ventilator    Vitals:    06/16/17 0600 06/17/17 0440 06/18/17 0400   Weight: 57.7 kg (127 lb 3.3 oz) 56.2 kg (123 lb 14.4 oz) 56.2 kg (123 lb 14.4 oz)     I/O last 3 completed shifts:  In: 1739.86 [I.V.:1034.86; NG/GT:375]  Out: -     Current Vitals:Temp:  [98.5  F (36.9  C)-99.9  F (37.7  C)] 99.8  F (37.7  C)  Heart Rate:  [] 110  Resp:  [0-38] 8  BP: ()/(42-87) 110/50  FiO2 (%):  [40 %] 40 %  SpO2:  [92 %-100 %] 99 %   Not interactive, resists movements and care   PERRL and conjugate gaze  Orally intubated  Lungs clear  H-RR w/o murmur  Abdomen-soft, non tender, non distended  Extremities-warm and no edema; bilateral upper extremities with old dialysis grafts  Lines: No erythema or discharge at entry site for arterial line, central line   Current lines are required for  patient management    Medications     NaCl 10 mL/hr at 06/17/17 2121     NaCl 10 mL/hr at 06/17/17 2120     IV fluid REPLACEMENT ONLY       IV fluid REPLACEMENT ONLY       norepinephrine Stopped (06/14/17 1600)     dexmedetomidine 0.6 mcg/kg/hr (06/18/17 0715)       simvastatin  40 mg Oral or Feeding Tube QPM     insulin aspart  1-6 Units Subcutaneous Q4H     ascorbic acid  250 mg Oral Daily     nystatin  500,000 Units Mouth/Throat 4x Daily     doxercalciferol  4 mcg Intravenous Once per day on Mon Wed Fri     epoetin arnulfo (EPOGEN,PROCRIT) inj ESRD  3,000 Units Intravenous Once per day on Mon Wed Fri     B and C vitamin Complex with folic acid  5 mL Per Feeding Tube Daily     Prosource TF protein modulator  1 packet Per G Tube Daily     pantoprazole  40 mg Per Feeding Tube BID     ampicillin-sulbactam (UNASYN) IV  3 g Intravenous Daily     fosphenytoin (CEREBYX) intermittent infusion (maintenance)  150 mg PE Intravenous Q12H     ipratropium - albuterol 0.5 mg/2.5 mg/3 mL  3 mL Nebulization 4x daily     sodium chloride 0.9%  1,000 mL Intravenous Once     - MEDICATION INSTRUCTIONS for Dialysis Patients -   Does not apply See Admin Instructions       Data     Recent Labs  Lab 06/18/17  0519 06/17/17  0820 06/16/17  1300 06/16/17  0500  06/14/17  0525  06/12/17  1020 06/12/17  0407  06/11/17  2324 06/11/17  2321   WBC 13.2* 14.5*  --  12.0*  < > 11.6*  < >  --   --   --   --  15.8*   HGB 8.2* 8.8*  --  8.2*  < > 9.4*  < >  --   --   --   --  13.3  11.7*   MCV 97 96  --  93  < > 89  < >  --   --   --   --  97    194  --  171  < > 199  < >  --   --   --   --  424   INR  --  1.38*  --   --   --  1.76*  --   --   --   --   --  1.23*   * 143 142 143  < > 139  < >  --   --   --   --  138  135   POTASSIUM 3.4 3.7 3.5 3.1*  < > 3.2*  < >  --   --   < >  --  5.8*  5.5*   CHLORIDE 110* 106 102 106  < > 100  < >  --   --   --   --  104  97   CO2 28 27 33* 28  < > 28  < >  --   --   --   --  19*   BUN 46* 30 19 47*   < > 68*  < >  --   --   --   --  134*  147*   CR 4.84* 3.81* 2.47* 4.78*  < > 4.25*  < >  --   --   --   --  7.89*   ANIONGAP 8 10 7 9  < > 11  < >  --   --   --   --  12  19*   DOUG 7.6* 8.3* 9.3 8.3*  < > 7.8*  < >  --   --   --   --  9.7   * 122* 154* 197*  < > 142*  < >  --  294*  --   --  283*  286*   ALBUMIN 1.9*  --   --  2.1*  < > 1.9*  < >  --   --   --   --  3.2*   PROTTOTAL 5.7*  --   --  6.4*  < > 6.0*  --   --   --   --   --  9.4*   BILITOTAL 0.4  --   --  0.6  < > 0.5  --   --   --   --   --  0.4   ALKPHOS 114  --   --  156*  < > 126  --   --   --   --   --  219*   ALT 75*  --   --  103*  < > 202*  --   --   --   --   --  70   AST 68*  --   --  54*  < > 199*  --   --   --   --   --  72*   TROPI  --   --   --   --   --   --   --  0.118* 0.132*  --   --   --    TROPONIN  --   --   --   --   --   --   --   --   --   --  0.18*  --    < > = values in this interval not displayed.  Recent Results (from the past 24 hour(s))   XR Chest Port 1 View    Narrative    CHEST PORTABLE ONE VIEW June 18, 2017 9:19 AM     HISTORY: Aspiration.    COMPARISON: 6/16/2017.      Impression    IMPRESSION: Endotracheal tube and dialysis catheter unchanged. Patchy  airspace opacity present in the left lower lobe, atelectasis versus  pneumonia. Right lung is clear. No pneumothorax or pleural effusion.  Vascular stents noted in the right arm.    NATALIYA LIU MD

## 2017-06-18 NOTE — PROGRESS NOTES
ICU End of Shift Summary.  For vital signs and complete assessments, please see documentation flowsheets.     Pertinent assessments: Emesis this am while on TF at goal of 15/hr-TF stopped, pt suctioned, md notified, compazine given. TF to remain off until J tube placed in am. BP lower while laying on right side but stable all day. MD changed vent to SIMV-pt tachypneic-dilaudid give (pt also has pain with rafal care). CXR done today. Added diflucan abx today r/t culture results.   Major Shift Events: see above.  Plan (Upcoming Events): Dialysis in am and Jtube placement in am. Wean vent as natasha.   Discharge/Transfer Needs: TBD    Bedside Shift Report Completed : TBD  Bedside Safety Check Completed: TBD

## 2017-06-18 NOTE — PROGRESS NOTES
Patient's vent settings were changed by MD to SIMV 14/450/+5/+7PS/40% at approx 1100. BS decreased R>L. Sx small amount, Sats=%, Duonebs QID given. RT will continue to follow.

## 2017-06-18 NOTE — PROGRESS NOTES
Renal Medicine Chart Check      Plan am dialysis  Dialysis orders entered          Recent Labs  Lab 06/18/17  0519   *   POTASSIUM 3.4   CHLORIDE 110*   CO2 28   ANIONGAP 8   *   BUN 46*   CR 4.84*   GFRESTIMATED 12*   GFRESTBLACK 15*   DOUG 7.6*           JUSTO Tran    [facility]  [phone]

## 2017-06-18 NOTE — PROGRESS NOTES
[facility]  Hospitalist Progress Note  Desiree Guevara MD 06/18/2017    Reason for Stay (Diagnosis): respiratory distress 2/2 aspiration of tube feeds         Assessment and Plan:      Summary of Stay: Hesham Ramos is a 67 year old male with a history of prior debilitating strokes, sz d/o, htn/diastolic CHF, ESRD on HD, and significantly debilitated who lives in a group home.  He had a feeding tube placed approximately 3 months ago for dysphagia, and has continued to decline since that time admitted on 6/11/2017 with e/o TF coming through his mouth and nose, with hypoxia/fever consistent with aspiration pna complicated by sepsis (leukocytosis/lactic acidosis/fever) which progressed to septic shock necessitating norepi/vasopressin support which has been weaned to off 6/15/17.  His hypoxic respiratory failure progressed and he ultimately required intubation on 6/12/17.  He remains intubated but with minimal ventilatory support undergoing daily weans that are limited by tachypnea/low tidal volumes. His early hospitalization is also notable for profound hyperglycemia nececsitating insulin gtt with hgb A1c wnl at 5.1    Also of note during this hospitalization is on admission BUN was noted to be quite high in the 150 range, despite HD, and hb has been drifting down, and dark stools have been noted, all suspicious for slow GIB.  He has required 2 prbc units.    His TFs had been resumed to challenge the G-tube, he had been transitioned up to goal rate, noted to have 150cc residual and before it could be stopped had large emesis occurring 6/16/17, subsequently developed tachycardia and fever consistent with another aspiration event, with e/o sepsis with hypotension/fever/worsening leukocytosis. TF held for 24 hours and resumed 6/17 at trophic rate, and this am had another aspiration event, so now they have been discontinued.  Plan is to convert G tube to G-J tube 6/19/17-IR aware     Problem List:    1. Septic Shock 2/2 aspiration pna, off nor-epi, and vaso weaned off post aggressive HD with 3L on 6/16/17, then hypotension recurred but responded to 500 cc LR bolus.  Shock is resolved, but is having e/o recurring SIRS with worsening leukocytosis/fever-suspected aspiration pneumonitis (infectious vs inflammatory vs both)  2.  aspiration pna with TFs:  How it initially happened is not clear-family members report that he was laid flat after bolus feeds.  It has been discussed with family the need to change from a G-tube to J-tube to decrease this from re-occurring.  And now that it re-occurred they are good with pursing exchange-discussed with IR and they will do it on Monday.  Rec'd 3 days of pip tazo, then started on amp-sulbactam, so on day 8 of abx-likely will need a 14 day course, maybe more now that this recurred with trophic feeds on 6/18/17.  procal 69.7 (!) on 6/17/17  3. Hypoxic Respiratory Failure 2/2 aspiration pna:  Minimally ventilatory support, so doing better, still not extubatable due to tachypnea and low tidal volumes (again this am has happened every day for past 4 days).  Cont qd weaning trials.  Repeat aspiration bodes poorly for future extubation.  In past family has not wanted to pursue trach placement.  On day 6 of intubation .  Repeat CXR to my personal read not that bad.  Family have stated they do not want to pursue trach placement. It may be that he just needs to be extubated to see how he does (preferably without re-intubation).  His sputum is growing heavy growth yeast, and given his thrush I do wonder if it is real.  Is on nystatin swabs for thrush, but now will add in fluconazole.   4. Suspected GIB:  Given high BUN/drifting down of hgb, episodic dark stools-has rec'd 2 u PRBCs, currently at 8.2-usually in 10-11 range.  Monitor for now, cont to hold asa,no AC.  No hematemesis with episode of emesis 6/16 or 6/18  5. ESRD on HD:  Appreciate Dr. Rose's input-HD am 6/16 with 3 L  fluid removal-did have some hypotension on alejandro of 6/16 so given 500 cc back. Next HD likely 6/19/17-discussed with Dr. Bell today who took over his renal care this weekend  6. Transaminitis:  LFTs have been consistently mildly elevated throughout hospitalization, ? Due to TF vs mild shock liver vs other.  No e/o hepatic failure.  Now almost normalized  7. Htn:  Previously treated by now off all meds since 2015 due to hypotension (? 2/2 weight loss?)  8. Seizure d/o:  phenytoin 300 mg at bedtime is home regimen-currently on  mg bid fosphenytoin  9. Diastolic HF: most recent echo in 11/2015 did show apical hypertrophy-recs at that time were for cardiac MRI but I don't see that was ever completed  10. Volume overload:  Appropriately in the setting of sepsis: ESRD-s/p 3 L removal of fluid 6-16 on HD-but then developed hypotension responsive to 500 cc bolus  11. Prior debilitating strokes:  Home regimen is asa and simva - both on hold-asa for GIB, statin for increased transaminases-now can't give statin b/c of no access via G-tube  12. Dementia; presumed vascular in nature-resume donepezil when able (G-J in place)  13. Hyperglycemia:  hgb A1c 5.1 %, no hx of dm, needed transient insulin gtt, now on ISS with minimal requirements.  This is likely to change once refeeding begins.   14. Trop leak:  2/2 demand ischemia  15.  ASx UE edema:  No e/o DVT by US  16.  Nutrition:  Clearly can't use G-tube for goal tube feeds, resumed them at trophic levels for gut protection 6/17 and now off for recurrent aspiration event with just trophic dosing  17.  Hypernatremia 2/2 inadequate free water replacement-start on low dose D5  DVT Prophylaxis: PCDs, no pharmacological AC in light of suspected slow GIB  Code Status: DNR-prior to this hospitalization he was full code, family is interested in getting distant family members here to see how poor his quality of life is to consider de-escalating cares.    Discharge Dispo: back to  "group home  Estimated Disch Date / # of Days until Disch: unclear - continues to require ICU level care, looked to be slowly improving from a medical standpoint, but now with recurrent aspiration         Interval History (Subjective):      Hypotensive overnight, responded to 500 cc LR bolus, continues to be febrile/tachycardic, with increasing leukocytosis.                   Physical Exam:      Last Vital Signs:  /50  Pulse 95  Temp 101.6  F (38.7  C) (Axillary)  Resp 8  Ht 1.651 m (5' 5\")  Wt 56.2 kg (123 lb 14.4 oz)  SpO2 99%  BMI 20.62 kg/m2    Up 5.2 L by I/O and up 8 kg by weight.     Sedated w/dexmedetomidine,intubated    Comfortable appearing, disuse atrophy of b ue and le with right arm contracture and e/o non-pitting edema of right arm, diffuse 1 + anasarca head ncat sclera normal pigmentation, lungs coarse but otherwise clear, RRR no m/r/g  Skin warm and dry no c/c, sedated.             Medications:      All current medications were reviewed with changes reflected in problem list.         Data:      All new lab and imaging data was reviewed.   Labs:    Recent Labs  Lab 06/18/17  0519   *   POTASSIUM 3.4   CHLORIDE 110*   CO2 28   ANIONGAP 8   *   BUN 46*   CR 4.84*   GFRESTIMATED 12*   GFRESTBLACK 15*   DOUG 7.6*       Recent Labs  Lab 06/18/17  0519   WBC 13.2*   HGB 8.2*   HCT 26.4*   MCV 97       Procalcitonin 69.7 (!)  Imaging:         "

## 2017-06-18 NOTE — PROGRESS NOTES
Patient placed on CPAP/PS trial this am at 0540. Patient placed on CPAP/PS 10/5, lasting <5 min. Patient placed back on CMV settings due to increased WOB, RR 35-40. RT will continue to assess.    Renee Baron, RT 6/18/2017, 5:45 AM

## 2017-06-18 NOTE — PLAN OF CARE
Problem: Restraint for Non-Violent/Non-Self-Destructive Behavior  Goal: Prevent/Manage Potential Problems  Maintain safety of patient and others during period of restraint.  Promote psychological and physical wellbeing.  Prevent injury to skin and involved body parts.  Promote nutrition, hydration, and elimination.   Outcome: No Change  Pt con't to try to go for the ET, restraints are needed for pt safety.

## 2017-06-19 NOTE — PLAN OF CARE
Problem: Restraint for Non-Violent/Non-Self-Destructive Behavior  Goal: Prevent/Manage Potential Problems  Maintain safety of patient and others during period of restraint.  Promote psychological and physical wellbeing.  Prevent injury to skin and involved body parts.  Promote nutrition, hydration, and elimination.   Outcome: No Change  Pt tries to pull at the ET, restraints are still needed.

## 2017-06-19 NOTE — PROGRESS NOTES
Inpatient Dialysis Progress Note        Assessment and Plan:     1. ESRD  2. Septic shock from aspiration PNA  3. Respiratory failure  4. Anemia  5. Non-verbal and bed-bound at baseline    Plan.   1. Decrease Qb to 200, temp down to 35, no UF. If BP does not improve, will stop HD treatment.  2. 500 cc NS bolus   3. Getting 12.5 grams        Interval History:     Pt became tachycardic and tachypnic. Unable to obtain BP reading. TF stopped. FIO2 increased. Pt received 500 cc NS bolus and he is getting 12.5 grams 5%q albumin now.          Dialysis Parameters:     Vitals:    06/17/17 0440 06/18/17 0400 06/19/17 0500   Weight: 56.2 kg (123 lb 14.4 oz) 56.2 kg (123 lb 14.4 oz) 56.5 kg (124 lb 9 oz)     I/O last 3 completed shifts:  In: 1479.58 [I.V.:889.58; NG/GT:75; IV Piggyback:500]  Out: 300 [Emesis/NG output:300]  Temp:  [99.3  F (37.4  C)-100.1  F (37.8  C)] 100.1  F (37.8  C)  Heart Rate:  [] 119  Resp:  [6-50] 18  BP: ()/() 88/73  FiO2 (%):  [40 %] 40 %  SpO2:  [30 %-100 %] 30 %    Current Weight: 56.5  Dry Weight:   Dialysis Temp: 36.5  C-->35  Access Device: CVC  Access Site: [facility] tunneled  Dialyzer: revaclear  Dialysis Bath: 2K  Sodium Profile: none  UF Goal: none. Will be + after treatment  Blood Flow Rate (mL/min): 400---->200  Total Treatment Time (hrs): 3 hrs  Heparin: none    Review of Systems:          Medications:     EPO dose: 5000  Hectorol: 2 mcg    Physical Exam:     Vitals were reviewed  Patient Vitals for the past 24 hrs:   BP Temp Temp src Heart Rate Resp SpO2 Weight   06/19/17 1430 (!) 88/73 - - 119 18 (!) 30 % -   06/19/17 1415 102/82 - - 121 14 94 % -   06/19/17 1400 (!) 116/103 - - 118 15 99 % -   06/19/17 1345 (!) 105/93 - - 134 19 92 % -   06/19/17 1330 99/84 - - 115 13 (!) 88 % -   06/19/17 1315 109/64 - - 116 25 - -   06/19/17 1300 109/86 - - 115 22 - -   06/19/17 1253 (!) 104/92 - - 114 11 - -   06/19/17 1250 (!) 104/92 - - 116 11 99 % -   06/19/17 1245 - - - 114 12 - -    17 1230 97/50 - - 113 19 - -   17 1200 - 100.1  F (37.8  C) Axillary - - - -   17 1140 111/66 - - 111 10 99 % -   17 1123 - - - - - 100 % -   17 1100 104/58 - - 106 18 100 % -   17 1030 97/46 - - 106 20 100 % -   17 1015 92/65 - - 110 (!) 6 100 % -   17 1000 116/62 - - 109 18 100 % -   17 0845 111/82 - - 105 15 - -   17 0830 (!) 43 - - 102 11 100 % -   17 0815 96/51 - - 103 11 100 % -   17 0800 91/54 99.4  F (37.4  C) Axillary 98 16 100 % -   17 0745 (!)  - - 95 20 100 % -   17 0744 - - - - - 100 % -   17 0730  - - 93 20 100 % -   17 0715 (!) 8122 - - 95 16 100 % -   17 0700 9646 - - 94 19 100 % -   17 0645 104/55 - - 96 21 100 % -   17 0630 (!) 70/41 - - 99 22 100 % -   17 0615 (!)  - - 99 17 100 % -   17 0600 (!)  - - 99 24 100 % -   17 0550 (!)  - - 99 19 93 % -   17 0545 (!)  - - 100 21 100 % -   17 0540 (!)  - - 105 24 100 % -   17 0500 (!)  - - 101 20 100 % 56.5 kg (124 lb 9 oz)   17 0400 98/53 99.3  F (37.4  C) Axillary 100 20 100 % -   17 0315 (!) 85/64 - - 103 19 100 % -   17 0310 (!) 78/48 - - 103 22 100 % -   17 0300 (!) 77/46 - - 104 21 100 % -   17 0200 121/51 - - 106 25 100 % -   17 0100 109/55 - - 110 22 100 % -   17 0050 102/54 - - 112 24 100 % -   17 0000 - - - 111 20 100 % -   17 2305 - - - - - 100 % -   17 2300 105/65 - - 107 20 100 % -   17 2200 90/50 - - 109 22 100 % -   17 2100 (!) 89/53 - - 113 20 100 % -   17 110/52 99.5  F (37.5  C) Axillary 111 20 100 % -   17 - - - - (!) 38 100 % -   17 194 (!) 86/44 - - 111 (!) 50 100 % -   17 1900 (!) 84/49 - - 115 (!) 35 100 % -   17 1625 - - - - - 100 % -       Temperatures:  Current - Temp: 100.1  F (37.8  C); Max - Temp  Av.6  F (37.6  C)   Min: 99.3  F (37.4  C)  Max: 100.1  F (37.8  C)  Respiration range: Resp  Av.7  Min: 6  Max: 50  Pulse range: No Data Recorded  Blood pressure range: Systolic (24hrs), Av , Min:70 , Max:121   ; Diastolic (24hrs), Av, Min:22, Max:103    Pulse oximetry range: SpO2  Av.6 %  Min: 30 %  Max: 100 %  I/O last 3 completed shifts:  In: 1479.58 [I.V.:889.58; NG/GT:75; IV Piggyback:500]  Out: 300 [Emesis/NG output:300]    Intake/Output Summary (Last 24 hours) at 17 1448  Last data filed at 17 1000   Gross per 24 hour   Intake          1412.57 ml   Output                0 ml   Net          1412.57 ml       Gen: critically ill  CV: irregular, tach  Pulm: Ctab.   Abd; soft, NT  Ext: no edema  Neuro: nonverbal        Data:     Recent Labs   Lab Test  17   0517  17   0519   NA  146*  146*   POTASSIUM  4.4  3.4   CHLORIDE  108  110*   CO2  26  28   ANIONGAP  12  8   GLC  169*  125*   BUN  67*  46*   CR  6.40*  4.84*   DOUG  7.6*  7.6*       Hemoglobin   Date Value Ref Range Status   2017 8.0 (L) 13.3 - 17.7 g/dL Final              Bernardo Carter MD

## 2017-06-19 NOTE — PROGRESS NOTES
Respiratory Therapy Note    Patient seen resting in bed on mechanical ventilator settings:    Ventilation Mode: CMV/AC  FiO2 (%): 70 %  Rate Set (breaths/minute): 20 breaths/min  Tidal Volume Set (mL): 450 mL  PEEP (cm H2O): 5 cmH2O  Pressure Support (cm H2O): 7 cmH2O  Oxygen Concentration (%): 70 %  Resp: 15     Respiratory rate 20-26, breath sounds clear, diminished and occasionally coarse, and SpO2 91%. Suctioning moderate-scant, white, thick secretions from ETT. Patient will continue to receive Duoneb QID. RT to follow.    Patsy Winn  5:35 PM June 19, 2017

## 2017-06-19 NOTE — PROGRESS NOTES
Patient became severely hypotensive during dialysis run after 2 hours, he received fluid 500 cc and albumin  , Blood pressure remained very low to detect, so dialysis was stopped and he was started on Levophed  His blood pressure significantly improved with mean arterial above 65, only on 0.03 Levophed  Tachypnea and oxygen saturation or the improved  Chest x-ray and abdominal x-ray were ordered  FT on hold      Results for HUGH JENSEN (MRN [id number]) as of 6/19/2017 15:44   Ref. Range 6/19/2017 15:00   FIO2 Unknown 80%   Ph Venous Latest Ref Range: 7.32 - 7.43 pH 7.41   PCO2 Venous Latest Ref Range: 40 - 50 mm Hg 41   PO2 Venous Latest Ref Range: 25 - 47 mm Hg 26   Bicarbonate Venous Latest Ref Range: 21 - 28 mmol/L 26   Base Excess Venous Latest Units: mmol/L 1.5   Oxyhemoglobin Venous Latest Units: % 38     Additional 30min CC

## 2017-06-19 NOTE — PROGRESS NOTES
Patient had a very large emesis, dark questionable stool  Abdominal x-ray showing small bowel obstruction  Feeding was stopped  Follow-up discussion with the family tomorrow on goals of care is highly recommended especially that he is unable to tolerate feeding now and it is possible ileus or bowel obstruction  Will try to put G-tube to suction

## 2017-06-19 NOTE — PROGRESS NOTES
DIALYSIS PROCEDURE NOTE    Safety checks complete, air detector on, venous and arterial parameters set prior to treatment, and consent verified.    Hesham Ramos was dialyzed for 2 hrs 13 min hrs via R tunneled PCAD using a K3 bath.   A BFR of 450 ml/min with a net volume removal of 0 mL.     Dialysed in . BVP 47.3L. Water alarm in use. Pre-run report received from Andree Carpio RN.    Heparin given on run: 0 units and heparin instilled into both catheter ports.  Kt/V by machine: 0.84  Medications given: epogen and hectorol IV    Treatment notes: Intubated, sedated using minimal precedex. Tachycardic; noted soft BP at start. Access patent and flows well. Tx started well, BP remained stable for first 2 hrs. Became tachypnic. Gave NS boluses of 200 then 300 ml. Was unable to get a valid BP despite numerous attempts. Stopped UF; reduced Qb to 200 ml/min. 12.5 gm albumin given per VO of Dr. Carter. Valid BP found on LLE, just below knee at 159/75. VO to stop HD with 46 min remaining. Net gain of 250 ml at end of tx.     Pt seen by Dr. Carter during treatment.   Pt received education on procedure and ESRD while on dialysis.   Report given to Andree Carpio RN.  .  Post run pt assessment charted in EPIC on [facility] Hemodialysis flow sheet.    MIREYA SimsN, RN  [facility]  [facility]

## 2017-06-19 NOTE — PLAN OF CARE
Problem: Sepsis (Adult)  Goal: Signs and Symptoms of Listed Potential Problems Will be Absent or Manageable (Sepsis)  Signs and symptoms of listed potential problems will be absent or manageable by discharge/transition of care (reference Sepsis (Adult) CPG).   Outcome: No Change  ICU End of Shift Summary.  For vital signs and complete assessments, please see documentation flowsheets.      Pertinent assessments:  Pt was on simv last evening, RR was 35-50, tele hub notified and pt was placed on cmv, pt settled down and slept, was very comfortable all night, this am b/p is a little soft, sats are 100 % on the vent, lungs are clear, con't in a ST. Dialysis is scheduled for today and a change out of the g tube to a J tube.  Major Shift Events: tachcipnic on SIMV, soft b/p  Plan (Upcoming Events): change to CMV on the vent, lower precidex and con't to assess b/p.  Discharge/Transfer Needs: TBD     Bedside Shift Report Completed : yes  Bedside Safety Check Completed: yes

## 2017-06-19 NOTE — PROGRESS NOTES
Received pt on SIMV, turned back to CMV due to increased RR and low Vt. Currently on the following settings:    Ventilation Mode: CMV/AC  FiO2 (%): 40 %  Rate Set (breaths/minute): 20 breaths/min  Tidal Volume Set (mL): 450 mL  PEEP (cm H2O): 5 cmH2O  Oxygen Concentration (%): 40 %    Bs clear/diminsed. Suctioned for small thick white/yellow secretions. Spo2 100%. RT will continue to assess.

## 2017-06-19 NOTE — PROGRESS NOTES
Tele hub notified that pt RR is 30-50 on the SIMV, vol are mainly in the 250 range, orders to follow.

## 2017-06-19 NOTE — PROGRESS NOTES
CLINICAL NUTRITION SERVICES - REASSESSMENT NOTE    Recommendations Ordered by Registered Dietitian (RD):   Begin TF Nepro @ 15 ml/hr. After 12 hrs no s/sx intolerance, advance TF 15 mL q 6 hrs to goal Nepro @ 45 ml/hr x 24 hours.      EVALUATION OF PROGRESS TOWARD GOALS   Enteral intake - Monitor for adequacy, tolerance, stooling, labs  Diet: NPO  Nutrition Support: Pt w/ g-tube for chronic EN. TF reached goal 6/16 @ 0100, however pt had large emesis around 1100 so TF was turned off. A trophic feed via g-tube started 6/17 PM, again turned off 6/18 AM due to emesis.    Goal TF -  Enteral Regimen: Nepro at 45 mL/hr x 24 hours  Total Enteral Provisions: 1080 mL per day provides 1944 kcals, 87 g protein, 788 mL free H2O, 174 g CHO and 14 g Fiber daily. Meets >100% of DRI's.  3434 International units Vitamin A, 113 mg Vitamin C, 29 mg Zinc sulfate  Free Water Flush: standard 60 mL q4 hours, increase to 200 mL once IVF off/per MD discretion  Renal MVI + Ascorbic acid 250 mg daily per pressure injury protocol  Prosource TF for added 40 kcal and 11 g pro daily  D5 @ 20 ml/hr --> 82 kcal daily from dextrose    Labs-  Na 146 (H)  BUN 67 (H, trending up) Cr 6.4 (H), Phos 4.6 (H) - dialysis today   (H)  BGs 135, 149, 136, 176 (H), 177 (H), 169 (H) - MSSI    I/O-  BM x2 yesterday (mucousy, loose, red streaks).  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  Tissue remains slightly macerated on right edge of coccyx wound    Enteral access - conversion to G/J  Per Radiology: Gastrostomy tube 14 Nauruan converted to 18 Nauruan GJ tube without difficulty. Tube is available for immediate use.    ASSESSED NUTRITION NEEDS (PER APPROVED PRACTICE GUIDELINES; DW: 48.5 kg):  Estimated Energy Needs: 4712-8288 kcals (35-40 Kcal/Kg)  Justification: repletion  Estimated Protein Needs: 58-87+ grams protein (1.2-1.8+ g  pro/Kg)  Justification: wound healing and dialysis    Previous Goals:   TF at goal rate to meet % of estimated needs within 24 hours  Evaluation: Not met. TF at goal for ~10 hrs    Previous Nutrition Diagnosis:   Inadequate enteral nutrition infusion related to vomiting and previous EN intolerance as evidenced by slow advancement to goal rate, pressure injury requiring increased protein needs with HD  Evaluation: No change    CURRENT NUTRITION DIAGNOSIS  Inadequate enteral nutrition infusion related to vomiting and previous EN intolerance as evidenced by inability to advance to goal rate w/ G-tube, pressure injury requiring increased protein needs with HD    INTERVENTIONS  Recommendations / Nutrition Prescription  Begin TF: Nepro @ 15 ml/hr. After 12 hours no s/sx intolerance, advance TF 15 ml q 6 hrs to goal.   - Fluid flushes 60 mL Q 4Hrs or per Nephrology      Implementation  EN Schedule - ordered in EPIC as above  Collaboration and Referral of Nutrition care - POC discussed during interdisciplinary rounds      Goals  Pt to tolerate TF advancement to goal in the next 1-2 days.       MONITORING AND EVALUATION:  Progress towards goals will be monitored and evaluated per protocol and Practice Guidelines    Angelita Silva, RD, LD

## 2017-06-19 NOTE — PROGRESS NOTES
[facility]  Hospitalist Progress Note  Alok Ortiz MD, [facility].    Reason for Stay (Diagnosis): respiratory distress 2/2 aspiration of tube feeds         Assessment and Plan:      Summary of Stay: Hesham Ramos is a 67 year old male with a history of prior debilitating strokes, sz d/o, htn/diastolic CHF, ESRD on HD, and significantly debilitated who lives in a group home.  He had a feeding tube placed approximately 3 months ago for dysphagia, and has continued to decline since that time admitted on 6/11/2017 with e/o TF coming through his mouth and nose, with hypoxia/fever consistent with aspiration pna complicated by sepsis (leukocytosis/lactic acidosis/fever) which progressed to septic shock necessitating norepi/vasopressin support which has been weaned to off 6/15/17.  His hypoxic respiratory failure progressed and he ultimately required intubation on 6/12/17.  He remains intubated but with minimal ventilatory support undergoing daily weans that are limited by tachypnea/low tidal volumes. His early hospitalization is also notable for profound hyperglycemia nececsitating insulin gtt with hgb A1c wnl at 5.1  Also of note during this hospitalization is on admission BUN was noted to be quite high in the 150 range, despite HD, and hb has been drifting down, and dark stools have been noted, all suspicious for slow GIB.  He has required 2 prbc units.  His TFs had been resumed to challenge the G-tube, he had been transitioned up to goal rate, noted to have 150cc residual and before it could be stopped had large emesis occurring 6/16/17, subsequently developed tachycardia and fever consistent with another aspiration event, with e/o sepsis with hypotension/fever/worsening leukocytosis. TF held for 24 hours and resumed 6/17 at trophic rate, and this am had another aspiration event, so now they have been discontinued.  Plan is to convert G tube to G-J tube 6/19/17 today     Problem List:   1. Septic  Shock 2/2 aspiration pna, off nor-epi, and vaso weaned off post aggressive HD with 3L on 6/16/17, then hypotension recurred but responded to 500 cc LR bolus.  Shock is resolved, but is having e/o recurring SIRS with worsening leukocytosis/fever-suspected aspiration pneumonitis (infectious vs inflammatory vs both)    2.  aspiration pna with TFs:  How it initially happened is not clear-family members report that he was laid flat after bolus feeds.  It has been discussed with family the need to change from a G-tube to J-tube to decrease this from re-occurring.  And now that it re-occurred .  Rec'd 3 days of pip tazo, then started on amp-sulbactam, so on day 9 of abx-likely will need a 14 day course, maybe more now that aspiration recurred with  feeds on 6/18/17.  procal 69.7  on 6/17/17    3. Hypoxic Respiratory Failure 2/2 aspiration pneumonia:  not extubatable due to tachypnea and low tidal volumes .  Cont qd weaning trials.  Repeat aspiration bodes poorly for future extubation.  In past family has not wanted to pursue trach placement.  On day 7 of intubation .   Family have stated they do not want to pursue trach placement. His sputum is growing heavy growth yeast, and given his thrush wonder if it is real.  Is on nystatin swabs for thrush, and fluconazole.     4. Suspected GIB:  Given high BUN/drifting down of hgb, episodic dark stools-has rec'd 2 u PRBCs, currently at 8.2-usually in 10-11 range.  Monitor for now, cont to hold asa,no AC.  No hematemesis with episode of emesis 6/16 or 6/18      5. ESRD on HD:  Appreciate Dr. Rose's input-HD am 6/16 with 3 L fluid removal-did have some hypotension on alejandro of 6/16 so given 500 cc back. Next HD likely 6/19/17    6. Transaminitis:  LFTs have been consistently mildly elevated throughout hospitalization, ? Due to TF vs mild shock liver vs other.  No e/o hepatic failure.  Now almost normalized    7. Htn:  Previously treated by now off all meds since 2015 due to  "hypotension (? 2/2 weight loss?)  8. Seizure d/o:  phenytoin 300 mg at bedtime is home regimen-currently on  mg bid fosphenytoin  9. Diastolic HF: most recent echo in 11/2015 did show apical hypertrophy-recs at that time were for cardiac MRI but I don't see that was ever completed  10. Prior debilitating strokes:  Home regimen is asa and simva - both on hold-asa for GIB, statin for increased transaminases-  11. Dementia; presumed vascular in nature-resume donepezil when able (G-J in place)  12. Hyperglycemia:  hgb A1c 5.1 %, no hx of dm, needed transient insulin gtt, now on ISS with minimal requirements.  This is likely to change once refeeding begins.   13. Trop leak:  2/2 demand ischemia  15.  ASx UE edema:  No e/o DVT by US  16.  Nutrition:  Clearly can't use G-tube for goal tube feeds, resumed them at trophic levels for gut protection 6/17 and now off for recurrent aspiration event with just trophic dosing  17.  Hypernatremia 2/2 inadequate free water replacement-start on low dose D5     DVT Prophylaxis: PCDs, no pharmacological AC in light of suspected slow GIB  Code Status: DNR-prior to this hospitalization he was full code, family is interested in getting distant family members here to see how poor his quality of life is to consider de-escalating cares.  Palliative care is following, if he fails extubation, discussion on terminal extubation was family would be reasonable  Discharge Dispo: back to group home  Estimated Disch Date / # of Days until Disch: unclear - continues to require ICU level care, at this time he needs to be started on feeding after GJ conversion and extubated        Interval History (Subjective):        Unable, sedated on vent                  Physical Exam:      Last Vital Signs:  /82  Pulse 95  Temp 99.4  F (37.4  C) (Axillary)  Resp 15  Ht 1.651 m (5' 5\")  Wt 56.5 kg (124 lb 9 oz)  SpO2 100%  BMI 20.73 kg/m2      Sedated w/dexmedetomidine,intubated  Comfortable " appearing  Atrophy of b ue and le with right arm contracture and e/o non-pitting edema of right arm, diffuse edema  Head ncat sclera normal pigmentation  lungs coarse but otherwise clear,  RRR no m/r/g    Abdomen soft no HSM, +BS  Skin warm and dry            Medications:      All current medications were reviewed with changes reflected in problem list.         Data:      All new lab and imaging data was reviewed.   Labs:    Recent Labs  Lab 06/19/17  0517   *   POTASSIUM 4.4   CHLORIDE 108   CO2 26   ANIONGAP 12   *   BUN 67*   CR 6.40*   GFRESTIMATED 9*   GFRESTBLACK 11*   DOUG 7.6*       Recent Labs  Lab 06/19/17  0517   WBC 14.0*   HGB 8.0*   HCT 25.9*   MCV 97       Procalcitonin 69.7 (!)      Critical Care time 45min

## 2017-06-19 NOTE — PROGRESS NOTES
Gastrostomy tube 14 Portuguese converted to 18 Portuguese GJ tube without difficulty. Tube is available for immediate use. Patient tolerated well and was transferred back to ICU via cart with flying squad RN.

## 2017-06-19 NOTE — PROGRESS NOTES
ICU Attending Note    I have seen and examined Hesham Ramos, reviewed the patient's history, pertinent labs, vital signs, medications, physical exam, and radiographs.  The patient is critically ill by my examination and requires continued ICU monitoring and cares    Hesham Ramos is admitted to ICU with respiratory failure after aspiration event. Had a g tube placed in March.  Was observed aspirating.  Intubated on admission.  Lives in a group home. .    Recent Events:  G switched to G-J today    Exam:  Temp:  [99.3  F (37.4  C)-101.6  F (38.7  C)] 99.4  F (37.4  C)  Heart Rate:  [] 106  Resp:  [6-50] 20  BP: ()/(22-82) 97/46  FiO2 (%):  [40 %] 40 %  SpO2:  [93 %-100 %] 100 %    Intake/Output Summary (Last 24 hours) at 06/19/17 1043  Last data filed at 06/19/17 1000   Gross per 24 hour   Intake          1533.57 ml   Output                0 ml   Net          1533.57 ml     Ventilation Mode: CMV/AC  FiO2 (%): 40 %  Rate Set (breaths/minute): 20 breaths/min  Tidal Volume Set (mL): 450 mL  PEEP (cm H2O): 5 cmH2O  Pressure Support (cm H2O): 7 cmH2O  Oxygen Concentration (%): 40 %  Resp: 20      Results:  ABG   Recent Labs  Lab 06/13/17  1732 06/12/17  2304 06/12/17  1405   PH 7.52* 7.27* 7.26*   PCO2 31* 26* 28*   PO2 94 108* 228*   HCO3 25 12* 13*     CBC  Recent Labs  Lab 06/19/17  0517 06/18/17  0519 06/17/17  0820 06/16/17  0500   WBC 14.0* 13.2* 14.5* 12.0*   HGB 8.0* 8.2* 8.8* 8.2*   HCT 25.9* 26.4* 28.0* 25.6*    222 194 171     BMP  Recent Labs  Lab 06/19/17  0517 06/18/17  0519 06/17/17  0820 06/16/17  1300   * 146* 143 142   POTASSIUM 4.4 3.4 3.7 3.5   CHLORIDE 108 110* 106 102   CO2 26 28 27 33*   BUN 67* 46* 30 19   CR 6.40* 4.84* 3.81* 2.47*   * 125* 122* 154*     LFT  Recent Labs  Lab 06/18/17  0519 06/17/17  0820 06/16/17  0500 06/15/17  0520 06/14/17  0525   AST 68*  --  54* 100* 199*   ALT 75*  --  103* 133* 202*   ALKPHOS 114  --  156* 135 126   BILITOTAL  0.4  --  0.6 0.6 0.5   ALBUMIN 1.9*  --  2.1* 2.0* 1.9*   INR  --  1.38*  --   --  1.76*     PancreasNo lab results found in last 7 days.  INR  Lab Results   Component Value Date    INR 1.38 06/17/2017    INR 1.76 06/14/2017    INR 1.23 06/11/2017    INR 1.03 04/23/2017       Current Issues:  1.  Neurology: baseline unresponsive, dexmedatomidine for sedation  2.  Intubated 6/12.  Vent support.  Weaning trials.  Has failed due to rapid shallow breathing  3.  End stage renal disease: dialysis  4.  Pneumonia: empiric antibiotics, no clear culture data  5.  Protein calorie malnutrition: tube feeds    Evaluation and management time exclusive of procedures was 30 minutes critical care time including:  examination with the ICU team, discussion of the patient's condition with other physicians and members of the care team, reviewing all data related to the patient, and time utilizing the EMR for documentation of this patient's care.          Arpan Farooq MD  Acute Care Surgery/Critical Care

## 2017-06-20 NOTE — PLAN OF CARE
Problem: Goal Outcome Summary  Goal: Goal Outcome Summary  Outcome: Declining  ICU End of Shift Summary.  For vital signs and complete assessments, please see documentation flowsheets.      Pertinent assessments: RASS -2, sleepy, doesn't follow command, LS coarse on 70% Fio2, coughing infrequent, tan thick secretions, no BM noted, vomited-brown looking vomitus, coccyx wound cares done per plan of care orders  Major Shift Events: possible aspiration, BP dropped, RR increased, CXR done see results, TF held, Levo started, up titrated precedex, Dialysis stopped no wgt off per RN  Plan (Upcoming Events): Per discussion in rounds if unable to wean off vent, possible palliative extubation if ok with family  Discharge/Transfer Needs: TBD     Bedside Shift Report Completed : yes  Bedside Safety Check Completed: yes

## 2017-06-20 NOTE — CONSULTS
[facility] Surgery Consultation    Hesham Ramos MRN# [id number]   Age: 67 year old YOB: 1950     Date of Admission:  6/11/2017    Reason for consult: SBO       Requesting physician: Donita       Level of consult: Consult, follow and place orders           Assessment and Plan:   Assessment:   SBO  Patient Active Problem List    Diagnosis Date Noted     Respiratory distress 06/12/2017     Priority: Medium     Feeding by G-tube (H) 03/31/2017     Priority: Medium     Aspiration into airway, sequela 03/31/2017     Priority: Medium     Hemodialysis catheter dysfunction, initial encounter (H) 10/25/2016     Priority: Medium     Hyperkalemia 10/24/2016     Priority: Medium     Diastolic CHF, chronic (H)      Priority: Medium     Septic shock (H) 11/12/2015     Priority: Medium     Health Care Home 08/25/2014     Pt not active in care coordination.  Status:  Declined  Care Coordinator:  KATY DOE    See Letters for H Care Plan  Date:  August 25, 2014           Seizures (HCC) 08/17/2014     Hematemesis 06/03/2014     Renal cancer (H) 04/25/2014     Hypoxia 03/13/2014     Syncope 08/02/2013     Anemia 08/02/2013     EKG abnormality 08/02/2013     Encephalopathy 08/02/2013     CKD (chronic kidney disease) stage 5, GFR less than 15 ml/min (H) 07/23/2013     Cognitive impairment 06/21/2013     ESRF (end stage renal failure) (H) 05/16/2013     Advance Care Planning 11/01/2011     Advance Care Planning 12/2/2015: Receipt of ACP document:  Received: POLST which was signed and dated by provider on 11/18/15.  Document previously scanned on 11/20/15.  Order reviewed and found to be valid.  Code Status reflects choices in most recent ACP document.  Confirmed/documented designated decision maker(s).  Added by PABLO CHAN  Advance Care Planning: Initial facilitation introduction:   Hesham Ramos presented for initial session regarding ACP at a group session. He was accompanied by no  one. Honoring Choices information provided and resources reviewed. He currently wishes to review choices without completion of an ACP document.  He currently has the following questions or concerns about Advance Care Planning: none known.  Confirmed/documented designated decision maker(s). See permanent comments section of demographics in clinical tab. Added by Kaylin Rincon on 10/20/2014  Advance Care Planning: Receipt of ACP document:  Received: POLST which was signed and dated by provider on 8/27/13.  Document previously scanned on 10/3/13.  Order reviewed and found to be valid.  Code Status reflects choices in most recent ACP document.  Confirmed/documented designated decision maker(s). See permanent comments section of demographics in clinical tab. View document(s) and details by clicking on code status. Added by Kaylin Rincon on 10/20/2014.  Pt have received packets.  Juliette Briggs MA         HYPERLIPIDEMIA LDL GOAL <100 10/31/2010     Cerebrovascular accident (CVA) due to occlusion of cerebral artery (H)      Hemiplegia, aphasia       Chronic diastolic congestive heart failure (H)      Essential hypertension      Problem list name updated by automated process. Provider to review           Plan:   Abdominal film - to see if CT contrast progresses to colon (partial obstruction)  Laparoscopy/laparotomy with lysis of adhesions or SB resection would be an option after he gets over his aspiration pneumonia and sepsis. Since his only abdominal surgery was laparoscopic cryotherapy of a right renal tumor in 2007, we would hope that adhesions would be minimal and surgery could be done laparoscopically. Since the obstruction is presumably chronic, GT suction should suffice for now.   Currently he has a prohibitive surgical risk.   Discussed with hospitalist team - they are in agreement.  Continue on LIS, no TF            Chief Complaint:   aspiration     History is obtained from the electronic health record and  hospitalist         History of Present Illness:   This patient is a 67 year old  male with a significant past medical history of chronic kidney disease and stroke, unresponsive at baseline who was found in acute respiratory distress and with tube feedings coming out of his nose and mouth. He was admitted with aspiration pneumonia and sepsis. He has a history of aspiration so a G-tube was placed on 3/28/17 (this was converted to G-tube with feeding jejunostomy 6/11/17 to allow TF and minimize risk of aspiration.  TF were gradually advanced until he had a large emesis 6/16. TF were held , then restarted slowly and he had another emesis 6/19. CT today reveals a picture of SBO. There is a question of some narrowing in the mid ileum but transition is not definitely seen. He is now on LIS.           Past Medical History:     Past Medical History:   Diagnosis Date     Cognitive impairment      Diastolic CHF, chronic (H)      End-stage renal disease on hemodialysis (H)      Essential hypertension     Off all BP meds as of late 2015 due to hypotension     Renal cancer (H)     s/p cryoablation     Seizures (H) Onset 8/2014    On dilantin     Stroke (H) 2000, 2007    Hemiplegia, aphasia             Past Surgical History:     Past Surgical History:   Procedure Laterality Date     Cryoablation of kidney cancer       ESOPHAGOSCOPY, GASTROSCOPY, DUODENOSCOPY (EGD), COMBINED  8/5/2014    Procedure: COMBINED ESOPHAGOSCOPY, GASTROSCOPY, DUODENOSCOPY (EGD), BIOPSY SINGLE OR MULTIPLE;  Surgeon: Raheel Mixon MD;  Location: [facility]     HEMODIALYSIS VIA AV FISTULA       PEG tube NOS       right arm skin graft               Social History:     Social History   Substance Use Topics     Smoking status: Never Smoker     Smokeless tobacco: Never Used     Alcohol use No             Family History:     Family History   Problem Relation Age of Onset     Family History Negative Mother      Unknown/Adopted No family hx of               Immunizations:     VACCINE/DOSE   Diptheria   DPT   DTAP   HBIG   Hepatitis A   Hepatitis B   HIB   Influenza   Measles   Meningococcal   MMR   Mumps   Pneumococcal   Polio   Rubella   Small Pox   TDAP   Varicella   Zoster             Allergies:   No Known Allergies          Medications:     Current Facility-Administered Medications   Medication     [START ON 6/22/2017] levofloxacin (LEVAQUIN) infusion 500 mg     epoetin arnulfo (EPOGEN/PROCRIT) 5,000 Units injection     doxercalciferol (HECTOROL) injection 2 mcg     norepinephrine (LEVOPHED) 16 mg in D5W 250 mL infusion     pantoprazole (PROTONIX) 40 mg IV push injection     fluconazole (DIFLUCAN) intermittent infusion 200 mg in NaCl     D5W 1,000 mL infusion     0.9% sodium chloride infusion     0.9% sodium chloride infusion     simvastatin (ZOCOR) tablet 40 mg     insulin aspart (NovoLOG) inj (RAPID ACTING)     ascorbic acid tablet 250 mg     acetaminophen (OFIRMEV) infusion 1,000 mg     acetaminophen (TYLENOL) Suppository 650 mg     nystatin (MYCOSTATIN) suspension 500,000 Units     doxercalciferol (HECTOROL) injection 4 mcg     epoetin arnulfo (EPOGEN/PROCRIT) injection 3,000 Units     dextrose 10 % 1,000 mL infusion     B and C vitamin Complex with folic acid (NEPHRONEX) liquid 5 mL     Prosource TF protein modulator     fosphenytoin (CEREBYX) 150 mg PE in NaCl 0.9 % 100 mL intermittent infusion     dextrose 10 % 1,000 mL infusion     glucose 40 % gel 15-30 g    Or     dextrose 50 % injection 25-50 mL    Or     glucagon injection 1 mg     ipratropium - albuterol 0.5 mg/2.5 mg/3 mL (DUONEB) neb solution 3 mL     albuterol neb solution 2.5 mg     ondansetron (ZOFRAN-ODT) ODT tab 4 mg    Or     ondansetron (ZOFRAN) injection 4 mg     prochlorperazine (COMPAZINE) injection 5 mg    Or     prochlorperazine (COMPAZINE) tablet 5 mg    Or     prochlorperazine (COMPAZINE) Suppository 12.5 mg     0.9% sodium chloride BOLUS     dexmedetomidine (PRECEDEX) 400 mcg in NaCl 0.9 %  100 mL infusion     LORazepam (ATIVAN) injection 0.5 mg     - MEDICATION INSTRUCTIONS for Dialysis Patients -     HYDROmorphone (DILAUDID) injection 0.2 mg     naloxone (NARCAN) injection 0.1-0.4 mg             Review of Systems:   Not obtainable from patient - reports of gradual weight loss in chart          Physical Exam:   All vitals have been reviewed  Patient Vitals for the past 24 hrs:   BP Temp Temp src Heart Rate Resp SpO2 Weight   06/20/17 1600 - - - - - 100 % -   06/20/17 1530 106/48 - - 94 20 100 % -   06/20/17 1515 99/48 - - 96 20 100 % -   06/20/17 1500 103/50 - - 99 (!) 77 100 % -   06/20/17 1445 117/49 - - 101 19 100 % -   06/20/17 1430 99/58 - - 99 19 100 % -   06/20/17 1330 121/59 - - 102 20 99 % -   06/20/17 1315 118/71 - - 106 24 99 % -   06/20/17 1300 111/55 100  F (37.8  C) Axillary 103 19 100 % -   06/20/17 1245 124/52 - - 101 20 97 % -   06/20/17 1230 119/60 - - 103 16 98 % -   06/20/17 1215 131/53 - - 102 19 97 % -   06/20/17 1200 93/50 - - 100 19 99 % -   06/20/17 1150 - - - - - 98 % -   06/20/17 1145 136/76 - - 96 19 100 % -   06/20/17 1130 108/49 - - 95 19 95 % -   06/20/17 1115 100/45 - - 98 20 100 % -   06/20/17 1100 129/53 - - 99 20 100 % -   06/20/17 1045 137/64 - - 103 19 99 % -   06/20/17 1030 101/45 - - 95 20 99 % -   06/20/17 1015 97/47 - - 96 20 100 % -   06/20/17 1000 95/45 - - 101 20 100 % -   06/20/17 0945 95/49 - - 98 20 99 % -   06/20/17 0930 91/44 - - 102 19 99 % -   06/20/17 0915 99/51 - - 98 20 99 % -   06/20/17 0900 95/43 - - 102 22 93 % -   06/20/17 0845 106/55 - - 104 19 (!) 69 % -   06/20/17 0830 (!) 87/40 - - 102 20 100 % -   06/20/17 0815 (!) 82/39 - - 102 20 (!) 89 % -   06/20/17 0800 92/47 - - 98 19 100 % -   06/20/17 0755 - - - - - 100 % -   06/20/17 0743 109/48 99.9  F (37.7  C) Axillary 99 19 100 % -   06/20/17 0730 105/49 - - 100 21 100 % -   06/20/17 0700 106/47 - - 102 19 100 % -   06/20/17 0645 120/52 - - 104 22 99 % -   06/20/17 0615 101/46 - - 98 19 100 %  -   06/20/17 0600 90/64 - - 96 19 90 % -   06/20/17 0545 116/65 - - 102 21 100 % -   06/20/17 0530 (!) 108/36 - - 98 19 100 % -   06/20/17 0515 93/52 - - 101 20 100 % -   06/20/17 0500 99/66 - - 101 21 100 % -   06/20/17 0445 (!) 88/55 - - 100 19 100 % -   06/20/17 0430 102/51 - - 101 18 100 % -   06/20/17 0415 (!) 86/35 - - 103 23 100 % -   06/20/17 0403 - - - - - 100 % -   06/20/17 0400 100/55 100  F (37.8  C) Axillary 101 20 100 % -   06/20/17 0345 102/41 - - 100 19 100 % -   06/20/17 0330 (!) 85/38 - - 102 22 100 % -   06/20/17 0315 (!) 110/28 - - 101 21 100 % -   06/20/17 0300 107/59 - - 103 27 100 % -   06/20/17 0245 111/60 - - 102 20 100 % -   06/20/17 0230 111/50 - - 103 21 98 % -   06/20/17 0215 (!) 122/26 - - 102 20 100 % -   06/20/17 0200 (!) 85/40 - - 101 19 100 % -   06/20/17 0145 98/55 - - 101 20 100 % -   06/20/17 0130 96/53 - - 101 19 100 % -   06/20/17 0115 96/51 - - 104 21 100 % -   06/20/17 0100 (!) 103/35 - - 104 19 100 % -   06/20/17 0045 92/49 - - 104 21 100 % -   06/20/17 0030 109/47 - - 104 21 100 % -   06/20/17 0015 (!) 106/37 - - 104 19 100 % -   06/20/17 0000 (!) 123/31 98.7  F (37.1  C) - 108 25 100 % 57.4 kg (126 lb 8.7 oz)   06/19/17 2345 (!) 133/36 - - 107 21 100 % -   06/19/17 2343 - - - - - 98 % -   06/19/17 2300 105/40 - - 106 23 100 % -   06/19/17 2245 (!) 76/42 - - 105 23 99 % -   06/19/17 2230 108/41 - - 109 20 94 % -   06/19/17 2215 107/42 - - 109 15 (!) 85 % -   06/19/17 2200 116/41 - - 109 20 97 % -   06/19/17 2145 (!) 114/38 - - 110 19 94 % -   06/19/17 2130 (!) 104/30 - - 112 16 95 % -   06/19/17 2115 (!) 127/38 - - 114 21 94 % -   06/19/17 2100 116/54 - - 113 13 98 % -   06/19/17 2045 114/44 - - 113 15 98 % -   06/19/17 2030 (!) 116/31 - - 112 8 96 % -   06/19/17 2015 118/66 - - 108 20 100 % -   06/19/17 2000 118/63 - - 106 22 100 % -   06/19/17 1945 (!) 100/37 100.1  F (37.8  C) Axillary 105 20 99 % -   06/19/17 1930 (!) 112/26 - - 104 20 100 % -   06/19/17 1915 100/51 - -  107 22 98 % -   06/19/17 1900 (!) 98/39 - - 107 19 98 % -   06/19/17 1850 - - - 107 21 98 % -   06/19/17 1840 108/40 - - 108 24 97 % -   06/19/17 1830 (!) 96/29 - - 108 21 98 % -   06/19/17 1820 - - - 111 28 98 % -   06/19/17 1810 93/42 - - 110 23 98 % -   06/19/17 1800 (!) 97/32 - - 109 29 100 % -   06/19/17 1740 (!) 86/52 99.9  F (37.7  C) Axillary 113 21 99 % -   06/19/17 1730 134/59 - - 114 11 99 % -   06/19/17 1720 - - - 116 12 97 % -   06/19/17 1715 113/44 - - 116 15 99 % -   06/19/17 1710 113/44 - - 116 14 98 % -   06/19/17 1700 141/53 - - 117 26 (!) 76 % -   06/19/17 1650 - - - 117 23 (!) 45 % -   06/19/17 1640 116/81 - - 120 9 - -   06/19/17 1630 124/78 - - 120 19 - -       Intake/Output Summary (Last 24 hours) at 06/20/17 1621  Last data filed at 06/20/17 1400   Gross per 24 hour   Intake          2006.96 ml   Output              350 ml   Net          1656.96 ml     Neck:   intubated and skin normal     Chest / Breast:   Ventilated mechanically     Abdomen:   firm, distended, non-tender (sedated), involuntary guarding absent, no masses palpated and hernia absent             Data:   All laboratory data reviewed  Results for orders placed or performed during the hospital encounter of 06/11/17   Chest  XR, 1 view PORTABLE    Narrative    XR CHEST PORT 1 VW  6/11/2017 11:04 PM      HISTORY: Shortness of breath.     COMPARISON: 4/24/2017.    FINDINGS: Right IJ infusion catheter in place. Vascular stents in the  right arm. The heart size is normal. The thoracic aorta is calcified  and tortuous. There is a band of scar or atelectasis at the left lung  base. The lungs are otherwise clear. No pneumothorax or pleural  effusion.      Impression    IMPRESSION: Mild left basilar atelectasis.    AMBIKA PERLA MD   Head CT w/o contrast    Narrative    CT HEAD W/O CONTRAST 6/11/2017 11:54 PM      HISTORY: Unresponsive.    TECHNIQUE: Routine noncontrast head CT. Radiation dose for this scan  was reduced using automated  exposure control, adjustment of the mA  and/or kV according to patient size, or iterative reconstruction  technique.    COMPARISON: 12/29/2016.    FINDINGS: There is generalized brain atrophy. No mass, mass effect or  intracranial hemorrhage. No evidence of acute infarct. Old cerebellar  infarcts. Periventricular low attenuation is consistent with chronic  small vessel ischemic disease. The visualized paranasal sinuses are  clear.      Impression    IMPRESSION:  No acute abnormality.    AMBIKA PERLA MD   XR Abdomen Port 1 View    Narrative    XR ABDOMEN PORT F1 VW  6/12/2017 4:37 AM      HISTORY: Aspiration.     COMPARISON: None.    FINDINGS: G-tube in the midline. Right inguinal catheter in place.  Catheter projects over the lower pelvis. The bowel gas pattern is  within normal limits. There is air within the liver; this could be  biliary or portal venous. Right hemidiaphragm is elevated.      Impression    IMPRESSION: Air within the liver which may be biliary or portal  venous. If there is clinical concern for portal venous gas, CT is  recommended.    AMBIKA PERLA MD   XR Chest Port 1 View    Narrative    CHEST ONE VIEW PORTABLE June 12, 2017 12:45 PM     HISTORY: Endotracheal tube placement.    COMPARISON: 6/11/2017.      Impression    IMPRESSION: Endotracheal tube in good position. Elevated right  diaphragm. Interstitial densities in both lungs may be partly due to  shallow inspiration. Normal heart size and pulmonary vascularity.  Central venous catheter is unchanged. Nasogastric tube is coiled in  the esophagus and must be repositioned. Apparent stent grafts over the  right arm medially.    THEODORE SWAN MD   CT Abdomen Pelvis w/o Contrast    Narrative    CT ABDOMEN AND PELVIS WITHOUT CONTRAST   6/12/2017 4:31 PM     HISTORY: Sepsis thought to be due to aspiration of tube feeds but with  air in liver on x-ray. Evaluate for biliary obstruction, infection,  etc.    TECHNIQUE: CT of the abdomen and pelvis  was performed without  intravenous contrast. Radiation dose for this scan was reduced using  automated exposure control, adjustment of the mA and/or kV according  to patient size, or iterative reconstruction technique.    COMPARISON: CT of the abdomen/pelvis dated 2/25/2009.    FINDINGS:    This is a very limited exam due to motion artifact and lack of  intravenous contrast.    Again identified are cysts within the liver. No definite air within  the biliary tree is seen. No definite intrahepatic biliary duct  dilatation although without contrast, sensitivity for detection is  decreased. No definite extrahepatic biliary duct dilatation.    Since the previous exam, a G-tube has been placed.    Evaluation of the remaining solid organs is limited due to a lack of  intravenous contrast. The kidneys are atrophic with multiple  calcifications.    There are distended loops of small bowel throughout the abdomen.    A La catheter is in place.    There is no free air and no definite free fluid in the abdomen or  pelvis.    There are COPD changes at the lung bases. Bibasilar atelectasis and/or  infiltrates.      Impression    IMPRESSION:   1. Limited exam. No definite air within the biliary tree or biliary  duct dilatation identified on this study.  2. Dilated loops of small bowel throughout the abdomen. This is  nonspecific. It could represent a diffuse ileus.  3. Bibasilar atelectasis and/or infiltrates.    SHANTI ANGEL MD   US Abdomen Limited Portable    Narrative    ULTRASOUND ABDOMEN LIMITED PORTABLE  6/14/2017 8:16 AM     HISTORY: Elevated liver enzymes. Elevated INR.    COMPARISON: CT of the abdomen and pelvis performed 6/12/2017.    FINDINGS: Scattered liver cysts are noted, with the largest on the  right measuring 3.2 cm. No evidence for fatty infiltration of the  liver. The gallbladder wall appears thickened, measuring up to 0.6 cm.  No shadowing gallstones are identified. Evaluation of the gallbladder  is  limited related to patient immobility. No pericholecystic fluid is  seen. No intra- or extrahepatic bile duct dilatation. Common hepatic  duct is normal in diameter. The entire common duct could not be  visualized on this scan. The right kidney could not be visualized.  Pancreas is partially obscured by overlying bowel gas, but appears  normal where seen. The abdominal aorta and IVC are of normal caliber  where visualized. A small amount of ascites is noted in the right  upper quadrant.      Impression    IMPRESSION:   1. There is mild gallbladder wall thickening. No other convincing  sonographic evidence for cholecystitis.  2. Small amount of ascites is noted in the right upper quadrant.  3. Multiple hepatic cysts are identified  4. Nonvisualization of the right kidney.    MAYRA MERCADO MD   US Lower Extremity Venous Duplex Right    Narrative    VENOUS ULTRASOUND RIGHT LEG  6/15/2017 9:11 AM     HISTORY: Swelling in the right leg    COMPARISON: None.    FINDINGS:  Examination of the deep veins with graded compression and  color flow Doppler with spectral wave form analysis was performed.   The right common femoral vein and saphenofemoral junction were not  visualized due to overlying bandage. Visualized deep veins are patent  without thrombus.      Impression    IMPRESSION: No evidence of deep venous thrombosis.    SHANTI ANGEL MD   US Extremity Upper Venous rt    Narrative    ULTRASOUND VENOUS RIGHT UPPER EXTREMITY  6/15/2017 11:38 AM     HISTORY: Evaluate for DVT.    COMPARISON: None.    TECHNIQUE: Ultrasound gray scale, Color Doppler flow, and spectral  Doppler waveform analysis performed.    FINDINGS: There are no intraluminal filling defects. The right  internal jugular, axillary, cephalic, basilic, brachial, radial, and  ulnar veins demonstrate normal compressibility. The right internal  jugular, innominate, subclavian, and axillary veins have normal venous  waveforms. Technologist noted the presence  of a PICC on the right.      Impression    IMPRESSION: No evidence for DVT.    NATALIYA LIU MD   XR Chest Port 1 View    Narrative    CHEST ONE VIEW UPRIGHT 6/16/2017 11:26 AM     HISTORY: Failing breathing trial.    COMPARISON: 6/12/2017.      Impression    IMPRESSION: Endotracheal tube tip mid to distal trachea. Large bore  right IJ line unchanged. Scattered trace atelectasis and/or fibrosis.  Previously seen orogastric tube has been removed.    LUCIA MCGUIRE MD   XR Chest Port 1 View    Narrative    CHEST PORTABLE ONE VIEW June 18, 2017 9:19 AM     HISTORY: Aspiration.    COMPARISON: 6/16/2017.      Impression    IMPRESSION: Endotracheal tube and dialysis catheter unchanged. Patchy  airspace opacity present in the left lower lobe, atelectasis versus  pneumonia. Right lung is clear. No pneumothorax or pleural effusion.  Vascular stents noted in the right arm.    NATALIYA LIU MD   XR Gastro Tube to Gastrojejuno Convert    Narrative    GASTRO TUBE TO GASTROJEJUNO CONVERT  6/19/2017 9:55 AM     HISTORY:  Patient has a G-tube in place. He has developed significant  nausea and vomiting suggesting possible gastric outlet obstruction.    COMPARISON: 3/28/2017.    FINDINGS: After obtaining informed consent, the patient was placed in  a supine position on the fluoroscopy table. The existing G-tube was  removed. Skin entry site was prepped and draped in the usual sterile  manner. A JB1 catheter was used to pass a wire into the proximal  jejunum through the existing tract. Over the wire, an 18 [place] JUAN CARLOS GJ  tube was placed. The tip was positioned in the proximal jejunum just  past the ligament of Treitz. Contrast was injected and showed adequate  positioning. A fluoroscopic image was saved.    The patient tolerated the procedure well. There were no immediate  postprocedure complications. The patient's vital signs were monitored  by radiology nursing staff under my supervision and remained stable  throughout the  study.    MEDICATIONS: None.    Fluoroscopy time: 7 minutes 31 seconds.    Contrast: 30 mL Gastroview.    Radiation dose: 76 mGy.      Impression    IMPRESSION: GJ tube replaced as above.    SHANTI ANGEL MD   XR Chest Port 1 View    Narrative    CHEST PORTABLE ONE VIEW  6/19/2017 4:53 PM      HISTORY: Worsening breathing on ventilator.     COMPARISON: 6/18/2017.    FINDINGS: Semiupright portable chest. ET tube in the mid trachea.  Right IJ infusion catheter is in place. There is no pneumothorax. The  heart size is normal. Thoracic aorta is tortuous. Lung volumes are  low. Mild right basilar atelectasis.      Impression    IMPRESSION: Mild right basilar atelectasis. No pneumothorax.    AMBIKA PERLA MD   XR Abdomen Port 1 View    Narrative    ABDOMEN PORTABLE FRONT ONE VIEW  6/19/2017 4:52 PM      HISTORY: Distended after FT evaluate for small bowel obstruction or  ileus.     COMPARISON: None.    FINDINGS: Supine portable abdomen. There are multiple dilated small  bowel loops suggesting small bowel obstruction. G-tube projects over  the mid abdomen. Right inguinal catheter in place.    AMBIKA PERLA MD   CT Abdomen Pelvis w/o & w Contrast    Narrative    CT ABDOMEN/PELVIS WITH AND WITHOUT CONTRAST June 20, 2017 2:04 PM     HISTORY: Bowel obstruction, question transition point.     TECHNIQUE: Axial images are obtained from the lung bases to the  symphysis without IV contrast. Patient had poor vascular access. Oral  contrast was administered. Coronal reformatted images are also  generated. Radiation dose for this scan was reduced using automated  exposure control, adjustment of the mA and/or kV according to patient  size, or iterative reconstruction technique.    FINDINGS: Bibasilar atelectasis is present with consolidation right  lower lobe concerning for pneumonia. Chronic fibroatelectatic changes  could look similar.    Abdomen: Gastrostomy tube with jejunal extension appears in good  position. No free  intraperitoneal air. Oral contrast opacifies the  stomach and a significant portion of the small bowel. A discrete  transition point is not definitely identified but there is focal  narrowing in the region of the terminal ileum just proximal to the  ileocecal valve on series 2, image 58 and series 3, images 54-59.  There is also relative narrowing in the mid ileum in the lower abdomen  on series 3, image 41 where the more proximal small bowel is more  distended with fecalized material. The remainder of the more proximal  small bowel is more significantly dilated. Follow-up abdominal x-ray  in several hours following CT imaging could be performed to see if  contrast finally empties into the colon. Colon is decompressed.    Upper abdominal organs demonstrate bilateral renal atrophy with small  nonobstructing stones. No hydronephrosis. Cysts are noted in the liver  and the gallbladder and spleen are unremarkable. Pancreas is within  normal limits. Adrenal glands are unremarkable.    Pelvis: Bladder is decompressed. Prostate gland and rectum are  unremarkable. Small amount free pelvic fluid is present likely related  to the suspected bowel obstruction versus renal failure. No enlarged  lymph nodes. Right femoral venous line is present. Tip terminates in  the inferior IVC. Bone window examination demonstrates ectopic  ossification posterior to the proximal right femur in the adjacent  musculature. Mild degenerative spine changes are noted.      Impression    IMPRESSION:  1. Probable transition point in the lower anterior abdomen in the  region of the mid ileum. Additional area near the terminal ileum also  is narrowed. Colon is decompressed. Oral contrast has not completely  made it through the small bowel but is likely into the distal jejunum  or proximal ileum. Proximal small bowel loops all significantly  distended. Gastrojejunostomy tube appears in good position.  2. Nonobstructing renal collecting system stones and  cysts with  bilateral renal atrophy consistent with end-stage renal disease.    KYLIE HERNANDEZ MD   CBC (platelets, no diff)   Result Value Ref Range    WBC 15.8 (H) 4.0 - 11.0 10e9/L    RBC Count 3.94 (L) 4.4 - 5.9 10e12/L    Hemoglobin 11.7 (L) 13.3 - 17.7 g/dL    Hematocrit 38.2 (L) 40.0 - 53.0 %    MCV 97 78 - 100 fl    MCH 29.7 26.5 - 33.0 pg    MCHC 30.6 (L) 31.5 - 36.5 g/dL    RDW 15.1 (H) 10.0 - 15.0 %    Platelet Count 424 150 - 450 10e9/L   Comprehensive metabolic panel   Result Value Ref Range    Sodium 135 133 - 144 mmol/L    Potassium 5.5 (H) 3.4 - 5.3 mmol/L    Chloride 97 94 - 109 mmol/L    Carbon Dioxide 19 (L) 20 - 32 mmol/L    Anion Gap 19 (H) 3 - 14 mmol/L    Glucose 286 (H) 70 - 99 mg/dL    Urea Nitrogen 147 (H) 7 - 30 mg/dL    Creatinine 7.89 (H) 0.66 - 1.25 mg/dL    GFR Estimate 7 (L) >60 mL/min/1.7m2    GFR Estimate If Black 8 (L) >60 mL/min/1.7m2    Calcium 9.7 8.5 - 10.1 mg/dL    Bilirubin Total 0.4 0.2 - 1.3 mg/dL    Albumin 3.2 (L) 3.4 - 5.0 g/dL    Protein Total 9.4 (H) 6.8 - 8.8 g/dL    Alkaline Phosphatase 219 (H) 40 - 150 U/L    ALT 70 0 - 70 U/L    AST 72 (H) 0 - 45 U/L   Lactic acid whole blood   Result Value Ref Range    Lactic Acid 4.4 (HH) 0.7 - 2.1 mmol/L   Glucose by meter   Result Value Ref Range    Glucose 173 (H) 70 - 99 mg/dL   INR   Result Value Ref Range    INR 1.23 (H) 0.86 - 1.14   Blood gas arterial and oxyhgb   Result Value Ref Range    pH Arterial 7.30 (L) 7.35 - 7.45 pH    pCO2 Arterial 32 (L) 35 - 45 mm Hg    pO2 Arterial 111 (H) 80 - 105 mm Hg    Bicarbonate Arterial 16 (L) 21 - 28 mmol/L    FIO2 100%  HIGH FLOW NC       Oxyhemoglobin Arterial 96 92 - 100 %    Base Deficit Art 9.7 mmol/L   Lactic acid whole blood   Result Value Ref Range    Lactic Acid 4.7 (HH) 0.7 - 2.1 mmol/L   Potassium   Result Value Ref Range    Potassium 5.0 3.4 - 5.3 mmol/L   Lactic acid whole blood   Result Value Ref Range    Lactic Acid 4.1 (HH) 0.7 - 2.1 mmol/L   Troponin I   Result Value  Ref Range    Troponin I ES 0.118 (H) 0.000 - 0.045 ug/L   Troponin I   Result Value Ref Range    Troponin I ES 0.132 (HH) 0.000 - 0.045 ug/L   Glucose   Result Value Ref Range    Glucose 294 (H) 70 - 99 mg/dL   Hemoglobin A1c   Result Value Ref Range    Hemoglobin A1C 5.1 4.3 - 6.0 %   Glucose by meter   Result Value Ref Range    Glucose 184 (H) 70 - 99 mg/dL   Glucose by meter   Result Value Ref Range    Glucose 262 (H) 70 - 99 mg/dL   Glucose by meter   Result Value Ref Range    Glucose 197 (H) 70 - 99 mg/dL   Magnesium   Result Value Ref Range    Magnesium 3.1 (H) 1.6 - 2.3 mg/dL   Phosphorus   Result Value Ref Range    Phosphorus 6.7 (H) 2.5 - 4.5 mg/dL   Vitamin D Deficiency   Result Value Ref Range    Vitamin D Deficiency screening 85 (H) 20 - 75 ug/L   Lactic acid whole blood   Result Value Ref Range    Lactic Acid 4.6 (HH) 0.7 - 2.1 mmol/L   Glucose by meter   Result Value Ref Range    Glucose 121 (H) 70 - 99 mg/dL   Blood gas arterial and oxyhgb   Result Value Ref Range    pH Arterial 7.26 (L) 7.35 - 7.45 pH    pCO2 Arterial 28 (L) 35 - 45 mm Hg    pO2 Arterial 228 (H) 80 - 105 mm Hg    Bicarbonate Arterial 13 (L) 21 - 28 mmol/L    FIO2 80%  Ventilator       Oxyhemoglobin Arterial 99 92 - 100 %    Base Deficit Art 13.4 mmol/L   Basic metabolic panel   Result Value Ref Range    Sodium 142 133 - 144 mmol/L    Potassium 5.6 (H) 3.4 - 5.3 mmol/L    Chloride 107 94 - 109 mmol/L    Carbon Dioxide 15 (L) 20 - 32 mmol/L    Anion Gap 20 (H) 3 - 14 mmol/L    Glucose 140 (H) 70 - 99 mg/dL    Urea Nitrogen 143 (H) 7 - 30 mg/dL    Creatinine 7.77 (H) 0.66 - 1.25 mg/dL    GFR Estimate 7 (L) >60 mL/min/1.7m2    GFR Estimate If Black 8 (L) >60 mL/min/1.7m2    Calcium 7.5 (L) 8.5 - 10.1 mg/dL   Lactic acid   Result Value Ref Range    Lactic Acid 4.7 (HH) 0.4 - 2.0 mmol/L   Renal panel   Result Value Ref Range    Sodium 141 133 - 144 mmol/L    Potassium 5.7 (H) 3.4 - 5.3 mmol/L    Chloride 107 94 - 109 mmol/L    Carbon  Dioxide 14 (L) 20 - 32 mmol/L    Anion Gap 20 (H) 3 - 14 mmol/L    Glucose 152 (H) 70 - 99 mg/dL    Urea Nitrogen 150 (H) 7 - 30 mg/dL    Creatinine 7.83 (H) 0.66 - 1.25 mg/dL    GFR Estimate 7 (L) >60 mL/min/1.7m2    GFR Estimate If Black 8 (L) >60 mL/min/1.7m2    Calcium 7.4 (L) 8.5 - 10.1 mg/dL    Phosphorus 5.6 (H) 2.5 - 4.5 mg/dL    Albumin 2.2 (L) 3.4 - 5.0 g/dL   Glucose by meter   Result Value Ref Range    Glucose 94 70 - 99 mg/dL   Blood gas venous   Result Value Ref Range    Ph Venous 7.27 (L) 7.32 - 7.43 pH    PCO2 Venous 32 (L) 40 - 50 mm Hg    PO2 Venous 40 25 - 47 mm Hg    Bicarbonate Venous 15 (L) 21 - 28 mmol/L    Base Deficit Venous 11.2 mmol/L    FIO2 50%VENT    Calcium, ionized whole blood (1200)   Result Value Ref Range    Calcium Ionized Whole Blood 4.2 (L) 4.4 - 5.2 mg/dL   Glucose by meter   Result Value Ref Range    Glucose 16 (LL) 70 - 99 mg/dL   Glucose by meter   Result Value Ref Range    Glucose 23 (LL) 70 - 99 mg/dL   Blood gas arterial with oxyhemoglobin   Result Value Ref Range    pH Arterial 7.27 (L) 7.35 - 7.45 pH    pCO2 Arterial 26 (L) 35 - 45 mm Hg    pO2 Arterial 108 (H) 80 - 105 mm Hg    Bicarbonate Arterial 12 (L) 21 - 28 mmol/L    FIO2 50%  Ventilator       Oxyhemoglobin Arterial 97 92 - 100 %    Base Deficit Art 13.9 mmol/L   Renal panel   Result Value Ref Range    Sodium 138 133 - 144 mmol/L    Potassium 5.8 (H) 3.4 - 5.3 mmol/L    Chloride 106 94 - 109 mmol/L    Carbon Dioxide 15 (L) 20 - 32 mmol/L    Anion Gap 17 (H) 3 - 14 mmol/L    Glucose 437 (H) 70 - 99 mg/dL    Urea Nitrogen 161 (H) 7 - 30 mg/dL    Creatinine 7.92 (H) 0.66 - 1.25 mg/dL    GFR Estimate 7 (L) >60 mL/min/1.7m2    GFR Estimate If Black 8 (L) >60 mL/min/1.7m2    Calcium 7.4 (L) 8.5 - 10.1 mg/dL    Phosphorus 6.1 (H) 2.5 - 4.5 mg/dL    Albumin 2.0 (L) 3.4 - 5.0 g/dL   Lactic acid whole blood   Result Value Ref Range    Lactic Acid 4.4 (HH) 0.7 - 2.1 mmol/L   Glucose by meter   Result Value Ref Range     Glucose 44 (LL) 70 - 99 mg/dL   Glucose by meter   Result Value Ref Range    Glucose 14 (LL) 70 - 99 mg/dL   Glucose by meter   Result Value Ref Range    Glucose 31 (LL) 70 - 99 mg/dL   Glucose by meter   Result Value Ref Range    Glucose 300 (H) 70 - 99 mg/dL   Basic metabolic panel (AM Draw)   Result Value Ref Range    Sodium 142 133 - 144 mmol/L    Potassium 4.6 3.4 - 5.3 mmol/L    Chloride 105 94 - 109 mmol/L    Carbon Dioxide 21 20 - 32 mmol/L    Anion Gap 16 (H) 3 - 14 mmol/L    Glucose 151 (H) 70 - 99 mg/dL    Urea Nitrogen 150 (H) 7 - 30 mg/dL    Creatinine 7.98 (H) 0.66 - 1.25 mg/dL    GFR Estimate 7 (L) >60 mL/min/1.7m2    GFR Estimate If Black 8 (L) >60 mL/min/1.7m2    Calcium 7.1 (L) 8.5 - 10.1 mg/dL   Glucose by meter   Result Value Ref Range    Glucose 96 70 - 99 mg/dL   Glucose by meter   Result Value Ref Range    Glucose 394 (H) 70 - 99 mg/dL   CBC with platelets differential   Result Value Ref Range    WBC 10.9 4.0 - 11.0 10e9/L    RBC Count 2.42 (L) 4.4 - 5.9 10e12/L    Hemoglobin 7.3 (L) 13.3 - 17.7 g/dL    Hematocrit 22.9 (L) 40.0 - 53.0 %    MCV 95 78 - 100 fl    MCH 30.2 26.5 - 33.0 pg    MCHC 31.9 31.5 - 36.5 g/dL    RDW 14.8 10.0 - 15.0 %    Platelet Count 255 150 - 450 10e9/L    Diff Method       Manual Differential   Slide reviewed by Pathologist  LEFT SHIFTED GRANULOCYTIC SERIES WITH TOXIC CHANGES  SUGGESTIVE OF CURRENT INFECTION/INFLAMATION  MILD DYSGRANULOPOIESIS  Hanna Swanson M.D., Pathologist  /AM      % Neutrophils 89.0 %    % Lymphocytes 4.5 %    % Monocytes 6.5 %    % Eosinophils 0.0 %    % Basophils 0.0 %    Absolute Neutrophil 9.7 (H) 1.6 - 8.3 10e9/L    Absolute Lymphocytes 0.5 (L) 0.8 - 5.3 10e9/L    Absolute Monocytes 0.7 0.0 - 1.3 10e9/L    Absolute Eosinophils 0.0 0.0 - 0.7 10e9/L    Absolute Basophils 0.0 0.0 - 0.2 10e9/L    Polychromasia Slight     Dohle Bodies Present     Toxic Granulation Present     RBC Morphology Consistent with reported results     Platelet Estimate  Normal    Lactic acid whole blood (AM Draw)   Result Value Ref Range    Lactic Acid 2.7 (H) 0.7 - 2.1 mmol/L   Blood gas venous with oxyhemoglobin (AM Draw)   Result Value Ref Range    Ph Venous 7.31 (L) 7.32 - 7.43 pH    PCO2 Venous 36 (L) 40 - 50 mm Hg    PO2 Venous 43 25 - 47 mm Hg    Bicarbonate Venous 18 (L) 21 - 28 mmol/L    FIO2 50%  Ventilator       Oxyhemoglobin Venous 71 %    Base Deficit Venous 7.7 mmol/L     *Note: Due to a large number of results and/or encounters for the requested time period, some results have not been displayed. A complete set of results can be found in Results Review.          Attestation:  I have reviewed today's vital signs, notes, medications, labs and imaging.  Amount of time performed on this consult: 45 minutes.    Gómez Dale MD

## 2017-06-20 NOTE — PLAN OF CARE
Problem: Goal Outcome Summary  Goal: Goal Outcome Summary  Outcome: Declining  ICU End of Shift Summary.  For vital signs and complete assessments, please see documentation flowsheets.      Pertinent assessments: Pt weak, non verbal. Opens eyes spontaneously.  Vent settings unchanged, decrease in oral secretions.  LS were initially coarse but improved by morning.  Abdomen rounded and firm, no BS.  GJ tube to suction with gastric contents out.  Had oral secretions initially with gastric contents but no more noted after 2300.  No urine.  Charan to R chest.  Continues to reach towards mouth when coughing.  Excoriation to buttocks-treated per care plan.  Precedex for sedation and levophed for BP  Major Shift Events: levophed titrated to MAP  Plan (Upcoming Events): family to make decisions regarding plan of care.  Discharge/Transfer Needs:      Bedside Shift Report Completed : yes  Bedside Safety Check Completed:yes

## 2017-06-20 NOTE — PROGRESS NOTES
[facility]    Hospitalist Progress Note  Name: Hesham Ramos    MRN: [id number]  Provider: Christina Duckworth MD  Date of Service: 06/20/2017    Assessment & Plan   Summary of Stay: Hesham Ramos is a 67 year old male who was admitted on 6/11/2017. Patient with a history of prior debilitating strokes, sz d/o, htn/diastolic CHF, ESRD on HD, and significantly debilitated who lives in a group home.  He had a feeding tube placed approximately 3 months ago for dysphagia, and has continued to decline since that time admitted on 6/11/2017 with hypoxia/fever consistent with aspiration pna complicated by sepsis (leukocytosis/lactic acidosis/fever) which progressed to septic shock necessitating norepi/vasopressin support and intubation on 6/12 for respiratory failure. He was been weaned  off  From pressors on 6/15/17 But again was restarted on levophed 6/19 as he was hypotensive during dialysis. He remains intubated but with minimal ventilatory support undergoing daily weans that are limited by tachypnea/low tidal volumes. In his [facility] hospitalization he was also notable for profound hyperglycemia nececsitating insulin gtt with hgb A1c wnl at 5.1.  During this admission he had  BUN upto 150s, despite HD, and hgb has been drifting down, and dark stools have been noted, all suspicious for slow GIB.  He has required 2 prbc units.  His TFs had been resumed to challenge the G-tube, he had been transitioned up to goal rate, noted to have 150cc residual and before it could be stopped had large emesis occurring 6/16/17, subsequently developed tachycardia and fever consistent with another aspiration event, with e/o sepsis with hypotension/fever/worsening leukocytosis. TF held for 24 hours and resumed 6/17 at trophic rate, and on 6/19 had another aspiration event with evidence of bowel obstruction. Tube feeds were discontinued.      Septic Shock 2/2 aspiration pna,   --off nor-epi, and vaso weaned off post  aggressive HD with 3L on 6/16/17, then hypotension recurred but responded to 500 cc LR bolus.    --Shock is resolved, but is having e/o recurring SIRS with worsening leukocytosis/fever-suspected aspiration pneumonitis (infectious vs inflammatory vs both)  -- on 6/19 patient once again was Hpotensive during dialysis and restarted on levophed    Aspiration pna with TFs:    --changed Gtube to J tube on 6/19 to decrease aspiration from re-occurring   -- Recurrent aspiration pneumonia sec to tube feeds on 6/18/17.  procal 69.7   --Rec'd 3 days of pip tazo, then started on amp-sulbactam until 6/19.  -- Respiratory cultures grew Enterobactor an P Aeruginosa was switched to fluoroquinolones 6/20     Hypoxic Respiratory Failure 2/2 aspiration pneumonia:    --not extubatable due to tachypnea and low tidal volumes .  Cont qd weaning trials.  Repeat aspiration bodes poorly for future extubation.    --Need for trach was discussed with family  --sputum is growing heavy growth yeast, and given his thrush wonder if it is real.  Is on nystatin swabs for thrush, and fluconazole.     Small Bowel obstruction  -- 6/19 has large emesis  -- Xray with SBO  -- feeding stopped  -- G tube to suction  -- will get ct abd and pelvis today    Suspected GIB:    --Given high BUN/drifting down of hgb, episodic dark stools-has rec'd 2 u PRBCs, currently at 8.2-usually in 10-11 range.   -- Monitor hgb   -- cont to hold asa,no AC.     ESRD on HD:    --Nephrology consulted and following   HD am 6/16  and 6/19/17    Transaminitis: IMPROVED  --LFTs have been consistently mildly elevated throughout hospitalization, ? Due to TF vs mild shock liver vs other.  No e/o hepatic failure.  Now almost normalized    H/o Htn:   --Previously treated by now off all meds since 2015 due to hypotension (? 2/2 weight loss?)    Seizure d/o:    --phenytoin 300 mg at bedtime is home regimen-currently on  mg bid fosphenytoin    Diastolic HF: most recent echo in 11/2015  did show apical hypertrophy-recs at that time were for cardiac MRI but I don't see that was ever completed    Prior debilitating strokes:    --Home regimen is asa and simva - both on hold-asa for GIB, statin for increased transaminases-    Dementia;  -- presumed vascular in nature-resume donepezil when able (G-J in place)    Hyperglycemia:    --hgb A1c 5.1 %, no hx of dm, needed transient insulin gtt, now on ISS with minimal requirements.  This is likely to change once refeeding begins.     Trop leak:    --2/2 demand ischemia    ASx UE edema:    --No e/o DVT by US    Nutrition:    --Clearly can't use G-tube for goal tube feeds, resumed them at trophic levels for gut protection 6/17 and now off for recurrent aspiration event with just trophic dosing  -- held tube feeds now with evidence of obstruction    Hypernatremia / improved  --2/2 inadequate free water replacement-start on low dose D5             DVT Prophylaxis: Pneumatic Compression Devices pt with ? Slow GI bleed  Code Status: DNR    Disposition: Critical care. TBD    ADDENDUM:  -- CT shows transition point mid ileum  -- d/w family  -- d/w ICU staff  --surgery consulted      Interval History   Developed SBO, hypotensive in dialysis back on pressors. Is responsive by moving head. Intubated unable to get complete ROS. Antibiotics switched based on cultures    -Data reviewed today: I reviewed all new labs and imaging reports over the last 24 hours. I personally reviewed the abdominal x-ray image(s) showing dilated bowel loops.    Physical Exam   Temp: 99.9  F (37.7  C) Temp src: Axillary BP: 109/48 Pulse: 120 Heart Rate: 99 Resp: 19 SpO2: 100 % O2 Device: Mechanical Ventilator    Vitals:    06/18/17 0400 06/19/17 0500 06/20/17 0000   Weight: 56.2 kg (123 lb 14.4 oz) 56.5 kg (124 lb 9 oz) 57.4 kg (126 lb 8.7 oz)     Vital Signs with Ranges  Temp:  [98.7  F (37.1  C)-100.1  F (37.8  C)] 99.9  F (37.7  C)  Pulse:  [120] 120  Heart Rate:  [] 99  Resp:  [0-45]  19  BP: ()/() 109/48  FiO2 (%):  [40 %-80 %] 65 %  SpO2:  [30 %-100 %] 100 %  I/O last 3 completed shifts:  In: 1537.41 [I.V.:1212.41; Blood:250; NG/GT:45]  Out: 350 [Emesis/NG output:350]      Sedated w/dexmedetomidine,intubated  Comfortable appearing  Atrophy of b ue and le with right arm contracture and e/o non-pitting edema of right arm, diffuse edema  Head ncat sclera normal pigmentation  lungs coarse but otherwise clear,  RRR no m/r/g    Abdomen  Distended, no BS  Skin warm and dry     Medications     norepinephrine 0.12 mcg/kg/min (06/20/17 0728)     IV infusion builder WITH additives 20 mL/hr at 06/20/17 0729     NaCl 10 mL/hr at 06/20/17 0400     NaCl Stopped (06/18/17 1427)     IV fluid REPLACEMENT ONLY       IV fluid REPLACEMENT ONLY       dexmedetomidine 0.7 mcg/kg/hr (06/20/17 0727)       [START ON 6/22/2017] levofloxacin  500 mg Intravenous Q48H     epoetin arnulfo (EPOGEN,PROCRIT) inj ESRD  5,000 Units Intravenous See Admin Instructions     doxercalciferol  2 mcg Intravenous See Admin Instructions     pantoprazole  40 mg Intravenous BID     fluconazole  200 mg Intravenous Q48H     simvastatin  40 mg Oral or Feeding Tube QPM     insulin aspart  1-6 Units Subcutaneous Q4H     ascorbic acid  250 mg Oral Daily     nystatin  500,000 Units Mouth/Throat 4x Daily     doxercalciferol  4 mcg Intravenous Once per day on Mon Wed Fri     epoetin arnulfo (EPOGEN,PROCRIT) inj ESRD  3,000 Units Intravenous Once per day on Mon Wed Fri     B and C vitamin Complex with folic acid  5 mL Per Feeding Tube Daily     Prosource TF protein modulator  1 packet Per G Tube Daily     ampicillin-sulbactam (UNASYN) IV  3 g Intravenous Daily     fosphenytoin (CEREBYX) intermittent infusion (maintenance)  150 mg PE Intravenous Q12H     ipratropium - albuterol 0.5 mg/2.5 mg/3 mL  3 mL Nebulization 4x daily     sodium chloride 0.9%  1,000 mL Intravenous Once     - MEDICATION INSTRUCTIONS for Dialysis Patients -   Does not apply  See Admin Instructions     Data       Recent Labs  Lab 06/19/17  1500   PHV 7.41   PO2V 26   PCO2V 41   HCO3V 26       Recent Labs  Lab 06/20/17  0535 06/19/17  0517 06/18/17  0519   WBC 12.6* 14.0* 13.2*   HGB 7.5* 8.0* 8.2*   HCT 24.3* 25.9* 26.4*   MCV 95 97 97    283 222       Recent Labs  Lab 06/20/17  0535 06/19/17  0517 06/18/17  0519    146* 146*   POTASSIUM 4.3 4.4 3.4   CHLORIDE 102 108 110*   CO2 27 26 28   ANIONGAP 13 12 8   * 169* 125*   BUN 55* 67* 46*   CR 4.92* 6.40* 4.84*   GFRESTIMATED 12* 9* 12*   GFRESTBLACK 14* 11* 15*   DOUG 7.3* 7.6* 7.6*       Recent Labs  Lab 06/17/17  1000 06/17/17  0931 06/17/17  0820   CULT Light growth Enterobacter cloacae complexLight growth Pseudomonas aeruginosaLight growth Candida albicans / dubliniensis Candida albicans and Candida dubliniensis are not routinely speciated Susceptibility testing not routinely done* No growth after 3 days  No growth after 3 days No growth after 3 days  No growth after 3 days     No results for input(s): NTBNPI, NTBNP in the last 168 hours.    Recent Labs  Lab 06/18/17  0519 06/16/17  0500 06/15/17  0520   AST 68* 54* 100*   ALT 75* 103* 133*   ALKPHOS 114 156* 135   BILITOTAL 0.4 0.6 0.6       Recent Labs  Lab 06/17/17  0820 06/14/17  0525   INR 1.38* 1.76*       Recent Labs  Lab 06/19/17  0755 06/16/17  2150 06/14/17  0525   LACT 1.3 0.9 1.1     No results for input(s): LIPASE in the last 168 hours.  No results for input(s): TROPONIN, TROPI, TROPR in the last 168 hours.    Invalid input(s): TROP, TROPONINIES  No results for input(s): COLOR, APPEARANCE, URINEGLC, URINEBILI, URINEKETONE, SG, UBLD, URINEPH, PROTEIN, UROBILINOGEN, NITRITE, LEUKEST, RBCU, WBCU in the last 168 hours.    Recent Results (from the past 24 hour(s))   XR Gastro Tube to Gastrojejuno Convert    Narrative    GASTRO TUBE TO GASTROJEJUNO CONVERT  6/19/2017 9:55 AM     HISTORY:  Patient has a G-tube in place. He has developed significant  nausea  and vomiting suggesting possible gastric outlet obstruction.    COMPARISON: 3/28/2017.    FINDINGS: After obtaining informed consent, the patient was placed in  a supine position on the fluoroscopy table. The existing G-tube was  removed. Skin entry site was prepped and draped in the usual sterile  manner. A JB1 catheter was used to pass a wire into the proximal  jejunum through the existing tract. Over the wire, an 18 Welsh JUAN CARLOS GJ  tube was placed. The tip was positioned in the proximal jejunum just  past the ligament of Treitz. Contrast was injected and showed adequate  positioning. A fluoroscopic image was saved.    The patient tolerated the procedure well. There were no immediate  postprocedure complications. The patient's vital signs were monitored  by radiology nursing staff under my supervision and remained stable  throughout the study.    MEDICATIONS: None.    Fluoroscopy time: 7 minutes 31 seconds.    Contrast: 30 mL Gastroview.    Radiation dose: 76 mGy.      Impression    IMPRESSION: GJ tube replaced as above.    SHANTI ANGEL MD   XR Abdomen Port 1 View    Narrative    ABDOMEN PORTABLE FRONT ONE VIEW  6/19/2017 4:52 PM      HISTORY: Distended after FT evaluate for small bowel obstruction or  ileus.     COMPARISON: None.    FINDINGS: Supine portable abdomen. There are multiple dilated small  bowel loops suggesting small bowel obstruction. G-tube projects over  the mid abdomen. Right inguinal catheter in place.    AMBIKA PERLA MD   XR Chest Port 1 View    Narrative    CHEST PORTABLE ONE VIEW  6/19/2017 4:53 PM      HISTORY: Worsening breathing on ventilator.     COMPARISON: 6/18/2017.    FINDINGS: Semiupright portable chest. ET tube in the mid trachea.  Right IJ infusion catheter is in place. There is no pneumothorax. The  heart size is normal. Thoracic aorta is tortuous. Lung volumes are  low. Mild right basilar atelectasis.      Impression    IMPRESSION: Mild right basilar atelectasis. No  pneumothorax.    AMBIKA PERLA MD     Critical care time:45min

## 2017-06-20 NOTE — PLAN OF CARE
Problem: Restraint for Non-Violent/Non-Self-Destructive Behavior  Goal: Prevent/Manage Potential Problems  Maintain safety of patient and others during period of restraint.  Promote psychological and physical wellbeing.  Prevent injury to skin and involved body parts.  Promote nutrition, hydration, and elimination.   Outcome: No Change  Continue Left Wrist restraint for lines protection.

## 2017-06-20 NOTE — PROGRESS NOTES
Brief icu note    Chart reviewed.  Respiratory cultures grew P aeruginosa and Enterobactor cloacae.  Pt is on Unaysn which is not adequate. Both are sensitive to fluoroquinolones. Will d/c unasyn and start pt on levaquin.    Miriam Fitzpatrick MD  6/20/2017

## 2017-06-20 NOTE — PROGRESS NOTES
[facility] Nurse Inpatient Adult Pressure Injury (PI) Wound Assessment     Assessment of PI(s) on pt's:   Coccyx, left buttock    Data:   Patient History:      per MD note(s):   67 year old male who was admitted on 6/11/2017. Patient with a history of prior debilitating strokes, sz d/o, htn/diastolic CHF, ESRD on HD, and significantly debilitated who lives in a group home.  He had a feeding tube placed approximately 3 months ago for dysphagia, and has continued to decline since that time admitted on 6/11/2017 with hypoxia/fever consistent with aspiration pna complicated by sepsis (leukocytosis/lactic acidosis/fever) which progressed to septic shock necessitating norepi/vasopressin support and intubation on 6/12 for respiratory failure. He was been weaned  off  From pressors on 6/15/17 But again was restarted on levophed 6/19 as he was hypotensive during dialysis. He remains intubated but with minimal ventilatory support undergoing daily weans that are limited by tachypnea/low tidal volumes.     Current mattress:   Pulsate   Current pressure relieving devices:  Pillows    Moisture Management:  Incontinence Protocol    Current Diet / Nutrition:   Tube feeds    Active Diet Order      NPO for Medical/Clinical Reasons Except for: No Exceptions  Dionisio Assessment and sub scores:   Dionisio Score  Avg: 10.4  Min: 9  Max: 14    Labs:  Critically Low Albumen!  Recent Labs   Lab Test  06/20/17   0535  06/19/17   0517  06/18/17   0519  06/17/17   0820   06/12/17   0639   ALBUMIN   --    --   1.9*   --    < >   --    HGB  7.5*  8.0*  8.2*  8.8*   < >   --    INR   --    --    --   1.38*   < >   --    WBC  12.6*  14.0*  13.2*  14.5*   < >   --    A1C   --    --    --    --    --   5.1   CRP   --   366.0*   --    --    --    --     < > = values in this interval not displayed.                                                                                                                        Pressure Injury  Assessment  (location #1):   Sacro/Coccyx, Left Buttock    Wound History:   Present on admission; pt with unstable pressures, vented, skin moist     Specific Dimensions (length x width x depth, in cm) :      Sacro/Coccyx:  5 x 3 x 0.2cm dark pink dermis with macerated epidermis peeling at distal edge.    Left Buttocks: scattered suprficial erosion of epidermis to lower buttocks 10 x 7 x <0.1cm; pink dermis    Palpation of the wound bed:  normal     Slough appearance:  None, peeling epidermis vs slough,     Eschar appearance:  none    Periwound Skin: denuded, erosion of epidermis, erythema blanches     Temperature  normal     Drainage:  small creamy Triad paste with minimal bloody drainage.     Odor: none    Pain:  unable to assess, intubated         Intervention:     Patient's chart evaluated.      Dionisio Interventions:  Current Dionisio Interventions and Care Plan reviewed and updated, appropriate at this time.    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  Tissue remains slightly  macerated on distal edge of coccyx wound, plan to continue to assess wound evolution. No s/s infection. Continue powder and Triad paste.     Left buttock superficial scattered erosion/denudement suspect combination of moisture and pressure/ friction; present on admission    Pt tolerating turns better since intubation.    Heels blanchable erythema, scattered dry crusted peeling epidermis            Plan:     Nursing to notify the Provider(s) and re-consult the [facility] Nurse if wound(s) deteriorate(s)or if the wound care plan needs reevaluation.      Plan for wound care to coccyx, left buttock: Daily and prn:  1.  Cleanse with MicroKlenz, pat dry.   2.  Dust  wound with stoma powder, apply layer of Triad paste to cover open wound, cover with ABD  3. Follow PIP (pressure injury prevention) measures, ensuring pt repositioning every 1-2 hrs, side to side.  Notify WOC if worsens or any concerns.  Pulsate air bed    WOC Nurse will return: weekly/ PRN  Face to face time: 25 Minutes

## 2017-06-20 NOTE — PROGRESS NOTES
Respiratory Therapy Note         Pt remains on full vent support and is not ready for a PS attempt at this time. Remains on pressors.  FiO2 has been turned down from 65-45%.  Good synchrony with the vent. Breath sounds are clear to coarse.  Suctioning back moderate amounts of thick tan sputum.    June 20, 2017.3:17 PM  Billy Mendez

## 2017-06-20 NOTE — PLAN OF CARE
Problem: Goal Outcome Summary  Goal: Goal Outcome Summary  Outcome: No Change  ICU End of Shift Summary.  For vital signs and complete assessments, please see documentation flowsheets.      Pertinent assessments: RASS -2, opens eyes, doesn't follow commands, LS clear/coarse, tan thick secretions via ETT, remains on vent with Fio2 45%, cardiac-tachy, GI/- on dialysis, small BM smear brown in color, coccyx wound dressing changed per plan of care.   Major Shift Events: CT abdomen see results, surgical consult.  Plan (Upcoming Events): possible surgery, continue pressors, Precedex, continue restraints for lines protection.  Discharge/Transfer Needs: TBD     Bedside Shift Report Completed : Yes  Bedside Safety Check Completed: Yes

## 2017-06-20 NOTE — PROGRESS NOTES
Pt continues on full vent support 20/450/+5/65%. SpO2 in the low to mid 90's through the shift, with some periods of desaturation. No wean attempted as pt remains on pressors and has high FIO2 needs currently. Respiratory will continue to monitor.     Anel Ibarra RRT

## 2017-06-20 NOTE — PROGRESS NOTES
[facility]  Hospitalist Progress Note  Marlene BASHIR Ukatu 06/20/2017    Reason for Stay (Diagnosis): Respiratory failure, sepsis secondary to aspiration of tube feeds         Assessment and Plan:      Summary of Stay: Hesham Ramos is a 67 year old male with a PMHx significant for HTN, seizure disorder, stroke (2000 and 2007), diastolic congestive heart failure, and end-stage renal disease for which he is on chronic dialysis. He lives in a group home, where he requires high level care. He is unable to communicate verbally at baseline. He has a G tube which was placed two months ago, with some concerns from family members that there have been issues with tolerating feeds since its placement. He was admitted on 6/11/2017 with respiratory distress after vomiting tube feeds and aspirating. Upon arrival, patient had tube feed regurgitating from nose and mouth. He quickly developed fever and hypotension in addition to tachycardia. Zosyn and vancomycin were started for presumed aspiration pneumonia, and Levophed for hypotension. On 6/12 vasopressin was added, and pressures were adequately stabilized to perform dialysis. On 6/13 a precipitous drop in Hgb was noted; patient received transfusion of 2 units PRBCs administered during dialysis. Arterial line was placed in the left femoral artery for hemodynamic monitoring. Elevated transaminases found incidentally on 6/13 prompted abdominal ultrasound, which revealed multiple hepatic cysts. Narrowed to Unasyn on 6/14. Had large emesis of tube feedings on 6/16, G tube was converted to GJ tube on 6/19 in hopes of decreasing vomiting. Later on 6/19, during dialysis, patient had acute decompensation with tachypnea, O2 sats down to 51%, hypotension with undetectable BP, and regurgitation of what appeared to be stool from nose and mouth. Dialysis and tube feeds were immediately discontinued. Abdominal XR revealed dilated bowel loops suggestive of  obstruction.      Problem List:   1. Sepsis. Thought to be due to aspiration pneumonitis. Had been weaned off pressors and narrowed to Unasyn, but as of 6/19 is back on Levophed for hypotension and Levaquin after sputum culture grew Pseudomonas and Enterobacter.   - IV Levophed PRN    - Levaquin 500 mg IV Q48H    2. Possible bowel obstruction. Patient had G tube replaced with GJ on 6/19. Later in the day regurgitated GI contents through mouth and nose, thought to be stool based on appearance and odor. Abdominal XR showed multiple loops of dilated bowel consistent with small bowel obstruction.    - CT abdomen   - Switch drugs from G tube administration to IV      2. Respiratory distress. Likely due to sepsis and aspiration pneumonitis. Mechanically ventilated. Has been failing spontaneous breathing trials; CXR on 6/17 was unremarkable. Patient will remain intubated until able to demonstrate ability to protect airway and remain hemodynamically stable without pressure support. Recurrent episodes of vomiting/regurgitation of GI contents, 6/19 sputum culture with Pseudomonas and Enterobacter.      3. End-stage renal disease. H/o ESRD, chronic hemodialysis. Has had several rounds of dialysis during this hospitalization, now back to regular dialysis schedule of 3 times per week. Dialysis on 6/19 was terminated prematurely due to acute respiratory decline and hypotension mid-session.   - Hemodialysis 3x per week      4. Anemia. Hemoglobin 7.5. Earlier concern for GI bleed met with no further compelling evidence of active bleeding justifying full workup. BMs have since been normal tan/brown color with no evidence of bleeding. Hemoglobin has been gradually decreasing, continue to trend Hgb and monitor for sources of bleeding.   - Recheck CBC in AM      6. Nutrition. G tube switched to GJ tube on 6/19, tube feeds were resumed, patient had regurgitation of GI contents several hours later. Will stop feeds in the setting of  "possible obstruction.    - Discontinue tube feeds for now      DVT Prophylaxis: Pneumatic Compression Devices  Code Status: DNR, but may intubate.   Discharge Dispo: Return to group home  Estimated Disch Date / # of Days until Disch: Pending clinical course          Interval History (Subjective):      Patient went to OR yesterday for conversion of G tube, now with G-J. Feeds were resumed shortly after this procedure, which was performed by IR without any complications. During dialysis yesterday evening, patient became acutely tachypneic with sats dropping to 51% and regurgitated a large amount of what appeared to be liquid stool from mouth and nose. CXR and abdominal XR were performed immediately, revealing loops of dilated bowel suggestive of bowel obstruction.                   Physical Exam:      Last Vital Signs:  /64  Pulse 120  Temp 99.9  F (37.7  C) (Axillary)  Resp 19  Ht 1.651 m (5' 5\")  Wt 57.4 kg (126 lb 8.7 oz)  SpO2 99%  BMI 21.06 kg/m2      Intake/Output Summary (Last 24 hours) at 06/20/17 1137  Last data filed at 06/20/17 1004   Gross per 24 hour   Intake          1650.58 ml   Output              350 ml   Net          1300.58 ml     Vitals:    06/16/17 0600 06/17/17 0440 06/18/17 0400 06/19/17 0500   Weight: 57.7 kg (127 lb 3.3 oz) 56.2 kg (123 lb 14.4 oz) 56.2 kg (123 lb 14.4 oz) 56.5 kg (124 lb 9 oz)    06/20/17 0000   Weight: 57.4 kg (126 lb 8.7 oz)       Constitutional: Intubated, sedated. Awake, no apparent distress   Respiratory: Clear to auscultation bilaterally, no crackles or wheezing   Cardiovascular: Regular rate and rhythm, normal S1 and S2, and no murmur noted   Abdomen: Abdomen firm, bowel sounds absent   Skin: No rashes, no cyanosis, dry to touch   Neuro: Nonverbal at baseline; awakens to voice, tracks examiner   Extremities: Contracture of upper extremities, slight pitting edema of RUE near dialysis port   Other(s):        All other systems: Negative          Medications:  "     All current medications were reviewed with changes reflected in problem list.         Data:      All new lab and imaging data was reviewed.   Labs:    Recent Labs  Lab 06/20/17  0535 06/19/17  0517 06/18/17  0519    146* 146*   POTASSIUM 4.3 4.4 3.4   CHLORIDE 102 108 110*   CO2 27 26 28   ANIONGAP 13 12 8   * 169* 125*   BUN 55* 67* 46*   CR 4.92* 6.40* 4.84*   GFRESTIMATED 12* 9* 12*   GFRESTBLACK 14* 11* 15*   DOUG 7.3* 7.6* 7.6*       Recent Labs  Lab 06/20/17  0535 06/19/17  0517 06/18/17  0519   WBC 12.6* 14.0* 13.2*   HGB 7.5* 8.0* 8.2*   HCT 24.3* 25.9* 26.4*   MCV 95 97 97    283 222      Imaging:   Results for orders placed or performed during the hospital encounter of 06/11/17   Chest  XR, 1 view PORTABLE    Narrative    XR CHEST PORT 1 VW  6/11/2017 11:04 PM      HISTORY: Shortness of breath.     COMPARISON: 4/24/2017.    FINDINGS: Right IJ infusion catheter in place. Vascular stents in the  right arm. The heart size is normal. The thoracic aorta is calcified  and tortuous. There is a band of scar or atelectasis at the left lung  base. The lungs are otherwise clear. No pneumothorax or pleural  effusion.      Impression    IMPRESSION: Mild left basilar atelectasis.    AMBIKA PERLA MD   Head CT w/o contrast    Narrative    CT HEAD W/O CONTRAST 6/11/2017 11:54 PM      HISTORY: Unresponsive.    TECHNIQUE: Routine noncontrast head CT. Radiation dose for this scan  was reduced using automated exposure control, adjustment of the mA  and/or kV according to patient size, or iterative reconstruction  technique.    COMPARISON: 12/29/2016.    FINDINGS: There is generalized brain atrophy. No mass, mass effect or  intracranial hemorrhage. No evidence of acute infarct. Old cerebellar  infarcts. Periventricular low attenuation is consistent with chronic  small vessel ischemic disease. The visualized paranasal sinuses are  clear.      Impression    IMPRESSION:  No acute abnormality.    AMBIKA  MD MINDA   XR Abdomen Port 1 View    Narrative    XR ABDOMEN PORT F1 VW  6/12/2017 4:37 AM      HISTORY: Aspiration.     COMPARISON: None.    FINDINGS: G-tube in the midline. Right inguinal catheter in place.  Catheter projects over the lower pelvis. The bowel gas pattern is  within normal limits. There is air within the liver; this could be  biliary or portal venous. Right hemidiaphragm is elevated.      Impression    IMPRESSION: Air within the liver which may be biliary or portal  venous. If there is clinical concern for portal venous gas, CT is  recommended.    AMBIKA PERLA MD   XR Chest Port 1 View    Narrative    CHEST ONE VIEW PORTABLE June 12, 2017 12:45 PM     HISTORY: Endotracheal tube placement.    COMPARISON: 6/11/2017.      Impression    IMPRESSION: Endotracheal tube in good position. Elevated right  diaphragm. Interstitial densities in both lungs may be partly due to  shallow inspiration. Normal heart size and pulmonary vascularity.  Central venous catheter is unchanged. Nasogastric tube is coiled in  the esophagus and must be repositioned. Apparent stent grafts over the  right arm medially.    THEODORE SWAN MD   CT Abdomen Pelvis w/o Contrast    Narrative    CT ABDOMEN AND PELVIS WITHOUT CONTRAST   6/12/2017 4:31 PM     HISTORY: Sepsis thought to be due to aspiration of tube feeds but with  air in liver on x-ray. Evaluate for biliary obstruction, infection,  etc.    TECHNIQUE: CT of the abdomen and pelvis was performed without  intravenous contrast. Radiation dose for this scan was reduced using  automated exposure control, adjustment of the mA and/or kV according  to patient size, or iterative reconstruction technique.    COMPARISON: CT of the abdomen/pelvis dated 2/25/2009.    FINDINGS:    This is a very limited exam due to motion artifact and lack of  intravenous contrast.    Again identified are cysts within the liver. No definite air within  the biliary tree is seen. No definite intrahepatic  biliary duct  dilatation although without contrast, sensitivity for detection is  decreased. No definite extrahepatic biliary duct dilatation.    Since the previous exam, a G-tube has been placed.    Evaluation of the remaining solid organs is limited due to a lack of  intravenous contrast. The kidneys are atrophic with multiple  calcifications.    There are distended loops of small bowel throughout the abdomen.    A La catheter is in place.    There is no free air and no definite free fluid in the abdomen or  pelvis.    There are COPD changes at the lung bases. Bibasilar atelectasis and/or  infiltrates.      Impression    IMPRESSION:   1. Limited exam. No definite air within the biliary tree or biliary  duct dilatation identified on this study.  2. Dilated loops of small bowel throughout the abdomen. This is  nonspecific. It could represent a diffuse ileus.  3. Bibasilar atelectasis and/or infiltrates.    SHANTI ANGEL MD   US Abdomen Limited Portable    Narrative    ULTRASOUND ABDOMEN LIMITED PORTABLE  6/14/2017 8:16 AM     HISTORY: Elevated liver enzymes. Elevated INR.    COMPARISON: CT of the abdomen and pelvis performed 6/12/2017.    FINDINGS: Scattered liver cysts are noted, with the largest on the  right measuring 3.2 cm. No evidence for fatty infiltration of the  liver. The gallbladder wall appears thickened, measuring up to 0.6 cm.  No shadowing gallstones are identified. Evaluation of the gallbladder  is limited related to patient immobility. No pericholecystic fluid is  seen. No intra- or extrahepatic bile duct dilatation. Common hepatic  duct is normal in diameter. The entire common duct could not be  visualized on this scan. The right kidney could not be visualized.  Pancreas is partially obscured by overlying bowel gas, but appears  normal where seen. The abdominal aorta and IVC are of normal caliber  where visualized. A small amount of ascites is noted in the right  upper quadrant.       Impression    IMPRESSION:   1. There is mild gallbladder wall thickening. No other convincing  sonographic evidence for cholecystitis.  2. Small amount of ascites is noted in the right upper quadrant.  3. Multiple hepatic cysts are identified  4. Nonvisualization of the right kidney.    MAYRA MERCADO MD   US Lower Extremity Venous Duplex Right    Narrative    VENOUS ULTRASOUND RIGHT LEG  6/15/2017 9:11 AM     HISTORY: Swelling in the right leg    COMPARISON: None.    FINDINGS:  Examination of the deep veins with graded compression and  color flow Doppler with spectral wave form analysis was performed.   The right common femoral vein and saphenofemoral junction were not  visualized due to overlying bandage. Visualized deep veins are patent  without thrombus.      Impression    IMPRESSION: No evidence of deep venous thrombosis.    SHANTI ANGEL MD   US Extremity Upper Venous rt    Narrative    ULTRASOUND VENOUS RIGHT UPPER EXTREMITY  6/15/2017 11:38 AM     HISTORY: Evaluate for DVT.    COMPARISON: None.    TECHNIQUE: Ultrasound gray scale, Color Doppler flow, and spectral  Doppler waveform analysis performed.    FINDINGS: There are no intraluminal filling defects. The right  internal jugular, axillary, cephalic, basilic, brachial, radial, and  ulnar veins demonstrate normal compressibility. The right internal  jugular, innominate, subclavian, and axillary veins have normal venous  waveforms. Technologist noted the presence of a PICC on the right.      Impression    IMPRESSION: No evidence for DVT.    NATALIYA LIU MD   XR Chest Port 1 View    Narrative    CHEST ONE VIEW UPRIGHT 6/16/2017 11:26 AM     HISTORY: Failing breathing trial.    COMPARISON: 6/12/2017.      Impression    IMPRESSION: Endotracheal tube tip mid to distal trachea. Large bore  right IJ line unchanged. Scattered trace atelectasis and/or fibrosis.  Previously seen orogastric tube has been removed.    LUCIA MCGUIRE MD   XR Chest Port 1 View     Narrative    CHEST PORTABLE ONE VIEW June 18, 2017 9:19 AM     HISTORY: Aspiration.    COMPARISON: 6/16/2017.      Impression    IMPRESSION: Endotracheal tube and dialysis catheter unchanged. Patchy  airspace opacity present in the left lower lobe, atelectasis versus  pneumonia. Right lung is clear. No pneumothorax or pleural effusion.  Vascular stents noted in the right arm.    NATALIYA LIU MD   XR Gastro Tube to Gastrojejuno Convert    Narrative    GASTRO TUBE TO GASTROJEJUNO CONVERT  6/19/2017 9:55 AM     HISTORY:  Patient has a G-tube in place. He has developed significant  nausea and vomiting suggesting possible gastric outlet obstruction.    COMPARISON: 3/28/2017.    FINDINGS: After obtaining informed consent, the patient was placed in  a supine position on the fluoroscopy table. The existing G-tube was  removed. Skin entry site was prepped and draped in the usual sterile  manner. A JB1 catheter was used to pass a wire into the proximal  jejunum through the existing tract. Over the wire, an 18 [place] JUAN CARLOS GJ  tube was placed. The tip was positioned in the proximal jejunum just  past the ligament of Treitz. Contrast was injected and showed adequate  positioning. A fluoroscopic image was saved.    The patient tolerated the procedure well. There were no immediate  postprocedure complications. The patient's vital signs were monitored  by radiology nursing staff under my supervision and remained stable  throughout the study.    MEDICATIONS: None.    Fluoroscopy time: 7 minutes 31 seconds.    Contrast: 30 mL Gastroview.    Radiation dose: 76 mGy.      Impression    IMPRESSION: GJ tube replaced as above.    SHANTI ANGEL MD   XR Chest Port 1 View    Narrative    CHEST PORTABLE ONE VIEW  6/19/2017 4:53 PM      HISTORY: Worsening breathing on ventilator.     COMPARISON: 6/18/2017.    FINDINGS: Semiupright portable chest. ET tube in the mid trachea.  Right IJ infusion catheter is in place. There is no  pneumothorax. The  heart size is normal. Thoracic aorta is tortuous. Lung volumes are  low. Mild right basilar atelectasis.      Impression    IMPRESSION: Mild right basilar atelectasis. No pneumothorax.    AMBIKA PERLA MD   XR Abdomen Port 1 View    Narrative    ABDOMEN PORTABLE FRONT ONE VIEW  6/19/2017 4:52 PM      HISTORY: Distended after FT evaluate for small bowel obstruction or  ileus.     COMPARISON: None.    FINDINGS: Supine portable abdomen. There are multiple dilated small  bowel loops suggesting small bowel obstruction. G-tube projects over  the mid abdomen. Right inguinal catheter in place.    AMBIKA PERLA MD

## 2017-06-20 NOTE — PROGRESS NOTES
Respiratory Therapy Note      Pt was transported to CT and back on the transport vent.  All vitals were monitored and maintained without incident.       June 20, 2017 2:00 PM  Billy Mendez

## 2017-06-20 NOTE — PROGRESS NOTES
" Renal Medicine Progress Note            Assessment/Plan:     1. ESRD  2. Septic shock from aspiration PNA  3. Respiratory failure  4. Anemia  5. Non-verbal and bed-bound at baseline  6. SBO. Outing syndrome?    Plan.  1. HD treatment tomorrow        Interval History:     Pt vomited this morning. CT scan showed SBO. He is on NE at 0.24 mcg           Medications and Allergies:       [START ON 6/22/2017] levofloxacin  500 mg Intravenous Q48H     epoetin arnulfo (EPOGEN,PROCRIT) inj ESRD  5,000 Units Intravenous See Admin Instructions     doxercalciferol  2 mcg Intravenous See Admin Instructions     pantoprazole  40 mg Intravenous BID     fluconazole  200 mg Intravenous Q48H     simvastatin  40 mg Oral or Feeding Tube QPM     insulin aspart  1-6 Units Subcutaneous Q4H     ascorbic acid  250 mg Oral Daily     nystatin  500,000 Units Mouth/Throat 4x Daily     doxercalciferol  4 mcg Intravenous Once per day on Mon Wed Fri     epoetin arnulfo (EPOGEN,PROCRIT) inj ESRD  3,000 Units Intravenous Once per day on Mon Wed Fri     B and C vitamin Complex with folic acid  5 mL Per Feeding Tube Daily     Prosource TF protein modulator  1 packet Per G Tube Daily     fosphenytoin (CEREBYX) intermittent infusion (maintenance)  150 mg PE Intravenous Q12H     ipratropium - albuterol 0.5 mg/2.5 mg/3 mL  3 mL Nebulization 4x daily     sodium chloride 0.9%  1,000 mL Intravenous Once     - MEDICATION INSTRUCTIONS for Dialysis Patients -   Does not apply See Admin Instructions      No Known Allergies         Physical Exam:   Vitals were reviewed  Heart Rate: 104, Blood pressure 113/64, pulse 120, temperature 100  F (37.8  C), temperature source Axillary, resp. rate 22, height 1.651 m (5' 5\"), weight 57.4 kg (126 lb 8.7 oz), SpO2 100 %.    Wt Readings from Last 3 Encounters:   06/20/17 57.4 kg (126 lb 8.7 oz)   05/02/17 49.9 kg (110 lb)   04/18/17 50 kg (110 lb 3.7 oz)       Intake/Output Summary (Last 24 hours) at 06/20/17 7842  Last data filed " at 06/20/17 1400   Gross per 24 hour   Intake          2006.96 ml   Output              350 ml   Net          1656.96 ml     Gen: critically ill  CV: irregular, tach  Pulm: Ctab.   Abd; firm and distended. Hypoactive bs.  Ext: no edema  Neuro: nonverbal. Seems more alert today         Data:     CBC RESULTS:     Recent Labs  Lab 06/20/17  0535 06/19/17  0517 06/18/17  0519 06/17/17  0820 06/16/17  0500 06/15/17  0520   WBC 12.6* 14.0* 13.2* 14.5* 12.0* 10.3   RBC 2.55* 2.66* 2.72* 2.91* 2.74* 2.80*   HGB 7.5* 8.0* 8.2* 8.8* 8.2* 8.4*   HCT 24.3* 25.9* 26.4* 28.0* 25.6* 25.7*    283 222 194 171 166       Basic Metabolic Panel:    Recent Labs  Lab 06/20/17  0535 06/19/17  0517 06/18/17  0519 06/17/17  0820 06/16/17  1300 06/16/17  0500    146* 146* 143 142 143   POTASSIUM 4.3 4.4 3.4 3.7 3.5 3.1*   CHLORIDE 102 108 110* 106 102 106   CO2 27 26 28 27 33* 28   BUN 55* 67* 46* 30 19 47*   CR 4.92* 6.40* 4.84* 3.81* 2.47* 4.78*   * 169* 125* 122* 154* 197*   DOUG 7.3* 7.6* 7.6* 8.3* 9.3 8.3*       INR  Recent Labs  Lab 06/17/17  0820 06/14/17  0525   INR 1.38* 1.76*      Attestation:   I have reviewed today's relevant vital signs, notes, medications, labs and imaging.    Bernardo Carter MD  [facility] Nephrology  Office phone :[phone]  Pager: [phone]

## 2017-06-20 NOTE — PROGRESS NOTES
ICU Attending Note    I have seen and examined Hesham Ramos, reviewed the patient's history, pertinent labs, vital signs, medications, physical exam, and radiographs.  The patient is critically ill by my examination and requires continued ICU monitoring and cares    Hesham Ramos is admitted to ICU with respiratory failure after aspiration event. Had a g tube placed in March.  Was observed aspirating.  Intubated on admission.  Lives in a group home.     Recent Events:  Significant vomiting episode of feculent contents after j tube placed.  AXR suggests a bowel obstruction picture.  Also needed norepinephrine added for hypotension.  G tube port to suction. Antibiotics switched due to culture data.    Exam:  Temp:  [98.7  F (37.1  C)-100.1  F (37.8  C)] 99.9  F (37.7  C)  Pulse:  [120] 120  Heart Rate:  [] 103  Resp:  [0-45] 19  BP: ()/() 137/64  FiO2 (%):  [40 %-80 %] 55 %  SpO2:  [30 %-100 %] 99 %    Intake/Output Summary (Last 24 hours) at 06/19/17 1043  Last data filed at 06/19/17 1000   Gross per 24 hour   Intake          1533.57 ml   Output                0 ml   Net          1533.57 ml     Ventilation Mode: CMV/AC  FiO2 (%): 55 %  Rate Set (breaths/minute): 20 breaths/min  Tidal Volume Set (mL): 450 mL  PEEP (cm H2O): 5 cmH2O  Oxygen Concentration (%): 55 %  Resp: 19    lungs coarse  abd distended  Ext warm    Results:  ABG     Recent Labs  Lab 06/13/17  1732   PH 7.52*   PCO2 31*   PO2 94   HCO3 25     CBC    Recent Labs  Lab 06/20/17  0535 06/19/17  0517 06/18/17  0519 06/17/17  0820   WBC 12.6* 14.0* 13.2* 14.5*   HGB 7.5* 8.0* 8.2* 8.8*   HCT 24.3* 25.9* 26.4* 28.0*    283 222 194     BMP    Recent Labs  Lab 06/20/17  0535 06/19/17  0517 06/18/17  0519 06/17/17  0820    146* 146* 143   POTASSIUM 4.3 4.4 3.4 3.7   CHLORIDE 102 108 110* 106   CO2 27 26 28 27   BUN 55* 67* 46* 30   CR 4.92* 6.40* 4.84* 3.81*   * 169* 125* 122*     LFT    Recent Labs  Lab  06/18/17  0519 06/17/17  0820 06/16/17  0500 06/15/17  0520 06/14/17  0525   AST 68*  --  54* 100* 199*   ALT 75*  --  103* 133* 202*   ALKPHOS 114  --  156* 135 126   BILITOTAL 0.4  --  0.6 0.6 0.5   ALBUMIN 1.9*  --  2.1* 2.0* 1.9*   INR  --  1.38*  --   --  1.76*     PancreasNo lab results found in last 7 days.  INR  Lab Results   Component Value Date    INR 1.38 06/17/2017    INR 1.76 06/14/2017    INR 1.23 06/11/2017    INR 1.03 04/23/2017       Current Issues:  1.  Neurology: baseline unresponsive, dexmedatomidine for sedation  2.  Intubated 6/12.  Vent support.  Weaning trials.  Has failed due to rapid shallow breathing  3.  End stage renal disease: dialysis  4.  Pneumonia: empiric antibiotics, on levofloxacin based on cultures  5.  Protein calorie malnutrition: tube feeds held, may need TPN  6.  Small bowel obstruction: CT abdomen to characterize.  May need a surgical consult.  This places significant issues on planning.    Evaluation and management time exclusive of procedures was 30 minutes critical care time including:  examination with the ICU team, discussion of the patient's condition with other physicians and members of the care team, reviewing all data related to the patient, and time utilizing the EMR for documentation of this patient's care.    Arpan Farooq MD  Acute Care Surgery/Critical Care

## 2017-06-21 NOTE — PROGRESS NOTES
Patient remains intubated and on the ventilator. No vent changes or weaning made overnight. BS coarse/diminished. Suctioning small amount of creamy secretions from ETT. SPO2 %. Patient receiving nebulizers QID.  RT will continue to follow.    Ventilation Mode: CMV/AC  FiO2 (%): 35 %  Rate Set (breaths/minute): 20 breaths/min  Tidal Volume Set (mL): 450 mL  PEEP (cm H2O): 5 cmH2O  Oxygen Concentration (%): 35 %  Resp: 22    RT Yoon 6/21/2017, 5:59 AM

## 2017-06-21 NOTE — PROGRESS NOTES
"The patient has been seen and examined by me.  I agree with the below assessment and plan.  AXR unchanged.  J tube port placed to suction (in addition to G tube) with 800 cc feculent output.  Continue both tube to LIS.  Family conference scheduled for 8:00am tomorrow to discuss goals of care.  Pt is a poor surgical candidate.  Will consider laparoscopy/laparotomy with lysis of adhesions or SB resection after he recovers from aspiration pneumonia and sepsis and iif family elects.  Repeat port AXR tomorrow.      Yue Sauceda MD    [facility]   General Surgery Progress Note           Assessment and Plan:   Assessment:   SBO  Septic shock secondary to aspiration pneumonia  Respiratory failure      Plan:   -continue NGT  -continue abx for pneumonia  -continue cares per intensivist/hospitalist  -GI prophylaxis: protonix   -Abdominal film - to see if CT contrast progresses to colon (partial obstruction)  -will consider laparoscopy/laparotomy with lysis of adhesions or SB resection after he recovers from aspiration pneumonia and sepsis         Interval History:   Intubated, on ventilator.  Scheduled for dialysis today.         Physical Exam:   Blood pressure (!) 98/35, pulse 120, temperature 98.4  F (36.9  C), temperature source Oral, resp. rate 20, height 1.651 m (5' 5\"), weight 59.9 kg (132 lb 0.9 oz), SpO2 99 %.    I/O last 3 completed shifts:  In: 2145.67 [I.V.:1545.67; NG/GT:600]  Out: 325 [Emesis/NG output:325]    Abdomen:   firm, distended,  and absent bowel sounds   J-tube and G-tube in place, intact          Data:     Recent Labs   Lab Test  06/21/17   0500  06/20/17   0535  06/19/17   0517   HGB  7.7*  7.5*  8.0*   WBC  11.0  12.6*  14.0*       Liz Bauer PA-C       "

## 2017-06-21 NOTE — PLAN OF CARE
Problem: Sepsis (Adult)  Goal: Signs and Symptoms of Listed Potential Problems Will be Absent or Manageable (Sepsis)  Signs and symptoms of listed potential problems will be absent or manageable by discharge/transition of care (reference Sepsis (Adult) CPG).   Outcome: Declining  ICU End of Shift Summary.  For vital signs and complete assessments, please see documentation flow sheets.      Pertinent assessments: Last evening pt POA and family were here, it was discussed that the pt is not doing well, b/p being low and is on levo, inability to wean pt from the vent, pt having a SBO and is not a surgical candidate, family feels pt is not doing well, they feel that the pt isn't comfortable and probably is having pain being intubated and the SBO and the lg sore on his bottom. Levo was increased during the shift.  Major Shift Events: discussion with family  Plan (Upcoming Events): family will come in at 1000 to talk to 's  Discharge/Transfer Needs: TBD     Bedside Shift Report Completed : yes  Bedside Safety Check Completed: yes

## 2017-06-21 NOTE — PHARMACY
TPN formula evaluated based on today s labs results.    The following changes have been made:    Rate 40ml/hr.    Protein: 77g.  Dextrose: 115g.   Sodium:  34meq.  Potassium: 15meq.  Calcium:  4meq.  Magnesium: 2.5meq.  Phosphorus: removed.  Chloride:Acetate ratio: 1:2.  Trace elements: 3ml.  Multivitamins 10ml.    Pharmacy will continue to follow and adjust as appropriate.

## 2017-06-21 NOTE — PROGRESS NOTES
ICU Attending Note    I have seen and examined Hesham Ramos, reviewed the patient's history, pertinent labs, vital signs, medications, physical exam, and radiographs.  The patient is critically ill by my examination and requires continued ICU monitoring and cares    Hesham Ramos is admitted to ICU with respiratory failure after aspiration event. Had a g tube placed in March.  Was observed aspirating.  Intubated on admission.  Lives in a group home.     Recent Events:  G and J limbs placed to LIS.  New fungal postive cultures in blood.  Family conference held today.  Needing vasopressors to maintain BP.      Exam:  Temp:  [97.4  F (36.3  C)-99.9  F (37.7  C)] 97.4  F (36.3  C)  Heart Rate:  [] 93  Resp:  [7-28] 16  BP: ()/() 112/75  FiO2 (%):  [35 %-45 %] 35 %  SpO2:  [53 %-100 %] 100 %    Intake/Output Summary (Last 24 hours) at 06/19/17 1043  Last data filed at 06/19/17 1000   Gross per 24 hour   Intake          1533.57 ml   Output                0 ml   Net          1533.57 ml     Ventilation Mode: CMV/AC  FiO2 (%): 35 %  Rate Set (breaths/minute): 20 breaths/min  Tidal Volume Set (mL): 450 mL  PEEP (cm H2O): 5 cmH2O  Oxygen Concentration (%): 35 %  Resp: 16    lungs coarse  abd distended, ypmanic  Ext warm    Results:  ABG   No lab results found in last 7 days.  CBC    Recent Labs  Lab 06/21/17  0500 06/20/17  0535 06/19/17 0517 06/18/17 0519   WBC 11.0 12.6* 14.0* 13.2*   HGB 7.7* 7.5* 8.0* 8.2*   HCT 23.9* 24.3* 25.9* 26.4*    312 283 222     BMP    Recent Labs  Lab 06/21/17  0500 06/20/17  0535 06/19/17  0517 06/18/17  0519    142 146* 146*   POTASSIUM 4.2 4.3 4.4 3.4   CHLORIDE 100 102 108 110*   CO2 26 27 26 28   BUN 67* 55* 67* 46*   CR 5.58* 4.92* 6.40* 4.84*   * 205* 169* 125*     LFT    Recent Labs  Lab 06/18/17  0519 06/17/17  0820 06/16/17  0500 06/15/17  0520   AST 68*  --  54* 100*   ALT 75*  --  103* 133*   ALKPHOS 114  --  156* 135   BILITOTAL  0.4  --  0.6 0.6   ALBUMIN 1.9*  --  2.1* 2.0*   INR  --  1.38*  --   --      PancreasNo lab results found in last 7 days.  INR  Lab Results   Component Value Date    INR 1.38 06/17/2017    INR 1.76 06/14/2017    INR 1.23 06/11/2017    INR 1.03 04/23/2017       Current Issues:  1.  Neurology: baseline unresponsive, dexmedatomidine for sedation as needed  2.  Respiratory failure:  Intubated 6/12.  Vent support. No weaning due to increased pressor needs  3.  End stage renal disease: dialysis  4.  Pneumonia: empiric antibiotics, on levofloxacin based on cultures.  Broadened fungal coverage.  5.  Protein calorie malnutrition: TPN to start.  Intestinal tubes to drainage  6.  Small bowel obstruction: expectant though I think this has been present for a while and therefor is unlikely to resolve.  5.  Fungemia: has dialysis line and groin line.  Will need to change.  Needs to complete dialysis run and then have line removed.  Will change central line tomorrow.  Cultures not positive until 4 days.      Met with family today and explained the best case scenario being that he is institutionalized and not back to his pre hospital baseline.  They are discussing as a family comfort care options.    Evaluation and management time exclusive of procedures was 30 minutes critical care time including:  examination with the ICU team, discussion of the patient's condition with other physicians and members of the care team, reviewing all data related to the patient, and time utilizing the EMR for documentation of this patient's care.    Arpan Farooq MD  Acute Care Surgery/Critical Care

## 2017-06-21 NOTE — PROGRESS NOTES
Respiratory Therapy Note         Pt remains on full vent support and is not ready for a PS attempt at this time, remains on pressors. Good synchrony with the vent.  Breath sounds are coarse and diminished.  Suctioning back small to moderate thin white secretions.     June 21, 2017.5:31 PM  Billy Mendez

## 2017-06-21 NOTE — PROGRESS NOTES
[facility]  Hospitalist Progress Note  Marlene BASHIR Ukatu 06/21/2017    Reason for Stay (Diagnosis): Respiratory distress, sepsis secondary to aspiration; small bowel obstruction         Assessment and Plan:      Summary of Stay: Hesham Ramos is a 67 year old male with a PMHx significant for HTN, seizure disorder, stroke (2000 and 2007), diastolic congestive heart failure, and end-stage renal disease for which he is on chronic dialysis. He lives in a group home, where he requires high level care. He is unable to communicate verbally at baseline. He has a G tube which was placed two months ago, with some concerns from family members that there have been issues with tolerating feeds since its placement. He was admitted on 6/11/2017 with respiratory distress after vomiting tube feeds and aspirating. Upon arrival, patient had tube feed regurgitating from nose and mouth. He quickly developed fever and hypotension in addition to tachycardia. Zosyn and vancomycin were started for presumed aspiration pneumonia, and Levophed for hypotension. On 6/12 vasopressin was added, and pressures were adequately stabilized to perform dialysis. On 6/13 a precipitous drop in Hgb was noted; patient received transfusion of 2 units PRBCs administered during dialysis. Arterial line was placed in the left femoral artery for hemodynamic monitoring. Elevated transaminases found incidentally on 6/13 prompted abdominal ultrasound, which revealed multiple hepatic cysts. Narrowed to Unasyn on 6/14. Had large emesis of tube feedings on 6/16, G tube was converted to GJ tube on 6/19 in hopes of decreasing vomiting. Later on 6/19, during dialysis, patient had acute decompensation with tachypnea, O2 sats down to 51%, hypotension with undetectable BP, and regurgitation of what appeared to be stool from nose and mouth. Dialysis and tube feeds were immediately discontinued. Abdominal XR revealed dilated bowel loops suggestive of  obstruction, confirmed by a transition point identified on CT scan of abdomen on 6/20. Family conference on 6/21 was centered around what the next steps will be in his care.      Problem List:   1. Sepsis. Thought to be due to aspiration pneumonitis. Had been weaned off pressors and narrowed to Unasyn, but as of 6/19 is back on Levophed for hypotension. Switched from Unasyn to Levaquin after sputum culture grew Pseudomonas and Enterobacter.   - IV Levophed PRN    - Levaquin 500 mg IV Q48H     2. Small bowel obstruction. Patient had G tube replaced with GJ on 6/19. Later in the day regurgitated GI contents through mouth and nose, thought to be stool based on appearance and odor. Abdominal XR showed multiple loops of dilated bowel consistent with small bowel obstruction, confirmed with CT.   - Repeat CT abdomen tomorrow AM   - Hold tube feeds   - J tube to suction      2. Respiratory distress. Likely due to sepsis and aspiration pneumonitis. Mechanically ventilated. Has been failing spontaneous breathing trials; CXR on 6/17 was unremarkable. Patient will remain intubated until able to demonstrate ability to protect airway and remain hemodynamically stable without pressure support.   - Mechanical ventilation      3. End-stage renal disease. H/o ESRD, chronic hemodialysis. Has had several rounds of dialysis during this hospitalization, now back to regular dialysis schedule of 3 times per week. Dialysis on 6/19 was terminated prematurely due to acute respiratory decline and hypotension mid-session. Scheduled for dialysis again today, 6/21.   - Hemodialysis 3x per week      4. Anemia. Hemoglobin 7.5. Earlier concern for GI bleed met with no further compelling evidence of active bleeding justifying full workup. BMs have since been normal tan/brown color with no evidence of bleeding. Hemoglobin has been gradually decreasing, continue to trend Hgb and monitor for sources of bleeding.   - Recheck CBC in AM      6.  "Nutrition. G tube switched to GJ tube on 6/19. Tube feeds have been suspended due to bowel obstruction found on CT scan.    - TPN      DVT Prophylaxis: Pneumatic Compression Devices  Code Status: DNR, but may intubate.   Discharge Dispo: Return to group home  Estimated Disch Date / # of Days until Disch: Pending clinical course        Interval History (Subjective):      Family members were contacted yesterday evening to discuss how to proceed with treatment of Hesham's bowel obstruction. It was understood that his clinical condition is quite compromised, and that there is increased risk of undergoing any type of surgery. During a family meeting this morning with niece (Hemant) and daughter-in-law (Isis), long-term outcomes of care and uncertainty of end goal (extubation and rehabilitation) were addressed. Will reconvene tomorrow morning at 0830 for discussion involving more family members.                  Physical Exam:      Last Vital Signs:  /83  Pulse 120  Temp 98.9  F (37.2  C) (Oral)  Resp 24  Ht 1.651 m (5' 5\")  Wt 59.9 kg (132 lb 0.9 oz)  SpO2 98%  BMI 21.98 kg/m2      Intake/Output Summary (Last 24 hours) at 06/21/17 1348  Last data filed at 06/21/17 1115   Gross per 24 hour   Intake           1496.2 ml   Output             1325 ml   Net            171.2 ml     Vitals:    06/17/17 0440 06/18/17 0400 06/19/17 0500 06/20/17 0000   Weight: 56.2 kg (123 lb 14.4 oz) 56.2 kg (123 lb 14.4 oz) 56.5 kg (124 lb 9 oz) 57.4 kg (126 lb 8.7 oz)    06/21/17 0400   Weight: 59.9 kg (132 lb 0.9 oz)       Constitutional: Intubated, sedated   Respiratory: Breath sounds slightly coarse bilaterally   Cardiovascular: Regular rate and rhythm, normal S1 and S2, and no murmur noted   Abdomen: Firm, decompressing with J tube to suction. Absent bowel sounds.   Skin: No rashes, no cyanosis, dry to touch   Neuro: Unresponsive   Extremities: Edema of RUE near dialysis port   Other(s):        All other systems: Negative "          Medications:      All current medications were reviewed with changes reflected in problem list.         Data:      All new lab and imaging data was reviewed.   Labs:    Recent Labs  Lab 06/21/17  0500 06/20/17  0535 06/19/17  0517    142 146*   POTASSIUM 4.2 4.3 4.4   CHLORIDE 100 102 108   CO2 26 27 26   ANIONGAP 12 13 12   * 205* 169*   BUN 67* 55* 67*   CR 5.58* 4.92* 6.40*   GFRESTIMATED 10* 12* 9*   GFRESTBLACK 12* 14* 11*   DOUG 7.2* 7.3* 7.6*       Recent Labs  Lab 06/21/17  0500 06/20/17  0535 06/19/17  0517   WBC 11.0 12.6* 14.0*   HGB 7.7* 7.5* 8.0*   HCT 23.9* 24.3* 25.9*   MCV 92 95 97    312 283       Recent Labs  Lab 06/17/17  0820   INR 1.38*      Imaging:   Results for orders placed or performed during the hospital encounter of 06/11/17   Chest  XR, 1 view PORTABLE    Narrative    XR CHEST PORT 1 VW  6/11/2017 11:04 PM      HISTORY: Shortness of breath.     COMPARISON: 4/24/2017.    FINDINGS: Right IJ infusion catheter in place. Vascular stents in the  right arm. The heart size is normal. The thoracic aorta is calcified  and tortuous. There is a band of scar or atelectasis at the left lung  base. The lungs are otherwise clear. No pneumothorax or pleural  effusion.      Impression    IMPRESSION: Mild left basilar atelectasis.    AMBIKA PERLA MD   Head CT w/o contrast    Narrative    CT HEAD W/O CONTRAST 6/11/2017 11:54 PM      HISTORY: Unresponsive.    TECHNIQUE: Routine noncontrast head CT. Radiation dose for this scan  was reduced using automated exposure control, adjustment of the mA  and/or kV according to patient size, or iterative reconstruction  technique.    COMPARISON: 12/29/2016.    FINDINGS: There is generalized brain atrophy. No mass, mass effect or  intracranial hemorrhage. No evidence of acute infarct. Old cerebellar  infarcts. Periventricular low attenuation is consistent with chronic  small vessel ischemic disease. The visualized paranasal sinuses  are  clear.      Impression    IMPRESSION:  No acute abnormality.    AMBIKA PERLA MD   XR Abdomen Port 1 View    Narrative    XR ABDOMEN PORT F1 VW  6/12/2017 4:37 AM      HISTORY: Aspiration.     COMPARISON: None.    FINDINGS: G-tube in the midline. Right inguinal catheter in place.  Catheter projects over the lower pelvis. The bowel gas pattern is  within normal limits. There is air within the liver; this could be  biliary or portal venous. Right hemidiaphragm is elevated.      Impression    IMPRESSION: Air within the liver which may be biliary or portal  venous. If there is clinical concern for portal venous gas, CT is  recommended.    ABMIKA PERLA MD   XR Chest Port 1 View    Narrative    CHEST ONE VIEW PORTABLE June 12, 2017 12:45 PM     HISTORY: Endotracheal tube placement.    COMPARISON: 6/11/2017.      Impression    IMPRESSION: Endotracheal tube in good position. Elevated right  diaphragm. Interstitial densities in both lungs may be partly due to  shallow inspiration. Normal heart size and pulmonary vascularity.  Central venous catheter is unchanged. Nasogastric tube is coiled in  the esophagus and must be repositioned. Apparent stent grafts over the  right arm medially.    THEODORE SWAN MD   CT Abdomen Pelvis w/o Contrast    Narrative    CT ABDOMEN AND PELVIS WITHOUT CONTRAST   6/12/2017 4:31 PM     HISTORY: Sepsis thought to be due to aspiration of tube feeds but with  air in liver on x-ray. Evaluate for biliary obstruction, infection,  etc.    TECHNIQUE: CT of the abdomen and pelvis was performed without  intravenous contrast. Radiation dose for this scan was reduced using  automated exposure control, adjustment of the mA and/or kV according  to patient size, or iterative reconstruction technique.    COMPARISON: CT of the abdomen/pelvis dated 2/25/2009.    FINDINGS:    This is a very limited exam due to motion artifact and lack of  intravenous contrast.    Again identified are cysts within the  liver. No definite air within  the biliary tree is seen. No definite intrahepatic biliary duct  dilatation although without contrast, sensitivity for detection is  decreased. No definite extrahepatic biliary duct dilatation.    Since the previous exam, a G-tube has been placed.    Evaluation of the remaining solid organs is limited due to a lack of  intravenous contrast. The kidneys are atrophic with multiple  calcifications.    There are distended loops of small bowel throughout the abdomen.    A La catheter is in place.    There is no free air and no definite free fluid in the abdomen or  pelvis.    There are COPD changes at the lung bases. Bibasilar atelectasis and/or  infiltrates.      Impression    IMPRESSION:   1. Limited exam. No definite air within the biliary tree or biliary  duct dilatation identified on this study.  2. Dilated loops of small bowel throughout the abdomen. This is  nonspecific. It could represent a diffuse ileus.  3. Bibasilar atelectasis and/or infiltrates.    SHANTI ANGEL MD   US Abdomen Limited Portable    Narrative    ULTRASOUND ABDOMEN LIMITED PORTABLE  6/14/2017 8:16 AM     HISTORY: Elevated liver enzymes. Elevated INR.    COMPARISON: CT of the abdomen and pelvis performed 6/12/2017.    FINDINGS: Scattered liver cysts are noted, with the largest on the  right measuring 3.2 cm. No evidence for fatty infiltration of the  liver. The gallbladder wall appears thickened, measuring up to 0.6 cm.  No shadowing gallstones are identified. Evaluation of the gallbladder  is limited related to patient immobility. No pericholecystic fluid is  seen. No intra- or extrahepatic bile duct dilatation. Common hepatic  duct is normal in diameter. The entire common duct could not be  visualized on this scan. The right kidney could not be visualized.  Pancreas is partially obscured by overlying bowel gas, but appears  normal where seen. The abdominal aorta and IVC are of normal caliber  where  visualized. A small amount of ascites is noted in the right  upper quadrant.      Impression    IMPRESSION:   1. There is mild gallbladder wall thickening. No other convincing  sonographic evidence for cholecystitis.  2. Small amount of ascites is noted in the right upper quadrant.  3. Multiple hepatic cysts are identified  4. Nonvisualization of the right kidney.    MAYRA MERCADO MD   US Lower Extremity Venous Duplex Right    Narrative    VENOUS ULTRASOUND RIGHT LEG  6/15/2017 9:11 AM     HISTORY: Swelling in the right leg    COMPARISON: None.    FINDINGS:  Examination of the deep veins with graded compression and  color flow Doppler with spectral wave form analysis was performed.   The right common femoral vein and saphenofemoral junction were not  visualized due to overlying bandage. Visualized deep veins are patent  without thrombus.      Impression    IMPRESSION: No evidence of deep venous thrombosis.    SHANTI ANGEL MD   US Extremity Upper Venous rt    Narrative    ULTRASOUND VENOUS RIGHT UPPER EXTREMITY  6/15/2017 11:38 AM     HISTORY: Evaluate for DVT.    COMPARISON: None.    TECHNIQUE: Ultrasound gray scale, Color Doppler flow, and spectral  Doppler waveform analysis performed.    FINDINGS: There are no intraluminal filling defects. The right  internal jugular, axillary, cephalic, basilic, brachial, radial, and  ulnar veins demonstrate normal compressibility. The right internal  jugular, innominate, subclavian, and axillary veins have normal venous  waveforms. Technologist noted the presence of a PICC on the right.      Impression    IMPRESSION: No evidence for DVT.    NATALIYA LIU MD   XR Chest Port 1 View    Narrative    CHEST ONE VIEW UPRIGHT 6/16/2017 11:26 AM     HISTORY: Failing breathing trial.    COMPARISON: 6/12/2017.      Impression    IMPRESSION: Endotracheal tube tip mid to distal trachea. Large bore  right IJ line unchanged. Scattered trace atelectasis and/or fibrosis.  Previously  seen orogastric tube has been removed.    LUCIA MCGUIRE MD   XR Chest Port 1 View    Narrative    CHEST PORTABLE ONE VIEW June 18, 2017 9:19 AM     HISTORY: Aspiration.    COMPARISON: 6/16/2017.      Impression    IMPRESSION: Endotracheal tube and dialysis catheter unchanged. Patchy  airspace opacity present in the left lower lobe, atelectasis versus  pneumonia. Right lung is clear. No pneumothorax or pleural effusion.  Vascular stents noted in the right arm.    NATALIYA LIU MD   XR Gastro Tube to Gastrojejuno Convert    Narrative    GASTRO TUBE TO GASTROJEJUNO CONVERT  6/19/2017 9:55 AM     HISTORY:  Patient has a G-tube in place. He has developed significant  nausea and vomiting suggesting possible gastric outlet obstruction.    COMPARISON: 3/28/2017.    FINDINGS: After obtaining informed consent, the patient was placed in  a supine position on the fluoroscopy table. The existing G-tube was  removed. Skin entry site was prepped and draped in the usual sterile  manner. A JB1 catheter was used to pass a wire into the proximal  jejunum through the existing tract. Over the wire, an 18 Romansh JUAN CARLOS GJ  tube was placed. The tip was positioned in the proximal jejunum just  past the ligament of Treitz. Contrast was injected and showed adequate  positioning. A fluoroscopic image was saved.    The patient tolerated the procedure well. There were no immediate  postprocedure complications. The patient's vital signs were monitored  by radiology nursing staff under my supervision and remained stable  throughout the study.    MEDICATIONS: None.    Fluoroscopy time: 7 minutes 31 seconds.    Contrast: 30 mL Gastroview.    Radiation dose: 76 mGy.      Impression    IMPRESSION: GJ tube replaced as above.    SHANTI ANGEL MD   XR Chest Port 1 View    Narrative    CHEST PORTABLE ONE VIEW  6/19/2017 4:53 PM      HISTORY: Worsening breathing on ventilator.     COMPARISON: 6/18/2017.    FINDINGS: Semiupright portable chest. ET tube  in the mid trachea.  Right IJ infusion catheter is in place. There is no pneumothorax. The  heart size is normal. Thoracic aorta is tortuous. Lung volumes are  low. Mild right basilar atelectasis.      Impression    IMPRESSION: Mild right basilar atelectasis. No pneumothorax.    AMBIKA PERLA MD   XR Abdomen Port 1 View    Narrative    ABDOMEN PORTABLE FRONT ONE VIEW  6/19/2017 4:52 PM      HISTORY: Distended after FT evaluate for small bowel obstruction or  ileus.     COMPARISON: None.    FINDINGS: Supine portable abdomen. There are multiple dilated small  bowel loops suggesting small bowel obstruction. G-tube projects over  the mid abdomen. Right inguinal catheter in place.    AMBIKA PERLA MD   CT Abdomen Pelvis w/o & w Contrast    Narrative    CT ABDOMEN/PELVIS WITH AND WITHOUT CONTRAST June 20, 2017 2:04 PM     HISTORY: Bowel obstruction, question transition point.     TECHNIQUE: Axial images are obtained from the lung bases to the  symphysis without IV contrast. Patient had poor vascular access. Oral  contrast was administered. Coronal reformatted images are also  generated. Radiation dose for this scan was reduced using automated  exposure control, adjustment of the mA and/or kV according to patient  size, or iterative reconstruction technique.    FINDINGS: Bibasilar atelectasis is present with consolidation right  lower lobe concerning for pneumonia. Chronic fibroatelectatic changes  could look similar.    Abdomen: Gastrostomy tube with jejunal extension appears in good  position. No free intraperitoneal air. Oral contrast opacifies the  stomach and a significant portion of the small bowel. A discrete  transition point is not definitely identified but there is focal  narrowing in the region of the terminal ileum just proximal to the  ileocecal valve on series 2, image 58 and series 3, images 54-59.  There is also relative narrowing in the mid ileum in the lower abdomen  on series 3, image 41 where the  more proximal small bowel is more  distended with fecalized material. The remainder of the more proximal  small bowel is more significantly dilated. Follow-up abdominal x-ray  in several hours following CT imaging could be performed to see if  contrast finally empties into the colon. Colon is decompressed.    Upper abdominal organs demonstrate bilateral renal atrophy with small  nonobstructing stones. No hydronephrosis. Cysts are noted in the liver  and the gallbladder and spleen are unremarkable. Pancreas is within  normal limits. Adrenal glands are unremarkable.    Pelvis: Bladder is decompressed. Prostate gland and rectum are  unremarkable. Small amount free pelvic fluid is present likely related  to the suspected bowel obstruction versus renal failure. No enlarged  lymph nodes. Right femoral venous line is present. Tip terminates in  the inferior IVC. Bone window examination demonstrates ectopic  ossification posterior to the proximal right femur in the adjacent  musculature. Mild degenerative spine changes are noted.      Impression    IMPRESSION:  1. Probable transition point in the lower anterior abdomen in the  region of the mid ileum. Additional area near the terminal ileum also  is narrowed. Colon is decompressed. Oral contrast has not completely  made it through the small bowel but is likely into the distal jejunum  or proximal ileum. Proximal small bowel loops all significantly  distended. Gastrojejunostomy tube appears in good position.  2. Nonobstructing renal collecting system stones and cysts with  bilateral renal atrophy consistent with end-stage renal disease.    KYLIE HERNANDEZ MD

## 2017-06-21 NOTE — CONSULTS
IMPRESSION:   1.  A 67-year-old male with complicated medical history including general poor functional status with G-tube in place for feeding and lives in a group home.   2.  Admitted 10 days ago with apparent sepsis and probable aspiration pneumonia, initially cultures negative, got antibiotics, some improvement, although still intubated.   3.  Acute worsening recently, now with candidemia, probably line-related, has a dialysis access catheter and a left groin line.   4.  End-stage renal disease on dialysis.   5.  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   6.  Small-bowel obstruction, current active problem.   7.  Respiratory failure on the vent, initially cultures were negative in the sputum, now with Enterobacter and pseudomonas question ongoing pneumonia.      RECOMMENDATIONS:   1.  Micafungin for now, it will probably be Candida albicans fluconazole is acceptable, but jose a for a short course.   2.  Follow up blood cultures today and serial until clear.   3.  Pull at least the groin line if new access needed put a new central line in, can alternate as course and further cultures direct.  Dialysis access catheter certainly a potential problem as well.  Ideally removal if able.     4.  Some discussion about level of care, certainly if goes to comfort care reconsider options at that point.   5.  From a conventional antibiotic standpoint Levaquin for now, although overall not that much for major active pneumonia, may just be colonization of sputum with gram-negative rods.      HISTORY:  This 67-year-old male is seen in consultation due to candidemia.  The patient is currently intubated, sedated in the ICU.  Cannot provide history.  He has a history of longstanding medical problems including end-stage renal disease, poor functional status, has a G-tube in place, history of recurrent aspiration and other sepsis problems.  He was admitted 10 days ago with apparent acute sepsis at that time, felt to have pneumonia.  Has been  intubated, possible aspiration occurred, on broad-spectrum antibiotics, initially Zosyn and then Unasyn and he then subsequently had some further worsening, now has blood cultures from  growing Candida.  He has a left groin access device and a central catheter for dialysis, he has bilateral failed grafts.  He also has now developed some degree of small-bowel obstruction, ongoing ventilator dependent.      PAST MEDICAL HISTORY:  Recurrent episodes of aspiration, a history of general decompensation overall health history of G-tube in place.      ALLERGIES:  No antimicrobial allergies.      MEDICATIONS:  Currently getting fluconazole and Levaquin.      SOCIAL AND FAMILY HISTORY:  Extensive health care contact, not known to have major resistant pathogens.      REVIEW OF SYSTEMS:  Unobtainable.      PHYSICAL EXAMINATION:   GENERAL:  The patient is in bed, vent in place, not communicative.   VITAL SIGNS:  Currently include being afebrile, pulse 110.   HEENT:  No visible lesions.  Tube in place.   NECK:  Supple and nontender.   HEART:  Tachycardic at about 100.   ABDOMEN:  Slightly distended, not really tender.   EXTREMITIES:  Groin line unremarkable looking, has his right chest dialysis access device, has bilateral failed grafts.      LABORATORY DATA:  Candidemia current blood culture, repeat is pending.  Sputum culture also had Candida, most recent sputum now growing Enterobacter and pseudomonas that are quinolone sensitive, he is on Levaquin.         ATIYA MCLEAN MD             D: 2017 16:41   T: 2017 16:53   MT: [id number]      Name:     HUGH JENSEN   MRN:      [id number]        Account:       [id number]   :      1950           Consult Date:  2017      Document: [id number]       cc: Fabien Sherman MD

## 2017-06-21 NOTE — PROGRESS NOTES
Focus: bilateral ear lobe  D/I: Patient History:      per MD note(s):   67 year old male who was admitted on 6/11/2017. Patient with a history of prior debilitating strokes, sz d/o, htn/diastolic CHF, ESRD on HD, and significantly debilitated who lives in a group home.  He had a feeding tube placed approximately 3 months ago for dysphagia, and has continued to decline since that time admitted on 6/11/2017 with hypoxia/fever consistent with aspiration pna complicated by sepsis (leukocytosis/lactic acidosis/fever) which progressed to septic shock necessitating norepi/vasopressin support and intubation on 6/12 for respiratory failure. He was been weaned  off  From pressors on 6/15/17 But again was restarted on levophed 6/19 as he was hypotensive during dialysis. He remains intubated but with minimal ventilatory support undergoing daily weans that are limited by tachypnea/low tidal volumes.  WOC notified during skin rounds today that pt has questionable skin issues on Left ear lobe, anterior and posterior. Skin changes noted with superficial erosion and dry peeling epidermis, suspect combination moisture and pressure from oximeter probe. Will continue to observe as evolves.   Skin assessed and No sting barrier applied to bilateral ear lobes.  A/P: superficial break in skin with erosion of epidermis evolving, stable, no signs of infection, suspect combination of moisture and pressure from probe.  Bilateral ear lobes front and back: Daily  1. Swab with No Sting barrier and leave open to air to dry

## 2017-06-21 NOTE — PROGRESS NOTES
[facility]  Palliative Care Progress Note  Text Page    Family care conference with the patient's niece/co-guardian Alinelandy Matthewsdelmy with the care team of Intensivist Arpan Farooq MD; Medical Student Marlene Sheets; bedside nurse Nakita Crawford RN. Thanks to Dr. Farooq for explaining the options of care in moving forward from the option of an aggressive approach of tracheostomy and abdominal surgery to a comfort plan of care. When Hemant's sister-in-law joined our discussion I briefly reviewed the options and Dr. Farooq did an eloquent job of outlining the options on paper, so that Hemant and the rest of Hesham's family can consider the best approach. We will again meet tomorrow morning at 8:30 am when nephew/co-guardian Carlos Matthewsshaquillematthew will hopefully be able to join our discussion.        Assessment & Plan      1.  Patient unresponsive - Patient does not demonstrate medical capacity. Legal co-guardians for healthcare decisions Alinepatienceban Ramos (niece) and Carlos Matthewsdelmy (nephew) are assisting in plan of care.     2.  Dysphagia with SBO - Discuss options for nutrition at tomorrow's family care conference.     3.  Dyspnea - Currently orally intubated. Discuss options for care (trach, continue oral intubation and comfort and withdrawal of ventilator) at tomorrow's family care conference.       4. Goal of Care: DNR - Restorative care. Family care conference with co-guardians at 8:30 AM tomorrow.      Elaina Moeller MS, RN, CNS, APRN, ACHPN, FAACVPR  Pain and Palliative Care  Pager [phone]  Office [phone]    Time Spent on this Encounter   I spent from 10:45 AM until 11:25 AM in assessment of the patient and discussion with the family. Another 20 minutes in review of chart, documentation and discussion with the health care team.    Interval History   Chart reviewed    Review of Systems    Unable as patient is unresponsive on the ventilator    Physical Exam   Temp:  [98.4  F (36.9   C)-100  F (37.8  C)] 99  F (37.2  C)  Heart Rate:  [] 99  Resp:  [7-77] 22  BP: ()/() 93/63  FiO2 (%):  [35 %-45 %] 35 %  SpO2:  [53 %-100 %] 100 %  132 lbs .89 oz  GEN:  Intubated and sedated cachetic black male.    Medications     - MEDICATION INSTRUCTIONS -       norepinephrine 0.32 mcg/kg/min (06/21/17 1100)     IV infusion builder WITH additives 20 mL/hr at 06/20/17 1800     NaCl 10 mL/hr at 06/20/17 1800     NaCl 10 mL/hr at 06/20/17 2000     IV fluid REPLACEMENT ONLY       IV fluid REPLACEMENT ONLY       dexmedetomidine 0.7 mcg/kg/hr (06/21/17 1000)       sodium chloride 0.9%  250 mL Hemodialysis Machine Once     albumin human  250 mL Intravenous Once in dialysis     [START ON 6/22/2017] levofloxacin  500 mg Intravenous Q48H     epoetin arnulfo (EPOGEN,PROCRIT) inj ESRD  5,000 Units Intravenous See Admin Instructions     doxercalciferol  2 mcg Intravenous See Admin Instructions     pantoprazole  40 mg Intravenous BID     fluconazole  200 mg Intravenous Q48H     simvastatin  40 mg Oral or Feeding Tube QPM     insulin aspart  1-6 Units Subcutaneous Q4H     nystatin  500,000 Units Mouth/Throat 4x Daily     doxercalciferol  4 mcg Intravenous Once per day on Mon Wed Fri     epoetin arnulfo (EPOGEN,PROCRIT) inj ESRD  3,000 Units Intravenous Once per day on Mon Wed Fri     fosphenytoin (CEREBYX) intermittent infusion (maintenance)  150 mg PE Intravenous Q12H     ipratropium - albuterol 0.5 mg/2.5 mg/3 mL  3 mL Nebulization 4x daily     sodium chloride 0.9%  1,000 mL Intravenous Once     - MEDICATION INSTRUCTIONS for Dialysis Patients -   Does not apply See Admin Instructions       Data   Results for orders placed or performed during the hospital encounter of 06/11/17 (from the past 24 hour(s))   Glucose by meter   Result Value Ref Range    Glucose 137 (H) 70 - 99 mg/dL   CT Abdomen Pelvis w/o & w Contrast    Narrative    CT ABDOMEN/PELVIS WITH AND WITHOUT CONTRAST June 20, 2017 2:04 PM     HISTORY:  Bowel obstruction, question transition point.     TECHNIQUE: Axial images are obtained from the lung bases to the  symphysis without IV contrast. Patient had poor vascular access. Oral  contrast was administered. Coronal reformatted images are also  generated. Radiation dose for this scan was reduced using automated  exposure control, adjustment of the mA and/or kV according to patient  size, or iterative reconstruction technique.    FINDINGS: Bibasilar atelectasis is present with consolidation right  lower lobe concerning for pneumonia. Chronic fibroatelectatic changes  could look similar.    Abdomen: Gastrostomy tube with jejunal extension appears in good  position. No free intraperitoneal air. Oral contrast opacifies the  stomach and a significant portion of the small bowel. A discrete  transition point is not definitely identified but there is focal  narrowing in the region of the terminal ileum just proximal to the  ileocecal valve on series 2, image 58 and series 3, images 54-59.  There is also relative narrowing in the mid ileum in the lower abdomen  on series 3, image 41 where the more proximal small bowel is more  distended with fecalized material. The remainder of the more proximal  small bowel is more significantly dilated. Follow-up abdominal x-ray  in several hours following CT imaging could be performed to see if  contrast finally empties into the colon. Colon is decompressed.    Upper abdominal organs demonstrate bilateral renal atrophy with small  nonobstructing stones. No hydronephrosis. Cysts are noted in the liver  and the gallbladder and spleen are unremarkable. Pancreas is within  normal limits. Adrenal glands are unremarkable.    Pelvis: Bladder is decompressed. Prostate gland and rectum are  unremarkable. Small amount free pelvic fluid is present likely related  to the suspected bowel obstruction versus renal failure. No enlarged  lymph nodes. Right femoral venous line is present. Tip  terminates in  the inferior IVC. Bone window examination demonstrates ectopic  ossification posterior to the proximal right femur in the adjacent  musculature. Mild degenerative spine changes are noted.      Impression    IMPRESSION:  1. Probable transition point in the lower anterior abdomen in the  region of the mid ileum. Additional area near the terminal ileum also  is narrowed. Colon is decompressed. Oral contrast has not completely  made it through the small bowel but is likely into the distal jejunum  or proximal ileum. Proximal small bowel loops all significantly  distended. Gastrojejunostomy tube appears in good position.  2. Nonobstructing renal collecting system stones and cysts with  bilateral renal atrophy consistent with end-stage renal disease.    KYLIE HERNANDEZ MD   Surgery General IP Consult: Patient to be seen: Routine - within 24 hours; bowel obstruction with transition point. criticaLLY ILL; Consultant may enter orders: Yes    Narrative    Gómez Dale MD     6/20/2017  4:53 PM  [facility] Surgery Consultation    Hesham Ramos MRN# [id number]   Age: 67 year old YOB: 1950     Date of Admission:  6/11/2017    Reason for consult: SBO       Requesting physician: Donita       Level of consult: Consult, follow and place orders           Assessment and Plan:   Assessment:   SBO  Patient Active Problem List    Diagnosis Date Noted     Respiratory distress 06/12/2017     Priority: Medium     Feeding by G-tube (H) 03/31/2017     Priority: Medium     Aspiration into airway, sequela 03/31/2017     Priority: Medium     Hemodialysis catheter dysfunction, initial encounter (H)   10/25/2016     Priority: Medium     Hyperkalemia 10/24/2016     Priority: Medium     Diastolic CHF, chronic (H)      Priority: Medium     Septic shock (H) 11/12/2015     Priority: Medium     Health Care Home 08/25/2014     Pt not active in care coordination.  Status:  Declined  Care Coordinator:  KATY  MAXINE DOE    See Letters for [facility]  Date:  August 25, 2014           Seizures (HCC) 08/17/2014     Hematemesis 06/03/2014     Renal cancer (H) 04/25/2014     Hypoxia 03/13/2014     Syncope 08/02/2013     Anemia 08/02/2013     EKG abnormality 08/02/2013     Encephalopathy 08/02/2013     CKD (chronic kidney disease) stage 5, GFR less than 15 ml/min   (H) 07/23/2013     Cognitive impairment 06/21/2013     ESRF (end stage renal failure) (H) 05/16/2013     Advance Care Planning 11/01/2011     Advance Care Planning 12/2/2015: Receipt of ACP document:    Received: POLST which was signed and dated by provider on   11/18/15.  Document previously scanned on 11/20/15.  Order   reviewed and found to be valid.  Code Status reflects choices in   most recent ACP document.  Confirmed/documented designated   decision maker(s).  Added by PABLO CHAN  Advance Care Planning: Initial facilitation introduction:     Hesham Ramos presented for initial session regarding ACP   at a group session. He was accompanied by no one. Honoring   Choices information provided and resources reviewed. He currently   wishes to review choices without completion of an ACP document.    He currently has the following questions or concerns about   Advance Care Planning: none known.  Confirmed/documented   designated decision maker(s). See permanent comments section of   demographics in clinical tab. Added by Kaylin Rincon on   10/20/2014  Advance Care Planning: Receipt of ACP document:  Received: POLST   which was signed and dated by provider on 8/27/13.  Document   previously scanned on 10/3/13.  Order reviewed and found to be   valid.  Code Status reflects choices in most recent ACP document.    Confirmed/documented designated decision maker(s). See permanent   comments section of demographics in clinical tab. View   document(s) and details by clicking on code status. Added by   Kaylin Rincon on 10/20/2014.  Pt have received packets.   Juliette Briggs MA         HYPERLIPIDEMIA LDL GOAL <100 10/31/2010     Cerebrovascular accident (CVA) due to occlusion of cerebral   artery (H)      Hemiplegia, aphasia       Chronic diastolic congestive heart failure (H)      Essential hypertension      Problem list name updated by automated process. Provider to   review           Plan:   Abdominal film - to see if CT contrast progresses to colon   (partial obstruction)  Laparoscopy/laparotomy with lysis of adhesions or SB resection   would be an option after he gets over his aspiration pneumonia   and sepsis. Since his only abdominal surgery was laparoscopic   cryotherapy of a right renal tumor in 2007, we would hope that   adhesions would be minimal and surgery could be done   laparoscopically. Since the obstruction is presumably chronic, GT   suction should suffice for now.   Currently he has a prohibitive   surgical risk.   Discussed with hospitalist team - they are in agreement.  Continue on LIS, no TF            Chief Complaint:   aspiration     History is obtained from the electronic health record and   hospitalist         History of Present Illness:   This patient is a 67 year old  male with a significant past   medical history of chronic kidney disease and stroke,   unresponsive at baseline who was found in acute respiratory   distress and with tube feedings coming out of his nose and mouth.   He was admitted with aspiration pneumonia and sepsis. He has a   history of aspiration so a G-tube was placed on 3/28/17 (this was   converted to G-tube with feeding jejunostomy 6/11/17 to allow TF   and minimize risk of aspiration.  TF were gradually advanced   until he had a large emesis 6/16. TF were held , then restarted   slowly and he had another emesis 6/19. CT today reveals a picture   of SBO. There is a question of some narrowing in the mid ileum   but transition is not definitely seen. He is now on LIS.           Past Medical History:     Past Medical  History:   Diagnosis Date     Cognitive impairment      Diastolic CHF, chronic (H)      End-stage renal disease on hemodialysis (H)      Essential hypertension     Off all BP meds as of late 2015 due to hypotension     Renal cancer (H)     s/p cryoablation     Seizures (H) Onset 8/2014    On dilantin     Stroke (H) 2000, 2007    Hemiplegia, aphasia             Past Surgical History:     Past Surgical History:   Procedure Laterality Date     Cryoablation of kidney cancer       ESOPHAGOSCOPY, GASTROSCOPY, DUODENOSCOPY (EGD), COMBINED    8/5/2014    Procedure: COMBINED ESOPHAGOSCOPY, GASTROSCOPY, DUODENOSCOPY   (EGD), BIOPSY SINGLE OR MULTIPLE;  Surgeon: Raheel Mixon MD;  Location:  GI     HEMODIALYSIS VIA AV FISTULA       PEG tube NOS       right arm skin graft               Social History:     Social History   Substance Use Topics     Smoking status: Never Smoker     Smokeless tobacco: Never Used     Alcohol use No             Family History:     Family History   Problem Relation Age of Onset     Family History Negative Mother      Unknown/Adopted No family hx of              Immunizations:     VACCINE/DOSE   Diptheria   DPT   DTAP   HBIG   Hepatitis A   Hepatitis B   HIB   Influenza   Measles   Meningococcal   MMR   Mumps   Pneumococcal   Polio   Rubella   Small Pox   TDAP   Varicella   Zoster             Allergies:   No Known Allergies          Medications:     Current Facility-Administered Medications   Medication     [START ON 6/22/2017] levofloxacin (LEVAQUIN) infusion 500 mg     epoetin arnulfo (EPOGEN/PROCRIT) 5,000 Units injection     doxercalciferol (HECTOROL) injection 2 mcg     norepinephrine (LEVOPHED) 16 mg in D5W 250 mL infusion     pantoprazole (PROTONIX) 40 mg IV push injection     fluconazole (DIFLUCAN) intermittent infusion 200 mg in NaCl     D5W 1,000 mL infusion     0.9% sodium chloride infusion     0.9% sodium chloride infusion     simvastatin (ZOCOR) tablet 40 mg     insulin aspart  (NovoLOG) inj (RAPID ACTING)     ascorbic acid tablet 250 mg     acetaminophen (OFIRMEV) infusion 1,000 mg     acetaminophen (TYLENOL) Suppository 650 mg     nystatin (MYCOSTATIN) suspension 500,000 Units     doxercalciferol (HECTOROL) injection 4 mcg     epoetin arnulfo (EPOGEN/PROCRIT) injection 3,000 Units     dextrose 10 % 1,000 mL infusion     B and C vitamin Complex with folic acid (NEPHRONEX) liquid 5 mL       Prosource TF protein modulator     fosphenytoin (CEREBYX) 150 mg PE in NaCl 0.9 % 100 mL   intermittent infusion     dextrose 10 % 1,000 mL infusion     glucose 40 % gel 15-30 g    Or     dextrose 50 % injection 25-50 mL    Or     glucagon injection 1 mg     ipratropium - albuterol 0.5 mg/2.5 mg/3 mL (DUONEB) neb   solution 3 mL     albuterol neb solution 2.5 mg     ondansetron (ZOFRAN-ODT) ODT tab 4 mg    Or     ondansetron (ZOFRAN) injection 4 mg     prochlorperazine (COMPAZINE) injection 5 mg    Or     prochlorperazine (COMPAZINE) tablet 5 mg    Or     prochlorperazine (COMPAZINE) Suppository 12.5 mg     0.9% sodium chloride BOLUS     dexmedetomidine (PRECEDEX) 400 mcg in NaCl 0.9 % 100 mL   infusion     LORazepam (ATIVAN) injection 0.5 mg     - MEDICATION INSTRUCTIONS for Dialysis Patients -     HYDROmorphone (DILAUDID) injection 0.2 mg     naloxone (NARCAN) injection 0.1-0.4 mg             Review of Systems:   Not obtainable from patient - reports of gradual weight loss in   chart          Physical Exam:   All vitals have been reviewed  Patient Vitals for the past 24 hrs:   BP Temp Temp src Heart Rate Resp SpO2 Weight   06/20/17 1600 - - - - - 100 % -   06/20/17 1530 106/48 - - 94 20 100 % -   06/20/17 1515 99/48 - - 96 20 100 % -   06/20/17 1500 103/50 - - 99 (!) 77 100 % -   06/20/17 1445 117/49 - - 101 19 100 % -   06/20/17 1430 99/58 - - 99 19 100 % -   06/20/17 1330 121/59 - - 102 20 99 % -   06/20/17 1315 118/71 - - 106 24 99 % -   06/20/17 1300 111/55 100  F (37.8  C) Axillary 103 19 100 % -    06/20/17 1245 124/52 - - 101 20 97 % -   06/20/17 1230 119/60 - - 103 16 98 % -   06/20/17 1215 131/53 - - 102 19 97 % -   06/20/17 1200 93/50 - - 100 19 99 % -   06/20/17 1150 - - - - - 98 % -   06/20/17 1145 136/76 - - 96 19 100 % -   06/20/17 1130 108/49 - - 95 19 95 % -   06/20/17 1115 100/45 - - 98 20 100 % -   06/20/17 1100 129/53 - - 99 20 100 % -   06/20/17 1045 137/64 - - 103 19 99 % -   06/20/17 1030 101/45 - - 95 20 99 % -   06/20/17 1015 97/47 - - 96 20 100 % -   06/20/17 1000 95/45 - - 101 20 100 % -   06/20/17 0945 95/49 - - 98 20 99 % -   06/20/17 0930 91/44 - - 102 19 99 % -   06/20/17 0915 99/51 - - 98 20 99 % -   06/20/17 0900 95/43 - - 102 22 93 % -   06/20/17 0845 106/55 - - 104 19 (!) 69 % -   06/20/17 0830 (!) 87/40 - - 102 20 100 % -   06/20/17 0815 (!) 82/39 - - 102 20 (!) 89 % -   06/20/17 0800 92/47 - - 98 19 100 % -   06/20/17 0755 - - - - - 100 % -   06/20/17 0743 109/48 99.9  F (37.7  C) Axillary 99 19 100 % -   06/20/17 0730 105/49 - - 100 21 100 % -   06/20/17 0700 106/47 - - 102 19 100 % -   06/20/17 0645 120/52 - - 104 22 99 % -   06/20/17 0615 101/46 - - 98 19 100 % -   06/20/17 0600 90/64 - - 96 19 90 % -   06/20/17 0545 116/65 - - 102 21 100 % -   06/20/17 0530 (!) 108/36 - - 98 19 100 % -   06/20/17 0515 93/52 - - 101 20 100 % -   06/20/17 0500 99/66 - - 101 21 100 % -   06/20/17 0445 (!) 88/55 - - 100 19 100 % -   06/20/17 0430 102/51 - - 101 18 100 % -   06/20/17 0415 (!) 86/35 - - 103 23 100 % -   06/20/17 0403 - - - - - 100 % -   06/20/17 0400 100/55 100  F (37.8  C) Axillary 101 20 100 % -   06/20/17 0345 102/41 - - 100 19 100 % -   06/20/17 0330 (!) 85/38 - - 102 22 100 % -   06/20/17 0315 (!) 110/28 - - 101 21 100 % -   06/20/17 0300 107/59 - - 103 27 100 % -   06/20/17 0245 111/60 - - 102 20 100 % -   06/20/17 0230 111/50 - - 103 21 98 % -   06/20/17 0215 (!) 122/26 - - 102 20 100 % -   06/20/17 0200 (!) 85/40 - - 101 19 100 % -   06/20/17 0145 98/55 - - 101 20 100 % -    06/20/17 0130 96/53 - - 101 19 100 % -   06/20/17 0115 96/51 - - 104 21 100 % -   06/20/17 0100 (!) 103/35 - - 104 19 100 % -   06/20/17 0045 92/49 - - 104 21 100 % -   06/20/17 0030 109/47 - - 104 21 100 % -   06/20/17 0015 (!) 106/37 - - 104 19 100 % -   06/20/17 0000 (!) 123/31 98.7  F (37.1  C) - 108 25 100 % 57.4 kg   (126 lb 8.7 oz)   06/19/17 2345 (!) 133/36 - - 107 21 100 % -   06/19/17 2343 - - - - - 98 % -   06/19/17 2300 105/40 - - 106 23 100 % -   06/19/17 2245 (!) 76/42 - - 105 23 99 % -   06/19/17 2230 108/41 - - 109 20 94 % -   06/19/17 2215 107/42 - - 109 15 (!) 85 % -   06/19/17 2200 116/41 - - 109 20 97 % -   06/19/17 2145 (!) 114/38 - - 110 19 94 % -   06/19/17 2130 (!) 104/30 - - 112 16 95 % -   06/19/17 2115 (!) 127/38 - - 114 21 94 % -   06/19/17 2100 116/54 - - 113 13 98 % -   06/19/17 2045 114/44 - - 113 15 98 % -   06/19/17 2030 (!) 116/31 - - 112 8 96 % -   06/19/17 2015 118/66 - - 108 20 100 % -   06/19/17 2000 118/63 - - 106 22 100 % -   06/19/17 1945 (!) 100/37 100.1  F (37.8  C) Axillary 105 20 99 %   -   06/19/17 1930 (!) 112/26 - - 104 20 100 % -   06/19/17 1915 100/51 - - 107 22 98 % -   06/19/17 1900 (!) 98/39 - - 107 19 98 % -   06/19/17 1850 - - - 107 21 98 % -   06/19/17 1840 108/40 - - 108 24 97 % -   06/19/17 1830 (!) 96/29 - - 108 21 98 % -   06/19/17 1820 - - - 111 28 98 % -   06/19/17 1810 93/42 - - 110 23 98 % -   06/19/17 1800 (!) 97/32 - - 109 29 100 % -   06/19/17 1740 (!) 86/52 99.9  F (37.7  C) Axillary 113 21 99 % -   06/19/17 1730 134/59 - - 114 11 99 % -   06/19/17 1720 - - - 116 12 97 % -   06/19/17 1715 113/44 - - 116 15 99 % -   06/19/17 1710 113/44 - - 116 14 98 % -   06/19/17 1700 141/53 - - 117 26 (!) 76 % -   06/19/17 1650 - - - 117 23 (!) 45 % -   06/19/17 1640 116/81 - - 120 9 - -   06/19/17 1630 124/78 - - 120 19 - -       Intake/Output Summary (Last 24 hours) at 06/20/17 1621  Last data filed at 06/20/17 1400   Gross per 24 hour   Intake           2006.96 ml   Output              350 ml   Net          1656.96 ml     Neck:   intubated and skin normal     Chest / Breast:   Ventilated mechanically     Abdomen:   firm, distended, non-tender (sedated), involuntary guarding   absent, no masses palpated and hernia absent             Data:   All laboratory data reviewed  Results for orders placed or performed during the hospital   encounter of 06/11/17   Chest  XR, 1 view PORTABLE    Narrative    XR CHEST PORT 1 VW  6/11/2017 11:04 PM      HISTORY: Shortness of breath.     COMPARISON: 4/24/2017.    FINDINGS: Right IJ infusion catheter in place. Vascular stents in   the  right arm. The heart size is normal. The thoracic aorta is   calcified  and tortuous. There is a band of scar or atelectasis at the left   lung  base. The lungs are otherwise clear. No pneumothorax or pleural  effusion.      Impression    IMPRESSION: Mild left basilar atelectasis.    AMBIKA PERLA MD   Head CT w/o contrast    Narrative    CT HEAD W/O CONTRAST 6/11/2017 11:54 PM      HISTORY: Unresponsive.    TECHNIQUE: Routine noncontrast head CT. Radiation dose for this   scan  was reduced using automated exposure control, adjustment of the   mA  and/or kV according to patient size, or iterative reconstruction  technique.    COMPARISON: 12/29/2016.    FINDINGS: There is generalized brain atrophy. No mass, mass   effect or  intracranial hemorrhage. No evidence of acute infarct. Old   cerebellar  infarcts. Periventricular low attenuation is consistent with   chronic  small vessel ischemic disease. The visualized paranasal sinuses   are  clear.      Impression    IMPRESSION:  No acute abnormality.    AMBIKA PERLA MD   XR Abdomen Port 1 View    Narrative    XR ABDOMEN PORT F1 VW  6/12/2017 4:37 AM      HISTORY: Aspiration.     COMPARISON: None.    FINDINGS: G-tube in the midline. Right inguinal catheter in   place.  Catheter projects over the lower pelvis. The bowel gas pattern is  within normal  limits. There is air within the liver; this could   be  biliary or portal venous. Right hemidiaphragm is elevated.      Impression    IMPRESSION: Air within the liver which may be biliary or portal  venous. If there is clinical concern for portal venous gas, CT is  recommended.    AMBIKA PERLA MD   XR Chest Port 1 View    Narrative    CHEST ONE VIEW PORTABLE June 12, 2017 12:45 PM     HISTORY: Endotracheal tube placement.    COMPARISON: 6/11/2017.      Impression    IMPRESSION: Endotracheal tube in good position. Elevated right  diaphragm. Interstitial densities in both lungs may be partly due   to  shallow inspiration. Normal heart size and pulmonary vascularity.  Central venous catheter is unchanged. Nasogastric tube is coiled   in  the esophagus and must be repositioned. Apparent stent grafts   over the  right arm medially.    THEODORE SWAN MD   CT Abdomen Pelvis w/o Contrast    Narrative    CT ABDOMEN AND PELVIS WITHOUT CONTRAST   6/12/2017 4:31 PM     HISTORY: Sepsis thought to be due to aspiration of tube feeds but   with  air in liver on x-ray. Evaluate for biliary obstruction,   infection,  etc.    TECHNIQUE: CT of the abdomen and pelvis was performed without  intravenous contrast. Radiation dose for this scan was reduced   using  automated exposure control, adjustment of the mA and/or kV   according  to patient size, or iterative reconstruction technique.    COMPARISON: CT of the abdomen/pelvis dated 2/25/2009.    FINDINGS:    This is a very limited exam due to motion artifact and lack of  intravenous contrast.    Again identified are cysts within the liver. No definite air   within  the biliary tree is seen. No definite intrahepatic biliary duct  dilatation although without contrast, sensitivity for detection   is  decreased. No definite extrahepatic biliary duct dilatation.    Since the previous exam, a G-tube has been placed.    Evaluation of the remaining solid organs is limited due to a lack    of  intravenous contrast. The kidneys are atrophic with multiple  calcifications.    There are distended loops of small bowel throughout the abdomen.    A La catheter is in place.    There is no free air and no definite free fluid in the abdomen or  pelvis.    There are COPD changes at the lung bases. Bibasilar atelectasis   and/or  infiltrates.      Impression    IMPRESSION:   1. Limited exam. No definite air within the biliary tree or   biliary  duct dilatation identified on this study.  2. Dilated loops of small bowel throughout the abdomen. This is  nonspecific. It could represent a diffuse ileus.  3. Bibasilar atelectasis and/or infiltrates.    SHANTI ANGEL MD   US Abdomen Limited Portable    Narrative    ULTRASOUND ABDOMEN LIMITED PORTABLE  6/14/2017 8:16 AM     HISTORY: Elevated liver enzymes. Elevated INR.    COMPARISON: CT of the abdomen and pelvis performed 6/12/2017.    FINDINGS: Scattered liver cysts are noted, with the largest on   the  right measuring 3.2 cm. No evidence for fatty infiltration of the  liver. The gallbladder wall appears thickened, measuring up to   0.6 cm.  No shadowing gallstones are identified. Evaluation of the   gallbladder  is limited related to patient immobility. No pericholecystic   fluid is  seen. No intra- or extrahepatic bile duct dilatation. Common   hepatic  duct is normal in diameter. The entire common duct could not be  visualized on this scan. The right kidney could not be   visualized.  Pancreas is partially obscured by overlying bowel gas, but   appears  normal where seen. The abdominal aorta and IVC are of normal   caliber  where visualized. A small amount of ascites is noted in the right  upper quadrant.      Impression    IMPRESSION:   1. There is mild gallbladder wall thickening. No other convincing  sonographic evidence for cholecystitis.  2. Small amount of ascites is noted in the right upper quadrant.  3. Multiple hepatic cysts are identified  4.  Nonvisualization of the right kidney.    MAYRA MERCADO MD   US Lower Extremity Venous Duplex Right    Narrative    VENOUS ULTRASOUND RIGHT LEG  6/15/2017 9:11 AM     HISTORY: Swelling in the right leg    COMPARISON: None.    FINDINGS:  Examination of the deep veins with graded compression   and  color flow Doppler with spectral wave form analysis was   performed.   The right common femoral vein and saphenofemoral junction were   not  visualized due to overlying bandage. Visualized deep veins are   patent  without thrombus.      Impression    IMPRESSION: No evidence of deep venous thrombosis.    SHANTI ANGEL MD   US Extremity Upper Venous rt    Narrative    ULTRASOUND VENOUS RIGHT UPPER EXTREMITY  6/15/2017 11:38 AM     HISTORY: Evaluate for DVT.    COMPARISON: None.    TECHNIQUE: Ultrasound gray scale, Color Doppler flow, and   spectral  Doppler waveform analysis performed.    FINDINGS: There are no intraluminal filling defects. The right  internal jugular, axillary, cephalic, basilic, brachial, radial,   and  ulnar veins demonstrate normal compressibility. The right   internal  jugular, innominate, subclavian, and axillary veins have normal   venous  waveforms. Technologist noted the presence of a PICC on the   right.      Impression    IMPRESSION: No evidence for DVT.    NATALIYA LIU MD   XR Chest Port 1 View    Narrative    CHEST ONE VIEW UPRIGHT 6/16/2017 11:26 AM     HISTORY: Failing breathing trial.    COMPARISON: 6/12/2017.      Impression    IMPRESSION: Endotracheal tube tip mid to distal trachea. Large   bore  right IJ line unchanged. Scattered trace atelectasis and/or   fibrosis.  Previously seen orogastric tube has been removed.    LUCIA MCGUIRE MD   XR Chest Port 1 View    Narrative    CHEST PORTABLE ONE VIEW June 18, 2017 9:19 AM     HISTORY: Aspiration.    COMPARISON: 6/16/2017.      Impression    IMPRESSION: Endotracheal tube and dialysis catheter unchanged.   Patchy  airspace opacity  present in the left lower lobe, atelectasis   versus  pneumonia. Right lung is clear. No pneumothorax or pleural   effusion.  Vascular stents noted in the right arm.    NATALIYA LIU MD   XR Gastro Tube to Gastrojejuno Convert    Narrative    GASTRO TUBE TO GASTROJEJUNO CONVERT  6/19/2017 9:55 AM     HISTORY:  Patient has a G-tube in place. He has developed   significant  nausea and vomiting suggesting possible gastric outlet   obstruction.    COMPARISON: 3/28/2017.    FINDINGS: After obtaining informed consent, the patient was   placed in  a supine position on the fluoroscopy table. The existing G-tube   was  removed. Skin entry site was prepped and draped in the usual   sterile  manner. A JB1 catheter was used to pass a wire into the proximal  jejunum through the existing tract. Over the wire, an 18 [place]   JUAN CARLOS GJ  tube was placed. The tip was positioned in the proximal jejunum   just  past the ligament of Treitz. Contrast was injected and showed   adequate  positioning. A fluoroscopic image was saved.    The patient tolerated the procedure well. There were no immediate  postprocedure complications. The patient's vital signs were   monitored  by radiology nursing staff under my supervision and remained   stable  throughout the study.    MEDICATIONS: None.    Fluoroscopy time: 7 minutes 31 seconds.    Contrast: 30 mL Gastroview.    Radiation dose: 76 mGy.      Impression    IMPRESSION: GJ tube replaced as above.    SHANTI ANGEL MD   XR Chest Port 1 View    Narrative    CHEST PORTABLE ONE VIEW  6/19/2017 4:53 PM      HISTORY: Worsening breathing on ventilator.     COMPARISON: 6/18/2017.    FINDINGS: Semiupright portable chest. ET tube in the mid trachea.  Right IJ infusion catheter is in place. There is no pneumothorax.   The  heart size is normal. Thoracic aorta is tortuous. Lung volumes   are  low. Mild right basilar atelectasis.      Impression    IMPRESSION: Mild right basilar atelectasis. No  pneumothorax.    AMBIKA PERLA MD   XR Abdomen Port 1 View    Narrative    ABDOMEN PORTABLE FRONT ONE VIEW  6/19/2017 4:52 PM      HISTORY: Distended after FT evaluate for small bowel obstruction   or  ileus.     COMPARISON: None.    FINDINGS: Supine portable abdomen. There are multiple dilated   small  bowel loops suggesting small bowel obstruction. G-tube projects   over  the mid abdomen. Right inguinal catheter in place.    AMBIKA PERLA MD   CT Abdomen Pelvis w/o & w Contrast    Narrative    CT ABDOMEN/PELVIS WITH AND WITHOUT CONTRAST June 20, 2017 2:04   PM     HISTORY: Bowel obstruction, question transition point.     TECHNIQUE: Axial images are obtained from the lung bases to the  symphysis without IV contrast. Patient had poor vascular access.   Oral  contrast was administered. Coronal reformatted images are also  generated. Radiation dose for this scan was reduced using   automated  exposure control, adjustment of the mA and/or kV according to   patient  size, or iterative reconstruction technique.    FINDINGS: Bibasilar atelectasis is present with consolidation   right  lower lobe concerning for pneumonia. Chronic fibroatelectatic   changes  could look similar.    Abdomen: Gastrostomy tube with jejunal extension appears in good  position. No free intraperitoneal air. Oral contrast opacifies   the  stomach and a significant portion of the small bowel. A discrete  transition point is not definitely identified but there is focal  narrowing in the region of the terminal ileum just proximal to   the  ileocecal valve on series 2, image 58 and series 3, images 54-59.  [Narrative was truncated due to length]   Glucose by meter   Result Value Ref Range    Glucose 180 (H) 70 - 99 mg/dL   Glucose by meter   Result Value Ref Range    Glucose 201 (H) 70 - 99 mg/dL   Glucose by meter   Result Value Ref Range    Glucose 140 (H) 70 - 99 mg/dL   Glucose by meter   Result Value Ref Range    Glucose 143 (H) 70 - 99 mg/dL    CBC Differential (AM Draw)   Result Value Ref Range    WBC 11.0 4.0 - 11.0 10e9/L    RBC Count 2.59 (L) 4.4 - 5.9 10e12/L    Hemoglobin 7.7 (L) 13.3 - 17.7 g/dL    Hematocrit 23.9 (L) 40.0 - 53.0 %    MCV 92 78 - 100 fl    MCH 29.7 26.5 - 33.0 pg    MCHC 32.2 31.5 - 36.5 g/dL    RDW 15.0 10.0 - 15.0 %    Platelet Count 352 150 - 450 10e9/L    Diff Method Manual Differential     % Neutrophils 84.0 %    % Lymphocytes 5.0 %    % Monocytes 8.0 %    % Eosinophils 2.0 %    % Basophils 1.0 %    Absolute Neutrophil 9.2 (H) 1.6 - 8.3 10e9/L    Absolute Lymphocytes 0.6 (L) 0.8 - 5.3 10e9/L    Absolute Monocytes 0.9 0.0 - 1.3 10e9/L    Absolute Eosinophils 0.2 0.0 - 0.7 10e9/L    Absolute Basophils 0.1 0.0 - 0.2 10e9/L    Toxic Granulation Present     RBC Morphology Consistent with reported results     Platelet Estimate Normal    Basic metabolic panel   Result Value Ref Range    Sodium 138 133 - 144 mmol/L    Potassium 4.2 3.4 - 5.3 mmol/L    Chloride 100 94 - 109 mmol/L    Carbon Dioxide 26 20 - 32 mmol/L    Anion Gap 12 3 - 14 mmol/L    Glucose 147 (H) 70 - 99 mg/dL    Urea Nitrogen 67 (H) 7 - 30 mg/dL    Creatinine 5.58 (H) 0.66 - 1.25 mg/dL    GFR Estimate 10 (L) >60 mL/min/1.7m2    GFR Estimate If Black 12 (L) >60 mL/min/1.7m2    Calcium 7.2 (L) 8.5 - 10.1 mg/dL   Magnesium   Result Value Ref Range    Magnesium 1.7 1.6 - 2.3 mg/dL   Phosphorus   Result Value Ref Range    Phosphorus 5.0 (H) 2.5 - 4.5 mg/dL   Triglycerides   Result Value Ref Range    Triglycerides 140 <150 mg/dL   Glucose by meter   Result Value Ref Range    Glucose 138 (H) 70 - 99 mg/dL

## 2017-06-21 NOTE — PLAN OF CARE
Problem: Restraint for Non-Violent/Non-Self-Destructive Behavior  Goal: Prevent/Manage Potential Problems  Maintain safety of patient and others during period of restraint.  Promote psychological and physical wellbeing.  Prevent injury to skin and involved body parts.  Promote nutrition, hydration, and elimination.   Outcome: No Change  Pt con't to try to get ET out, restraints are still needed.

## 2017-06-21 NOTE — PLAN OF CARE
Problem: Goal Outcome Summary  Goal: Goal Outcome Summary  Outcome: Declining  ICU End of Shift Summary.  For vital signs and complete assessments, please see documentation flowsheets.      Pertinent assessments: Restless/alert. No commands, does not appear to track. 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 Lungs coarse. Belly firm. B.P. Low, increased pressors. Other VSS and afebrile. Coccyx wound.  Major Shift Events: Increased pressors. BC positive for yeast, I.D. Consult, more BC. Anti-fungals added. Tolerated HD. Need to have CVC switched out tomorrow. Belly firm, J placed to suction for >1000 out. abd XR unchanged. Surgery on hold for now. Short care conference today, will decided on goals of care with rest of family members tonight.  Plan (Upcoming Events): care conference at 8:30 a.m. Tomorrow ? Transition to C.C. was recommended by MDs given pt's poor prognosis. TPN to be started tonight.  Discharge/Transfer Needs: unknown     Bedside Shift Report Completed   Bedside Safety Check Completed

## 2017-06-21 NOTE — PROGRESS NOTES
Potassium   Date Value Ref Range Status   06/21/2017 4.2 3.4 - 5.3 mmol/L Final   ]  Lab Results   Component Value Date    HGB 7.7 06/21/2017     Results for HUGH JENSEN (MRN [id number]) as of 6/21/2017 16:38   Ref. Range 6/4/2014 09:00   Hep B Surface Agn Latest Ref Range: NEG  Negative   Hepatitis B Surface Antibody Latest Ref Range: <8.00 mIU/mL 167.14 (H)     Weight: 59.9 kg (132 lb 0.9 oz)    POST WT 59.9 kg  TDW  50    DIALYSIS PROCEDURE NOTE    Patient dialyzed for 3.5 hrs on a 3 K bath with a  net fluid removal of 0 L.  A BFR of 350-400 ml/min was obtained via [facility].  Patient was seen by  during treatment.  Total heparin received during treatment:  0 units.  Meds given: Epogen and Hectorol. Complications: none.      Procedure teaching done.  Consent verified yes  See flowsheet in EPIC for further details and post assessment.    Machine water alarm in place and functioning.  Chlorine and chloramines checked on water system every 4 hours.  All safety checks done, air detector on, venous and arterial parameters set. Transducer protectors checked every 15 min.    Pt dialyzed in ICU at bedside.    Outpatient Dialysis in [facility].    Neha Martin RN  [facility]

## 2017-06-21 NOTE — PROGRESS NOTES
Brief Progress Note    This writer spoke with family members on 6/20 at approximately 1600 to update them with information about bowel obstruction. First called niece, Hemant (legal guardian), and explained that CT scan revealed a transition point which may represent an obstruction that would be surgically reparable, but that Hesham's condition would make any procedure more risky. She requested that this writer also provide this same information to her brothers Carlos (also legal guardian) and Garo over the phone. These calls were made immediately. Each verbalized their understanding that surgery would, in these circumstances, be associated with increased risk. They each expressed appreciation for the update and for the care that Hesham has received thus far, and were agreeable with the plan of involving surgery to assess whether/when surgical intervention might be necessary.     Hemant Richard [phone]  Carlos Richard [phone]  Garo Ramos [phone]

## 2017-06-21 NOTE — PROGRESS NOTES
Pt BC results called to this writer from [place] [facility] resulting critically positive for yeast. BC drawn on 6/17 from CVC line. Results given to med student Marlene Meng, and Dr. Duckworth notified of BC results as well. Orders obtained. I.D. Consult ordered. See flowsheet and MAR for additional assessment information.

## 2017-06-21 NOTE — CONSULTS
ID consult dictaed IMP 1 66 yo complex, now candidemia,  REc likely lines, groin line out, jose a and serial BC

## 2017-06-21 NOTE — PROGRESS NOTES
[facility]    Hospitalist Progress Note  Name: Hesham Ramos    MRN: [id number]  Provider: Christina Duckworth MD  Date of Service: 06/21/2017    Assessment & Plan   Summary of Stay: Hesham Ramos is a 67 year old male who was admitted on 6/11/2017. Patient with a history of prior debilitating strokes, sz d/o, htn/diastolic CHF, ESRD on HD, and significantly debilitated who lives in a group home.  He had a feeding tube placed approximately 3 months ago for dysphagia, and has continued to decline since that time admitted on 6/11/2017 with hypoxia/fever consistent with aspiration pna complicated by sepsis (leukocytosis/lactic acidosis/fever) which progressed to septic shock necessitating norepi/vasopressin support and intubation on 6/12 for respiratory failure. He was been weaned  off  From pressors on 6/15/17 But again was restarted on levophed 6/19 as he was hypotensive during dialysis. He remains intubated but with minimal ventilatory support undergoing daily weans that are limited by tachypnea/low tidal volumes. In his [facility] hospitalization he was also notable for profound hyperglycemia nececsitating insulin gtt with hgb A1c wnl at 5.1.  During this admission he had  BUN upto 150s, despite HD, and hgb has been drifting down, and dark stools have been noted, all suspicious for slow GIB.  He has required 2 prbc units.  His TFs had been resumed to challenge the G-tube, he had been transitioned up to goal rate, noted to have 150cc residual and before it could be stopped had large emesis occurring 6/16/17, subsequently developed tachycardia and fever consistent with another aspiration event, with e/o sepsis with hypotension/fever/worsening leukocytosis. TF held for 24 hours and resumed 6/17 at trophic rate, and on 6/19 had another aspiration event with evidence of bowel obstruction. Tube feeds were discontinued. 6/20 CT abd  Showed transition point and surgery was consulted. J tube is suctioned  and surgery will follow. Overnight patient had increased need for pressors. Palliative team and ICU team have been discussing goals of therapy with family especially that family would not like a trach at this time. Family meeting for 1030 am 6/22.    Septic Shock 2/2 aspiration pna,   --off nor-epi, and vaso weaned off post aggressive HD with 3L on 6/16/17, then hypotension recurred but responded to 500 cc LR bolus.    --Shock is resolved, but is having e/o recurring SIRS with worsening leukocytosis/fever-suspected aspiration pneumonitis (infectious vs inflammatory vs both)  -- on 6/19 patient once again was Hpotensive during dialysis and restarted on levophed and levels were increased    YEAST positive in blood. Had sputum positive with yeast as well. Concern for candidemia  -- CULTURE FROM IV LINE POSITIVE FOR YEAST  Concern for candidemia  -- ID consulted  -- will start microfungin and d/w ID   -- d/w Dr. Farooq will place new central access today  -- d/w Dr. Owusu will need to remove infected line and consider new access  -- D/w Hemant over th phone regarding need to get  A new central line  -- She gives a verbal consent to  Get new iv access as necessary until family can have overall goals of therapy clarified  -- i discussed at length all the risks and benefitd  -- Also discussed that family is leaning towards comfort care but still not sure about everyone's wishes      Aspiration pna with TFs:    --changed Gtube to J tube on 6/19 to decrease aspiration from re-occurring   -- Recurrent aspiration pneumonia sec to tube feeds on 6/18/17.  procal 69.7   --Rec'd 3 days of pip tazo, then started on amp-sulbactam until 6/19.  -- Respiratory cultures grew Enterobactor an P Aeruginosa was switched to fluoroquinolones 6/20     Hypoxic Respiratory Failure 2/2 aspiration pneumonia:    --not extubatable due to tachypnea and low tidal volumes .  Cont qd weaning trials.  Repeat aspiration bodes poorly for future  extubation.    --Need for trach was discussed with family but family not consenting to it at this time  --sputum is growing heavy growth yeast, and given his thrush wonder if it is real.  Is on nystatin swabs for thrush, and fluconazole.     Small Bowel obstruction  -- 6/19 had  large emesis  -- Xray  showed SBO  -- feeding stopped  -- G tube to suction with minimal out  -- J tube suction with 800 cc this morning   -- CT with transitional point on scan  -- surgery consulted will repeat AXR in am and assess need for surgery  -- d/w Dr. Sauceda    Suspected GIB:    --Given high BUN/drifting down of hgb, episodic dark stools-has rec'd 2 u PRBCs, currently at 8.2-usually in 10-11 range.   -- Monitor hgb   -- cont to hold asa,no AC.     ESRD on HD:    --Nephrology consulted and following   HD am 6/16, 6/19 and scheduled for today 6/21    Transaminitis: IMPROVED  --LFTs have been consistently mildly elevated throughout hospitalization, ? Due to TF vs mild shock liver vs other.  No e/o hepatic failure.  Now almost normalized    H/o Htn:   --Previously treated by now off all meds since 2015 due to hypotension (? 2/2 weight loss?)    Seizure d/o:    --phenytoin 300 mg at bedtime is home regimen-currently on  mg bid fosphenytoin    Diastolic HF: most recent echo in 11/2015 did show apical hypertrophy-recs at that time were for cardiac MRI but I don't see that was ever completed    Prior debilitating strokes:    --Home regimen is asa and simva - both on hold-asa for GIB, statin for increased transaminases-    Dementia;  -- presumed vascular in nature-resume donepezil when able (G-J in place)    Hyperglycemia:    --hgb A1c 5.1 %, no hx of dm, needed transient insulin gtt, now on ISS with minimal requirements.  This is likely to change once refeeding begins.     Trop leak:    --2/2 demand ischemia    ASx UE edema:    --No e/o DVT by US    Nutrition:    --Clearly can't use G-tube for goal tube feeds, resumed them at trophic  levels for gut protection 6/17 and now off for recurrent aspiration event with just trophic dosing and NOW new SBO  -- held tube feeds now with evidence of obstruction  -- TPN consult entered for ongoing nutritional needs    Hypernatremia / improved  --2/2 inadequate free water replacement-start on low dose D5         DVT Prophylaxis: Pneumatic Compression Devices pt with ? Slow GI bleed  Code Status: DNR    Disposition: Critical care. TBD        Interval History   Increased pressors, not responding to verbal stimuli, sedated, SBO with no BS, J suction 800cc. CT with transition point, surgery consulted following with AVEL suction. Updated family. Possible fmaily meeting 6/22. Intubated unable to get complete ROS. Antibiotics switched based on cultures    -Data reviewed today: I reviewed all new labs and imaging reports over the last 24 hours. I personally reviewed the abdominal x-ray image(s) showing dilated bowel loops.    Physical Exam   Temp: 98.9  F (37.2  C) Temp src: Axillary BP: 90/53   Heart Rate: 91 Resp: 20 SpO2: 100 % O2 Device: Mechanical Ventilator    Vitals:    06/19/17 0500 06/20/17 0000 06/21/17 0400   Weight: 56.5 kg (124 lb 9 oz) 57.4 kg (126 lb 8.7 oz) 59.9 kg (132 lb 0.9 oz)     Vital Signs with Ranges  Temp:  [98.9  F (37.2  C)-100  F (37.8  C)] 98.9  F (37.2  C)  Heart Rate:  [] 91  Resp:  [7-77] 20  BP: ()/() 90/53  FiO2 (%):  [35 %-65 %] 35 %  SpO2:  [53 %-100 %] 100 %  I/O last 3 completed shifts:  In: 2145.67 [I.V.:1545.67; NG/GT:600]  Out: 325 [Emesis/NG output:325]      Sedated w/dexmedetomidine,intubated  Comfortable appearing  Atrophy of b ue and le with right arm contracture and e/o non-pitting edema of right arm, diffuse edema  Head ncat sclera normal pigmentation  lungs coarse but otherwise clear,  RRR no m/r/g    Abdomen  Distended, no BS  Skin warm and dry     Medications     norepinephrine 0.26 mcg/kg/min (06/21/17 0130)     IV infusion builder WITH additives 20  mL/hr at 06/20/17 1800     NaCl 10 mL/hr at 06/20/17 1800     NaCl 10 mL/hr at 06/20/17 2000     IV fluid REPLACEMENT ONLY       IV fluid REPLACEMENT ONLY       dexmedetomidine 0.7 mcg/kg/hr (06/20/17 2000)       [START ON 6/22/2017] levofloxacin  500 mg Intravenous Q48H     epoetin arnulfo (EPOGEN,PROCRIT) inj ESRD  5,000 Units Intravenous See Admin Instructions     doxercalciferol  2 mcg Intravenous See Admin Instructions     pantoprazole  40 mg Intravenous BID     fluconazole  200 mg Intravenous Q48H     simvastatin  40 mg Oral or Feeding Tube QPM     insulin aspart  1-6 Units Subcutaneous Q4H     ascorbic acid  250 mg Oral Daily     nystatin  500,000 Units Mouth/Throat 4x Daily     doxercalciferol  4 mcg Intravenous Once per day on Mon Wed Fri     epoetin arnulfo (EPOGEN,PROCRIT) inj ESRD  3,000 Units Intravenous Once per day on Mon Wed Fri     B and C vitamin Complex with folic acid  5 mL Per Feeding Tube Daily     Prosource TF protein modulator  1 packet Per G Tube Daily     fosphenytoin (CEREBYX) intermittent infusion (maintenance)  150 mg PE Intravenous Q12H     ipratropium - albuterol 0.5 mg/2.5 mg/3 mL  3 mL Nebulization 4x daily     sodium chloride 0.9%  1,000 mL Intravenous Once     - MEDICATION INSTRUCTIONS for Dialysis Patients -   Does not apply See Admin Instructions     Data       Recent Labs  Lab 06/19/17  1500   PHV 7.41   PO2V 26   PCO2V 41   HCO3V 26       Recent Labs  Lab 06/21/17  0500 06/20/17  0535 06/19/17  0517   WBC 11.0 12.6* 14.0*   HGB 7.7* 7.5* 8.0*   HCT 23.9* 24.3* 25.9*   MCV 92 95 97    312 283       Recent Labs  Lab 06/21/17  0500 06/20/17  0535 06/19/17  0517    142 146*   POTASSIUM 4.2 4.3 4.4   CHLORIDE 100 102 108   CO2 26 27 26   ANIONGAP 12 13 12   * 205* 169*   BUN 67* 55* 67*   CR 5.58* 4.92* 6.40*   GFRESTIMATED 10* 12* 9*   GFRESTBLACK 12* 14* 11*   DOUG 7.2* 7.3* 7.6*       Recent Labs  Lab 06/17/17  1000 06/17/17  0931 06/17/17  0820   CULT Light growth  Enterobacter cloacae complexLight growth Pseudomonas aeruginosaLight growth Candida albicans / dubliniensis Candida albicans and Candida dubliniensis are not routinely speciated Susceptibility testing not routinely done* No growth after 3 days  No growth after 3 days No growth after 3 days  No growth after 3 days     No results for input(s): NTBNPI, NTBNP in the last 168 hours.    Recent Labs  Lab 06/18/17  0519 06/16/17  0500 06/15/17  0520   AST 68* 54* 100*   ALT 75* 103* 133*   ALKPHOS 114 156* 135   BILITOTAL 0.4 0.6 0.6       Recent Labs  Lab 06/17/17  0820   INR 1.38*       Recent Labs  Lab 06/19/17  0755 06/16/17  2150   LACT 1.3 0.9     No results for input(s): LIPASE in the last 168 hours.  No results for input(s): TROPONIN, TROPI, TROPR in the last 168 hours.    Invalid input(s): TROP, TROPONINIES  No results for input(s): COLOR, APPEARANCE, URINEGLC, URINEBILI, URINEKETONE, SG, UBLD, URINEPH, PROTEIN, UROBILINOGEN, NITRITE, LEUKEST, RBCU, WBCU in the last 168 hours.    Recent Results (from the past 24 hour(s))   CT Abdomen Pelvis w/o & w Contrast    Narrative    CT ABDOMEN/PELVIS WITH AND WITHOUT CONTRAST June 20, 2017 2:04 PM     HISTORY: Bowel obstruction, question transition point.     TECHNIQUE: Axial images are obtained from the lung bases to the  symphysis without IV contrast. Patient had poor vascular access. Oral  contrast was administered. Coronal reformatted images are also  generated. Radiation dose for this scan was reduced using automated  exposure control, adjustment of the mA and/or kV according to patient  size, or iterative reconstruction technique.    FINDINGS: Bibasilar atelectasis is present with consolidation right  lower lobe concerning for pneumonia. Chronic fibroatelectatic changes  could look similar.    Abdomen: Gastrostomy tube with jejunal extension appears in good  position. No free intraperitoneal air. Oral contrast opacifies the  stomach and a significant portion of the  small bowel. A discrete  transition point is not definitely identified but there is focal  narrowing in the region of the terminal ileum just proximal to the  ileocecal valve on series 2, image 58 and series 3, images 54-59.  There is also relative narrowing in the mid ileum in the lower abdomen  on series 3, image 41 where the more proximal small bowel is more  distended with fecalized material. The remainder of the more proximal  small bowel is more significantly dilated. Follow-up abdominal x-ray  in several hours following CT imaging could be performed to see if  contrast finally empties into the colon. Colon is decompressed.    Upper abdominal organs demonstrate bilateral renal atrophy with small  nonobstructing stones. No hydronephrosis. Cysts are noted in the liver  and the gallbladder and spleen are unremarkable. Pancreas is within  normal limits. Adrenal glands are unremarkable.    Pelvis: Bladder is decompressed. Prostate gland and rectum are  unremarkable. Small amount free pelvic fluid is present likely related  to the suspected bowel obstruction versus renal failure. No enlarged  lymph nodes. Right femoral venous line is present. Tip terminates in  the inferior IVC. Bone window examination demonstrates ectopic  ossification posterior to the proximal right femur in the adjacent  musculature. Mild degenerative spine changes are noted.      Impression    IMPRESSION:  1. Probable transition point in the lower anterior abdomen in the  region of the mid ileum. Additional area near the terminal ileum also  is narrowed. Colon is decompressed. Oral contrast has not completely  made it through the small bowel but is likely into the distal jejunum  or proximal ileum. Proximal small bowel loops all significantly  distended. Gastrojejunostomy tube appears in good position.  2. Nonobstructing renal collecting system stones and cysts with  bilateral renal atrophy consistent with end-stage renal disease.    KYLIE HERNANDEZ,  MD     Critical care time:45min

## 2017-06-21 NOTE — PROGRESS NOTES
Inpatient Dialysis Progress Note        Assessment and Plan:     1. ESRD  2. Septic shock from aspiration PNA  3. Respiratory failure  4. Anemia  5. Non-verbal and bed-bound at baseline  6. SBO. Paula syndrome?  7. Now with fungemia    Plan  1. I agree that patient should be on comfort care. If family decide to continue all medical care, then his tunneled dialysis CVC has to be removed by IR tomorrow. He will need a line holiday.         Interval History:     Blood culture grew yeast. SBP is better on 0.3 mcg of NE. He is getting dialysis without UF. ICU team had a discussion with family today regarding goals of care.          Dialysis Parameters:     Vitals:    06/19/17 0500 06/20/17 0000 06/21/17 0400   Weight: 56.5 kg (124 lb 9 oz) 57.4 kg (126 lb 8.7 oz) 59.9 kg (132 lb 0.9 oz)     I/O last 3 completed shifts:  In: 1583.55 [I.V.:1583.55]  Out: 1675 [Emesis/NG output:1675]  Temp:  [97.4  F (36.3  C)-99.9  F (37.7  C)] 97.4  F (36.3  C)  Heart Rate:  [] 93  Resp:  [7-28] 16  BP: ()/() 112/75  FiO2 (%):  [35 %-45 %] 35 %  SpO2:  [53 %-100 %] 100 %    Current Weight: 59.9  Dry Weight:   Dialysis Temp: 36.5  C  Access Device: CVC  Access Site: [facility] tunneled  Dialyzer: revaclear  Dialysis Bath: 3K  Sodium Profile: none  UF Goal: none  Blood Flow Rate (mL/min): 305-->400  Total Treatment Time (hrs): 3.5  Heparin:     Review of Systems:        Medications:          Physical Exam:     Vitals were reviewed  Patient Vitals for the past 24 hrs:   BP Temp Temp src Heart Rate Resp SpO2 Weight   06/21/17 1554 - - - - - 100 % -   06/21/17 1545 112/75 - - 93 16 - -   06/21/17 1530 112/60 - - 94 25 100 % -   06/21/17 1515 113/75 - - 95 8 100 % -   06/21/17 1500 95/74 - - 97 21 100 % -   06/21/17 1445 91/62 - - 97 11 100 % -   06/21/17 1430 99/71 - - 99 20 100 % -   06/21/17 1415 98/59 - - 99 22 100 % -   06/21/17 1400 104/58 - - 98 24 100 % -   06/21/17 1345 (!) 105/35 97.4  F (36.3  C) Oral 98 16 99 % -    06/21/17 1330 120/69 - - 100 9 100 % -   06/21/17 1315 111/83 - - 102 24 98 % -   06/21/17 1310 111/83 - - 101 15 99 % -   06/21/17 1300 114/73 - - 103 12 100 % -   06/21/17 1245 129/42 - - 94 13 99 % -   06/21/17 1230 (!) 131/38 98.9  F (37.2  C) Oral 90 24 100 % -   06/21/17 1228 - - - - - 100 % -   06/21/17 1215 (!) 117/32 - - 90 19 100 % -   06/21/17 1200 121/45 - - 90 20 100 % -   06/21/17 1145 108/53 - - 90 19 100 % -   06/21/17 1130 100/62 - - 91 15 100 % -   06/21/17 1115 (!) 83/36 - - 92 16 93 % -   06/21/17 1100 93/63 - - 99 22 - -   06/21/17 1045 104/58 - - 101 17 98 % -   06/21/17 1030 109/47 - - 99 20 98 % -   06/21/17 1015 (!) 119/28 99  F (37.2  C) Oral 89 20 100 % -   06/21/17 1000 (!) 95/38 - - 91 20 100 % -   06/21/17 0915 (!) 98/35 - - 89 20 99 % -   06/21/17 0900 110/69 - - 87 19 97 % -   06/21/17 0858 - - - - - 95 % -   06/21/17 0845 105/51 - - 90 20 98 % -   06/21/17 0830 107/53 - - 91 19 98 % -   06/21/17 0815 119/68 - - 93 23 97 % -   06/21/17 0800 117/60 98.4  F (36.9  C) Oral 86 17 100 % -   06/21/17 0745 (!) 97/22 - - 86 20 100 % -   06/21/17 0730 127/74 - - 89 20 100 % -   06/21/17 0715 111/50 - - 89 26 100 % -   06/21/17 0700 90/53 - - 91 20 100 % -   06/21/17 0645 (!) 104/23 - - 87 25 100 % -   06/21/17 0630 91/47 - - 89 25 100 % -   06/21/17 0615 (!) 88/47 - - 89 19 100 % -   06/21/17 0600 (!) 85/26 - - 91 21 100 % -   06/21/17 0545 117/72 - - 98 22 100 % -   06/21/17 0530 114/65 - - 97 12 100 % -   06/21/17 0515 117/56 - - 96 9 100 % -   06/21/17 0500 99/55 - - 94 20 100 % -   06/21/17 0445 102/64 - - 99 23 100 % -   06/21/17 0430 133/67 - - 103 9 98 % -   06/21/17 0415 (!) 108/37 - - 90 20 100 % -   06/21/17 0400 93/51 98.9  F (37.2  C) Axillary 92 28 100 % 59.9 kg (132 lb 0.9 oz)   06/21/17 0345 111/65 - - 90 20 - -   06/21/17 0330 95/52 - - 93 21 - -   06/21/17 0322 - - - - - 100 % -   06/21/17 0315 97/52 - - 91 20 - -   06/21/17 0300 101/62 - - 93 21 - -   06/21/17 0250 105/50 -  - 92 19 100 % -   06/21/17 0245 (!) 104/21 - - 91 20 100 % -   06/21/17 0235 98/54 - - 92 19 100 % -   06/21/17 0230 (!) 124/20 - - 93 16 100 % -   06/21/17 0215 110/58 - - 94 19 100 % -   06/21/17 0210 (!) 73/51 - - 95 20 100 % -   06/21/17 0200 (!) 105/24 - - 96 19 100 % -   06/21/17 0145 (!) 96/38 - - 99 19 100 % -   06/21/17 0130 (!) 89/44 - - 97 10 100 % -   06/21/17 0115 (!) 95/30 - - 93 19 100 % -   06/21/17 0100 (!) 87/54 - - 105 18 100 % -   06/21/17 0045 - - - 104 16 100 % -   06/21/17 0030 101/54 - - 101 (!) 7 100 % -   06/21/17 0015 (!) 84/60 - - 96 21 100 % -   06/21/17 0000 (!) 87/36 99.5  F (37.5  C) Oral 98 20 100 % -   06/20/17 2345 (!) 101/36 - - 99 21 100 % -   06/20/17 2330 108/56 - - 101 20 100 % -   06/20/17 2318 - - - - - 100 % -   06/20/17 2315 97/51 - - 97 21 100 % -   06/20/17 2300 (!) 83/47 - - 98 22 100 % -   06/20/17 2245 111/45 - - 102 20 100 % -   06/20/17 2230 96/48 - - 102 20 100 % -   06/20/17 2215 106/50 - - 104 20 100 % -   06/20/17 2200 100/59 - - 102 20 100 % -   06/20/17 2145 104/59 - - 106 21 100 % -   06/20/17 2130 94/57 - - 101 19 100 % -   06/20/17 2115 102/51 - - 106 21 100 % -   06/20/17 2100 117/57 - - 106 21 99 % -   06/20/17 2045 (!) 77/52 - - 103 22 100 % -   06/20/17 2030 90/63 - - 102 21 100 % -   06/20/17 2015 (!) 84/47 - - 102 20 100 % -   06/20/17 2000 95/49 99.9  F (37.7  C) Axillary 105 16 100 % -   06/20/17 1945 109/56 - - 102 20 100 % -   06/20/17 1933 - - - - - 100 % -   06/20/17 1930 118/55 - - 102 24 100 % -   06/20/17 1915 114/54 - - 100 20 100 % -   06/20/17 1900 109/78 - - 102 24 100 % -   06/20/17 1830 112/62 - - 99 20 100 % -   06/20/17 1815 102/48 - - 101 20 100 % -   06/20/17 1800 113/48 - - 105 13 99 % -   06/20/17 1745 (!) 123/33 - - 106 20 99 % -   06/20/17 1730 117/71 - - 104 24 99 % -   06/20/17 1715 120/54 - - 103 13 (!) 53 % -   06/20/17 1700 113/64 - - 104 22 100 % -   06/20/17 1645 (!) 113/106 - - 101 20 100 % -   06/20/17 1640 (!) 113/106  - - 101 25 99 % -   17 1630 122/71 - - 101 22 100 % -   17 1615 (!) 136/35 - - 105 19 100 % -       Temperatures:  Current - Temp: 97.4  F (36.3  C); Max - Temp  Av.9  F (37.2  C)  Min: 97.4  F (36.3  C)  Max: 99.9  F (37.7  C)  Respiration range: Resp  Av.2  Min: 7  Max: 28  Pulse range: No Data Recorded  Blood pressure range: Systolic (24hrs), Av , Min:73 , Max:136   ; Diastolic (24hrs), Av, Min:20, Max:106    Pulse oximetry range: SpO2  Av.1 %  Min: 53 %  Max: 100 %  I/O last 3 completed shifts:  In: 1583.55 [I.V.:1583.55]  Out: 1675 [Emesis/NG output:1675]    Intake/Output Summary (Last 24 hours) at 17 1608  Last data filed at 17 1427   Gross per 24 hour   Intake          1381.15 ml   Output             1675 ml   Net          -293.85 ml       Gen: critically ill  CV: irregular, tach  Pulm: Ctab.   Abd; firm and distended. Hypoactive bs.  Ext: no edema  Neuro: nonverbal.           Data:     Recent Labs   Lab Test  17   0500  17   0535   NA  138  142   POTASSIUM  4.2  4.3   CHLORIDE  100  102   CO2  26  27   ANIONGAP  12  13   GLC  147*  205*   BUN  67*  55*   CR  5.58*  4.92*   DOUG  7.2*  7.3*       Hemoglobin   Date Value Ref Range Status   2017 7.7 (L) 13.3 - 17.7 g/dL Final              Bernardo Carter MD

## 2017-06-21 NOTE — CONSULTS
CLINICAL NUTRITION SERVICES - REASSESSMENT NOTE  + CONSULT TO START TPN (verbal per rounds, formal consult pending)      RECOMMENDATIONS FOR MD/PROVIDER TO ORDER:     TPN consult   Recommendations Ordered by Registered Dietitian (RD):   D12 AA8 at 40 mL/hr, no lipids  Mg, P04 and Trig check   Future/Additional Recommendations:   Pending POC, additional protein/CHO provisions, zinc sulfate + Vitamin C + lipids     EVALUATION OF PROGRESS TOWARD GOALS/NEW FINDINGS   Patient with G to G/J conversion 6/19. EN resumed 6/19 and stopped later same day due to SBO, with large emesis. Previous goal rate: Nepro at 45 mL/hr x 24 hours + Prosource TF packet + Renal MVI + Ascorbic acid 250 mg daily per pressure injury protocol. 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  Tissue remains slightly  macerated on distal edge of coccyx wound, plan to continue to assess wound evolution. No s/s infection. Continue powder and Triad paste.     Left buttock superficial scattered erosion/denudement suspect combination of moisture and pressure/ friction; present on admission    Pt tolerating turns better since intubation.    Heels blanchable erythema, scattered dry crusted peeling epidermis       ASSESSED NUTRITION NEEDS (PER APPROVED PRACTICE GUIDELINES; DW: 54.6 kg):  Estimated Energy Needs: 6637-2103 kcals (30-35 Kcal/Kg-)  Justification: repletion, vented  Estimated Protein Needs:  grams protein (1.5-2 g pro/Kg)  Justification: wound healing and dialysis    Previous Goals:   Pt to tolerate TF advancement to goal in the next 1-2 days.    Evaluation: Not met    Previous Nutrition Diagnosis:   Inadequate enteral nutrition infusion related to vomiting and previous EN intolerance as evidenced by inability to advance to goal rate w/ G-tube, pressure injury requiring increased protein needs with HD  Evaluation: Declining    CURRENT NUTRITION DIAGNOSIS  Inadequate enteral nutrition infusion related to intolerance 2/2 SBO as evidenced by nutrition meeting <25% of estimated needs x 5 days, pressure injury requiring increased protein needs with HD    INTERVENTIONS  Recommendations / Nutrition Prescription  Recommend TPN as follows:    D12 AA8 at 40 mL/hr to provide 115 g CHO, 77 g protein (1.4 g per kg), 699 kcal daily.   Pending POC, additional protein/CHO provisions, zinc sulfate + Vitamin C + lipids    Implementation  PN Composition and PN Schedule: Entered orders to reflect regimen outlined above  Biochemical data: P04, Mg and Trig add on  Multivitamin/Minerals: d/c Vitamin C, renal MVI  Collaboration and Referral of care: Discussed patient during interdisciplinary care rounds this morning including need for TPN consult, total IVF    Goals  TPN to meet % of estimated nutrition needs within next 48 hours    MONITORING AND EVALUATION:  Parenteral intake - Monitor for adequacy, tolerance, stooling, labs  Progress towards goals will be monitored and evaluated per protocol and Practice Guidelines      THANIA Blank  3rd floor/ICU: [phone]  All other floors: [phone]  Weekend/holiday: [phone]  Office: [phone]

## 2017-06-22 NOTE — PLAN OF CARE
Problem: Sepsis (Adult)  Goal: Signs and Symptoms of Listed Potential Problems Will be Absent or Manageable (Sepsis)  Signs and symptoms of listed potential problems will be absent or manageable by discharge/transition of care (reference Sepsis (Adult) CPG).   Outcome: No Change  ICU End of Shift Summary.  For vital signs and complete assessments, please see documentation flowsheets.      Pertinent assessments: Pt J tube put out 200 ml, the G tube was 0, lungs are coarse and diminished, was suctioned q2h for a sm-mod amt of creamy phlegm, sats are 100%, b/p dropped a little, con't in a SR, temp is 99 ax, no stools, wt is down 2 kg from yesterday, BS are hypoactive.  Major Shift Events: increased levo.  Plan (Upcoming Events): family conference this am  Discharge/Transfer Needs: TBD     Bedside Shift Report Completed : yes  Bedside Safety Check Completed: yes

## 2017-06-22 NOTE — PHARMACY
TPN formula evaluated based on today s labs results.    The following changes have been made:    RD changes:  Change to the following goal PN regimen, but continue to hold lipids, add wound care protocol - 250 mg ascorbic acid daily x 10 days, 5 mg zinc daily x 10 days:     275 g CHO, 77 g protein (1.6 g/kg) in 1440 ml (60 ml/hr x 24 hrs) to provide 1328 kcal (27 kcal/kg), GIR: 4.3      Total fluids (PN + IVFs) = maximum 100 ml/hr per MD    Pharmacy changes:  Sodium:  50meq .  Potassium: 43meq .  Calcium:  6.5.  Magnesium: 7meq.  Phosphorus: 10mMol .  Chloride:Acetate ratio: No change  Insulin:No change.  Trace elements:No change.  Multivitamins No change .    Pharmacy will continue to follow and adjust as appropriate.

## 2017-06-22 NOTE — PROGRESS NOTES
[facility]    Hospitalist Progress Note  Name: Hesham Ramos    MRN: [id number]  Provider: Nomi Kumar MD  Date of Service: 06/22/2017    Assessment & Plan   Summary of Stay: Hesham Ramos is a 67 year old male who was admitted on 6/11/2017. Patient with a history of prior debilitating strokes, sz d/o, htn/diastolic CHF, ESRD on HD, and significantly debilitated who lives in a group home.  He had a feeding tube placed approximately 3 months ago for dysphagia, and has continued to decline since that time admitted on 6/11/2017 with hypoxia/fever consistent with aspiration pna complicated by sepsis (leukocytosis/lactic acidosis/fever) which progressed to septic shock necessitating norepi/vasopressin support and intubation on 6/12 for respiratory failure. He was been weaned  off  From pressors on 6/15/17 But again was restarted on levophed 6/19 as he was hypotensive during dialysis. He remains intubated but with minimal ventilatory support undergoing daily weans that are limited by tachypnea/low tidal volumes. In his [facility] hospitalization he was also notable for profound hyperglycemia nececsitating insulin gtt with hgb A1c wnl at 5.1.  During this admission he had  BUN upto 150s, despite HD, and hgb has been drifting down, and dark stools have been noted, all suspicious for slow GIB.  He has required 2 prbc units.  His TFs had been resumed to challenge the G-tube, he had been transitioned up to goal rate, noted to have 150cc residual and before it could be stopped had large emesis occurring 6/16/17, subsequently developed tachycardia and fever consistent with another aspiration event, with e/o sepsis with hypotension/fever/worsening leukocytosis. TF held for 24 hours and resumed 6/17 at trophic rate, and on 6/19 had another aspiration event with evidence of bowel obstruction. Tube feeds were discontinued. 6/20 CT abd  Showed transition point and surgery was consulted. J tube is  suctioned and surgery will follow. Overnight patient had increased need for pressors. Palliative team and ICU team have been discussing goals of therapy with family especially that family would not like a trach at this time. Family meeting for 1030 am 6/22.    Septic Shock 2/2 aspiration pna, fungemia   -- stable hemodynamics today   -- continue current med for now       Aspiration pna with TFs:    --changed Gtube to J tube on 6/19 to decrease aspiration from re-occurring   -- Recurrent aspiration pneumonia sec to tube feeds on 6/18/17.  procal 69.7   --Rec'd 3 days of pip tazo, then started on amp-sulbactam until 6/19.  -- Respiratory cultures grew Enterobactor an P Aeruginosa was switched to fluoroquinolones 6/20   - ID following     Hypoxic Respiratory Failure 2/2 aspiration pneumonia:    --not extubatable due to tachypnea and low tidal volumes .  Cont qd weaning trials.  Repeat aspiration bodes poorly for future extubation.    --Need for trach was discussed with family but family not consenting to it at this time  --sputum is growing heavy growth yeast, and given his thrush     Small Bowel obstruction  -- 6/19 had  large emesis  -- Xray  showed SBO  -- feeding stopped  -- G tube to suction with minimal out  -- CT with transitional point on scan  -- surgery consulted and following   -- not a good candidate for surgery     Suspected GIB:    --Given high BUN/drifting down of hgb, episodic dark stools-has rec'd 2 u PRBCs, currently  Hemoglobin   Date Value Ref Range Status   06/22/2017 7.6 (L) 13.3 - 17.7 g/dL Final   ]  -- Monitor hgb   -- cont to hold asa,no AC.     ESRD on HD:    --Nephrology consulted and following   HD am 6/16, 6/19 and  6/21    Prior debilitating strokes:  Not able to speak at baseline   --Home regimen is asa and simva - both on hold-asa for GIB, statin for increased transaminases-    Dementia;  -- presumed vascular in nature-resume donepezil when able (G-J in place)    Nutrition:    --Clearly  can't use G-tube for goal tube feeds, resumed them at trophic levels for gut protection 6/17 and now off for recurrent aspiration event with just trophic dosing and NOW new SBO  -- held tube feeds now with evidence of obstruction  -- TPN for now     Goal of care : we had family meeting with a gurdian and family as well as [facility] , ICU team   -- family considering comfort measures  And plan to meet at 9 pm today and make a decision regarding hpw to proceed  -- patient has poor prognosis and restorative care is not recommended.     DVT Prophylaxis: Pneumatic Compression Devices     Code Status: DNR    Disposition:  ICU care for now , likely comfort measures only soon         Interval History      Patient is seen and examined by me today and medical record reviewed.Overnight events noted and care discussed with nursing staff.  Family meeting in am and discussed with cydneyiily , answered  Questions and concerns               Physical Exam   Temp: 97.8  F (36.6  C) Temp src: Oral BP: 110/59   Heart Rate: 102 Resp: 22 SpO2: 100 % O2 Device: Mechanical Ventilator    Vitals:    06/20/17 0000 06/21/17 0400 06/22/17 0500   Weight: 57.4 kg (126 lb 8.7 oz) 59.9 kg (132 lb 0.9 oz) 57.3 kg (126 lb 5.2 oz)     Vital Signs with Ranges  Temp:  [97.4  F (36.3  C)-99  F (37.2  C)] 97.8  F (36.6  C)  Heart Rate:  [] 102  Resp:  [8-53] 22  BP: ()/(33-83) 110/59  FiO2 (%):  [35 %] 35 %  SpO2:  [87 %-100 %] 100 %  I/O last 3 completed shifts:  In: 2268.99 [I.V.:1687.66; NG/GT:100; IV Piggyback:100]  Out: 2527 [Emesis/NG output:2025; Other:502]      Sedated w/dexmedetomidine,intubated  Comfortable appearing  Atrophy of b ue and le with right arm contracture and e/o non-pitting edema of right arm, diffuse edema  Head ncat sclera normal pigmentation  lungs coarse but otherwise clear,  RRR no m/r/g    Abdomen  Distended, no BS  Skin warm and dry     Medications     IV fluid REPLACEMENT ONLY       parenteral nutrition - ADULT compounded  formula 40 mL/hr at 06/21/17 2200     norepinephrine 0.24 mcg/kg/min (06/22/17 1200)     NaCl 10 mL/hr at 06/21/17 2200     NaCl 10 mL/hr at 06/21/17 2200     IV fluid REPLACEMENT ONLY       IV fluid REPLACEMENT ONLY       dexmedetomidine 0.7 mcg/kg/hr (06/22/17 1200)       insulin aspart  1-12 Units Subcutaneous Q4H     micafungin  100 mg Intravenous Q24H     levofloxacin  500 mg Intravenous Q48H     pantoprazole  40 mg Intravenous BID     simvastatin  40 mg Oral or Feeding Tube QPM     nystatin  500,000 Units Mouth/Throat 4x Daily     doxercalciferol  4 mcg Intravenous Once per day on Mon Wed Fri     epoetin arnulfo (EPOGEN,PROCRIT) inj ESRD  3,000 Units Intravenous Once per day on Mon Wed Fri     fosphenytoin (CEREBYX) intermittent infusion (maintenance)  150 mg PE Intravenous Q12H     ipratropium - albuterol 0.5 mg/2.5 mg/3 mL  3 mL Nebulization 4x daily     sodium chloride 0.9%  1,000 mL Intravenous Once     - MEDICATION INSTRUCTIONS for Dialysis Patients -   Does not apply See Admin Instructions     Data       Recent Labs  Lab 06/19/17  1500   PHV 7.41   PO2V 26   PCO2V 41   HCO3V 26       Recent Labs  Lab 06/22/17  0500 06/21/17  1620 06/21/17  0500   WBC 7.4 9.9 11.0   HGB 7.6* 7.7* 7.7*   HCT 23.6* 24.2* 23.9*   MCV 93 93 92    347 352       Recent Labs  Lab 06/22/17  0500 06/21/17  0500 06/20/17  0535    138 142   POTASSIUM 3.7 4.2 4.3   CHLORIDE 103 100 102   CO2 29 26 27   ANIONGAP 8 12 13   * 147* 205*   BUN 34* 67* 55*   CR 3.02* 5.58* 4.92*   GFRESTIMATED 21* 10* 12*   GFRESTBLACK 25* 12* 14*   DOUG 7.6* 7.2* 7.3*       Recent Labs  Lab 06/22/17  0500 06/21/17  1620 06/21/17  1606 06/17/17  1000 06/17/17  0931 06/17/17  0820   CULT No growth after 1 hour No growth after 9 hours No growth after 9 hours Light growth Enterobacter cloacae complexLight growth Pseudomonas aeruginosaLight growth Candida albicans / dubliniensis Candida albicans and Candida dubliniensis are not routinely  speciated Susceptibility testing not routinely done* Cultured on the 5th day of incubation: Probable Candida glabrata Referred to mycology for identificationCritical Value/Significant Value called to and read back by Silva August RN MARZENAU at 1410 on 6/21/17 by SAVAGE.Susceptibility testing in progress*  Cultured on the 5th day of incubation: YeastCritical Value/Significant Value called to and read back by Silva August RN MARZENAU at 1410 on 6/21/17 by SAVAGE.* No growth after 5 days  No growth after 5 days     No results for input(s): NTBNPI, NTBNP in the last 168 hours.    Recent Labs  Lab 06/22/17  0500 06/18/17  0519 06/16/17  0500   * 68* 54*   * 75* 103*   ALKPHOS 113 114 156*   BILITOTAL 0.7 0.4 0.6       Recent Labs  Lab 06/22/17  0500 06/17/17  0820   INR 1.57* 1.38*       Recent Labs  Lab 06/19/17  0755 06/16/17  2150   LACT 1.3 0.9     No results for input(s): LIPASE in the last 168 hours.  No results for input(s): TROPONIN, TROPI, TROPR in the last 168 hours.    Invalid input(s): TROP, TROPONINIES  No results for input(s): COLOR, APPEARANCE, URINEGLC, URINEBILI, URINEKETONE, SG, UBLD, URINEPH, PROTEIN, UROBILINOGEN, NITRITE, LEUKEST, RBCU, WBCU in the last 168 hours.    No results found for this or any previous visit (from the past 24 hour(s)).  Critical care time:45min

## 2017-06-22 NOTE — PROGRESS NOTES
"[facility]  Palliative Care Progress Note  Text Page    Family care conference with the patients nephew/co-guardian Carlos Ramos and his wife with the care team of Hospitalist Nomi Kumar MD; Intensivist Arpan Farooq MD; Medical Student Marlene Sheets; bedside nurse Nakita Crawford RN. Carlos explained that their family will discuss Hesham's care this evening at 9 PM, therefore he does not have a decision regarding the goals of care. Carlos acknowledged that Hesham \"seems to be suffering\" and he will explain this to family members who have not been at bedside. Appreciate Dr. Farooq for explaining the new finding that the patient's blood has grown a fungus and that the routine care to exchange lines would cause discomfort. Await the families input regarding change in care plan.        Assessment & Plan      1.  Patient unresponsive - Patient does not demonstrate medical capacity. Legal co-guardians for healthcare decisions Hemant Matthewsdelmy (niece) and Carlos Ramos (nephew) are assisting in plan of care.      2.  Dysphagia with SBO - Family/co-guardians aware that feeding is on hold due to SBO.      3.  Dyspnea - Currently orally intubated. Family/co-guardians plan to give input after the family has discussed options (trach, continue oral intubation and comfort and withdrawal of ventilator).        4. Goal of Care: DNR - Restorative care. Family will discuss options this evening and get back to the team regarding their decision tomorrow.       Elaina Moeller MS, RN, CNS, APRN, ACHPN, FAACVPR  Pain and Palliative Care  Pager [phone]  Office [phone]    Time Spent on this Encounter   I spent from 9:20 until 10:10 AM assessment of the patient and discussion with the family. Another 15 minutes in review of chart, documentation and discussion with the health care team.    Interval History   Chart reviewed    Review of Systems    Unable as patient is intubated     Physical Exam   Temp:  " [97.4  F (36.3  C)-99  F (37.2  C)] 98.9  F (37.2  C)  Heart Rate:  [] 97  Resp:  [8-53] 20  BP: ()/(33-75) 88/47  FiO2 (%):  [35 %] 35 %  SpO2:  [87 %-100 %] 100 %  126 lbs 5.18 oz  GEN:  Orally intubated black elderly male, appears comfortable.    Medications     parenteral nutrition - ADULT compounded formula       IV fluid REPLACEMENT ONLY       parenteral nutrition - ADULT compounded formula 40 mL/hr at 06/21/17 2200     norepinephrine 0.24 mcg/kg/min (06/22/17 1200)     NaCl 10 mL/hr at 06/21/17 2200     NaCl 10 mL/hr at 06/21/17 2200     IV fluid REPLACEMENT ONLY       IV fluid REPLACEMENT ONLY       dexmedetomidine 0.7 mcg/kg/hr (06/22/17 1253)       insulin aspart  1-12 Units Subcutaneous Q4H     micafungin  100 mg Intravenous Q24H     levofloxacin  500 mg Intravenous Q48H     pantoprazole  40 mg Intravenous BID     simvastatin  40 mg Oral or Feeding Tube QPM     nystatin  500,000 Units Mouth/Throat 4x Daily     doxercalciferol  4 mcg Intravenous Once per day on Mon Wed Fri     epoetin arnulfo (EPOGEN,PROCRIT) inj ESRD  3,000 Units Intravenous Once per day on Mon Wed Fri     fosphenytoin (CEREBYX) intermittent infusion (maintenance)  150 mg PE Intravenous Q12H     ipratropium - albuterol 0.5 mg/2.5 mg/3 mL  3 mL Nebulization 4x daily     sodium chloride 0.9%  1,000 mL Intravenous Once     - MEDICATION INSTRUCTIONS for Dialysis Patients -   Does not apply See Admin Instructions       Data   Results for orders placed or performed during the hospital encounter of 06/11/17 (from the past 24 hour(s))   Blood culture   Result Value Ref Range    Specimen Description Blood DIALYSIS LINE     Special Requests Aerobic and anaerobic bottles received     Culture Micro No growth after 16 hours     Micro Report Status Pending    Blood culture   Result Value Ref Range    Specimen Description Blood Left Arm     Special Requests Aerobic and anaerobic bottles received     Culture Micro No growth after 16 hours      Micro Report Status Pending    CBC with platelets differential   Result Value Ref Range    WBC 9.9 4.0 - 11.0 10e9/L    RBC Count 2.60 (L) 4.4 - 5.9 10e12/L    Hemoglobin 7.7 (L) 13.3 - 17.7 g/dL    Hematocrit 24.2 (L) 40.0 - 53.0 %    MCV 93 78 - 100 fl    MCH 29.6 26.5 - 33.0 pg    MCHC 31.8 31.5 - 36.5 g/dL    RDW 15.1 (H) 10.0 - 15.0 %    Platelet Count 347 150 - 450 10e9/L    Diff Method Manual Differential     % Neutrophils 82.0 %    % Lymphocytes 12.0 %    % Monocytes 3.0 %    % Eosinophils 2.0 %    % Basophils 1.0 %    Nucleated RBCs 1 (H) 0 /100    Absolute Neutrophil 8.1 1.6 - 8.3 10e9/L    Absolute Lymphocytes 1.2 0.8 - 5.3 10e9/L    Absolute Monocytes 0.3 0.0 - 1.3 10e9/L    Absolute Eosinophils 0.2 0.0 - 0.7 10e9/L    Absolute Basophils 0.1 0.0 - 0.2 10e9/L    Absolute Nucleated RBC 0.1     Anisocytosis Slight     Target Cells Slight     Hypochromasia Present     Toxic Granulation Present     Platelet Estimate Normal    Glucose by meter   Result Value Ref Range    Glucose 92 70 - 99 mg/dL   Glucose by meter   Result Value Ref Range    Glucose 85 70 - 99 mg/dL   Glucose by meter   Result Value Ref Range    Glucose 184 (H) 70 - 99 mg/dL   Glucose by meter   Result Value Ref Range    Glucose 166 (H) 70 - 99 mg/dL   Comprehensive metabolic panel   Result Value Ref Range    Sodium 140 133 - 144 mmol/L    Potassium 3.7 3.4 - 5.3 mmol/L    Chloride 103 94 - 109 mmol/L    Carbon Dioxide 29 20 - 32 mmol/L    Anion Gap 8 3 - 14 mmol/L    Glucose 188 (H) 70 - 99 mg/dL    Urea Nitrogen 34 (H) 7 - 30 mg/dL    Creatinine 3.02 (H) 0.66 - 1.25 mg/dL    GFR Estimate 21 (L) >60 mL/min/1.7m2    GFR Estimate If Black 25 (L) >60 mL/min/1.7m2    Calcium 7.6 (L) 8.5 - 10.1 mg/dL    Bilirubin Total 0.7 0.2 - 1.3 mg/dL    Albumin 1.6 (L) 3.4 - 5.0 g/dL    Protein Total 5.2 (L) 6.8 - 8.8 g/dL    Alkaline Phosphatase 113 40 - 150 U/L     (H) 0 - 70 U/L     (H) 0 - 45 U/L   Magnesium   Result Value Ref Range     Magnesium 1.4 (L) 1.6 - 2.3 mg/dL   Phosphorus   Result Value Ref Range    Phosphorus 2.8 2.5 - 4.5 mg/dL   INR   Result Value Ref Range    INR 1.57 (H) 0.86 - 1.14   Prealbumin   Result Value Ref Range    Prealbumin 5 (L) 15 - 45 mg/dL   CBC Differential (AM Draw)   Result Value Ref Range    WBC 7.4 4.0 - 11.0 10e9/L    RBC Count 2.54 (L) 4.4 - 5.9 10e12/L    Hemoglobin 7.6 (L) 13.3 - 17.7 g/dL    Hematocrit 23.6 (L) 40.0 - 53.0 %    MCV 93 78 - 100 fl    MCH 29.9 26.5 - 33.0 pg    MCHC 32.2 31.5 - 36.5 g/dL    RDW 14.9 10.0 - 15.0 %    Platelet Count 340 150 - 450 10e9/L    Diff Method Manual Differential     % Neutrophils 81.0 %    % Lymphocytes 7.0 %    % Monocytes 5.0 %    % Eosinophils 1.0 %    % Basophils 1.0 %    % Metamyelocytes 5.0 %    Absolute Neutrophil 6.0 1.6 - 8.3 10e9/L    Absolute Lymphocytes 0.5 (L) 0.8 - 5.3 10e9/L    Absolute Monocytes 0.4 0.0 - 1.3 10e9/L    Absolute Eosinophils 0.1 0.0 - 0.7 10e9/L    Absolute Basophils 0.1 0.0 - 0.2 10e9/L    Absolute Metamyelocytes 0.4 (H) 0 10e9/L    Polychromasia Slight     Target Cells Slight     Hypochromasia Present     Dohle Bodies Present     Toxic Granulation Present     Platelet Estimate Normal    Blood culture   Result Value Ref Range    Specimen Description Blood IV site     Special Requests Aerobic and anaerobic bottles received     Culture Micro No growth after 5 hours     Micro Report Status Pending    XR Abdomen Port 1 View    Narrative    ABDOMEN PORTABLE ONE VIEW June 22, 2017 7:55 AM     HISTORY: Small bowel obstruction.     COMPARISON: Abdominal x-ray 6/21/2017.      Impression    IMPRESSION: Portable views of the abdomen are performed. Bowel gas  pattern demonstrates air-filled loops of small bowel and colon which  appears fairly stable compared to prior study. Gastrostomy tube with  jejunal extension is again noted. Lung bases appear clear. Right  femoral vein line is present with the lead terminating at  approximately L4 which is  stable.     Glucose by meter   Result Value Ref Range    Glucose 183 (H) 70 - 99 mg/dL   Glucose by meter   Result Value Ref Range    Glucose 158 (H) 70 - 99 mg/dL

## 2017-06-22 NOTE — PROGRESS NOTES
Respiratory Therapy    Patient seen resting in bed on mechanical ventilator settings:    Ventilation Mode: CMV/AC  FiO2 (%): 35 %  Rate Set (breaths/minute): 20 breaths/min  Tidal Volume Set (mL): 450 mL  PEEP (cm H2O): 5 cmH2O  Oxygen Concentration (%): 35 %  Resp: 19    Respiratory rate 20-26, breath sounds coarse/diminished, and SpO2 100%. Suctioning small, white, thick secretions from ETT. Patient will continue to receive Duoneb QID. RT to follow.    Patsy Winn  June 22, 2017, 4:42 PM

## 2017-06-22 NOTE — PROGRESS NOTES
Patient remains intubated and on ventilator. No vent changes or weaning overnight. BS coarse, suctioning small amount of secretions from ETT. SPO2 %. RT will continue to follow.     Ventilation Mode: CMV/AC  FiO2 (%): 35 %  Rate Set (breaths/minute): 20 breaths/min  Tidal Volume Set (mL): 450 mL  PEEP (cm H2O): 5 cmH2O  Oxygen Concentration (%): 35 %  Resp: 19      Renee Baron RT 6/22/2017, 5:58 AM

## 2017-06-22 NOTE — PROGRESS NOTES
CLINICAL NUTRITION SERVICES - BRIEF NOTE    ASSESSED NUTRITION NEEDS (PER APPROVED PRACTICE GUIDELINES; DW: 48.5 kg):  Estimated Energy Needs: 3909-1816 kcals (35-40 Kcal/Kg)  Justification: repletion  Estimated Protein Needs: 58-87+ grams protein (1.2-1.8+ g pro/Kg)  Justification: wound healing and dialysis    - Intubated since 6/12  - Nutrition Support: Baseline GT (placed 3/2017) though d/t vomiting patient not consistently receiving goal enteral volume (last goal rate 6/16, meeting <35% needs on average daily from 6/16-6/21) and therefore GJT conversion 6/19 but held the same day d/t patient now with SBO, PN regimen of the following initiated yesterday:    D12 AA8 at 40 mL/hr to provide 115 g CHO, 77 g protein (1.4 g per kg), 699 kcal daily.     - Medications reviewed.  - Biochemical review:   BG ranging from  yesterday   Na, K, phos WNL   Mag slightly low at 1.4  - GJT to suction - 1850 ml output yesterday.     - Discussed POC during rounds.  Continue nutrition support therapy today, follow-up care conference as family leaning towards comfort care.  Also clarified total fluids - per MD ok for maximum of  ml/hr.    - Change to the following goal PN regimen, but continue to hold lipids, add wound care protocol - 250 mg ascorbic acid daioy x 10 days, 5 mg zinc x 10 days:    275 g CHO, 77 g protein (1.6 g/kg) in 1440 ml (60 ml/hr x 24 hrs) to provide 1243 kcal (26 kcal/kg - 73% of needs), GIR: 3.9      - Discussed changes in regimen as above with PharmD, will continue following.         Stacey Ortiz RD, LD  Clinical Dietitian  3rd floor/ICU: [phone]  All other floors: [phone]  Weekend/holiday: [phone]

## 2017-06-22 NOTE — PROGRESS NOTES
" Renal Medicine Progress Note            Assessment/Plan:     1. ESRD  2. Septic shock from aspiration PNA  3. Respiratory failure  4. Anemia  5. Non-verbal and bed-bound at baseline  6. Persistent SBO  7. Now with fungemia     Plan  1. If family decide to continue all medical care, then will plan for HD CVC removal after dialysis tomorrow.            Interval History:     Pt remains on NE at 0.24 mcg. He seems a little more alert today. TPN started. Persistent SBO and now with fungemia. Family conference tonight to determine goals of care.          Medications and Allergies:       insulin aspart  1-12 Units Subcutaneous Q4H     micafungin  100 mg Intravenous Q24H     levofloxacin  500 mg Intravenous Q48H     pantoprazole  40 mg Intravenous BID     simvastatin  40 mg Oral or Feeding Tube QPM     nystatin  500,000 Units Mouth/Throat 4x Daily     doxercalciferol  4 mcg Intravenous Once per day on Mon Wed Fri     epoetin arnulfo (EPOGEN,PROCRIT) inj ESRD  3,000 Units Intravenous Once per day on Mon Wed Fri     fosphenytoin (CEREBYX) intermittent infusion (maintenance)  150 mg PE Intravenous Q12H     ipratropium - albuterol 0.5 mg/2.5 mg/3 mL  3 mL Nebulization 4x daily     sodium chloride 0.9%  1,000 mL Intravenous Once     - MEDICATION INSTRUCTIONS for Dialysis Patients -   Does not apply See Admin Instructions      No Known Allergies         Physical Exam:   Vitals were reviewed  Heart Rate: 90, Blood pressure 114/61, pulse 120, temperature 99.1  F (37.3  C), temperature source Oral, resp. rate 19, height 1.651 m (5' 5\"), weight 57.3 kg (126 lb 5.2 oz), SpO2 100 %.    Wt Readings from Last 3 Encounters:   06/22/17 57.3 kg (126 lb 5.2 oz)   05/02/17 49.9 kg (110 lb)   04/18/17 50 kg (110 lb 3.7 oz)       Intake/Output Summary (Last 24 hours) at 06/22/17 1438  Last data filed at 06/22/17 1400   Gross per 24 hour   Intake          2469.19 ml   Output             1227 ml   Net          1242.19 ml     Gen: critically " ill  CV: irregular, tach  Pulm: course   Abd: distended  Ext: no edema  Neuro: nonverbal            Data:     CBC RESULTS:     Recent Labs  Lab 06/22/17  0500 06/21/17  1620 06/21/17  0500 06/20/17  0535 06/19/17  0517 06/18/17  0519   WBC 7.4 9.9 11.0 12.6* 14.0* 13.2*   RBC 2.54* 2.60* 2.59* 2.55* 2.66* 2.72*   HGB 7.6* 7.7* 7.7* 7.5* 8.0* 8.2*   HCT 23.6* 24.2* 23.9* 24.3* 25.9* 26.4*    347 352 312 283 222       Basic Metabolic Panel:    Recent Labs  Lab 06/22/17  0500 06/21/17  0500 06/20/17  0535 06/19/17  0517 06/18/17  0519 06/17/17  0820    138 142 146* 146* 143   POTASSIUM 3.7 4.2 4.3 4.4 3.4 3.7   CHLORIDE 103 100 102 108 110* 106   CO2 29 26 27 26 28 27   BUN 34* 67* 55* 67* 46* 30   CR 3.02* 5.58* 4.92* 6.40* 4.84* 3.81*   * 147* 205* 169* 125* 122*   DOUG 7.6* 7.2* 7.3* 7.6* 7.6* 8.3*       INR  Recent Labs  Lab 06/22/17  0500 06/17/17  0820   INR 1.57* 1.38*      Attestation:   I have reviewed today's relevant vital signs, notes, medications, labs and imaging.    Bernardo Carter MD  [facility] - Nephrology  Office phone :[phone]  Pager: [phone]

## 2017-06-22 NOTE — PROGRESS NOTES
[facility]  Palliative Care Progress Note  Text Page     Patient's niece/co-guardian Hemant Ramos left a message at our office at 11:46 PM requesting to cancel the 8:30 AM family meeting. Hemant can be contacted at [phone]. She also request assistance with paperwork for her brother Mane, as he has been missing work. Will alert the medical team.    Elaina Moeller MS, RN, CNS, APRN, ACHPN, FAACVPR  Pain and Palliative Care  Pager [phone]  Office [phone]

## 2017-06-22 NOTE — PLAN OF CARE
Problem: Goal Outcome Summary  Goal: Goal Outcome Summary  Outcome: No Change  ICU End of Shift Summary.  For vital signs and complete assessments, please see documentation flowsheets.      Pertinent assessments: restless intermittently, not responding to commands.  Lungs coarse. Belly firm but slightly improved. B.S. Distant and hypoactive. G and J to suction. Levo weaning down, B.P. Stable. Low grade temps.   Major Shift Events: family will discuss goals of care tonight and let staff know in morning the plan. Lines to be changed out tomorrow if decided to continue cares. HD tomorrow if not comfort care. Magnesium replaced, TPN increased. Dilaudid for discomfort.  Plan (Upcoming Events): pending. ? C.C. Continue precedex and levophed along with abx.  Discharge/Transfer Needs: unknown     Bedside Shift Report Completed   Bedside Safety Check Completed

## 2017-06-22 NOTE — PROGRESS NOTES
Focus: Left ear lobe  D: Skin improving, dry, blanching to anterior and posterior left ear lobe.     I: Skin assessed and No sting barrier already applied to bilateral ear lobes by RN.  A/P: superficial break in skin with erosion of epidermis evolving and drying, stable, no signs of infection, suspect combination of moisture and pressure from probe.  Bilateral ear lobes front and back: Daily  1. Swab with No Sting barrier and leave open to air to dry  Will reassess next week.

## 2017-06-22 NOTE — PLAN OF CARE
Problem: Goal Outcome Summary  Goal: Goal Outcome Summary  Outcome: No Change  ICU End of Shift Summary.  For vital signs and complete assessments, please see documentation flowsheets.      Pertinent assessments: Cared for patient from [id number]. Vented, fi02 35%, RR 20, PEEP 5 and . Maintaining sats >90%. Precedex gtt at 0.7mcg and Levophed at 0.23mcg. Right side hemiplegia from old CVA. Right side contracted. LUE restrained, + CMS. WC bound at baseline. Does not follow commands at baseline. L/S diminished. Tele SR. x2 NS at TKO and D5 at 20 mL/hr. Right subclavian CVC for HD. Right femoral triple lumen, CDI. GJ tube, both to LIS. Hypoactive bowel sounds, abdomen firm to palpation. Anuria at baseine. Coccyx wound, repositioned q2h, specialty mattress. WOCN follows. ID follows, continue Levaquin and Micafungin with daily blood cultures. Palliative following, care conference earlier today, current plan is restorative care while family discusses information provided today.   Major Shift Events: Unremarkable four hours. Initiated TPN as ordered.   Plan (Upcoming Events): Palliative family care conference 6/22 at 0830 to discuss options for nutrition and ongoing care. Eventually pull femoral line pending family's decision at care conference.  Discharge/Transfer Needs:  TBD, continue to wean vent and gtt's as able.     Bedside Shift Report Completed : yes  Bedside Safety Check Completed: yes

## 2017-06-22 NOTE — PLAN OF CARE
Problem: Restraint for Non-Violent/Non-Self-Destructive Behavior  Goal: Prevent/Manage Potential Problems  Maintain safety of patient and others during period of restraint.  Promote psychological and physical wellbeing.  Prevent injury to skin and involved body parts.  Promote nutrition, hydration, and elimination.   Outcome: No Change  Pt tries to pull out ET, restraints are still needed.

## 2017-06-22 NOTE — PROGRESS NOTES
[facility]  Infectious Disease Progress Note          Assessment and Plan:   IMPRESSION:   1.  A 67-year-old male with complicated medical history including general poor functional status with G-tube in place for feeding and lives in a group home.   2.  Admitted 10 days ago with apparent sepsis and probable aspiration pneumonia, initially cultures negative, got antibiotics, some improvement, although still intubated.   3.  Acute worsening 6/17, that day candidemia, probably line-related, has a dialysis access catheter and a left groin line, of note no fever and on flucon for other reasons starting then.   4.  End-stage renal disease on dialysis.   5.  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   6.  Small-bowel obstruction, current active problem.   7.  Respiratory failure on the vent, initially cultures were negative in the sputum, now with Enterobacter and pseudomonas question ongoing pneumonia.       RECOMMENDATIONS:   1.  Micafungin for now, it will probably be Candida albicans, if so fluconazole is acceptable, but jose a for a short course.   2.  Follow up blood cultures today and serial until clear.   3.  Pull at least the groin line if new access needed put a new central line in, can alternate as course and further cultures direct.  Dialysis access catheter certainly a potential problem as well.  Ideally removal if able.     4.  Some discussion about level of care, certainly if goes to comfort care reconsider options at that point.   5.  From a conventional antibiotic standpoint Levaquin for now, although overall not that much for major active pneumonia, may just be colonization of sputum with gram-negative rods.         Interval History:   Intubated and sedated no new fever, FU cxs pending, ID candida pending, groin line out, dialysis cath in              Medications:       insulin aspart  1-12 Units Subcutaneous Q4H     micafungin  100 mg Intravenous Q24H     levofloxacin  500 mg Intravenous Q48H  "    pantoprazole  40 mg Intravenous BID     simvastatin  40 mg Oral or Feeding Tube QPM     nystatin  500,000 Units Mouth/Throat 4x Daily     doxercalciferol  4 mcg Intravenous Once per day on Mon Wed Fri     epoetin arnulfo (EPOGEN,PROCRIT) inj ESRD  3,000 Units Intravenous Once per day on Mon Wed Fri     fosphenytoin (CEREBYX) intermittent infusion (maintenance)  150 mg PE Intravenous Q12H     ipratropium - albuterol 0.5 mg/2.5 mg/3 mL  3 mL Nebulization 4x daily     sodium chloride 0.9%  1,000 mL Intravenous Once     - MEDICATION INSTRUCTIONS for Dialysis Patients -   Does not apply See Admin Instructions                  Physical Exam:   Blood pressure 95/56, pulse 120, temperature 97.9  F (36.6  C), temperature source Oral, resp. rate 19, height 1.651 m (5' 5\"), weight 57.3 kg (126 lb 5.2 oz), SpO2 100 %.  Wt Readings from Last 2 Encounters:   06/22/17 57.3 kg (126 lb 5.2 oz)   05/02/17 49.9 kg (110 lb)     Vital Signs with Ranges  Temp:  [97.4  F (36.3  C)-99  F (37.2  C)] 97.9  F (36.6  C)  Heart Rate:  [] 99  Resp:  [8-53] 19  BP: ()/(28-83) 95/56  FiO2 (%):  [35 %] 35 %  SpO2:  [87 %-100 %] 100 %    Constitutional: Intubated, eyes open   Lungs: Vent sounds  to auscultation bilaterally, no crackles or wheezing   Cardiovascular: Regular rate and rhythm, normal S1 and S2, and no murmur noted   Abdomen: Normal bowel sounds, soft, non-distended, non-tender   Skin: No rashes, no cyanosis, sl edema line sites OK   Other:           Data:   All microbiology laboratory data reviewed.  Recent Labs   Lab Test  06/22/17   0500  06/21/17   1620  06/21/17   0500   WBC  7.4  9.9  11.0   HGB  7.6*  7.7*  7.7*   HCT  23.6*  24.2*  23.9*   MCV  93  93  92   PLT  340  347  352     Recent Labs   Lab Test  06/22/17   0500  06/21/17   0500  06/20/17   0535   CR  3.02*  5.58*  4.92*     Recent Labs   Lab Test  12/03/10   1320   SED  30*     Recent Labs   Lab Test  06/22/17   0500  06/21/17   1620  06/21/17   1606  " 06/17/17   1000  06/17/17   0931  06/17/17   0820  06/12/17   2138  06/12/17   0143  06/11/17   2320   CULT  No growth after 1 hour  No growth after 9 hours  No growth after 9 hours  Light growth Enterobacter cloacae complex  Light growth Pseudomonas aeruginosa  Light growth Candida albicans / dubliniensis Candida albicans and Candida   dubliniensis are not routinely speciated Susceptibility testing not routinely   done  *  Cultured on the 5th day of incubation: Yeast  Critical Value/Significant Value called to and read back by Silva August RN   MARZENA at 1410 on 6/21/17 by SAVAGE.  *  Cultured on the 5th day of incubation: Yeast  Critical Value/Significant Value called to and read back by Silva August RN   MARZENA at 1410 on 6/21/17 by SAVAGE.  *  No growth after 5 days  No growth after 5 days  Moderate growth Candida glabrata  Plus Moderate growth Normal dahlia  Susceptibility testing not routinely done  *  No growth  No growth

## 2017-06-22 NOTE — PROGRESS NOTES
"[facility]  General Surgery Progress Note           Assessment and Plan:   Assessment:   SBO - persistent  Pneumonia, resp failure, sepsis  underlying cognitive deficits from CVAs      Plan:   -family considering options for continued care - apparently meeting tonite  If they elect to continue all available treatment - would perform laparoscopy/laparotomy possible bowel resection when adequately recovered from a pulmonary/sepsis standpoint  Continue suction via G-J tube  following         Interval History:   Non comunicative         Physical Exam:   Blood pressure 123/55, pulse 120, temperature 99.1  F (37.3  C), temperature source Oral, resp. rate 19, height 1.651 m (5' 5\"), weight 57.3 kg (126 lb 5.2 oz), SpO2 100 %.    I/O last 3 completed shifts:  In: 2268.99 [I.V.:1687.66; NG/GT:100; IV Piggyback:100]  Out: 2527 [Emesis/NG output:2025; Other:502]    Abdomen:   softer and less distended             Data:     Recent Labs   Lab Test  06/22/17   0500  06/21/17   1620  06/21/17   0500   HGB  7.6*  7.7*  7.7*   WBC  7.4  9.9  11.0       Recent labs and studies reviewed.  Abdominal films:   Air fluid levels seen in the small bowel       Gómez Dale MD     "

## 2017-06-22 NOTE — PLAN OF CARE
Problem: Restraint for Non-Violent/Non-Self-Destructive Behavior  Goal: Prevent/Manage Potential Problems  Maintain safety of patient and others during period of restraint.  Promote psychological and physical wellbeing.  Prevent injury to skin and involved body parts.  Promote nutrition, hydration, and elimination.   Outcome: No Change  Pt continues to attempt to reach for ET tube with left arm, as well as bite the ET tube and tongue the ET tube episodically. Pt is not redirectable to verbal reminders due to condition therefore need for LUE wrist restraint continues. Order updated per physician as required. Tubes within reach of pt moved and covered to prevent pulling. Pt is resistive to ROM when offered in the LUE, and RUE contracted. CMS intact. See restraint flowsheet for additional assessment and documentation information.

## 2017-06-22 NOTE — PROGRESS NOTES
ICU Attending Note    I have seen and examined Hesham Ramos, reviewed the patient's history, pertinent labs, vital signs, medications, physical exam, and radiographs.  The patient is critically ill by my examination and requires continued ICU monitoring and cares    Hesahm Ramos is admitted to ICU with respiratory failure after aspiration event. Had a g tube placed in March.  Was observed aspirating.  Intubated on admission.  Lives in a group home.     Recent Events:  No change.  Met again with family today.      Exam:  Temp:  [97.4  F (36.3  C)-99  F (37.2  C)] 97.8  F (36.6  C)  Heart Rate:  [] 94  Resp:  [8-53] 22  BP: ()/(32-83) 111/58  FiO2 (%):  [35 %] 35 %  SpO2:  [87 %-100 %] 100 %    Intake/Output Summary (Last 24 hours) at 06/19/17 1043  Last data filed at 06/19/17 1000   Gross per 24 hour   Intake          1533.57 ml   Output                0 ml   Net          1533.57 ml     Ventilation Mode: CMV/AC  FiO2 (%): 35 %  Rate Set (breaths/minute): 20 breaths/min  Tidal Volume Set (mL): 450 mL  PEEP (cm H2O): 5 cmH2O  Oxygen Concentration (%): 35 %  Resp: 22    lungs coarse  abd distended, ypmanic  Ext warm    Results:  ABG   No lab results found in last 7 days.  CBC    Recent Labs  Lab 06/22/17  0500 06/21/17  1620 06/21/17  0500 06/20/17  0535   WBC 7.4 9.9 11.0 12.6*   HGB 7.6* 7.7* 7.7* 7.5*   HCT 23.6* 24.2* 23.9* 24.3*    347 352 312     BMP    Recent Labs  Lab 06/22/17  0500 06/21/17  0500 06/20/17  0535 06/19/17  0517    138 142 146*   POTASSIUM 3.7 4.2 4.3 4.4   CHLORIDE 103 100 102 108   CO2 29 26 27 26   BUN 34* 67* 55* 67*   CR 3.02* 5.58* 4.92* 6.40*   * 147* 205* 169*     LFT    Recent Labs  Lab 06/22/17  0500 06/18/17  0519 06/17/17  0820 06/16/17  0500   * 68*  --  54*   * 75*  --  103*   ALKPHOS 113 114  --  156*   BILITOTAL 0.7 0.4  --  0.6   ALBUMIN 1.6* 1.9*  --  2.1*   INR 1.57*  --  1.38*  --      PancreasNo lab results found in  last 7 days.  INR  Lab Results   Component Value Date    INR 1.38 06/17/2017    INR 1.76 06/14/2017    INR 1.23 06/11/2017    INR 1.03 04/23/2017       Current Issues:  1.  Neurology: baseline unresponsive, dexmedatomidine for sedation as needed  2.  Respiratory failure:  Intubated 6/12. Continue vent support. No weaning due to increased pressor needs  3.  End stage renal disease: dialysis yesterday  4.  Pneumonia: empiric antibiotics, on levofloxacin based on cultures.  Broadened fungal coverage.  5.  Protein calorie malnutrition: TPN  Intestinal tubes to drainage  6.  Small bowel obstruction: continued observation to see if resolves  5.  Fungemia: has dialysis line and groin line.  Will need to change.  Needs to complete dialysis run and then have line removed.  Will change central line tomorrow.  Cultures not positive until 4 days.      Met with nephew today.  He is presenting the situation to family tonight.  I explained the normal course of changing lines in the setting of positive blood cultures. They understand our reluctance to do so if comfort care is initiated soon and agree to hold off since there are no significant issues caused by not changing the line in the short term.  Also discussed the probably need, in my opinion, for surgery for the bowel obstruction.  This has been present for a period of time and he has not resolved with expectant care.  All will be considered with family conference.    Evaluation and management time exclusive of procedures was 30 minutes critical care time including:  examination with the ICU team, discussion of the patient's condition with other physicians and members of the care team, reviewing all data related to the patient, and time utilizing the EMR for documentation of this patient's care.    Arpan Farooq MD  Acute Care Surgery/Critical Care

## 2017-06-23 NOTE — PROGRESS NOTES
NUTRITION BRIEF NOTE  RD following for enteral and parenteral nutrition needs.  Upon chart review, plans for terminal extubation Tuesday - TPN no longer warranted. RD to sign off. Please page/consult as needed.      Madonna Redd RDN   3rd floor/ICU: [phone]  All other floors: [phone]  Weekend/holiday: [phone]  Office: [phone]

## 2017-06-23 NOTE — PROGRESS NOTES
Patient remains intubated and on ventilator. No vent changes or weaning done overnight. BS clear/diminished, suctioning small amount of creamy/white secretions from ETT. SPO2 %. RT will continue to follow.    RT Yoon 6/23/2017, 5:54 AM

## 2017-06-23 NOTE — PROGRESS NOTES
"[facility]  Palliative Care Progress Note  Text Page    Family care conference with the patient's nephew/co-guardian Carlos Ramos and his wife with the care team of Hospitalist Nomi Kumar MD and Intensivist Arpan Farooq MD. Carlos explained that their family discussed Hesham's care last evening and decided they would want \"the tube taken out of his mouth on Tuesday\". Carlos indicated that family will visit before Tuesday. In planning for comfort Carlos request no lab draws and no dialysis. He is aware that Hesham may deteriorate before Tuesday and request no escalation of care. In asking if he would like to be here when Hesham passes Mane  explained \"No, I'm not strong enough for that\". He was not certain if Hemant would want to be here at the time of Hesham's passing.    Assessment & Plan      1.  Patient unresponsive - Patient does not demonstrate medical capacity. Legal co-guardians for healthcare decisions Hemant Ramos (niece) and Carlos Ramos (nephew) are assisting in plan of care.      2.  Dysphagia with SBO - Discontinue TPN in plan or comfort.       3.  Dyspnea - Plan to extubate on Tuesday June 27, 2017.        4. Goal of Care: DNR with no escalation of care and plan for extubation on Tuesday June 27, 2017.       Elaina Moeller MS, RN, CNS, APRN, ACHPN, FAACVPR  Pain and Palliative Care  Pager [phone]  Office [phone]    Time Spent on this Encounter   I spent 35  minutes in assessment of the patient and discussion with the patient and family. Another 20 minutes in review of chart, documentation and discussion with the health care team. I personally informed [facility] dialysis and left messages for Nephrology and Surgery regarding the plan for comfort.    Interval History   Chart reviewed    Review of Systems      Unable - patient intubated    Physical Exam   Temp:  [97.8  F (36.6  C)-99.5  F (37.5  C)] 99  F (37.2  C)  Heart Rate:  [] 80  Resp:  [10-30] " 19  BP: ()/(38-99) 129/63  FiO2 (%):  [35 %] 35 %  SpO2:  [75 %-100 %] 92 %  127 lbs 6.81 oz  GEN:  Cachetic and frail black male orally intubated and sedated.    Medications     parenteral nutrition - ADULT compounded formula 60 mL/hr at 06/23/17 0700     IV fluid REPLACEMENT ONLY       norepinephrine 0.16 mcg/kg/min (06/23/17 0700)     NaCl 10 mL/hr at 06/23/17 0700     NaCl 10 mL/hr at 06/23/17 0700     IV fluid REPLACEMENT ONLY       IV fluid REPLACEMENT ONLY       dexmedetomidine 0.7 mcg/kg/hr (06/23/17 0700)       insulin aspart  1-12 Units Subcutaneous Q4H     micafungin  100 mg Intravenous Q24H     levofloxacin  500 mg Intravenous Q48H     pantoprazole  40 mg Intravenous BID     simvastatin  40 mg Oral or Feeding Tube QPM     nystatin  500,000 Units Mouth/Throat 4x Daily     doxercalciferol  4 mcg Intravenous Once per day on Mon Wed Fri     epoetin arnulfo (EPOGEN,PROCRIT) inj ESRD  3,000 Units Intravenous Once per day on Mon Wed Fri     fosphenytoin (CEREBYX) intermittent infusion (maintenance)  150 mg PE Intravenous Q12H     ipratropium - albuterol 0.5 mg/2.5 mg/3 mL  3 mL Nebulization 4x daily     sodium chloride 0.9%  1,000 mL Intravenous Once     - MEDICATION INSTRUCTIONS for Dialysis Patients -   Does not apply See Admin Instructions       Data   Results for orders placed or performed during the hospital encounter of 06/11/17 (from the past 24 hour(s))   Glucose by meter   Result Value Ref Range    Glucose 158 (H) 70 - 99 mg/dL   Glucose by meter   Result Value Ref Range    Glucose 181 (H) 70 - 99 mg/dL   Magnesium   Result Value Ref Range    Magnesium 2.9 (H) 1.6 - 2.3 mg/dL   Glucose by meter   Result Value Ref Range    Glucose 158 (H) 70 - 99 mg/dL   Glucose by meter   Result Value Ref Range    Glucose 261 (H) 70 - 99 mg/dL   Glucose by meter   Result Value Ref Range    Glucose 141 (H) 70 - 99 mg/dL   Basic metabolic panel   Result Value Ref Range    Sodium 137 133 - 144 mmol/L    Potassium 3.8  3.4 - 5.3 mmol/L    Chloride 101 94 - 109 mmol/L    Carbon Dioxide 27 20 - 32 mmol/L    Anion Gap 9 3 - 14 mmol/L    Glucose 158 (H) 70 - 99 mg/dL    Urea Nitrogen 53 (H) 7 - 30 mg/dL    Creatinine 4.08 (H) 0.66 - 1.25 mg/dL    GFR Estimate 15 (L) >60 mL/min/1.7m2    GFR Estimate If Black 18 (L) >60 mL/min/1.7m2    Calcium 7.8 (L) 8.5 - 10.1 mg/dL   Magnesium   Result Value Ref Range    Magnesium 2.7 (H) 1.6 - 2.3 mg/dL   Phosphorus   Result Value Ref Range    Phosphorus 3.7 2.5 - 4.5 mg/dL   Blood culture   Result Value Ref Range    Specimen Description Blood Central line     Special Requests Aerobic and anaerobic bottles received     Culture Micro No growth after 1 hour     Micro Report Status Pending    Glucose by meter   Result Value Ref Range    Glucose 133 (H) 70 - 99 mg/dL

## 2017-06-23 NOTE — TELEPHONE ENCOUNTER
Form received from: [employer]    Form requesting following info/need: Gauze and tape orders    DANNA needed?: No    Location of form: Dr. Sherman's yellow folder    When completed the route for return: Fax

## 2017-06-23 NOTE — PROGRESS NOTES
Respiratory Therapy    Patient seen resting in bed on mechanical ventilator settings:    Ventilation Mode: CMV/AC  FiO2 (%): 35 %  Rate Set (breaths/minute): 20 breaths/min  Tidal Volume Set (mL): 450 mL  PEEP (cm H2O): 5 cmH2O  Oxygen Concentration (%): 35 %  Resp: 20    Respiratory rate 20, breath sounds coarse/diminished, and SpO2 100%. Suctioning small/scant, white, thick secretions from ETT. Patient will continue to receive Duoneb QID. RT to follow.     Patsy Winn  June 23, 2017, 4:41 PM

## 2017-06-23 NOTE — PLAN OF CARE
Problem: Goal Outcome Summary  Goal: Goal Outcome Summary  Outcome: No Change  ICU End of Shift Summary.  For vital signs and complete assessments, please see documentation flowsheets.      Pertinent assessments: Vented, fi02 35%, RR 20,  and PEEP 5. Precedex gtt 0.7 mcg. NS at TKO x2. TPN infusing at 60 mL/hr. Right femoral CVC. WC bound at baseline. L/S diminished. Abdomen round and firm. Faint/hypoactive bowel sounds. GJ tube, both to LIS. Anuria at baseline. Right subclavian CVC for HD. Edema in RUE and BLE. PRN dilaudid given x3 for biting ETT. Coccyx wound and left ear lobe pressure sore, WOCN follows.  Major Shift Events: Levophed gtt titrated up and down throughout shift to keep MAP >65, see documentation for trends. Currently infusing at 0.16 mcg.   Plan (Upcoming Events): Palliative following, plan is to determine ongoing goals of care today. Surgery follow in the event family determines to continue restorative goals.  Discharge/Transfer Needs: TBD, continue POC.     Bedside Shift Report Completed : yes  Bedside Safety Check Completed: yes

## 2017-06-23 NOTE — PROGRESS NOTES
[facility]    Hospitalist Progress Note  Name: Hesham Ramos    MRN: [id number]  Provider: Nomi Kumar MD  Date of Service: 06/23/2017    Assessment & Plan   Summary of Stay: Hesham Ramos is a 67 year old male who was admitted on 6/11/2017. Patient with a history of prior debilitating strokes, sz d/o, htn/diastolic CHF, ESRD on HD, and significantly debilitated who lives in a group home.  He had a feeding tube placed approximately 3 months ago for dysphagia, and has continued to decline since that time admitted on 6/11/2017 with hypoxia/fever consistent with aspiration pna complicated by sepsis (leukocytosis/lactic acidosis/fever) which progressed to septic shock necessitating norepi/vasopressin support and intubation on 6/12 for respiratory failure. He was been weaned  off  From pressors on 6/15/17 But again was restarted on levophed 6/19 as he was hypotensive during dialysis. He remains intubated but with minimal ventilatory support undergoing daily weans that are limited by tachypnea/low tidal volumes. In his [facility] hospitalization he was also notable for profound hyperglycemia nececsitating insulin gtt with hgb A1c wnl at 5.1.  During this admission he had  BUN upto 150s, despite HD, and hgb has been drifting down, and dark stools have been noted, all suspicious for slow GIB.  He has required 2 prbc units.  His TFs had been resumed to challenge the G-tube, he had been transitioned up to goal rate, noted to have 150cc residual and before it could be stopped had large emesis occurring 6/16/17, subsequently developed tachycardia and fever consistent with another aspiration event, with e/o sepsis with hypotension/fever/worsening leukocytosis. TF held for 24 hours and resumed 6/17 at trophic rate, and on 6/19 had another aspiration event with evidence of bowel obstruction. Tube feeds were discontinued. 6/20 CT abd  Showed transition point and surgery was consulted. J tube is  suctioned and surgery will follow. Overnight patient had increased need for pressors. Palliative team and ICU team have been discussing goals of therapy with family especially that family would not like a trach at this time. Family meeting for 1030 am 6/22.    Septic Shock 2/2 aspiration pna, fungemia   -- stable hemodynamics today   -- continue current med for now       Aspiration pna with TFs:    --changed Gtube to J tube on 6/19 to decrease aspiration from re-occurring   -- Recurrent aspiration pneumonia sec to tube feeds on 6/18/17.  procal 69.7   --Rec'd 3 days of pip tazo, then started on amp-sulbactam until 6/19.  -- Respiratory cultures grew Enterobactor an P Aeruginosa was switched to fluoroquinolones 6/20   - ID following     Hypoxic Respiratory Failure 2/2 aspiration pneumonia:    --not extubatable due to tachypnea and low tidal volumes .  Cont qd weaning trials.  Repeat aspiration bodes poorly for future extubation.    --Need for trach was discussed with family but family not consenting to it at this time  --sputum is growing heavy growth yeast, and given his thrush     Small Bowel obstruction  -- 6/19 had  large emesis  -- Xray  showed SBO  -- feeding stopped  -- G tube to suction with minimal out  -- CT with transitional point on scan  -- surgery consulted and following   -- not a good candidate for surgery     Suspected GIB:    --Given high BUN/drifting down of hgb, episodic dark stools-has rec'd 2 u PRBCs, currently  Hemoglobin   Date Value Ref Range Status   06/22/2017 7.6 (L) 13.3 - 17.7 g/dL Final   ]    ESRD on HD:    --Nephrology consulted and following   HD am 6/16, 6/19 and  6/21    Prior debilitating strokes:  Not able to speak at baseline   --Home regimen is asa and simva - both on hold-asa for GIB, statin for increased transaminases-    Dementia;  -- presumed vascular in nature-resume donepezil when able (G-J in place)    Nutrition:    --Clearly can't use G-tube for goal tube feeds, resumed  them at trophic levels for gut protection 6/17 and now off for recurrent aspiration event with just trophic dosing and NOW new SBO  -- held tube feeds now with evidence of obstruction    Goal of care : we had family meeting with a gurdian and family as well as [facility] , ICU team   -- family wanted not to escalate care and comfort measures preferred   --plan to keep intubated for now , terminal extubation on Tuesday       DVT Prophylaxis: Pneumatic Compression Devices     Code Status: DNR/DNI    Disposition: Limited ICU care       Interval History      Patient is seen and examined by me today and medical record reviewed.Overnight events noted and care discussed with nursing staff.  Family meeting in am and discussed with famiily , answered  Questions and concerns               Physical Exam   Temp: 99  F (37.2  C) Temp src: Axillary BP: 118/61   Heart Rate: 85 Resp: 20 SpO2: 100 % O2 Device: Mechanical Ventilator    Vitals:    06/21/17 0400 06/22/17 0500 06/23/17 0430   Weight: 59.9 kg (132 lb 0.9 oz) 57.3 kg (126 lb 5.2 oz) 57.8 kg (127 lb 6.8 oz)     Vital Signs with Ranges  Temp:  [98.4  F (36.9  C)-99.5  F (37.5  C)] 99  F (37.2  C)  Heart Rate:  [77-97] 85  Resp:  [10-30] 20  BP: ()/(38-99) 118/61  FiO2 (%):  [35 %] 35 %  SpO2:  [75 %-100 %] 100 %  I/O last 3 completed shifts:  In: 2514.14 [I.V.:1240.14; NG/GT:130; IV Piggyback:100]  Out: 575 [Emesis/NG output:575]      Sedated w/dexmedetomidine,intubated  Comfortable appearing  Atrophy of b ue and le with right arm contracture and e/o non-pitting edema of right arm, diffuse edema  Head ncat sclera normal pigmentation  lungs coarse but otherwise clear,  RRR no m/r/g    Abdomen  Distended, no BS  Skin warm and dry     Medications     parenteral nutrition - ADULT compounded formula Stopped (06/23/17 1123)     IV fluid REPLACEMENT ONLY       norepinephrine 0.16 mcg/kg/min (06/23/17 0700)     NaCl 10 mL/hr at 06/23/17 0700     NaCl 10 mL/hr at 06/23/17 0700     IV  fluid REPLACEMENT ONLY       IV fluid REPLACEMENT ONLY       dexmedetomidine 0.7 mcg/kg/hr (06/23/17 0700)       insulin aspart  1-12 Units Subcutaneous Q4H     micafungin  100 mg Intravenous Q24H     levofloxacin  500 mg Intravenous Q48H     pantoprazole  40 mg Intravenous BID     nystatin  500,000 Units Mouth/Throat 4x Daily     fosphenytoin (CEREBYX) intermittent infusion (maintenance)  150 mg PE Intravenous Q12H     ipratropium - albuterol 0.5 mg/2.5 mg/3 mL  3 mL Nebulization 4x daily     sodium chloride 0.9%  1,000 mL Intravenous Once     Data       Recent Labs  Lab 06/19/17  1500   PHV 7.41   PO2V 26   PCO2V 41   HCO3V 26       Recent Labs  Lab 06/22/17  0500 06/21/17  1620 06/21/17  0500   WBC 7.4 9.9 11.0   HGB 7.6* 7.7* 7.7*   HCT 23.6* 24.2* 23.9*   MCV 93 93 92    347 352       Recent Labs  Lab 06/23/17  0540 06/22/17  0500 06/21/17  0500    140 138   POTASSIUM 3.8 3.7 4.2   CHLORIDE 101 103 100   CO2 27 29 26   ANIONGAP 9 8 12   * 188* 147*   BUN 53* 34* 67*   CR 4.08* 3.02* 5.58*   GFRESTIMATED 15* 21* 10*   GFRESTBLACK 18* 25* 12*   DOUG 7.8* 7.6* 7.2*       Recent Labs  Lab 06/23/17  0540 06/22/17  0500 06/21/17  1620 06/21/17  1606 06/17/17  1000 06/17/17  0931 06/17/17  0820   CULT No growth after 1 hour No growth after 23 hours No growth after 2 days No growth after 2 days Light growth Enterobacter cloacae complexLight growth Pseudomonas aeruginosaLight growth Candida albicans / dubliniensis Candida albicans and Candida dubliniensis are not routinely speciated Susceptibility testing not routinely done* Cultured on the 5th day of incubation: Candida glabrataCritical Value/Significant Value called to and read back by [facility] at 1410 on 6/21/17 by SAVAGE.Susceptibility testing in progress*  Cultured on the 5th day of incubation: YeastCritical Value/Significant Value called to and read back by [facility] at 1410 on 6/21/17 by SAVAGE.* No growth after 6 days   No growth     No results for input(s): NTBNPI, NTBNP in the last 168 hours.    Recent Labs  Lab 06/22/17  0500 06/18/17  0519   * 68*   * 75*   ALKPHOS 113 114   BILITOTAL 0.7 0.4       Recent Labs  Lab 06/22/17  0500 06/17/17  0820   INR 1.57* 1.38*       Recent Labs  Lab 06/19/17  0755 06/16/17  2150   LACT 1.3 0.9     No results for input(s): LIPASE in the last 168 hours.  No results for input(s): TROPONIN, TROPI, TROPR in the last 168 hours.    Invalid input(s): TROP, TROPONINIES  No results for input(s): COLOR, APPEARANCE, URINEGLC, URINEBILI, URINEKETONE, SG, UBLD, URINEPH, PROTEIN, UROBILINOGEN, NITRITE, LEUKEST, RBCU, WBCU in the last 168 hours.    No results found for this or any previous visit (from the past 24 hour(s)).  Critical care time:45min

## 2017-06-23 NOTE — PROGRESS NOTES
ICU Attending Note    I have seen and examined Hesham Ramos, reviewed the patient's history, pertinent labs, vital signs, medications, physical exam, and radiographs.  The patient is critically ill by my examination and requires continued ICU monitoring and cares    Hesham Ramos is admitted to ICU with respiratory failure after aspiration event. Had a g tube placed in March.  Was observed aspirating.  Intubated on admission.  Lives in a group home.     Recent Events:  Nephew expressed families decision to move to comfort care.  Asked if he could be extubated on Tuesday when family can arrive.  Agreed to not escalate care and not do dialysis.  He understands the patient may not survive until Tuesday.    Exam:  Temp:  [98.4  F (36.9  C)-99.5  F (37.5  C)] 99  F (37.2  C)  Heart Rate:  [] 80  Resp:  [10-30] 19  BP: ()/(38-99) 129/63  FiO2 (%):  [35 %] 35 %  SpO2:  [75 %-100 %] 92 %    Intake/Output Summary (Last 24 hours) at 06/19/17 1043  Last data filed at 06/19/17 1000   Gross per 24 hour   Intake          1533.57 ml   Output                0 ml   Net          1533.57 ml     Ventilation Mode: CMV/AC  FiO2 (%): 35 %  Rate Set (breaths/minute): 20 breaths/min  Tidal Volume Set (mL): 450 mL  PEEP (cm H2O): 5 cmH2O  Oxygen Concentration (%): 35 %  Resp: 19    lungs coarse  abd distended, typmanic  Ext warm    Results:  ABG   No lab results found in last 7 days.  CBC    Recent Labs  Lab 06/22/17  0500 06/21/17  1620 06/21/17  0500 06/20/17  0535   WBC 7.4 9.9 11.0 12.6*   HGB 7.6* 7.7* 7.7* 7.5*   HCT 23.6* 24.2* 23.9* 24.3*    347 352 312     BMP    Recent Labs  Lab 06/23/17  0540 06/22/17  0500 06/21/17  0500 06/20/17  0535    140 138 142   POTASSIUM 3.8 3.7 4.2 4.3   CHLORIDE 101 103 100 102   CO2 27 29 26 27   BUN 53* 34* 67* 55*   CR 4.08* 3.02* 5.58* 4.92*   * 188* 147* 205*     LFT    Recent Labs  Lab 06/22/17  0500 06/18/17  0519 06/17/17  0820   * 68*  --     * 75*  --    ALKPHOS 113 114  --    BILITOTAL 0.7 0.4  --    ALBUMIN 1.6* 1.9*  --    INR 1.57*  --  1.38*     PancreasNo lab results found in last 7 days.  INR  Lab Results   Component Value Date    INR 1.38 06/17/2017    INR 1.76 06/14/2017    INR 1.23 06/11/2017    INR 1.03 04/23/2017       Current Issues:  1.  Neurology: baseline unresponsive, dexmedatomidine for sedation as needed.  Pain meds for comfort.  2.  Respiratory failure:  Intubated 6/12. Continue vent support. No weaning.  Planned terminal extubation on Tuesday  3.  End stage renal disease: stop dialysis  4.  Pneumonia: empiric antibiotics, on levofloxacin based on cultures.  Broadened fungal coverage.  5.  Protein calorie malnutrition: stop tpn  6.  Small bowel obstruction: continued observation to see if resolves  5.  Fungemia: has dialysis line and groin line.  Will need to change.  Needs to complete dialysis run and then have line removed.  Will change central line tomorrow.  Cultures not positive until 4 days.      Met with nephew today as above    Evaluation and management time exclusive of procedures was 30 minutes critical care time including:  examination with the ICU team, discussion of the patient's condition with other physicians and members of the care team, reviewing all data related to the patient, and time utilizing the EMR for documentation of this patient's care.    Arpan Farooq MD  Acute Care Surgery/Critical Care

## 2017-06-23 NOTE — PROGRESS NOTES
Focus: comfort cares  D: WOC has been following pt care and pressure injuries present on admission. Change of care now goal of care: DNR with no escalation of care and plan for extubation on Tuesday June 27, 2017.   I: 10 min review and discussion with RN about POC  A/P: critically ill pt nearing end of life, nursing staff will support and provide skin and wound care per POC for comfort care.   WOC consult completed.

## 2017-06-23 NOTE — PLAN OF CARE
Problem: Goal Outcome Summary  Goal: Goal Outcome Summary  ICU End of Shift Summary.  For vital signs and complete assessments, please see documentation flowsheets.      Pertinent assessments: Levo and precedex gtts remain on. VSS. Afebrile  Major Shift Events: Family decided to withdraw care on Tuesday. Plan to continue supportive cares until that time.  Plan (Upcoming Events): Pain control, continue vent, levo, abx until Tuesday.  Discharge/Transfer Needs: None     Bedside Shift Report Completed :   Bedside Safety Check Completed:

## 2017-06-23 NOTE — PROGRESS NOTES
"[facility]  Infectious Disease Progress Note          Assessment and Plan:   IMPRESSION:   1.  A 67-year-old male with complicated medical history including general poor functional status with G-tube in place for feeding and lives in a group home.   2.  Admitted 10 days ago with apparent sepsis and probable aspiration pneumonia, initially cultures negative, got antibiotics, some improvement, although still intubated.   3.  Acute worsening 6/17, that day candidemia, probably line-related, has a dialysis access catheter and a left groin line, of note no fever and on flucon for other reasons starting then.   4.  End-stage renal disease on dialysis.   5.  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   6.  Small-bowel obstruction, current active problem.   7.  Respiratory failure on the vent, initially cultures were negative in the sputum, now with Enterobacter and pseudomonas question ongoing pneumonia.       RECOMMENDATIONS:   1. Comfort care plan noted, will sign off, call if issues        Interval History:   Intubated and sedated no new fever, FU cxs pending, ID candida pending, groin line out, dialysis cath in              Medications:       insulin aspart  1-12 Units Subcutaneous Q4H     micafungin  100 mg Intravenous Q24H     levofloxacin  500 mg Intravenous Q48H     pantoprazole  40 mg Intravenous BID     nystatin  500,000 Units Mouth/Throat 4x Daily     fosphenytoin (CEREBYX) intermittent infusion (maintenance)  150 mg PE Intravenous Q12H     ipratropium - albuterol 0.5 mg/2.5 mg/3 mL  3 mL Nebulization 4x daily     sodium chloride 0.9%  1,000 mL Intravenous Once                  Physical Exam:   Blood pressure 103/55, pulse 120, temperature 99  F (37.2  C), temperature source Axillary, resp. rate 20, height 1.651 m (5' 5\"), weight 57.8 kg (127 lb 6.8 oz), SpO2 100 %.  Wt Readings from Last 2 Encounters:   06/23/17 57.8 kg (127 lb 6.8 oz)   05/02/17 49.9 kg (110 lb)     Vital Signs with Ranges  Temp:  " [98.4  F (36.9  C)-99  F (37.2  C)] 99  F (37.2  C)  Heart Rate:  [77-97] 87  Resp:  [16-30] 20  BP: ()/(38-99) 103/55  FiO2 (%):  [35 %] 35 %  SpO2:  [75 %-100 %] 100 %    Constitutional: Intubated, eyes open   Lungs: Vent sounds  to auscultation bilaterally, no crackles or wheezing   Cardiovascular: Regular rate and rhythm, normal S1 and S2, and no murmur noted   Abdomen: Normal bowel sounds, soft, non-distended, non-tender   Skin: No rashes, no cyanosis, sl edema line sites OK   Other:           Data:   All microbiology laboratory data reviewed.  Recent Labs   Lab Test  06/22/17   0500  06/21/17   1620  06/21/17   0500   WBC  7.4  9.9  11.0   HGB  7.6*  7.7*  7.7*   HCT  23.6*  24.2*  23.9*   MCV  93  93  92   PLT  340  347  352     Recent Labs   Lab Test  06/23/17   0540  06/22/17   0500  06/21/17   0500   CR  4.08*  3.02*  5.58*     Recent Labs   Lab Test  12/03/10   1320   SED  30*     Recent Labs   Lab Test  06/23/17   0540  06/22/17   0500  06/21/17   1620  06/21/17   1606  06/17/17   1000  06/17/17   0931  06/17/17   0820  06/12/17   2138  06/12/17   0143   CULT  No growth after 6 hours  No growth after 1 day  No growth after 2 days  No growth after 2 days  Light growth Enterobacter cloacae complex  Light growth Pseudomonas aeruginosa  Light growth Candida albicans / dubliniensis Candida albicans and Candida   dubliniensis are not routinely speciated Susceptibility testing not routinely   done  *  Cultured on the 5th day of incubation: Candida glabrata  Critical Value/Significant Value called to and read back by Silva August RN   MARZENA at 1410 on 6/21/17 by SAVAGE.  Susceptibility testing in progress  *  Cultured on the 5th day of incubation: Yeast  Critical Value/Significant Value called to and read back by Silva August RN   MARZENAU at 1410 on 6/21/17 by SAVAGE.  *  No growth after 6 days  No growth  Moderate growth Candida glabrata  Plus Moderate growth Normal dahlia  Susceptibility testing not  routinely done  *  No growth

## 2017-06-23 NOTE — PROGRESS NOTES
I reviewed his chart. I discussed his case with medical staff. Family is moving towards comfort care and extubation on Tuesday. ICU team canceled all dialysis treatment. Please let us know if we can be of further assistance. I am signing-off.

## 2017-06-23 NOTE — PROGRESS NOTES
Discussed with intensivist:   In light of family decision to pursue comfort care - will not plan on surgery  Rec: continue suction via G-tube/J-tube  Please call if conditions change or if we can be of help  Will sign off  Gómez Dale MD  6/23/2017 12:56 PM

## 2017-06-24 NOTE — PROGRESS NOTES
[facility]    Hospitalist Progress Note  Name: Hesham Ramos    MRN: [id number]  Provider: Nomi Kumar MD  Date of Service: 06/24/2017    Assessment & Plan   Summary of Stay: Hesham Ramos is a 67 year old male who was admitted on 6/11/2017. Patient with a history of prior debilitating strokes, sz d/o, htn/diastolic CHF, ESRD on HD, and significantly debilitated who lives in a group home.  He had a feeding tube placed approximately 3 months ago for dysphagia, and has continued to decline since that time admitted on 6/11/2017 with hypoxia/fever consistent with aspiration pna complicated by sepsis (leukocytosis/lactic acidosis/fever) which progressed to septic shock necessitating norepi/vasopressin support and intubation on 6/12 for respiratory failure. He was been weaned  off  From pressors on 6/15/17 But again was restarted on levophed 6/19 as he was hypotensive during dialysis. He remains intubated but with minimal ventilatory support undergoing daily weans that are limited by tachypnea/low tidal volumes. In his [facility] hospitalization he was also notable for profound hyperglycemia nececsitating insulin gtt with hgb A1c wnl at 5.1.  During this admission he had  BUN upto 150s, despite HD, and hgb has been drifting down, and dark stools have been noted, all suspicious for slow GIB.  He has required 2 prbc units.  His TFs had been resumed to challenge the G-tube, he had been transitioned up to goal rate, noted to have 150cc residual and before it could be stopped had large emesis occurring 6/16/17, subsequently developed tachycardia and fever consistent with another aspiration event, with e/o sepsis with hypotension/fever/worsening leukocytosis. TF held for 24 hours and resumed 6/17 at trophic rate, and on 6/19 had another aspiration event with evidence of bowel obstruction. Tube feeds were discontinued. 6/20 CT abd  Showed transition point and surgery was consulted. J tube is  suctioned and surgery will follow. Overnight patient had increased need for pressors. Palliative team and ICU team have been discussing goals of therapy with family especially that family would not like a trach at this time. Family meeting for 1030 am 6/22.    Septic Shock 2/2 aspiration pna, fungemia   -- stable hemodynamics today   -- continue current med for now       Aspiration pna with TFs:    --changed Gtube to J tube on 6/19 to decrease aspiration from re-occurring   -- Recurrent aspiration pneumonia sec to tube feeds on 6/18/17.  procal 69.7   --Rec'd 3 days of pip tazo, then started on amp-sulbactam until 6/19.  -- Respiratory cultures grew Enterobactor an P Aeruginosa was switched to fluoroquinolones 6/20   - ID following     Hypoxic Respiratory Failure 2/2 aspiration pneumonia:    --not extubatable due to tachypnea and low tidal volumes .  Cont qd weaning trials.  Repeat aspiration bodes poorly for future extubation.    --Need for trach was discussed with family but family not consenting to it at this time  --sputum is growing heavy growth yeast, and given his thrush     Small Bowel obstruction  -- 6/19 had  large emesis  -- Xray  showed SBO  -- feeding stopped  -- G tube to suction with minimal out  -- CT with transitional point on scan  -- surgery consulted and following   -- not a good candidate for surgery     Suspected GIB:    --Given high BUN/drifting down of hgb, episodic dark stools-has rec'd 2 u PRBCs, currently  Hemoglobin   Date Value Ref Range Status   06/22/2017 7.6 (L) 13.3 - 17.7 g/dL Final   ]    ESRD on HD:    --Nephrology consulted and following   HD am 6/16, 6/19 and  6/21    Prior debilitating strokes:  Not able to speak at baseline   --Home regimen is asa and simva - both on hold-asa for GIB, statin for increased transaminases-    Dementia;  -- presumed vascular in nature-resume donepezil when able (G-J in place)    Nutrition:    --Clearly can't use G-tube for goal tube feeds, resumed  them at trophic levels for gut protection 6/17 and now off for recurrent aspiration event with just trophic dosing and NOW new SBO  -- held tube feeds now with evidence of obstruction    Goal of care : comfort measures with no escalation of care.Plan to extubate on Tuesday , hospice consult on Monday       DVT Prophylaxis: Pneumatic Compression Devices     Code Status: DNR/DNI    Disposition: Limited ICU care         Interval History      Patient is seen and examined by me today and medical record reviewed.Overnight events noted and care discussed with nursing staff.  No change   Appears comfortable               Physical Exam   Temp: 99  F (37.2  C) Temp src: Axillary BP: 104/53   Heart Rate: 90 Resp: 20 SpO2: 100 % O2 Device: Mechanical Ventilator    Vitals:    06/22/17 0500 06/23/17 0430 06/24/17 0000   Weight: 57.3 kg (126 lb 5.2 oz) 57.8 kg (127 lb 6.8 oz) 61.5 kg (135 lb 9.3 oz)     Vital Signs with Ranges  Temp:  [98.2  F (36.8  C)-99.3  F (37.4  C)] 99  F (37.2  C)  Heart Rate:  [85-97] 90  Resp:  [14-20] 20  BP: ()/(44-66) 104/53  FiO2 (%):  [35 %] 35 %  SpO2:  [100 %] 100 %  I/O last 3 completed shifts:  In: 680.94 [I.V.:357.94]  Out: -       Sedated w/dexmedetomidine,intubated  Comfortable appearing  Atrophy of b ue and le with right arm contracture and e/o non-pitting edema of right arm, diffuse edema  Head ncat sclera normal pigmentation  lungs coarse but otherwise clear,  RRR no m/r/g    Abdomen  Distended, no BS  Skin warm and dry     Medications     IV fluid REPLACEMENT ONLY       norepinephrine 0.16 mcg/kg/min (06/24/17 0700)     NaCl 10 mL/hr at 06/24/17 0700     NaCl 10 mL/hr at 06/24/17 0700     IV fluid REPLACEMENT ONLY       IV fluid REPLACEMENT ONLY       dexmedetomidine 0.7 mcg/kg/hr (06/24/17 0700)       insulin aspart  1-12 Units Subcutaneous Q4H     micafungin  100 mg Intravenous Q24H     levofloxacin  500 mg Intravenous Q48H     pantoprazole  40 mg Intravenous BID     nystatin   500,000 Units Mouth/Throat 4x Daily     fosphenytoin (CEREBYX) intermittent infusion (maintenance)  150 mg PE Intravenous Q12H     ipratropium - albuterol 0.5 mg/2.5 mg/3 mL  3 mL Nebulization 4x daily     sodium chloride 0.9%  1,000 mL Intravenous Once     Data       Recent Labs  Lab 06/19/17  1500   PHV 7.41   PO2V 26   PCO2V 41   HCO3V 26       Recent Labs  Lab 06/22/17  0500 06/21/17  1620 06/21/17  0500   WBC 7.4 9.9 11.0   HGB 7.6* 7.7* 7.7*   HCT 23.6* 24.2* 23.9*   MCV 93 93 92    347 352       Recent Labs  Lab 06/23/17  0540 06/22/17  0500 06/21/17  0500    140 138   POTASSIUM 3.8 3.7 4.2   CHLORIDE 101 103 100   CO2 27 29 26   ANIONGAP 9 8 12   * 188* 147*   BUN 53* 34* 67*   CR 4.08* 3.02* 5.58*   GFRESTIMATED 15* 21* 10*   GFRESTBLACK 18* 25* 12*   DOUG 7.8* 7.6* 7.2*       Recent Labs  Lab 06/23/17  0540 06/22/17  0500 06/21/17  1620 06/21/17  1606   CULT No growth after 22 hours No growth after 2 days No growth after 3 days Cultured on the 2nd day of incubation: Gram positive cocci in clustersCritical Value/Significant Value, preliminary result only, called to and read back by Sally Davidson RN @ 2093 6/23/17 CS(Note)POSITIVE for STAPHYLOCOCCUS EPIDERMIDIS and NEGATIVE for the mecAgene (not resistant to methicillin) by [facility] nucleic acid test.The mecA gene was not detected. Final identification andantimicrobial susceptibility testing will be verified by standardmethods.Specimen tested with [facility] [employer] multiplex, gram-positive blood culturenucleic acid test for the following targets: Staph aureus, Staphepidermidis, Staph lugdunensis, other Staph species, Enterococcusfaecalis, Enterococcus faecium, Streptococcus species, S. agalactiae,S. anginosus grp., S. pneumoniae, S. pyogenes, Listeria sp., mecA(methicillin resistance) and Tristen/B (vancomycin resistance).Critical Value/Significant Value called to and read back by GEORGE Yusuf, @ 0102 06.24.2017 BL*     No results for input(s):  NTBNPI, NTBNP in the last 168 hours.    Recent Labs  Lab 06/22/17  0500 06/18/17  0519   * 68*   * 75*   ALKPHOS 113 114   BILITOTAL 0.7 0.4       Recent Labs  Lab 06/22/17  0500   INR 1.57*       Recent Labs  Lab 06/19/17  0755   LACT 1.3     No results for input(s): LIPASE in the last 168 hours.  No results for input(s): TROPONIN, TROPI, TROPR in the last 168 hours.    Invalid input(s): TROP, TROPONINIES  No results for input(s): COLOR, APPEARANCE, URINEGLC, URINEBILI, URINEKETONE, SG, UBLD, URINEPH, PROTEIN, UROBILINOGEN, NITRITE, LEUKEST, RBCU, WBCU in the last 168 hours.    No results found for this or any previous visit (from the past 24 hour(s)).  Critical care time:45min

## 2017-06-24 NOTE — PROGRESS NOTES
Respiratory Therapy    Patient remains intubated and on mechanical ventilator settings as follows:  Ventilation Mode: CMV/AC  FiO2 (%): 35 %  Rate Set (breaths/minute): 20 breaths/min  Tidal Volume Set (mL): 450 mL  PEEP (cm H2O): 5 cmH2O  Oxygen Concentration (%): 35 %  Resp: 19    Patient became tachypneic when he was chewing on his ETT. ETAD was changed and tube repositioned with relief. Suctioning small creamy, pale yellow thick secretions via ETT. Breath sounds are coarse/diminished. No wean will be attempted this morning d/t extubation to palliative Tuesday. Will continue to assess and monitor.    Mary Anne Taylor RRT  6/24/2017

## 2017-06-24 NOTE — PLAN OF CARE
Problem: Goal Outcome Summary  Goal: Goal Outcome Summary  Outcome: No Change  ICU End of Shift Summary.  For vital signs and complete assessments, please see documentation flowsheets.      Pertinent assessments: vent fio2 at 35%. G/J tube patent. Sacral wound, barrier cream applied. Precedex gtt. Levo gtt.  Major Shift Events: dilaudid given for comfort. Repositioned. Oral care. Increased secretions.  Plan (Upcoming Events): continue with current plan of care  Discharge/Transfer Needs: TBD  Bedside Shift Report Completed :   Bedside Safety Check Completed:

## 2017-06-24 NOTE — PROGRESS NOTES
ICU Attending Note    I have seen and examined Hesham Ramos, reviewed the patient's history, pertinent labs, vital signs, medications, physical exam, and radiographs.  The patient is critically ill by my examination and requires continued ICU monitoring and cares    Hesham Ramos is admitted to ICU with respiratory failure after aspiration event. Had a g tube placed in March.  Was observed aspirating.  Intubated on admission.  Lives in a group home.     Recent Events:  Comfort cares initiated with plans for terminal extubation on Tuesday but no dialysis or escalation of cares.    Exam:  Temp:  [98.2  F (36.8  C)-99.3  F (37.4  C)] 99  F (37.2  C)  Heart Rate:  [85-97] 90  Resp:  [14-20] 20  BP: ()/(44-66) 104/53  FiO2 (%):  [35 %] 35 %  SpO2:  [100 %] 100 %    Intake/Output Summary (Last 24 hours) at 06/19/17 1043  Last data filed at 06/19/17 1000   Gross per 24 hour   Intake          1533.57 ml   Output                0 ml   Net          1533.57 ml     Ventilation Mode: CMV/AC  FiO2 (%): 35 %  Rate Set (breaths/minute): 20 breaths/min  Tidal Volume Set (mL): 450 mL  PEEP (cm H2O): 5 cmH2O  Oxygen Concentration (%): 35 %  Resp: 20    lungs coarse  abd distended, typmanic  Ext warm    Results:  ABG   No lab results found in last 7 days.  CBC    Recent Labs  Lab 06/22/17  0500 06/21/17  1620 06/21/17  0500 06/20/17  0535   WBC 7.4 9.9 11.0 12.6*   HGB 7.6* 7.7* 7.7* 7.5*   HCT 23.6* 24.2* 23.9* 24.3*    347 352 312     BMP    Recent Labs  Lab 06/23/17  0540 06/22/17  0500 06/21/17  0500 06/20/17  0535    140 138 142   POTASSIUM 3.8 3.7 4.2 4.3   CHLORIDE 101 103 100 102   CO2 27 29 26 27   BUN 53* 34* 67* 55*   CR 4.08* 3.02* 5.58* 4.92*   * 188* 147* 205*     LFT    Recent Labs  Lab 06/22/17  0500 06/18/17  0519   * 68*   * 75*   ALKPHOS 113 114   BILITOTAL 0.7 0.4   ALBUMIN 1.6* 1.9*   INR 1.57*  --      PancreasNo lab results found in last 7 days.  INR  Lab  Results   Component Value Date    INR 1.38 06/17/2017    INR 1.76 06/14/2017    INR 1.23 06/11/2017    INR 1.03 04/23/2017       Current Issues:  1.  Neurology: baseline unresponsive, dexmedatomidine and pain meds for relief of pain and suffering.  2.  Respiratory failure:  Continue vent support. No weaning.  Planned terminal extubation on Tuesday  3.  End stage renal disease: dialysis held  4.  Pneumonia:off antibiotics  5.  Protein calorie malnutrition: stop tpn  6.  Small bowel obstruction: continued observation to see if resolves  5.  Fungemia: has dialysis line and groin line.  Will need to change.  Needs to complete dialysis run and then have line removed.  Will change central line tomorrow.  Cultures not positive until 4 days.      Evaluation and management time exclusive of procedures was 30 minutes critical care time including:  examination with the ICU team, discussion of the patient's condition with other physicians and members of the care team, reviewing all data related to the patient, and time utilizing the EMR for documentation of this patient's care.    Arpan Farooq MD  Acute Care Surgery/Critical Care

## 2017-06-25 NOTE — PROGRESS NOTES
ICU Attending Note    I have seen and examined Hesham Ramos, reviewed the patient's history, pertinent labs, vital signs, medications, physical exam, and radiographs.  The patient is critically ill by my examination and requires continued ICU monitoring and cares    Hesham Ramos is admitted to ICU with respiratory failure after aspiration event. Had a g tube placed in March.  Was observed aspirating.  Intubated on admission.  Lives in a group home.     Recent Events:  No new changes.  Seems comfortable.      Exam:  Temp:  [99  F (37.2  C)-99.5  F (37.5  C)] 99  F (37.2  C)  Heart Rate:  [77-94] 77  Resp:  [19-20] 20  BP: (108-141)/(31-70) 133/31  FiO2 (%):  [35 %] 35 %  SpO2:  [94 %-100 %] 100 %    Intake/Output Summary (Last 24 hours) at 06/19/17 1043  Last data filed at 06/19/17 1000   Gross per 24 hour   Intake          1533.57 ml   Output                0 ml   Net          1533.57 ml     Ventilation Mode: CMV/AC  FiO2 (%): 35 %  Rate Set (breaths/minute): 20 breaths/min  Tidal Volume Set (mL): 450 mL  PEEP (cm H2O): 5 cmH2O  Oxygen Concentration (%): 35 %  Resp: 20    lungs coarse  abd distended, typmanic  Ext warm    Results:  ABG   No lab results found in last 7 days.  CBC    Recent Labs  Lab 06/22/17  0500 06/21/17  1620 06/21/17  0500 06/20/17  0535   WBC 7.4 9.9 11.0 12.6*   HGB 7.6* 7.7* 7.7* 7.5*   HCT 23.6* 24.2* 23.9* 24.3*    347 352 312     BMP    Recent Labs  Lab 06/23/17  0540 06/22/17  0500 06/21/17  0500 06/20/17  0535    140 138 142   POTASSIUM 3.8 3.7 4.2 4.3   CHLORIDE 101 103 100 102   CO2 27 29 26 27   BUN 53* 34* 67* 55*   CR 4.08* 3.02* 5.58* 4.92*   * 188* 147* 205*     LFT    Recent Labs  Lab 06/22/17  0500   *   *   ALKPHOS 113   BILITOTAL 0.7   ALBUMIN 1.6*   INR 1.57*     PancreasNo lab results found in last 7 days.  INR  Lab Results   Component Value Date    INR 1.38 06/17/2017    INR 1.76 06/14/2017    INR 1.23 06/11/2017    INR 1.03  04/23/2017       Current Issues:  1.  Neurology: baseline unresponsive, dexmedatomidine and pain meds for relief of pain and suffering.  2.  Respiratory failure:  Continue vent support. No weaning.  Planned terminal extubation on Tuesday  3.  End stage renal disease: dialysis held  4.  Pneumonia:off antibiotics  5.  Protein calorie malnutrition: off tpn  6.  Small bowel obstruction: no change, comfort care planned    Arpan Farooq MD  Acute Care Surgery/Critical Care

## 2017-06-25 NOTE — PROGRESS NOTES
RT- patient remains intubated with ETT and bite block secure and patent. Current vent settings as charted:    Ventilation Mode: CMV/AC  FiO2 (%): 35 %  Rate Set (breaths/minute): 20 breaths/min  Tidal Volume Set (mL): 450 mL  PEEP (cm H2O): 5 cmH2O  Oxygen Concentration (%): 35%  Resp: 20    Breath sounds coarse, diminished at bases. ETT suctioned for small to moderate, thick, white/cloudy secretions.    Patient getting Duoneb in line as ordered. BVM at bedside.    Rashmi Sosa, RT  6/25/2017 1:58 PM

## 2017-06-25 NOTE — PROGRESS NOTES
[facility]    Hospitalist Progress Note  Name: Hesham Ramos    MRN: [id number]    Provider: Nomi Kumar MD    Date of Service: 06/25/2017    Assessment & Plan   Summary of Stay: Hesham Ramos is a 67 year old male who was admitted on 6/11/2017. Patient with a history of prior debilitating strokes, sz d/o, htn/diastolic CHF, ESRD on HD, and significantly debilitated who lives in a group home.  He had a feeding tube placed approximately 3 months ago for dysphagia, and has continued to decline since that time admitted on 6/11/2017 with hypoxia/fever consistent with aspiration pna complicated by sepsis (leukocytosis/lactic acidosis/fever) which progressed to septic shock necessitating norepi/vasopressin support and intubation on 6/12 for respiratory failure. He was been weaned  off  From pressors on 6/15/17 But again was restarted on levophed 6/19 as he was hypotensive during dialysis. He remains intubated but with minimal ventilatory support undergoing daily weans that are limited by tachypnea/low tidal volumes. In his [facility] hospitalization he was also notable for profound hyperglycemia nececsitating insulin gtt with hgb A1c wnl at 5.1.  During this admission he had  BUN upto 150s, despite HD, and hgb has been drifting down, and dark stools have been noted, all suspicious for slow GIB.  He has required 2 prbc units.  His TFs had been resumed to challenge the G-tube, he had been transitioned up to goal rate, noted to have 150cc residual and before it could be stopped had large emesis occurring 6/16/17, subsequently developed tachycardia and fever consistent with another aspiration event, with e/o sepsis with hypotension/fever/worsening leukocytosis. TF held for 24 hours and resumed 6/17 at trophic rate, and on 6/19 had another aspiration event with evidence of bowel obstruction. Tube feeds were discontinued. 6/20 CT abd  Showed transition point and surgery was consulted. J tube is  suctioned and surgery will follow. Overnight patient had increased need for pressors. Palliative team and ICU team have been discussing goals of therapy with family especially that family would not like a trach at this time. Family meeting for 1030 am 6/22.    Septic Shock 2/2 aspiration pna, fungemia   -- requiring pressor support   -- continue current med for now       Aspiration pna with TFs:    --changed Gtube to J tube on 6/19 to decrease aspiration from re-occurring   -- Recurrent aspiration pneumonia sec to tube feeds on 6/18/17.  procal 69.7   --Rec'd 3 days of pip tazo, then started on amp-sulbactam until 6/19.  -- Respiratory cultures grew Enterobactor an P Aeruginosa was switched to fluoroquinolones 6/20   - ID following , Plan to continue abx     Hypoxic Respiratory Failure 2/2 aspiration pneumonia:    --not extubatable due to tachypnea and low tidal volumes .  -- continue vent support until Tuesday     Small Bowel obstruction  -- 6/19 had  large emesis  -- Xray  showed SBO  -- feeding stopped  -- G tube to suction with minimal out  -- CT with transitional point on scan  -- surgery consulted and following   -- not a good candidate for surgery     Suspected GIB:    --no active bleeding     ESRD on HD:    --Nephrology consulted and following   HD am 6/16, 6/19 and  6/21  -- no dialysis ,comfort measure only     Prior debilitating strokes:  Not able to speak at baseline   --Home regimen is asa and simva - both on hold-asa for GIB, statin for increased transaminases-    Dementia;  -- presumed vascular in nature-resume donepezil when able (G-J in place)    Nutrition:    --Clearly can't use G-tube for goal tube feeds, resumed them at trophic levels for gut protection 6/17 and now off for recurrent aspiration event with just trophic dosing and NOW new SBO  -- held tube feeds now with evidence of obstruction      Goal of care : Comfort measures with no escalation of care.Plan to extubate on Tuesday , hospice  consult for Monday.Family questions and concerns addressed.  No lab draws      DVT Prophylaxis: Pneumatic Compression Devices     Code Status: DNR/DNI    Disposition: Limited ICU care .DNR , plan to do terminal extubation on Tuesday to allow  [place].        Interval History      Patient is seen and examined by me today and medical record reviewed.Overnight events noted and care discussed with nursing staff.  No change   Appears comfortable               Physical Exam   Temp: 99  F (37.2  C) Temp src: Axillary BP: (!) 133/31   Heart Rate: 77 Resp: 20 SpO2: 100 % O2 Device: Mechanical Ventilator    Vitals:    06/23/17 0430 06/24/17 0000 06/25/17 0000   Weight: 57.8 kg (127 lb 6.8 oz) 61.5 kg (135 lb 9.3 oz) 59.5 kg (131 lb 2.8 oz)     Vital Signs with Ranges  Temp:  [99  F (37.2  C)-99.5  F (37.5  C)] 99  F (37.2  C)  Heart Rate:  [77-94] 77  Resp:  [19-20] 20  BP: (115-141)/(31-70) 133/31  FiO2 (%):  [35 %] 35 %  SpO2:  [94 %-100 %] 100 %  I/O last 3 completed shifts:  In: 1835.06 [I.V.:1535.06; IV Piggyback:300]  Out: 1000 [Emesis/NG output:1000]      Sedated,intubated  Comfortable appearing  Atrophy of b ue and le with right arm contracture and e/o non-pitting edema of right arm, diffuse edema  Head ncat sclera normal pigmentation  lungs coarse but otherwise clear,  RRR no m/r/g    Abdomen  Distended, no BS  Skin warm and dry     Medications     IV fluid REPLACEMENT ONLY       norepinephrine 0.16 mcg/kg/min (06/25/17 0800)     NaCl 10 mL/hr at 06/25/17 0700     NaCl 10 mL/hr at 06/25/17 0700     IV fluid REPLACEMENT ONLY       IV fluid REPLACEMENT ONLY       dexmedetomidine 0.7 mcg/kg/hr (06/25/17 0800)       insulin aspart  1-12 Units Subcutaneous Q4H     micafungin  100 mg Intravenous Q24H     levofloxacin  500 mg Intravenous Q48H     pantoprazole  40 mg Intravenous BID     nystatin  500,000 Units Mouth/Throat 4x Daily     fosphenytoin (CEREBYX) intermittent infusion (maintenance)  150 mg  PE Intravenous Q12H     ipratropium - albuterol 0.5 mg/2.5 mg/3 mL  3 mL Nebulization 4x daily     sodium chloride 0.9%  1,000 mL Intravenous Once     Data       Recent Labs  Lab 06/19/17  1500   PHV 7.41   PO2V 26   PCO2V 41   HCO3V 26       Recent Labs  Lab 06/22/17  0500 06/21/17  1620 06/21/17  0500   WBC 7.4 9.9 11.0   HGB 7.6* 7.7* 7.7*   HCT 23.6* 24.2* 23.9*   MCV 93 93 92    347 352       Recent Labs  Lab 06/23/17  0540 06/22/17  0500 06/21/17  0500    140 138   POTASSIUM 3.8 3.7 4.2   CHLORIDE 101 103 100   CO2 27 29 26   ANIONGAP 9 8 12   * 188* 147*   BUN 53* 34* 67*   CR 4.08* 3.02* 5.58*   GFRESTIMATED 15* 21* 10*   GFRESTBLACK 18* 25* 12*   DOUG 7.8* 7.6* 7.2*       Recent Labs  Lab 06/23/17  0540 06/22/17  0500 06/21/17  1620 06/21/17  1606   CULT No growth after 2 days No growth after 3 days No growth after 4 days Cultured on the 2nd day of incubation: Staphylococcus epidermidisCritical Value/Significant Value, preliminary result only, called to and read back by Sally Davidson RN @ 4664 6/23/17 CS(Note)POSITIVE for STAPHYLOCOCCUS EPIDERMIDIS and NEGATIVE for the mecAgene (not resistant to methicillin) by Warp 9 nucleic acid test.The mecA gene was not detected. Final identification andantimicrobial susceptibility testing will be verified by standardmethods.Specimen tested with ARDACOigene multiplex, gram-positive blood culturenucleic acid test for the following targets: Staph aureus, Staphepidermidis, Staph lugdunensis, other Staph species, Enterococcusfaecalis, Enterococcus faecium, Streptococcus species, S. agalactiae,S. anginosus grp., S. pneumoniae, S. pyogenes, Listeria sp., mecA(methicillin resistance) and Tristen/B (vancomycin resistance).Critical Value/Significant Value called to and read back by GEORGE Yusuf, @ 0107 06.24.2017 BL*     No results for input(s): NTBNPI, NTBNP in the last 168 hours.    Recent Labs  Lab 06/22/17  0500   *   *   ALKPHOS 113    BILITOTAL 0.7       Recent Labs  Lab 06/22/17  0500   INR 1.57*       Recent Labs  Lab 06/19/17  0755   LACT 1.3     No results for input(s): LIPASE in the last 168 hours.  No results for input(s): TROPONIN, TROPI, TROPR in the last 168 hours.    Invalid input(s): TROP, TROPONINIES  No results for input(s): COLOR, APPEARANCE, URINEGLC, URINEBILI, URINEKETONE, SG, UBLD, URINEPH, PROTEIN, UROBILINOGEN, NITRITE, LEUKEST, RBCU, WBCU in the last 168 hours.    No results found for this or any previous visit (from the past 24 hour(s)).  Critical care time:45min

## 2017-06-25 NOTE — PROGRESS NOTES
Shift Summary VENT:  No changes made to the vent.  Patient VSS.  BS remain coarse t/o- sxn small-mod white thick secretions- rhonchi remain.  Pt opens eyes with sxn.  ETT remains in place with bite block.  BMV at bedside.  Will continue to monitor respiratory status for changes.

## 2017-06-26 NOTE — PROGRESS NOTES
"[facility]  Palliative Care Progress Note  Text Page     Many family members including the patient's co-guardians; his niece and nephew Hemant and Mane Ramos. Roycerufus explained \"the family is ready to have to take the tube taken out of his mouth\". Discussed the plan to liberate from the ventilator and focus on comfort. The family agrees with plan and moved to the ICU conference room during the extubation process. Appreciate assistance of Respiratory Therapist Jocelyne Healy PT and bedside nurse Antonieta Maldonado RN. The patient did become tachypnic shortly after extubation and was given IV Dilaudid for comfort.    TOTAL TIME: 11:00 until 11:35 AM in discussion with family and assessment of patient.    Elaina Moeller MS, RN, CNS, APRN, ACHPN, FAACVPR  Pain and Palliative Care  Pager [phone]  Office [phone]         "

## 2017-06-26 NOTE — PROGRESS NOTES
Patient was seen and examined by me today.I discussed care plan with and addressed questions and concerns of patient family and staff.    Hesham Ramos is a 67 year old black male with a significant past medical history of renal cancer history , chronic kidney disease, congestive heart failure and stroke who presents with resp distress and has been in ICU since 6/11 on vent and pressor support.Patient family decided to focus on comfort care and he was extubated on 06/26/2017    Family elected for comfort measures only and [facility] SW following patient to assist with disposition planning.    Currently patient appears comfortable post extubation  and responding well to comfort medications     Plan to continue current care and plan disposition.  -- will transfer to floor   -- hospice consult tomorrow if needed

## 2017-06-26 NOTE — PLAN OF CARE
Problem: Goal Outcome Summary  Goal: Goal Outcome Summary  Outcome: Declining  ICU End of Shift Summary.  For vital signs and complete assessments, please see documentation flowsheets.      Pertinent assessments: Patient more alert this shift,   precedex at max 0.7 diluadid 1mg given for pain.vent at 35% fio2. Repositioned. G tubes intact. Central line patent. Patient is anuric. Dialysis shunt in place. Increased oral secretions, suction often.   Major Shift Events: N/A  Plan (Upcoming Events): extubation today  Discharge/Transfer Needs:      Bedside Shift Report Completed :   Bedside Safety Check Completed:

## 2017-06-26 NOTE — PROGRESS NOTES
"[facility]  Palliative Care Progress Note  Text Page     Patient's nephew/co-guardian Carlos Ramos called our office. I returned his call at [phone]. Hesham's family will be in at 11 AM \"to take the tube out\". I have notified bedside nurse Antonieta Maldonado RN and Hospitalist Nomi Kumar MD regarding the families plan.    Assessment & Plan       1.  Patient unresponsive - Patient does not demonstrate medical capacity. Legal co-guardians for healthcare decisions Hemant Ramos (niece) and Carlos Ramos (nephew) are assisting in plan of care.      2. Copious secretions - Robinul IV now and every 4 hours for comfort.       3.  Dyspnea - Plan to extubate later today for comfort plan.       4. Goal of Care: DNR with no escalation of care and plan for extubation later today.      Elaina Moeller MS, RN, CNS, APRN, ACHPN, FAACVPR  Pain and Palliative Care  Pager [phone]  Office [phone]    Time Spent on this Encounter   I spent  15 minutes in assessment of the patient and discussion with the patient and family. Another 10 minutes in review of chart, documentation and discussion with the health care team.    Interval History   Chart reviewed     Review of Systems       Unable to complete as patient intubated and minimally responsive    Physical Exam   Temp:  [99.7  F (37.6  C)] 99.7  F (37.6  C)  Heart Rate:  [80-93] 80  Resp:  [13-20] 20  BP: (102-129)/(25-56) 124/56  FiO2 (%):  [35 %] 35 %  SpO2:  [93 %-100 %] 93 %  130 lbs 15.25 oz  GEN: Orally intubated black male who appears much older than documented age of 67 years.   RESP: Symmetric chest rise on inhalation noted.  Normal respiratory effort.    Medications     IV fluid REPLACEMENT ONLY       norepinephrine 0.16 mcg/kg/min (06/26/17 0600)     NaCl 10 mL/hr at 06/25/17 0700     NaCl 10 mL/hr at 06/25/17 0700     IV fluid REPLACEMENT ONLY       IV fluid REPLACEMENT ONLY       dexmedetomidine 0.7 mcg/kg/hr (06/26/17 0600) "       glycopyrrolate  0.3 mg Intravenous Q4H     insulin aspart  1-12 Units Subcutaneous Q4H     micafungin  100 mg Intravenous Q24H     pantoprazole  40 mg Intravenous BID     nystatin  500,000 Units Mouth/Throat 4x Daily     fosphenytoin (CEREBYX) intermittent infusion (maintenance)  150 mg PE Intravenous Q12H     ipratropium - albuterol 0.5 mg/2.5 mg/3 mL  3 mL Nebulization 4x daily     sodium chloride 0.9%  1,000 mL Intravenous Once       Data   No results found for this or any previous visit (from the past 24 hour(s)).

## 2017-06-26 NOTE — PROGRESS NOTES
Shift Summary:  Vent    No changes made on vent today.  Vital Signs Stable.  BS coarse t/o without much improvement following suctioning.  Duonebs given QID- no improvement post neb.  Will continue to monitor pt for changes in respiratory status.

## 2017-06-26 NOTE — PLAN OF CARE
Problem: Goal Outcome Summary  Goal: Goal Outcome Summary  Outcome: No Change  ICU End of Shift Summary.  For vital signs and complete assessments, please see documentation flowsheets.   Pt placed on comfort cares this am and extubated at 11:20 am. Levo off at 11:17am. Pt placed on nasal cannula at 2L, now RA. Sats 100 on 2L and 95% on 1L. GJ to continue to suction per telephone order from Dr Kumar. Telephone order to dc femoral line.      Pertinent assessments: BP at 1500 stable HR stable. Resps 20's.   Major Shift Events: Extubation  Plan (Upcoming Events): Need to revisit plan. Comfort cares for now  Discharge/Transfer Needs: ?     Bedside Shift Report Completed :   Bedside Safety Check Completed:

## 2017-06-26 NOTE — PROGRESS NOTES
Respiratory Therapy     Patient remains intubated and on mechanical ventilator settings as follows:  Ventilation Mode: CMV/AC  FiO2 (%): 35 %  Rate Set (breaths/minute): 20 breaths/min  Tidal Volume Set (mL): 450 mL  PEEP (cm H2O): 5 cmH2O  Oxygen Concentration (%): 35 %  Resp: 19     Suctioning out small to moderate amounts of thick, white secretions via ETT, patient's breath sounds are coarse bilaterally.Receiving Duoneb QID in line with the ventilator. Will continue to assess and monitor.     Mary Anne Taylor RRT

## 2017-06-27 NOTE — PROGRESS NOTES
[facility]  Palliative Care Progress Note  Text Page    Patient resting comfortably at time of assessment. No family at bedside. I would be happy to discuss Hesham's care if they should arrive later today, please call or page.      Assessment & Plan       1.  Tachypnea and abdominal pain - Needed 4.5 mg IV Dilaudid during the past day to maintain comfort.  Patient no longer has and IV site, so would use Roxicodone as needed for comfort.      2.  Death rattle - IV Robinul discontinued as patient no longer has IV access. Use Atropine sublingual to keep secretions at bay.      3. Patient does not respond to commands. Patient does not demonstrate medical capacity. Legal co-guardians for healthcare decisions Hemant Richard (niece) and Carlos Richard (nephew) are assisting in plan of care.      4. Goal of Care: DNR/DNI - Comfort plan       Elaina Moeller MS, RN, CNS, APRN, ACHPN, FAACVPR  Pain and Palliative Care  Pager [phone]  Office [phone]    Time Spent on this Encounter   I spent  15 minutes in assessment of the patient. Another 20 minutes in review of chart, documentation and discussion with the health care team.    Interval History   Chart reviewed    Review of Systems     Non-verbal and does not respond to commands    Physical Exam   Temp:  [98.2  F (36.8  C)] 98.2  F (36.8  C)  Heart Rate:  [76-92] 80  Resp:  [19-35] 28  BP: (103-135)/(25-46) 135/45  FiO2 (%):  [35 %] 35 %  SpO2:  [31 %-100 %] 97 %  130 lbs 15.25 oz  GEN:  Alert, looking blankly about, but does not focus on person speaking and does not follow commands.   HEENT:  Normocephalic/atraumatic, no scleral icterus, no nasal discharge, mouth dry.  CV:  RRR, S1, S2; no murmurs or other irregularities noted.  +3 DP/PT pulses bilaterally; no edema BLE.  RESP:  Coarse bilateral breath sounds which are diminished at bases. Symmetric chest rise on inhalation noted.  Normal respiratory effort.  ABD:  Rounded, soft, wincing with  palpation/non-distended.  Absent BS  EXT:  Right hand is edematous and contracted.   SKIN:  Warm and dry to touch, no exanthems noted in the visualized areas.    PAIN BEHAVIOR: Looks comfortable.    Medications        glycopyrrolate  0.3 mg Intravenous Q4H     sodium chloride 0.9%  1,000 mL Intravenous Once       Data   No results found for this or any previous visit (from the past 24 hour(s)).

## 2017-06-27 NOTE — PROGRESS NOTES
[facility]  Hospitalist Progress Note    Hesham Todd MD 06/27/2017  Text Page      Reason for Stay (Diagnosis): aspiration pneumonia         Assessment and Plan:      Summary of Stay: Summary of Stay: Hesham Ramos is a 67 year old male who was admitted on 6/11/2017. Patient with a history of prior debilitating strokes, sz d/o, htn/diastolic CHF, ESRD on HD, and significantly debilitated who lives in a group home.  He had a feeding tube placed approximately 3 months ago for dysphagia, and has continued to decline since that time.  He was admitted on 6/11/2017 with hypoxia/fever consistent with aspiration pna complicated by sepsis (leukocytosis/lactic acidosis/fever) which progressed to septic shock necessitating norepi/vasopressin support and intubation on 6/12 for respiratory failure. He was been weaned from pressors on 6/15/17 But again was restarted on levophed 6/19 as he was hypotensive during dialysis. He remained intubated but with minimal ventilatory support undergoing daily weans that are limited by tachypnea/low tidal volumes.   During this admission he had  BUN upto 150s, despite HD, and hgb has been drifting down, and dark stools have been noted, all suspicious for slow GIB.  He has required 2 prbc units.  His TFs had been resumed to challenge the G-tube, he had been transitioned up to goal rate, noted to have 150cc residual and before it had be stopped had large emesis occurring 6/16/17, subsequently developed tachycardia and fever consistent with another aspiration event, with e/o sepsis with hypotension/fever/worsening leukocytosis. TF held for 24 hours and resumed 6/17 at trophic rate, and on 6/19 had another aspiration event with evidence of bowel obstruction. Tube feeds were discontinued. 6/20 CT abd  Showed transition point and surgery was consulted. J tube is suctioned and surgery will follow. Overnight patient had increased need for pressors. Palliative team and  "ICU team had been discussing goals of therapy with family especially that family would not like a trach at this time. They decided to pursue a comfort care approach and he was extubated on 6/26.  He has maintained his respirations.     Septic Shock 2/2 aspiration pna, fungemia ,Aspiration pna with TFs:    -  Stopped abx.  Comfort cares.     Hypoxic Respiratory Failure 2/2 aspiration pneumonia:    --extubated.  Comfort cares.    Small Bowel obstruction  - comfort cares  -- not a good candidate for surgery      Suspected GIB:    --no active bleeding      ESRD on HD:    -- no dialysis ,comfort measure only      Prior debilitating strokes:  Not able to speak at baseline      Dementia;  -- presumed vascular in nature-resume donepezil when able (G-J in place)     Nutrition:    -  Comfort cares  -- held tube feeds now with evidence of obstruction        Goal of care : Comfort measures.   Plan for hospice consult tomorrow with likely return to his group home on Hosp[facility].  Palliative care and SW assisting.             Code Status: DNR/DNI        Interval History (Subjective):      Non verbal.  Extubated yesterday and maintaining respirations.                    Physical Exam:      Last Vital Signs:  /45  Pulse 120  Temp 98.2  F (36.8  C) (Axillary)  Resp 28  Ht 1.651 m (5' 5\")  Wt 59.4 kg (130 lb 15.3 oz)  SpO2 97%  BMI 21.79 kg/m2      Vital signs reviewed  General:  Alert, non verbal  CV: regular rate and rhythm, no murmurs or rubs  Lungs:  Clear to ascultation bilaterally, normal respiratory effort  Abdomen:  Soft,  nondistended, no masses, normal bowel sounds, G tube in place  Extremities:  trace edema  Neuro: unable  Psychiatric: unable           Medications:      All current medications were reviewed with changes reflected in problem list.         Data:      All new lab and imaging data was reviewed.     "

## 2017-06-27 NOTE — PROGRESS NOTES
[facility]  Palliative Care Progress Note  Text Page     Thanks to bedside nurse Raven Rincon, RN for paging me to let me know that Hesham's family was at bedside asking questions. The patient's nephew (from [place]) was asking about nutrition. He indicated that the family had a discussion with Mane and agree with the plan for comfort and hospice, yet he was concerned about Hesham's comfort. We discussed the problems with tube feeding due to recurrent SBO and problems with IV hydration and dialysis. He plans to leave tomorrow afternoon and wanted to make certain everything is done for comfort. Hesham does look somewhat agitated this afternoon, so I have requested to try a dose of Lorazepam for comfort. Hesham's nephew called his brother Mane to discuss the above. Both nephews plan to be here tomorrow morning for the 9 AM meeting with the group home and hospice.    TOTAL TIME: 20 minutes in assessment of patient and discussion with family.    Elaina Moeller MS, RN, CNS, APRN, ACHPN, FAACVPR  Pain and Palliative Care  Pager [phone]  Office [phone]

## 2017-06-27 NOTE — PLAN OF CARE
Problem: Goal Outcome Summary  Goal: Goal Outcome Summary  Patient is alert this shift. Opens eyes spontaneously. Appears comfortable. O2 sats 99% on 2LPM. G tube to gravity with minimal output. J tube to LIS with 250 of liquid stool appearing output. Pressure ulcer to coccyx dressed per orders. Contracted. No IV access. Comfort cared. DNR/DNI

## 2017-06-27 NOTE — PLAN OF CARE
"Problem: Goal Outcome Summary  Goal: Goal Outcome Summary  VS /45  Pulse 120  Temp 98.2  F (36.8  C) (Axillary)  Resp 28  Ht 1.651 m (5' 5\")  Wt 59.4 kg (130 lb 15.3 oz)  SpO2 97%  BMI 21.79 kg/m2  Lung sounds Coarse  O2 NC @ 3L   NPO  IVF No IV access   Incontinent of bowels x2  CMS Pressure ulcer to coccyx, dressed per orders.  Drains GJ tube, J to intermittent suction, G tube to gravity.   Activity bedrest, lift, assist x2  Pain No objective signs of pain.  Patient/family centered care Cares explained. Repositioned.   Discharge plan TBD      "

## 2017-06-27 NOTE — PROGRESS NOTES
[facility]  Palliative Care Progress Note  Text Page     Discussion at bedside with the patient's nephew/co-guardian Carlos Ramos. He acknowledged that Hesham looks comfortable and explained that his family had asked about restarting dialysis. We discussed options for care at length and Carlos explained that Hesham would want to continue with a plan of comfort. We discussed the option of using the hospice benefit. Carlos will discuss this with his family and asked if Hesham could go back to his group home. I explained that our , Corinne White, LSW could look into this. Thanks Corinne for calling the group home to see if this is a possibility.    Total Time: 20 minutes in assessment of patient and discussion with co-guardian.    Elaina Moeller MS, RN, CNS, APRN, ACHPN, FAACVPR  Pain and Palliative Care  Pager [phone]  Office [phone]

## 2017-06-27 NOTE — CONSULTS
Care Transition Initial Assessment -   Reason For Consult: discharge planning, end of life/hospice  Met with. Left VM message for both Hemant and Mane. Spoke to RN Patience at [facility] [phone]  Active Problems:    Respiratory distress         DATA  Lives With: facility resident  Living Arrangements: group home  Description of Support System: Supportive, Involved  Who is your support system?: Facility resident(s)/Staff  Support Assessment: Adequate family and caregiver support, Adequate social supports. Spoke with RN at [facility]. They were provided full support for pt prior to admission.   Identified issues/concerns regarding health management: PT is now comfort care.  staff would like to attend meeting for dc planning. They are aware that Hospice is the recommendation for pt at this time.        Resources List: Hospice     Quality Of Family Relationships: supportive  Transportation Available:  (stretcher)      ASSESSMENT  Pt has 2 co decision makers for HCD. Left Vm message for both pre conversation with Pal-Care team.  PT is able to return to his  setting once Hospice has signed onto care.  Jessica RN from [facility] would also like to attend to coordinate needs for for pt.   Called [facility] and set up meeting for tomorrow at 9:30    [facility] has RN on site m-f from 8am-12:00 than from 4pm-8am  Weekends, evening and 12-4 pt is care for by PCA support when RN staff not there  PLAN  Financial costs for the patient includes Pt has medicare. For Hospice support.  .  Patient given options and choices for discharge Family would like pt to return to his current  setting  .  Discharge Planner   Discharge Plans in progress: Hospice meeting set up for Wed @ 9:30  Barriers to discharge plan: medical stability for dc  Follow up plan: Waiting for call back from both Decision makers about time        Entered by: Corinne C. White 06/27/2017 11:24 AM       CM: Ramona PABON [phone] from [facility] called  about an update on pt and his needs.

## 2017-06-27 NOTE — PROGRESS NOTES
[facility] HEALTH SERVICES  SPIRITUAL ASSESSMENT Progress Note  [facility] 523    Visited pt per staff's consult requesting additional spiritual support for family.    On arriving, family had left and none were expected until 8:30am on 6/28.  At the suggestion of his nurse and based on previous  interaction, prayed with Hesham.  Though his eyes were open and he moved around gently during our time together, he did not give any clear responses.    Will notify the palliative  of our visit.      Taqueria Tobias M.Div.  Staff   Pager [phone]

## 2017-06-28 NOTE — PROGRESS NOTES
[facility]  Palliative Care Progress Note  Text Page     Appreciate notification from  Corinne White, LSW regarding the families plan to have Hesham transferred to LTC at [facility]. I did call the patient's niece and co-guardian Hemant Ramos at [phone] regarding the results of today's laboratory values. Unfortunately the Potassium, Carbon Dioxide, Urea Nitrogen, Creatine and GFR are still pending.     Elaina Moeller MS, RN, CNS, APRN, ACHPN, FAACVPR  Pain and Palliative Care  Pager [phone]  Office [phone]

## 2017-06-28 NOTE — PLAN OF CARE
Problem: Goal Outcome Summary  Goal: Goal Outcome Summary  Outcome: No Change  Patient resting comfortably. Repositioned as tolerated. Loose BM this shift. Wound care completed. Oxy given for pain.

## 2017-06-28 NOTE — PROGRESS NOTES
CM: Met with pt's family. They agree that NH placement at this time is the better option for pt. They are aware that  setting made the decision last night to not accept pt back.  Family have been offered TCU bed at 2 of the three locations. Once facility is able to offer private room for pt,. The other two are shared. Family will leave after Hospice meeting has finished and tour   P:     06/28/17 1000   [facility] [phone], Fax: [phone]   [facility] [phone], Fax: [phone]   Will need stretcher transport     CM: spoke with Ramona Rascon at [place]. She is aware of the plan. Spoke with MD that family toured and liked facility. Marie wanted [facility] to set up transport set up for 1530

## 2017-06-28 NOTE — PLAN OF CARE
Problem: Goal Outcome Summary  Goal: Goal Outcome Summary  Outcome: No Change  Alert, nonverbal, continues on comfort cares.  J and G tube to gravity.  No bowel sounds auscultated.  Incontinent liquid stool x2, small amounts.  Coccyx wound cares done x2.  LS diminished.  SL Ativan given x1 for anxiety.  No nonverbal signs of pain/discomfort noted.  Turned and repositioned prn.  O2 3L/NC for comfort.   Continue with plan of care.

## 2017-06-28 NOTE — PROGRESS NOTES
SPIRITUAL HEALTH SERVICES Progress Note  FR Med/Surg 5th floor    Pt currently on comfort cares and will be meeting with hospice this morning. Visited pt this morning, his eyes are open but not focusing and does not acknowledge my presence. Pt appears comfortable. Family has shared in the past that pt is Worship and appreciates prayer. Prayed with pt and read 23rd Psalm. Will attempt to connect with family later today and assess needs. I and the other chaplains remain available per pt/family needs/request.     SHAD Godwin.  Staff    Pager #[phone]

## 2017-06-28 NOTE — PROGRESS NOTES
CM:  staff made decision last night that they are NOT able to accept pt back to their home  P: Hospice meeting set for 9:30 today. Will send referral out for LTC bed in NH setting with the support of Hospice.

## 2017-06-28 NOTE — CONSULTS
Writer, Nikia ORTIZ with [facility] and Elaina Moeller NP met with patients family in a private conference room to discuss hospice philosophy, hospice program and benefit coverage.  Patient did not attend due to decline in condition.  Patient hospitalized on 6/11/17 with aspiration pneumonia, septic shock 2/2 asp pneumonia and hypoxic respiratory failure 2/2 asp pneumonia.  Patient was intubated x 2 weeks.  He was extubated on 6/26/17.  Patient has ESRD with his last dialysis being one week ago with no further ones to follow.  Patient is no longer receiving TF due to a small bowel obstruction.  He has a G-J tube in place which are currently attached to straight drainage.  Plan is to discharge later today after family have toured facilities that have openings for patient.  Reviewed outcome of meeting with Felipa ORTIZ who will notify hospice when family have made decision of where patient will move to upon discharge from the hospital.  Elaina Moeller NP will order hospice comfort medications to be fillled and sent home with patient upon discharge.  Informed family that hospice admission team will meet patient tomorrow morning at 0930 to complete admission wherever he discharges to.  Please call Queenie Areavlo RN Care Navigator at [phone] with any questions or should discharge plans change.

## 2017-06-28 NOTE — PROGRESS NOTES
[facility]  Palliative Care Progress Note  Text Page    Patient looks comfortable resting in bed. No family at bedside at this time, but I will me mindful to stop by for the meeting with hospice at 9 AM.       Assessment & Plan      1.  Tachypnea and abdominal pain - 5 mg sublingual Roxicodone used during the past day to maintain comfort.         2.  Death rattle -Continue Atropine sublingual to keep secretions at bay.       3. Patient does not respond to command - Patient does not demonstrate medical capacity. Legal co-guardians for healthcare decisions Hemant Ramos (niece) and Carlos Ramos (nephew) are assisting in plan of care.      4. Goal of Care: DNR/DNI - Comfort plan. Hospice meeting with group home care providers and family at 9 AM today. Patient does look stable for transfer today.       Elaina Moeller MS, RN, CNS, APRN, ACHPN, FAACVPR  Pain and Palliative Care  Pager [phone]  Office [phone]    Time Spent on this Encounter   I spent  15 minutes in assessment of the patient. Another 10 minutes in review of chart, documentation and discussion with the health care team.    Interval History   Chart reviewed and discussed with night shift nurses Deandre Paz, GEORGE and Radha Slade LPN    Review of Systems     Patient is not verbal        Physical Exam   Resp:  [18-20] 18  130 lbs 15.25 oz  GEN:  Alert, looking aimlessly around the room and non-verbal, appears comfortable.  HEENT:  Normocephalic/atraumatic, no scleral icterus, no nasal discharge, mouth dry.  CV:  RRR at 80, S1, S2; rub.  +3 DP/PT pulses bilaterally; right hand/arm edema.  RESP:  Tachypnic at 28. Bilateral coarse lung sounds. Symmetric chest rise on inhalation noted.  Normal respiratory effort.  ABD:  Rounded, soft, non-tender/non-distended.  Absent bowel sounds.   SKIN:  Warm and dry to touch, no exanthems noted in the visualized areas.      Medications           Data   Results for orders placed or performed  during the hospital encounter of 06/11/17 (from the past 24 hour(s))   Social Work IP Consult    Narrative    RonaldIsaiahCorinne C, LSW     6/27/2017  2:22 PM  Care Transition Initial Assessment -   Reason For Consult: discharge planning, end of life/hospice  Met with. Left VM message for both Hemant and Mane. Spoke to RN   Sienaence at [facility] [phone]  Active Problems:    Respiratory distress         DATA  Lives With: facility resident  Living Arrangements: group home  Description of Support System: Supportive, Involved  Who is your support system?: Facility resident(s)/Staff  Support Assessment: Adequate family and caregiver support,   Adequate social supports. Spoke with RN at [facility]. They were   provided full support for pt prior to admission.   Identified issues/concerns regarding health management: PT is now   comfort care.  staff would like to attend meeting for dc   planning. They are aware that Hospice is the recommendation for   pt at this time.        Resources List: Hospice     Quality Of Family Relationships: supportive  Transportation Available:  (stretcher)      ASSESSMENT  Pt has 2 co decision makers for HCD. Left Vm message for both pre   conversation with Pal-Care team.  PT is able to return to his    setting once Hospice has signed onto care.  Jessica RN from [facility]   would also like to attend to coordinate needs for for pt.   Called [facility] and set up meeting for tomorrow at 9:30    [facility] has RN on site m-f from 8am-12:00 than from 4pm-8am  Weekends, evening and 12-4 pt is care for by PCA support when RN   staff not there  PLAN  Financial costs for the patient includes Pt has medicare. For   Hospice support.  .  Patient given options and choices for discharge Family would like   pt to return to his current  setting  .  Discharge Planner   Discharge Plans in progress: Hospice meeting set up for Wed @   9:30  Barriers to discharge plan: medical stability for dc  Follow up plan:  Waiting for call back from both Decision makers   about time        Entered by: Corinne C. White 06/27/2017 11:24 AM       CM: Ramona PABON  [phone] from [facility] called about   an update on pt and his needs.

## 2017-06-28 NOTE — CONSULTS
[facility] Consult    Writer and RN Sandra met with pts family in a private conference room for a hospice consult this morning.  Reviewed info about our philosophy of care, medicare benefit, and available services.  Pts family is still working on discharge plans as the group home has decided that they are unable to accept him back.  Coordinated care with unit ANGEL Leo who has made referrals to several local SNFs.  Family plans to tour after our meeting and pt will likely discharge to their facility of choice before the end of the day today.  TORI Delaney will complete updated POLST with pts family before discharge takes place.  Niece/guardian has signed consents for hospice election upon hospital discharge and we are planning to meet the pt at the SNF he goes to tomorrow morning to complete the admission process.  We can also order O2 to be delivered to the facility upon his arrival if that is necessary.    Thank you for this referral and please call us with updates or changes to this plan.    MANDY Ibrahim, [facility]  [phone]

## 2017-06-28 NOTE — PLAN OF CARE
Problem: Goal Outcome Summary  Goal: Goal Outcome Summary  Outcome: Declining  Patient nonverbal, opens eyes spontaneously.  Restless at times, oxycodone 5mg x2, patient rests calmly after.  Continues on comfort cares. O2 3L/NC for comfort.  GJ tubes to drainage, thin brown output both tubes.  Coccyx wound cares done x2 today, small smear liquid stool x1.  Anuric.  Generalized edema +1, RUE/hand +3-4, LUE +3.  HOB 30 degrees, turned and repositioned prn.  HD access dressing c/d/i.  Patient transferred to [facility] via [facility].  J tube clamped for transfer as collection tubing repeatedly coming off with repositioning/movement.  Called [facility], spoke to nurse Sheffield and updated to reattach to gravity drainage when arrives, new drainage bag sent with patient.

## 2017-06-28 NOTE — PROGRESS NOTES
[facility]  Palliative Care Progress Note  Text Page     Joined family care conference with [facility] and unit  Corinne White, LSW in the fifth floor conference room. The patient's co-guardians; his niece and nephew Hemant and Mane Ramos along with Hesham's sister and another nephew had questions in regard to the current status of the patient. We discussed Hesham's comorbidities at length. They are insistent in wanting current lab data, so I paged Hospitalist Hesham Todd MD for this request. The family does not want any possible option for restorative intervention to be overlooked. They plan to look at LTC facilities today with a plan to dismiss with the support of [facility].      Oxygen Saturation at 92% on room air. Reasonable to dismiss without supplemental oxygen.      Total time: 9:40 AM until 10:35 AM in assessment of patient and discussion with family.    Elaina Moeller MS, RN, CNS, APRN, ACHPN, FAACVPR  Pain and Palliative Care  Pager [phone]  Office [phone]

## 2017-06-28 NOTE — PROGRESS NOTES
Your information has been submitted on June 28th, 2017 at 02:10:29 PM CDT. The confirmation number is [id number]

## 2017-06-29 NOTE — DISCHARGE SUMMARY
[facility]    Discharge Summary  Hospitalist    Date of Admission:  6/11/2017  Date of Discharge:  6/28/2017  3:19 PM  Discharging Provider: Hesham Todd MD    Discharge Diagnoses   1.  Acute respiratory failure due to recurrent aspiration pneumonia  2.  Small bowel obstruction  3.  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  Dysphagia with feeding tube  5.  Septic shock due to aspiration pneumonia  6.  ESRD on HD      History of Present Illness   Hesham Ramos is a 67 year old male who was admitted on 6/11/2017. Patient with a history of prior debilitating strokes, sz d/o, htn/diastolic CHF, ESRD on HD, and significantly debilitated who lives in a group home.  He had a feeding tube placed approximately 3 months ago for dysphagia, and has continued to decline since that time.  He was admitted on 6/11/2017 with hypoxia/fever consistent with aspiration pna complicated by sepsis (leukocytosis/lactic acidosis/fever) which progressed to septic shock necessitating norepi/vasopressin support and intubation on 6/12 for respiratory failure. He was been weaned from pressors on 6/15/17 But again was restarted on levophed 6/19 as he was hypotensive during dialysis. He remained intubated but with minimal ventilatory support undergoing daily weans that are limited by tachypnea/low tidal volumes.   During this admission he had  BUN upto 150s, despite HD, and hgb has been drifting down, and dark stools have been noted, all suspicious for slow GIB.  He has required 2 prbc units.  His TFs had been resumed to challenge the G-tube, he had been transitioned up to goal rate, noted to have 150cc residual and before it had be stopped had large emesis occurring 6/16/17, subsequently developed tachycardia and fever consistent with another aspiration event, with e/o sepsis with hypotension/fever/worsening leukocytosis. TF held for 24 hours and resumed 6/17 at trophic rate, and on 6/19 had another  aspiration event with evidence of bowel obstruction. Tube feeds were discontinued. 6/20 CT abd  Showed transition point and surgery was consulted. J tube is suctioned and surgery will follow. Overnight patient had increased need for pressors. Palliative team and ICU team had been discussing goals of therapy with family especially that family would not like a trach at this time. They decided to pursue a comfort care approach and he was extubated on 6/26.  He has maintained his respirations and was discharged to nursing home on comfort cares with hospice management.        Hesham Todd MD        Pending Results   none    Code Status   DNR / DNI       Primary Care Physician   Fabien Sherman MD    Physical Exam                      Vitals:    06/24/17 0000 06/25/17 0000 06/26/17 0000   Weight: 61.5 kg (135 lb 9.3 oz) 59.5 kg (131 lb 2.8 oz) 59.4 kg (130 lb 15.3 oz)     Vital Signs with Ranges     I/O last 3 completed shifts:  In: 42.5 [I.V.:42.5]  Out: 1075 [Emesis/NG output:1075]    Discharge Disposition   Admited to hospice care.  .    Condition at discharge: Terminal    Consultations This Hospital Stay   NEPHROLOGY IP CONSULT  PALLIATIVE CARE ADULT IP CONSULT  WOUND OSTOMY CONTINENCE NURSE  IP CONSULT  PHARMACY IP CONSULT  NUTRITION SERVICES ADULT IP CONSULT  PHARMACY IP CONSULT  NUTRITION SERVICES ADULT IP CONSULT  SURGERY GENERAL IP CONSULT  PHARMACY IP CONSULT  PHARMACY/NUTRITION TO START AND MANAGE TPN  PHARMACY IP CONSULT  INFECTIOUS DISEASES IP CONSULT  PHARMACY IP CONSULT  PHARMACY IP CONSULT  SOCIAL WORK IP CONSULT  SPIRITUAL HEALTH SERVICES IP CONSULT    Time Spent on this Encounter   I discussed discharge with Hesham Ramos.  I, Hesham Todd, personally saw the patient today and spent greater than 30 minutes discharging this patient.    Discharge Orders     General info for SNF   Length of Stay Estimate: Long Term Care  Condition at Discharge: Declining  Level of care:skilled    Rehabilitation Potential: Poor  Admission H&P remains valid and up-to-date: Yes  Recent Chemotherapy: N/A  Use Nursing Home Standing Orders: Yes     Mantoux instructions   Give two-step Mantoux (PPD) Per Facility Policy Yes     Discharge Instructions   Comfort cares, hospice to follow.     DNR/DNI     Advance Diet as Tolerated   Follow this diet upon discharge: may eat for comfort       Discharge Medications   Discharge Medication List as of 6/28/2017  2:42 PM      START taking these medications    Details   !! MEDICATION INSTRUCTION If care facility cannot accept or use ranges, facility is instructed to use lower end of dosing range, No Print Out      atropine 1 % ophthalmic solution Take 2 drops by mouth, place under tongue or place inside cheek every 2 hours as needed for other (terminal respiratory secretions) Not for ophthalmic use., Disp-5 mL, R-1, Local Print      acetaminophen (TYLENOL) 650 MG Suppository Place 1 suppository (650 mg) rectally every 4 hours as needed for fever or mild pain (Do not exceed 4000 mg total acetaminophen per day.) Unwrap prior to insertion., Disp-12 suppository, R-1, Local Print      !! MEDICATION INSTRUCTION If care facility cannot accept or use ranges, facility is instructed to use lower end of dosing range, No Print Out      !! MEDICATION INSTRUCTION If care facility cannot accept or use ranges, facility is instructed to use lower end of dosing range, No Print Out      HYDROmorphone (DILAUDID) 2 MG tablet Take 1 tablet (2 mg) by mouth or place under tongue every 2 hours as needed for moderate to severe pain (and/or shortness of breath or respiratory rate greater than 20.) Need to be bubble packed for long term care, Disp-90 tablet, R-0, Local Print      LORazepam (ATIVAN) 0.5 MG tablet Take 2 tablets (1 mg) by mouth, place under tongue or insert rectally every 4 hours as needed for anxiety or seizures (restlessness), Disp-30 tablet, R-1, Local Print       !! - Potential  duplicate medications found. Please discuss with provider.      CONTINUE these medications which have NOT CHANGED    Details   order for DME 1 wet sling with head support  1 dry sling with head supportDisp-2 each, R-0, Local Print         STOP taking these medications       oxyCODONE (ROXICODONE INTENSOL) 100 MG/5ML (HIGH CONC) solution Comments:   Reason for Stopping:         acetaminophen (TYLENOL) 32 mg/mL solution Comments:   Reason for Stopping:         polyethylene glycol (MIRALAX/GLYCOLAX) Packet Comments:   Reason for Stopping:         cholecalciferol (VITAMIN D/D-VI-SOL) 400 UNIT/ML LIQD liquid Comments:   Reason for Stopping:         Docusate Sodium 150 MG/15ML LIQD Comments:   Reason for Stopping:         DONEPEZIL HYDROCHLORIDE 10 MG TBDP Comments:   Reason for Stopping:         sennosides (SENOKOT) 8.6 MG tablet Comments:   Reason for Stopping:         simvastatin (ZOCOR) 40 MG tablet Comments:   Reason for Stopping:         ACE/ARB NOT PRESCRIBED, INTENTIONAL, Comments:   Reason for Stopping:         BETA BLOCKER NOT PRESCRIBED, INTENTIONAL, Comments:   Reason for Stopping:         multivitamins with minerals (CERTAVITE/CEROVITE) LIQD liquid Comments:   Reason for Stopping:         ASPIRIN PO Comments:   Reason for Stopping:         phenytoin (DILANTIN) 125 MG/5ML suspension Comments:   Reason for Stopping:         triamcinolone (KENALOG) 0.1 % cream Comments:   Reason for Stopping:             Allergies   No Known Allergies

## 2017-07-01 LAB
Lab: NORMAL
MICRO REPORT STATUS: NORMAL
SPECIMEN SOURCE: NORMAL
YEAST SPEC QL CULT: NO GROWTH

## 2019-04-01 NOTE — ADDENDUM NOTE
Addended by: DIMITRI SEWELL on: 4/26/2017 04:10 PM     Modules accepted: Orders    
Addended by: LYDIA ALMANZA on: 4/28/2017 08:20 AM     Modules accepted: Orders    
0 = independent

## 2024-05-28 NOTE — TELEPHONE ENCOUNTER
1.  LM for Maritza (staff) to call back. Let her know we are in a holding pattern until we get input from nutritionist re: enteral feedings. We have [facility] infusion paperwork that needs to be completed after the nutritionist consult.    2,  Generated nutrition referral, someone to see pt while in care suites tomorrow per [facility] Cuellar.  Gtube placement scheduled for 10:00 a.m. 3/28 ZAYRA Forbes R.N.    
Can we ask if home care could assist with this   Pharmacy to start feedings and dose   And nursing to teach and assess GT initiation   Then home care can decide supplies needed and formula   Let me know and I can place an order   Dr Sherman said he would sign for supplies and home needs   
Can we call dietician at hospital and see if they would give any recommendations?   Home care infusion order would include having dietician to decide on enteral feeding   
Left message to call back.    
Maritza (from pt's group home ph#[phone], fax#[phone]) called. Stated pt had a GT tube placed, they now need order from [facility] for at GT supplies.   
Maritza calls back. She states he is getting his G-tube on Tuesday, so will need orders for all his meds to be converted to liquid, or to be crushed.   They also need order for syringes for flushing, [facility].    They also need a Pump. She is not sure who supplies this.       
See 3/30 phone note   
Seven @ [facility] intake calling ([phone]).  Checked pt's benefits and since he meets Medicare's criteria for home enteral feedings, need to go thru diff facility (cannot go thru [facility]).  (If he didn't meet Medicare's criteria, then [facility] home infusion could bill secondary insur = [employer] and enteral feedings could have been done by [facility] home infusion.)    Pt has to go thru either [facility] infusion ([phone]) or [facility] infusion ([phone]).    Please advise, thanks.  
Spoke with [facility] aspen Huggins who directed me to [facility].  Spoke with Acosta in infusion and he will be checking patient's benefits and calling us back.  States they should be able to have someone go out to see patient tomorrow after tube placement and do some teaching with family/staff.  TEJAL Forbes R.N.    
Unable to connect with 2 [facility] phone numbers, ended up speaking with a call center in [facility] and they were not able to help with enteral feeding.  Call placed to [facility], spoke with Staci at [facility] infusion intake ([phone]), she is faxing over a Medicare documentation check list of what they will need as well as a copy of enteral rx form.  A dietician recommendation is needed to determine the formula he will need.  Usually this is done in-patient after trying 1 or more formulas to determine patients needs.  They do not have a dietician or pharmacist that does that.  TEJAL Forbes R.N.    
What orders do they need?   They need dietician  to recommend feeding   
meds were changed to GT route   Home care order placed for feeding assistance on formula and dose and for post GT care and teaching   
no